# Patient Record
Sex: MALE | Race: WHITE | Employment: OTHER | URBAN - METROPOLITAN AREA
[De-identification: names, ages, dates, MRNs, and addresses within clinical notes are randomized per-mention and may not be internally consistent; named-entity substitution may affect disease eponyms.]

---

## 2018-06-18 ENCOUNTER — HOSPITAL ENCOUNTER (INPATIENT)
Age: 68
LOS: 3 days | Discharge: HOME | DRG: 637 | End: 2018-06-21
Attending: EMERGENCY MEDICINE | Admitting: INTERNAL MEDICINE
Payer: COMMERCIAL

## 2018-06-18 DIAGNOSIS — E13.10 DIABETIC KETOACIDOSIS WITHOUT COMA ASSOCIATED WITH OTHER SPECIFIED DIABETES MELLITUS (HCC): Primary | ICD-10-CM

## 2018-06-18 DIAGNOSIS — K92.2 GASTROINTESTINAL HEMORRHAGE, UNSPECIFIED GASTROINTESTINAL HEMORRHAGE TYPE: ICD-10-CM

## 2018-06-18 PROBLEM — E87.29 HIGH ANION GAP METABOLIC ACIDOSIS: Status: ACTIVE | Noted: 2018-06-18

## 2018-06-18 PROBLEM — E11.10 DKA, TYPE 2, NOT AT GOAL (HCC): Status: ACTIVE | Noted: 2018-06-18

## 2018-06-18 PROBLEM — E11.10 DKA (DIABETIC KETOACIDOSIS) (HCC): Status: ACTIVE | Noted: 2018-06-18

## 2018-06-18 PROBLEM — E11.10 DKA, TYPE 2, NOT AT GOAL (HCC): Status: RESOLVED | Noted: 2018-06-18 | Resolved: 2018-06-18

## 2018-06-18 PROBLEM — D64.9 ANEMIA: Status: ACTIVE | Noted: 2018-06-18

## 2018-06-18 LAB
ABO/RH: NORMAL
ACETAMINOPHEN LEVEL: <5 MCG/ML (ref 10–30)
ALBUMIN SERPL-MCNC: 3.6 G/DL (ref 3.5–5.2)
ALP BLD-CCNC: 52 U/L (ref 40–129)
ALT SERPL-CCNC: 62 U/L (ref 0–40)
AMPHETAMINE SCREEN, URINE: NOT DETECTED
ANION GAP SERPL CALCULATED.3IONS-SCNC: 12 MMOL/L (ref 7–16)
ANION GAP SERPL CALCULATED.3IONS-SCNC: 23 MMOL/L (ref 7–16)
ANTIBODY SCREEN: NORMAL
APTT: 21.6 SEC (ref 24.5–35.1)
AST SERPL-CCNC: 49 U/L (ref 0–39)
BARBITURATE SCREEN URINE: NOT DETECTED
BASOPHILS ABSOLUTE: 0.06 E9/L (ref 0–0.2)
BASOPHILS RELATIVE PERCENT: 0.6 % (ref 0–2)
BENZODIAZEPINE SCREEN, URINE: NOT DETECTED
BETA-HYDROXYBUTYRATE: >4.5 MMOL/L (ref 0.02–0.27)
BILIRUB SERPL-MCNC: 0.7 MG/DL (ref 0–1.2)
BILIRUBIN URINE: NEGATIVE
BLOOD, URINE: NEGATIVE
BUN BLDV-MCNC: 30 MG/DL (ref 8–23)
BUN BLDV-MCNC: 46 MG/DL (ref 8–23)
CALCIUM SERPL-MCNC: 8.1 MG/DL (ref 8.6–10.2)
CALCIUM SERPL-MCNC: 9.5 MG/DL (ref 8.6–10.2)
CANNABINOID SCREEN URINE: NOT DETECTED
CHLORIDE BLD-SCNC: 101 MMOL/L (ref 98–107)
CHLORIDE BLD-SCNC: 89 MMOL/L (ref 98–107)
CHP ED QC CHECK: NORMAL
CHP ED QC CHECK: NORMAL
CLARITY: CLEAR
CO2: 17 MMOL/L (ref 22–29)
CO2: 21 MMOL/L (ref 22–29)
COCAINE METABOLITE SCREEN URINE: NOT DETECTED
COLOR: YELLOW
CREAT SERPL-MCNC: 0.7 MG/DL (ref 0.7–1.2)
CREAT SERPL-MCNC: 0.7 MG/DL (ref 0.7–1.2)
EOSINOPHILS ABSOLUTE: 0.01 E9/L (ref 0.05–0.5)
EOSINOPHILS RELATIVE PERCENT: 0.1 % (ref 0–6)
ETHANOL: <10 MG/DL (ref 0–0.08)
FERRITIN: 36 NG/ML
FOLATE: >20 NG/ML (ref 4.8–24.2)
GFR AFRICAN AMERICAN: >60
GFR AFRICAN AMERICAN: >60
GFR NON-AFRICAN AMERICAN: >60 ML/MIN/1.73
GFR NON-AFRICAN AMERICAN: >60 ML/MIN/1.73
GLUCOSE BLD-MCNC: 153 MG/DL (ref 74–109)
GLUCOSE BLD-MCNC: 441 MG/DL
GLUCOSE BLD-MCNC: 470 MG/DL (ref 74–109)
GLUCOSE BLD-MCNC: 499 MG/DL
GLUCOSE URINE: >=1000 MG/DL
HBA1C MFR BLD: 13.3 % (ref 4.8–5.9)
HCT VFR BLD CALC: 21.6 % (ref 37–54)
HCT VFR BLD CALC: 24.8 % (ref 37–54)
HEMOGLOBIN: 7.6 G/DL (ref 12.5–16.5)
HEMOGLOBIN: 8.8 G/DL (ref 12.5–16.5)
IMMATURE GRANULOCYTES #: 0.06 E9/L
IMMATURE GRANULOCYTES %: 0.6 % (ref 0–5)
IMMATURE RETIC FRACT: 19.3 % (ref 2.3–13.4)
INR BLD: 1
IRON SATURATION: 55 % (ref 20–55)
IRON: 174 MCG/DL (ref 59–158)
KETONES, URINE: >=80 MG/DL
LACTIC ACID: 1.7 MMOL/L (ref 0.5–2.2)
LEUKOCYTE ESTERASE, URINE: NEGATIVE
LYMPHOCYTES ABSOLUTE: 1.18 E9/L (ref 1.5–4)
LYMPHOCYTES RELATIVE PERCENT: 12.1 % (ref 20–42)
MAGNESIUM: 1.8 MG/DL (ref 1.6–2.6)
MAGNESIUM: 2 MG/DL (ref 1.6–2.6)
MCH RBC QN AUTO: 34.4 PG (ref 26–35)
MCH RBC QN AUTO: 34.4 PG (ref 26–35)
MCHC RBC AUTO-ENTMCNC: 35.2 % (ref 32–34.5)
MCHC RBC AUTO-ENTMCNC: 35.5 % (ref 32–34.5)
MCV RBC AUTO: 96.9 FL (ref 80–99.9)
MCV RBC AUTO: 97.7 FL (ref 80–99.9)
METER GLUCOSE: 161 MG/DL (ref 70–110)
METER GLUCOSE: 161 MG/DL (ref 70–110)
METER GLUCOSE: 164 MG/DL (ref 70–110)
METER GLUCOSE: 174 MG/DL (ref 70–110)
METER GLUCOSE: 193 MG/DL (ref 70–110)
METER GLUCOSE: 253 MG/DL (ref 70–110)
METER GLUCOSE: 320 MG/DL (ref 70–110)
METER GLUCOSE: 366 MG/DL (ref 70–110)
METER GLUCOSE: 405 MG/DL (ref 70–110)
METER GLUCOSE: 441 MG/DL (ref 70–110)
METER GLUCOSE: 499 MG/DL (ref 70–110)
METHADONE SCREEN, URINE: NOT DETECTED
MONOCYTES ABSOLUTE: 0.66 E9/L (ref 0.1–0.95)
MONOCYTES RELATIVE PERCENT: 6.8 % (ref 2–12)
NEUTROPHILS ABSOLUTE: 7.78 E9/L (ref 1.8–7.3)
NEUTROPHILS RELATIVE PERCENT: 79.8 % (ref 43–80)
NITRITE, URINE: NEGATIVE
OPIATE SCREEN URINE: NOT DETECTED
PDW BLD-RTO: 12.8 FL (ref 11.5–15)
PDW BLD-RTO: 13.2 FL (ref 11.5–15)
PH UA: 5.5 (ref 5–9)
PHENCYCLIDINE SCREEN URINE: NOT DETECTED
PHOSPHORUS: 3.3 MG/DL (ref 2.5–4.5)
PHOSPHORUS: 3.8 MG/DL (ref 2.5–4.5)
PLATELET # BLD: 124 E9/L (ref 130–450)
PLATELET # BLD: 155 E9/L (ref 130–450)
PMV BLD AUTO: 10.3 FL (ref 7–12)
PMV BLD AUTO: 9.8 FL (ref 7–12)
POTASSIUM SERPL-SCNC: 3.9 MMOL/L (ref 3.5–5)
POTASSIUM SERPL-SCNC: 4.9 MMOL/L (ref 3.5–5)
PROPOXYPHENE SCREEN: NOT DETECTED
PROTEIN UA: NEGATIVE MG/DL
PROTHROMBIN TIME: 12 SEC (ref 9.3–12.4)
RBC # BLD: 2.21 E12/L (ref 3.8–5.8)
RBC # BLD: 2.56 E12/L (ref 3.8–5.8)
RETIC HGB EQUIVALENT: 38.7 PG (ref 28.2–36.6)
RETICULOCYTE ABSOLUTE COUNT: 0.09 E12/L
RETICULOCYTE COUNT PCT: 3.7 % (ref 0.4–1.9)
SALICYLATE, SERUM: <0.3 MG/DL (ref 0–30)
SODIUM BLD-SCNC: 129 MMOL/L (ref 132–146)
SODIUM BLD-SCNC: 134 MMOL/L (ref 132–146)
SPECIFIC GRAVITY UA: 1.01 (ref 1–1.03)
TOTAL IRON BINDING CAPACITY: 315 MCG/DL (ref 250–450)
TOTAL PROTEIN: 6 G/DL (ref 6.4–8.3)
TRICYCLIC ANTIDEPRESSANTS SCREEN SERUM: NEGATIVE NG/ML
TROPONIN: <0.01 NG/ML (ref 0–0.03)
UROBILINOGEN, URINE: 0.2 E.U./DL
VITAMIN B-12: >2000 PG/ML (ref 211–946)
WBC # BLD: 8.6 E9/L (ref 4.5–11.5)
WBC # BLD: 9.8 E9/L (ref 4.5–11.5)

## 2018-06-18 PROCEDURE — 86850 RBC ANTIBODY SCREEN: CPT

## 2018-06-18 PROCEDURE — 6360000002 HC RX W HCPCS: Performed by: PREVENTIVE MEDICINE

## 2018-06-18 PROCEDURE — 83605 ASSAY OF LACTIC ACID: CPT

## 2018-06-18 PROCEDURE — 83550 IRON BINDING TEST: CPT

## 2018-06-18 PROCEDURE — C9113 INJ PANTOPRAZOLE SODIUM, VIA: HCPCS | Performed by: PREVENTIVE MEDICINE

## 2018-06-18 PROCEDURE — 2580000003 HC RX 258: Performed by: EMERGENCY MEDICINE

## 2018-06-18 PROCEDURE — 82607 VITAMIN B-12: CPT

## 2018-06-18 PROCEDURE — 82010 KETONE BODYS QUAN: CPT

## 2018-06-18 PROCEDURE — G0480 DRUG TEST DEF 1-7 CLASSES: HCPCS

## 2018-06-18 PROCEDURE — 36415 COLL VENOUS BLD VENIPUNCTURE: CPT

## 2018-06-18 PROCEDURE — 81003 URINALYSIS AUTO W/O SCOPE: CPT

## 2018-06-18 PROCEDURE — 6370000000 HC RX 637 (ALT 250 FOR IP): Performed by: EMERGENCY MEDICINE

## 2018-06-18 PROCEDURE — 80048 BASIC METABOLIC PNL TOTAL CA: CPT

## 2018-06-18 PROCEDURE — 87040 BLOOD CULTURE FOR BACTERIA: CPT

## 2018-06-18 PROCEDURE — 99285 EMERGENCY DEPT VISIT HI MDM: CPT

## 2018-06-18 PROCEDURE — 82728 ASSAY OF FERRITIN: CPT

## 2018-06-18 PROCEDURE — 86901 BLOOD TYPING SEROLOGIC RH(D): CPT

## 2018-06-18 PROCEDURE — 80053 COMPREHEN METABOLIC PANEL: CPT

## 2018-06-18 PROCEDURE — 6360000002 HC RX W HCPCS: Performed by: EMERGENCY MEDICINE

## 2018-06-18 PROCEDURE — 85045 AUTOMATED RETICULOCYTE COUNT: CPT

## 2018-06-18 PROCEDURE — 85730 THROMBOPLASTIN TIME PARTIAL: CPT

## 2018-06-18 PROCEDURE — 83735 ASSAY OF MAGNESIUM: CPT

## 2018-06-18 PROCEDURE — 87081 CULTURE SCREEN ONLY: CPT

## 2018-06-18 PROCEDURE — 85027 COMPLETE CBC AUTOMATED: CPT

## 2018-06-18 PROCEDURE — 6360000002 HC RX W HCPCS: Performed by: INTERNAL MEDICINE

## 2018-06-18 PROCEDURE — 2000000000 HC ICU R&B

## 2018-06-18 PROCEDURE — 80307 DRUG TEST PRSMV CHEM ANLYZR: CPT

## 2018-06-18 PROCEDURE — 82962 GLUCOSE BLOOD TEST: CPT

## 2018-06-18 PROCEDURE — 87088 URINE BACTERIA CULTURE: CPT

## 2018-06-18 PROCEDURE — 2580000003 HC RX 258: Performed by: INTERNAL MEDICINE

## 2018-06-18 PROCEDURE — 2500000003 HC RX 250 WO HCPCS: Performed by: EMERGENCY MEDICINE

## 2018-06-18 PROCEDURE — 83540 ASSAY OF IRON: CPT

## 2018-06-18 PROCEDURE — 84484 ASSAY OF TROPONIN QUANT: CPT

## 2018-06-18 PROCEDURE — 82746 ASSAY OF FOLIC ACID SERUM: CPT

## 2018-06-18 PROCEDURE — 83036 HEMOGLOBIN GLYCOSYLATED A1C: CPT

## 2018-06-18 PROCEDURE — 86900 BLOOD TYPING SEROLOGIC ABO: CPT

## 2018-06-18 PROCEDURE — 85025 COMPLETE CBC W/AUTO DIFF WBC: CPT

## 2018-06-18 PROCEDURE — 85610 PROTHROMBIN TIME: CPT

## 2018-06-18 PROCEDURE — 84100 ASSAY OF PHOSPHORUS: CPT

## 2018-06-18 RX ORDER — MAGNESIUM SULFATE 1 G/100ML
1 INJECTION INTRAVENOUS PRN
Status: DISCONTINUED | OUTPATIENT
Start: 2018-06-18 | End: 2018-06-19

## 2018-06-18 RX ORDER — 0.9 % SODIUM CHLORIDE 0.9 %
1000 INTRAVENOUS SOLUTION INTRAVENOUS ONCE
Status: COMPLETED | OUTPATIENT
Start: 2018-06-18 | End: 2018-06-18

## 2018-06-18 RX ORDER — DEXTROSE MONOHYDRATE 25 G/50ML
12.5 INJECTION, SOLUTION INTRAVENOUS PRN
Status: DISCONTINUED | OUTPATIENT
Start: 2018-06-18 | End: 2018-06-21 | Stop reason: HOSPADM

## 2018-06-18 RX ORDER — SODIUM CHLORIDE 9 MG/ML
INJECTION, SOLUTION INTRAVENOUS CONTINUOUS
Status: DISCONTINUED | OUTPATIENT
Start: 2018-06-18 | End: 2018-06-18

## 2018-06-18 RX ORDER — SODIUM CHLORIDE 0.9 % (FLUSH) 0.9 %
10 SYRINGE (ML) INJECTION EVERY 12 HOURS SCHEDULED
Status: DISCONTINUED | OUTPATIENT
Start: 2018-06-18 | End: 2018-06-21 | Stop reason: HOSPADM

## 2018-06-18 RX ORDER — SODIUM CHLORIDE 9 MG/ML
INJECTION, SOLUTION INTRAVENOUS CONTINUOUS
Status: DISCONTINUED | OUTPATIENT
Start: 2018-06-18 | End: 2018-06-19

## 2018-06-18 RX ORDER — PANTOPRAZOLE SODIUM 40 MG/10ML
40 INJECTION, POWDER, LYOPHILIZED, FOR SOLUTION INTRAVENOUS DAILY
Status: DISCONTINUED | OUTPATIENT
Start: 2018-06-18 | End: 2018-06-18

## 2018-06-18 RX ORDER — DEXTROSE MONOHYDRATE 25 G/50ML
12.5 INJECTION, SOLUTION INTRAVENOUS PRN
Status: DISCONTINUED | OUTPATIENT
Start: 2018-06-18 | End: 2018-06-18 | Stop reason: SDUPTHER

## 2018-06-18 RX ORDER — ASCORBIC ACID 500 MG
1000 TABLET ORAL DAILY
COMMUNITY

## 2018-06-18 RX ORDER — 0.9 % SODIUM CHLORIDE 0.9 %
15 INTRAVENOUS SOLUTION INTRAVENOUS ONCE
Status: COMPLETED | OUTPATIENT
Start: 2018-06-18 | End: 2018-06-18

## 2018-06-18 RX ORDER — NICOTINE POLACRILEX 4 MG
15 LOZENGE BUCCAL PRN
Status: DISCONTINUED | OUTPATIENT
Start: 2018-06-18 | End: 2018-06-21 | Stop reason: HOSPADM

## 2018-06-18 RX ORDER — CHOLECALCIFEROL (VITAMIN D3) 125 MCG
500 CAPSULE ORAL DAILY
COMMUNITY

## 2018-06-18 RX ORDER — DEXTROSE MONOHYDRATE 50 MG/ML
100 INJECTION, SOLUTION INTRAVENOUS PRN
Status: DISCONTINUED | OUTPATIENT
Start: 2018-06-18 | End: 2018-06-21 | Stop reason: HOSPADM

## 2018-06-18 RX ORDER — ONDANSETRON 2 MG/ML
4 INJECTION INTRAMUSCULAR; INTRAVENOUS EVERY 6 HOURS PRN
Status: DISCONTINUED | OUTPATIENT
Start: 2018-06-18 | End: 2018-06-21 | Stop reason: HOSPADM

## 2018-06-18 RX ORDER — VITAMIN E 268 MG
400 CAPSULE ORAL DAILY
COMMUNITY

## 2018-06-18 RX ORDER — PANTOPRAZOLE SODIUM 40 MG/10ML
40 INJECTION, POWDER, LYOPHILIZED, FOR SOLUTION INTRAVENOUS 2 TIMES DAILY
Status: DISCONTINUED | OUTPATIENT
Start: 2018-06-18 | End: 2018-06-21 | Stop reason: HOSPADM

## 2018-06-18 RX ORDER — CHLORAL HYDRATE 500 MG
1000 CAPSULE ORAL DAILY
COMMUNITY

## 2018-06-18 RX ORDER — POTASSIUM CHLORIDE 7.45 MG/ML
10 INJECTION INTRAVENOUS PRN
Status: DISCONTINUED | OUTPATIENT
Start: 2018-06-18 | End: 2018-06-19

## 2018-06-18 RX ORDER — DEXTROSE, SODIUM CHLORIDE, AND POTASSIUM CHLORIDE 5; .45; .15 G/100ML; G/100ML; G/100ML
INJECTION INTRAVENOUS CONTINUOUS PRN
Status: DISCONTINUED | OUTPATIENT
Start: 2018-06-18 | End: 2018-06-19

## 2018-06-18 RX ORDER — VITAMIN B COMPLEX
1 CAPSULE ORAL DAILY
COMMUNITY

## 2018-06-18 RX ORDER — SODIUM CHLORIDE 0.9 % (FLUSH) 0.9 %
10 SYRINGE (ML) INJECTION PRN
Status: DISCONTINUED | OUTPATIENT
Start: 2018-06-18 | End: 2018-06-21 | Stop reason: HOSPADM

## 2018-06-18 RX ADMIN — POTASSIUM CHLORIDE 10 MEQ: 10 INJECTION, SOLUTION INTRAVENOUS at 21:00

## 2018-06-18 RX ADMIN — POTASSIUM CHLORIDE 10 MEQ: 10 INJECTION, SOLUTION INTRAVENOUS at 23:00

## 2018-06-18 RX ADMIN — SODIUM CHLORIDE 0.1 UNITS/KG/HR: 9 INJECTION, SOLUTION INTRAVENOUS at 14:14

## 2018-06-18 RX ADMIN — POTASSIUM CHLORIDE 10 MEQ: 10 INJECTION, SOLUTION INTRAVENOUS at 22:00

## 2018-06-18 RX ADMIN — SODIUM CHLORIDE 1000 ML: 9 INJECTION, SOLUTION INTRAVENOUS at 12:03

## 2018-06-18 RX ADMIN — SODIUM CHLORIDE: 9 INJECTION, SOLUTION INTRAVENOUS at 15:03

## 2018-06-18 RX ADMIN — PANTOPRAZOLE SODIUM 40 MG: 40 INJECTION, POWDER, FOR SOLUTION INTRAVENOUS at 15:27

## 2018-06-18 RX ADMIN — PANTOPRAZOLE SODIUM 40 MG: 40 INJECTION, POWDER, FOR SOLUTION INTRAVENOUS at 20:07

## 2018-06-18 RX ADMIN — SODIUM CHLORIDE 1020 ML: 9 INJECTION, SOLUTION INTRAVENOUS at 14:03

## 2018-06-18 RX ADMIN — Medication 10 ML: at 20:07

## 2018-06-18 RX ADMIN — POTASSIUM CHLORIDE, DEXTROSE MONOHYDRATE AND SODIUM CHLORIDE: 150; 5; 450 INJECTION, SOLUTION INTRAVENOUS at 19:20

## 2018-06-18 ASSESSMENT — PAIN SCALES - GENERAL
PAINLEVEL_OUTOF10: 0

## 2018-06-18 NOTE — CONSULTS
Critical Care Admit/Consult Note         Patient - Tania Alvarado   MRN -  [id number]   Acct # - [de-identified]   - 1950      Date of Admission -  2018 11:10 AM  Date of evaluation -  2018   Hospital Day - 0            ADMIT/CONSULT DETAILS     Reason for Admit/Consult   DKA    Consulting Karl Harris MD  Primary Care Physician - MD ROBERTA Farah   The patient is a 75 y.o. male with significant past medical history of diabetes mellitus, hepatitis C, hypertension, hyperlipidemia and right-sided inguinal hernia presenting to the emergency department with polyuria, fatigue and greater than 2 year history of 50 pound weight loss. The patient is the primary historian, he reports he has been treated with metformin for his diabetes for a number of years but that his last hemoglobin A1c was greater than 12. He was previously being managed by his primary care doctor for this but was recently referred to an endocrinologist who recommended he start on Triceeba and Novolin for better blood sugar management. The patient reports he was unable to  his medications from the pharmacy and has not had any of his insulin yet. Upon arrival to the emergency department a urinalysis demonstrated urinary ketones, his blood glucose was greater than 1000, his beta hydroxybutyrate was greater than 4.5 and anion gap was 23. He was initiated on the DKA protocol and started on IV fluid hydration. He was also found to be Hemoccult positive on rectal exam in the emergency department. He states he has not had a colonoscopy. Upon examination patient is asymptomatic other than mild fatigue and polyuria stating that he has been going to the bathroom approximately 12 times daily but denies any polyphagia or polydipsia. He denies any nausea, vomiting, diarrhea, hematochezia, melena, hematuria, chest pain, shortness of breath.          Past Medical History         Diagnosis Date 98.2 °F (36.8 °C)  Max: 98.2 °F (36.8 °C)  BP Range:  Systolic (72HIW), RQE:784 , Min:118 , LQT:068     Diastolic (66YZU), MUU:45, Min:67, Max:67    Pulse Range: Pulse  Av  Min: 100  Max: 100  Respiration Range: Resp  Av  Min: 18  Max: 18  Current Pulse Ox[de-identified]  SpO2: 100 %  24HR Pulse Ox Range:  SpO2  Av %  Min: 100 %  Max: 100 %  Oxygen Amount and Delivery:        I/O (24 Hours)    Patient Vitals for the past 8 hrs:   BP Temp Temp src Pulse Resp SpO2 Height Weight   18 1126 118/67 98.2 °F (36.8 °C) Oral 100 18 100 % 5' 10\" (1.778 m) 150 lb (68 kg)     No intake or output data in the 24 hours ending 18 1355  No intake/output data recorded. Patient Vitals for the past 96 hrs (Last 3 readings):   Weight   18 1126 150 lb (68 kg)         Drains/Tubes Outputs  None  Exam         PHYSICAL EXAM:  CONSTITUTIONAL:  awake, alert, cooperative, no apparent distress, and appears stated age  EYES: pupils equal, round and reactive to light, extra ocular muscles intact, sclera clear, conjunctiva normal  ENT:  Normocephalic, without obvious abnormality, atraumatic, sinuses nontender on palpation, external ears without lesions, oral pharynx with dry mucus membranes, tonsils without erythema or exudates, gums normal and good dentition. NECK:  Supple, symmetrical, trachea midline, no adenopathy, thyroid symmetric, not enlarged and no tenderness, skin normal  LUNGS:  No increased work of breathing, good air exchange, clear to auscultation bilaterally, no crackles or wheezing  CARDIOVASCULAR:  Normal apical impulse, regular rate and rhythm, normal S1 and S2, , and no murmur noted  ABDOMEN:  No scars, normal bowel sounds, soft, non-distended, non-tender, no masses palpated, no hepatosplenomegally  MUSCULOSKELETAL:  There is no redness, warmth, or swelling of the joints. Full range of motion noted. Motor strength is 5 out of 5 all extremities bilaterally.   Tone is normal.  NEUROLOGIC:  Awake, alert, requires no sedation. No acute issues at this time. Respiratory   Exam shows no labored breathing, bilateral lungs are clear to auscultation, SPO2 is within normal limits on room air. No acute issues at this time. Cardiovascular   Cardiac exam is unremarkable, there is no chest x-ray, or EKG. He is asymptomatic at this time. He'll be placed on telemetry. Gastrointestinal   Low-grade transaminitis, however he has a history of hepatitis C from a blood transfusion as a child. He'll be nothing by mouth at this time while on DKA protocol. His hemoglobin is 8.8 and there is no historical data to refer to a baseline. We will start patient on Protonix 40 mg twice daily and consult to Gen. surgery will be placed. Renal   Creatinine is at his baseline at 0.7. We will follow urine output. His urinalysis demonstrated urinary ketones and is otherwise unremarkable. Infectious Disease   There is no leukocytosis, the patient is afebrile. We will continue to monitor these parameters. Hematology/Oncology   Hemoglobin and hematocrit are both low, the patient denies any blood in the stool but was Hemoccult positive. We will consult to Gen. surgery and place patient on Protonix. Endocrine   Current clinical picture is consistent with diabetic ketoacidosis. He has urinary ketones, elevated beta hydroxybutyrate, anion gap of 23, and a blood glucose of 499. He will be initiated on the DKA protocol and received IV fluids according to this. The patient's sodium was also found to be low at 129 but when corrected is greater than 135 consistent with pseudohyponatremia, we'll continue to monitor this. Social/Spiritual/DNR/Other   Patient is full code    MECRED    \"Thank you for asking us to see this complex patient. \"    Total critical care time caring for this patient with life threatening, unstable organ failure, including direct patient contact, management of life support systems, review of data including imaging and 5 minutes    Critical Care Time: > 35 minutes excluding procedures    SIVA Cerda  6/18/2018  4:37 PM

## 2018-06-18 NOTE — ED PROVIDER NOTES
rhonchi. Not in respiratory distress  Cardiovascular:  Regular rate. Regular rhythm. No murmurs, no aortic murmurs, no gallops, or rubs. 2+ distal pulses. Equal extremity pulses. Chest: No chest wall tenderness  GI:  Abdomen Soft, Non tender, Non distended. +BS. No rebound, guarding, or rigidity. No pulsatile masses. Musculoskeletal: Moves all extremities x 4. Warm and well perfused, no clubbing, cyanosis, or edema. Capillary refill <3 seconds  Integument: skin warm and dry. No rashes. Neurologic: GCS 15, no focal deficits, symmetric strength 5/5 in the upper and lower extremities bilaterally  Psychiatric: Normal Affect    Rectal, guaiac positive stool        -------------------------------------------------- RESULTS -------------------------------------------------  I have personally reviewed all laboratory and imaging results for this patient. Results are listed below.      LABS:  Results for orders placed or performed during the hospital encounter of 06/18/18   CBC Auto Differential   Result Value Ref Range    WBC 9.8 4.5 - 11.5 E9/L    RBC 2.56 (L) 3.80 - 5.80 E12/L    Hemoglobin 8.8 (L) 12.5 - 16.5 g/dL    Hematocrit 24.8 (L) 37.0 - 54.0 %    MCV 96.9 80.0 - 99.9 fL    MCH 34.4 26.0 - 35.0 pg    MCHC 35.5 (H) 32.0 - 34.5 %    RDW 12.8 11.5 - 15.0 fL    Platelets 667 711 - 772 E9/L    MPV 10.3 7.0 - 12.0 fL    Neutrophils % 79.8 43.0 - 80.0 %    Immature Granulocytes % 0.6 0.0 - 5.0 %    Lymphocytes % 12.1 (L) 20.0 - 42.0 %    Monocytes % 6.8 2.0 - 12.0 %    Eosinophils % 0.1 0.0 - 6.0 %    Basophils % 0.6 0.0 - 2.0 %    Neutrophils # 7.78 (H) 1.80 - 7.30 E9/L    Immature Granulocytes # 0.06 E9/L    Lymphocytes # 1.18 (L) 1.50 - 4.00 E9/L    Monocytes # 0.66 0.10 - 0.95 E9/L    Eosinophils # 0.01 (L) 0.05 - 0.50 E9/L    Basophils # 0.06 0.00 - 0.20 E9/L   Comprehensive Metabolic Panel   Result Value Ref Range    Sodium 129 (L) 132 - 146 mmol/L    Potassium 4.9 3.5 - 5.0 mmol/L    Chloride 89 (L) 98 - 107 mg/dL   Urine Drug Screen   Result Value Ref Range    Amphetamine Screen, Urine NOT DETECTED Negative <1000 ng/mL    Barbiturate Screen, Ur NOT DETECTED Negative < 200 ng/mL    Benzodiazepine Screen, Urine NOT DETECTED Negative < 200 ng/mL    Cannabinoid Scrn, Ur NOT DETECTED Negative < 50ng/mL    COCAINE METABOLITE SCREEN URINE NOT DETECTED Negative < 300 ng/mL    Opiate Scrn, Ur NOT DETECTED Negative < 300ng/mL    PCP Scrn, Ur NOT DETECTED Negative < 25 ng/mL    Methadone Screen, Urine NOT DETECTED Negative <300 ng/mL    Propoxyphene Scrn, Ur NOT DETECTED Negative <300 ng/mL   Troponin   Result Value Ref Range    Troponin <0.01 0.00 - 0.03 ng/mL   CBC   Result Value Ref Range    WBC 8.6 4.5 - 11.5 E9/L    RBC 2.21 (L) 3.80 - 5.80 E12/L    Hemoglobin 7.6 (L) 12.5 - 16.5 g/dL    Hematocrit 21.6 (L) 37.0 - 54.0 %    MCV 97.7 80.0 - 99.9 fL    MCH 34.4 26.0 - 35.0 pg    MCHC 35.2 (H) 32.0 - 34.5 %    RDW 13.2 11.5 - 15.0 fL    Platelets 736 (L) 520 - 450 E9/L    MPV 9.8 7.0 - 12.0 fL   Protime-INR   Result Value Ref Range    Protime 12.0 9.3 - 12.4 sec    INR 1.0    APTT   Result Value Ref Range    aPTT 21.6 (L) 24.5 - 35.1 sec   Lactic acid, plasma   Result Value Ref Range    Lactic Acid 1.7 0.5 - 2.2 mmol/L   Basic metabolic panel   Result Value Ref Range    Sodium 134 132 - 146 mmol/L    Potassium 3.9 3.5 - 5.0 mmol/L    Chloride 101 98 - 107 mmol/L    CO2 21 (L) 22 - 29 mmol/L    Anion Gap 12 7 - 16 mmol/L    Glucose 153 (H) 74 - 109 mg/dL    BUN 30 (H) 8 - 23 mg/dL    CREATININE 0.7 0.7 - 1.2 mg/dL    GFR Non-African American >60 >=60 mL/min/1.73    GFR African American >60     Calcium 8.1 (L) 8.6 - 10.2 mg/dL   Magnesium   Result Value Ref Range    Magnesium 1.8 1.6 - 2.6 mg/dL   Phosphorus   Result Value Ref Range    Phosphorus 3.3 2.5 - 4.5 mg/dL   POCT Glucose   Result Value Ref Range    Glucose 499 mg/dL    QC OK? ok    POCT Glucose   Result Value Ref Range    Meter Glucose 499 (H) 70 - 110 mg/dL   POCT Intravenous New Bag 6/18/18 2300)   magnesium sulfate 1 g in dextrose 5% 100 mL IVPB (not administered)   sodium phosphate 10 mmol in dextrose 5 % 250 mL IVPB (not administered)     Or   sodium phosphate 15 mmol in dextrose 5 % 250 mL IVPB (not administered)     Or   sodium phosphate 20 mmol in dextrose 5 % 500 mL IVPB (not administered)   sodium chloride flush 0.9 % injection 10 mL (10 mLs Intravenous Given 6/18/18 2007)   sodium chloride flush 0.9 % injection 10 mL (not administered)   ondansetron (ZOFRAN) injection 4 mg (not administered)   glucose (GLUTOSE) 40 % oral gel 15 g (not administered)   dextrose 50 % solution 12.5 g (not administered)   glucagon (rDNA) injection 1 mg (not administered)   dextrose 5 % solution (not administered)   pantoprazole (PROTONIX) injection 40 mg (40 mg Intravenous Given 6/18/18 2007)   0.9 % sodium chloride bolus (0 mLs Intravenous Stopped 6/18/18 1328)              Medical Decision Making:     diabetic ketoacidosis, IV fluids, IV insulin, DKA protocol, needs ICU admission. Also with gastrointestinal bleeding, hemodynamically stable. Re-Evaluations:                  Improving      This patient's ED course included: a personal history and physicial examination, re-evaluation prior to disposition, multiple bedside re-evaluations, IV medications, cardiac monitoring, continuous pulse oximetry and complex medical decision making and emergency management    This patient has remained hemodynamically stable during their ED course. Consultations:  Dr. Jorge Danger  Dr. Chauncey Cotton:  Please note that the withdrawal or failure to initiate urgent interventions for this patient would likely result in a life threatening deterioration or permanent disability. Accordingly this patient received 30 minutes of critical care time, excluding separately billable procedures. Counseling:    The emergency provider has spoken with the patient and family and discussed todays

## 2018-06-18 NOTE — CARE COORDINATION
Social Work/Transition of Care:  Pt is a 74 y/o male who family requested to speak with SW,  SW introduced self and role, pt spouse and daughter at bedside and express concerns over the price of pt Insulin, pt currently has prescription drug coverage but family concerned that he may not be able to afford it. ADALBERTO provided number for [employer] in order for family to follow up.     Electronically signed by Peg Hendrickson on 6/61/4164 at 7:21 PM

## 2018-06-18 NOTE — CONSULTS
GENERAL SURGERY  CONSULT NOTE  6/18/2018    Physician Consulted: Dr. Connie Doyle  Reason for Consult: Anemia r/o GIB  Referring Physician: Dr. Jeanne Montez    HPI  Evelyn Hannah is a 75 y.o. male who presents for evaluation of anemia and GIB. He is admitted to MICU with DKA. Reports several days of darkened stools. Denies hematochezia. He was found to be HOC (+) in ED. He takes no AC at home. He denies abdominal pain, nausea, vomiting, history of reflux. History of HCV known to GI - he has completed treatment for his HCV. Last EGD < 1yr ago by Dr Brant Hughes to eval for varices per patient and colonoscopy < 3yrs ago without significant findings per patient. He denies smoking, Etoh, Nsaids or steroids. He denies fevers, chills, CP SOB. Past Medical History:   Diagnosis Date    Hepatitis C     Hernia, inguinal, right     Hyperlipidemia     Hypertension        Past Surgical History:   Procedure Laterality Date    NASAL SEPTUM SURGERY      TONSILLECTOMY         Medications Prior to Admission:    Prior to Admission medications    Medication Sig Start Date End Date Taking?  Authorizing Provider   metFORMIN (GLUCOPHAGE) 1000 MG tablet Take 1,000 mg by mouth 2 times daily (with meals)   Yes Historical Provider, MD   Omega-3 Fatty Acids (FISH OIL) 1000 MG CAPS Take 1,000 mg by mouth 3 times daily   Yes Historical Provider, MD   vitamin C (ASCORBIC ACID) 500 MG tablet Take 1,000 mg by mouth 3 times daily   Yes Historical Provider, MD   vitamin B-12 (CYANOCOBALAMIN) 500 MCG tablet Take 500 mcg by mouth 3 times daily   Yes Historical Provider, MD   vitamin E 400 UNIT capsule Take 400 Units by mouth 3 times daily   Yes Historical Provider, MD   b complex vitamins capsule Take 1 capsule by mouth 3 times daily   Yes Historical Provider, MD   Calcium-Magnesium-Zinc 500-250-12.5 MG TABS Take 1 tablet by mouth 3 times daily   Yes Historical Provider, MD   Insulin Degludec (TRESIBA FLEXTOUCH) 200 UNIT/ML SOPN Inject 30 Units into the skin nightly    Historical Provider, MD   insulin aspart (NOVOLOG FLEXPEN) 100 UNIT/ML injection pen Inject 6-16 Units into the skin 3 times daily (before meals) 6 units with breakfast and lunch PLUS 2 units for  and above. Increasing by 50   8 units with dinner PLUS 2 units for  and above. Increasing by 50    Historical Provider, MD       No Known Allergies    History reviewed. No pertinent family history. Social History   Substance Use Topics    Smoking status: Never Smoker    Smokeless tobacco: Never Used    Alcohol use No         Review of Systems as above      PHYSICAL EXAM:    Vitals:    06/18/18 1530   BP: 138/71   Pulse: 98   Resp: 21   Temp: 99.1 °F (37.3 °C)   SpO2: 97%       General Appearance:  awake, alert, oriented, in no acute distress  Skin:  Skin color, texture, turgor normal. No rashes or lesions. Head/face:  NCAT  Eyes:  No gross abnormalities. Lungs:  Normal expansion. No increased work of breathing. Heart:  Heart regular rate   Abdomen:  Soft, NT, ND, reducible R inguinal hernia  Extremities: Extremities warm to touch, pink, with no edema. Male Rectal:  HOC (+) melenic stool, no masses    LABS:    CBC  Recent Labs      06/18/18   1208   WBC  9.8   HGB  8.8*   HCT  24.8*   PLT  155     BMP  Recent Labs      06/18/18   1208   NA  129*   K  4.9   CL  89*   CO2  17*   BUN  46*   CREATININE  0.7   CALCIUM  9.5     Liver Function  Recent Labs      06/18/18   1208   BILITOT  0.7   AST  49*   ALT  62*   ALKPHOS  52   PROT  6.0*   LABALBU  3.6     No results for input(s): LACTATE in the last 72 hours. No results for input(s): INR, PTT in the last 72 hours. Invalid input(s): PT    RADIOLOGY    No results found. ASSESSMENT:  75 y.o. male with anemia, GIB.  Admitted with DKA    PLAN:    NPO   IVF  PPI bid  Trend HH - transfuse prn  Check coags, T&S    Will discuss EGD timing per general surgery or GI service likely when DKA resolves      Blayne Woods DO  6/18/18  5:04

## 2018-06-19 ENCOUNTER — ANESTHESIA (OUTPATIENT)
Dept: ENDOSCOPY | Age: 68
DRG: 637 | End: 2018-06-19
Payer: COMMERCIAL

## 2018-06-19 ENCOUNTER — ANESTHESIA EVENT (OUTPATIENT)
Dept: ENDOSCOPY | Age: 68
DRG: 637 | End: 2018-06-19
Payer: COMMERCIAL

## 2018-06-19 VITALS — OXYGEN SATURATION: 100 % | DIASTOLIC BLOOD PRESSURE: 55 MMHG | SYSTOLIC BLOOD PRESSURE: 107 MMHG

## 2018-06-19 LAB
ALBUMIN SERPL-MCNC: 3 G/DL (ref 3.5–5.2)
ALP BLD-CCNC: 42 U/L (ref 40–129)
ALT SERPL-CCNC: 50 U/L (ref 0–40)
ANION GAP SERPL CALCULATED.3IONS-SCNC: 10 MMOL/L (ref 7–16)
ANION GAP SERPL CALCULATED.3IONS-SCNC: 10 MMOL/L (ref 7–16)
ANION GAP SERPL CALCULATED.3IONS-SCNC: 11 MMOL/L (ref 7–16)
ANION GAP SERPL CALCULATED.3IONS-SCNC: 14 MMOL/L (ref 7–16)
AST SERPL-CCNC: 38 U/L (ref 0–39)
BASOPHILS ABSOLUTE: 0.05 E9/L (ref 0–0.2)
BASOPHILS RELATIVE PERCENT: 0.7 % (ref 0–2)
BILIRUB SERPL-MCNC: 0.5 MG/DL (ref 0–1.2)
BUN BLDV-MCNC: 14 MG/DL (ref 8–23)
BUN BLDV-MCNC: 15 MG/DL (ref 8–23)
BUN BLDV-MCNC: 22 MG/DL (ref 8–23)
BUN BLDV-MCNC: 26 MG/DL (ref 8–23)
CALCIUM SERPL-MCNC: 7.6 MG/DL (ref 8.6–10.2)
CALCIUM SERPL-MCNC: 7.8 MG/DL (ref 8.6–10.2)
CALCIUM SERPL-MCNC: 7.9 MG/DL (ref 8.6–10.2)
CALCIUM SERPL-MCNC: 8.4 MG/DL (ref 8.6–10.2)
CHLORIDE BLD-SCNC: 100 MMOL/L (ref 98–107)
CHLORIDE BLD-SCNC: 96 MMOL/L (ref 98–107)
CHLORIDE BLD-SCNC: 98 MMOL/L (ref 98–107)
CHLORIDE BLD-SCNC: 99 MMOL/L (ref 98–107)
CO2: 19 MMOL/L (ref 22–29)
CO2: 21 MMOL/L (ref 22–29)
CO2: 21 MMOL/L (ref 22–29)
CO2: 22 MMOL/L (ref 22–29)
CREAT SERPL-MCNC: 0.5 MG/DL (ref 0.7–1.2)
CREAT SERPL-MCNC: 0.6 MG/DL (ref 0.7–1.2)
EOSINOPHILS ABSOLUTE: 0.14 E9/L (ref 0.05–0.5)
EOSINOPHILS RELATIVE PERCENT: 2 % (ref 0–6)
GFR AFRICAN AMERICAN: >60
GFR NON-AFRICAN AMERICAN: >60 ML/MIN/1.73
GLUCOSE BLD-MCNC: 147 MG/DL (ref 74–109)
GLUCOSE BLD-MCNC: 170 MG/DL (ref 74–109)
GLUCOSE BLD-MCNC: 209 MG/DL (ref 74–109)
GLUCOSE BLD-MCNC: 273 MG/DL (ref 74–109)
HCT VFR BLD CALC: 20 % (ref 37–54)
HCT VFR BLD CALC: 22 % (ref 37–54)
HCT VFR BLD CALC: 25.2 % (ref 37–54)
HEMOGLOBIN: 6.7 G/DL (ref 12.5–16.5)
HEMOGLOBIN: 7.6 G/DL (ref 12.5–16.5)
HEMOGLOBIN: 8.7 G/DL (ref 12.5–16.5)
IMMATURE GRANULOCYTES #: 0.03 E9/L
IMMATURE GRANULOCYTES %: 0.4 % (ref 0–5)
LYMPHOCYTES ABSOLUTE: 1.57 E9/L (ref 1.5–4)
LYMPHOCYTES RELATIVE PERCENT: 22.3 % (ref 20–42)
MAGNESIUM: 1.8 MG/DL (ref 1.6–2.6)
MAGNESIUM: 1.8 MG/DL (ref 1.6–2.6)
MAGNESIUM: 2 MG/DL (ref 1.6–2.6)
MCH RBC QN AUTO: 33.5 PG (ref 26–35)
MCHC RBC AUTO-ENTMCNC: 34.5 % (ref 32–34.5)
MCV RBC AUTO: 96.9 FL (ref 80–99.9)
METER GLUCOSE: 124 MG/DL (ref 70–110)
METER GLUCOSE: 147 MG/DL (ref 70–110)
METER GLUCOSE: 149 MG/DL (ref 70–110)
METER GLUCOSE: 161 MG/DL (ref 70–110)
METER GLUCOSE: 163 MG/DL (ref 70–110)
METER GLUCOSE: 165 MG/DL (ref 70–110)
METER GLUCOSE: 181 MG/DL (ref 70–110)
METER GLUCOSE: 181 MG/DL (ref 70–110)
METER GLUCOSE: 193 MG/DL (ref 70–110)
METER GLUCOSE: 209 MG/DL (ref 70–110)
METER GLUCOSE: 213 MG/DL (ref 70–110)
METER GLUCOSE: 217 MG/DL (ref 70–110)
METER GLUCOSE: 217 MG/DL (ref 70–110)
METER GLUCOSE: 218 MG/DL (ref 70–110)
METER GLUCOSE: 226 MG/DL (ref 70–110)
METER GLUCOSE: 244 MG/DL (ref 70–110)
METER GLUCOSE: 466 MG/DL (ref 70–110)
METER GLUCOSE: >500 MG/DL (ref 70–110)
MONOCYTES ABSOLUTE: 0.83 E9/L (ref 0.1–0.95)
MONOCYTES RELATIVE PERCENT: 11.8 % (ref 2–12)
NEUTROPHILS ABSOLUTE: 4.43 E9/L (ref 1.8–7.3)
NEUTROPHILS RELATIVE PERCENT: 62.8 % (ref 43–80)
PATHOLOGIST REVIEW: NORMAL
PDW BLD-RTO: 13.3 FL (ref 11.5–15)
PHOSPHORUS: 2.3 MG/DL (ref 2.5–4.5)
PHOSPHORUS: 2.7 MG/DL (ref 2.5–4.5)
PHOSPHORUS: 3 MG/DL (ref 2.5–4.5)
PLATELET # BLD: 101 E9/L (ref 130–450)
PMV BLD AUTO: 9.8 FL (ref 7–12)
POTASSIUM SERPL-SCNC: 4.2 MMOL/L (ref 3.5–5)
POTASSIUM SERPL-SCNC: 4.4 MMOL/L (ref 3.5–5)
POTASSIUM SERPL-SCNC: 4.8 MMOL/L (ref 3.5–5)
POTASSIUM SERPL-SCNC: 5.3 MMOL/L (ref 3.5–5)
RBC # BLD: 2.27 E12/L (ref 3.8–5.8)
SODIUM BLD-SCNC: 129 MMOL/L (ref 132–146)
SODIUM BLD-SCNC: 130 MMOL/L (ref 132–146)
SODIUM BLD-SCNC: 130 MMOL/L (ref 132–146)
SODIUM BLD-SCNC: 132 MMOL/L (ref 132–146)
TOTAL PROTEIN: 5.3 G/DL (ref 6.4–8.3)
WBC # BLD: 7.1 E9/L (ref 4.5–11.5)

## 2018-06-19 PROCEDURE — 6360000002 HC RX W HCPCS: Performed by: NURSE ANESTHETIST, CERTIFIED REGISTERED

## 2018-06-19 PROCEDURE — 2580000003 HC RX 258: Performed by: INTERNAL MEDICINE

## 2018-06-19 PROCEDURE — 80053 COMPREHEN METABOLIC PANEL: CPT

## 2018-06-19 PROCEDURE — 2500000003 HC RX 250 WO HCPCS: Performed by: EMERGENCY MEDICINE

## 2018-06-19 PROCEDURE — C9113 INJ PANTOPRAZOLE SODIUM, VIA: HCPCS | Performed by: PREVENTIVE MEDICINE

## 2018-06-19 PROCEDURE — 1200000000 HC SEMI PRIVATE

## 2018-06-19 PROCEDURE — 06L38CZ OCCLUSION OF ESOPHAGEAL VEIN WITH EXTRALUMINAL DEVICE, VIA NATURAL OR ARTIFICIAL OPENING ENDOSCOPIC: ICD-10-PCS | Performed by: INTERNAL MEDICINE

## 2018-06-19 PROCEDURE — 3700000001 HC ADD 15 MINUTES (ANESTHESIA): Performed by: INTERNAL MEDICINE

## 2018-06-19 PROCEDURE — 6370000000 HC RX 637 (ALT 250 FOR IP): Performed by: INTERNAL MEDICINE

## 2018-06-19 PROCEDURE — 83735 ASSAY OF MAGNESIUM: CPT

## 2018-06-19 PROCEDURE — 85025 COMPLETE CBC W/AUTO DIFF WBC: CPT

## 2018-06-19 PROCEDURE — 82962 GLUCOSE BLOOD TEST: CPT

## 2018-06-19 PROCEDURE — 2580000003 HC RX 258: Performed by: NURSE ANESTHETIST, CERTIFIED REGISTERED

## 2018-06-19 PROCEDURE — 2580000003 HC RX 258: Performed by: EMERGENCY MEDICINE

## 2018-06-19 PROCEDURE — 3609012300 HC EGD BAND LIGATION ESOPHGEAL/GASTRIC VARICES: Performed by: INTERNAL MEDICINE

## 2018-06-19 PROCEDURE — 6370000000 HC RX 637 (ALT 250 FOR IP): Performed by: EMERGENCY MEDICINE

## 2018-06-19 PROCEDURE — 85018 HEMOGLOBIN: CPT

## 2018-06-19 PROCEDURE — 84100 ASSAY OF PHOSPHORUS: CPT

## 2018-06-19 PROCEDURE — 0DB68ZX EXCISION OF STOMACH, VIA NATURAL OR ARTIFICIAL OPENING ENDOSCOPIC, DIAGNOSTIC: ICD-10-PCS | Performed by: INTERNAL MEDICINE

## 2018-06-19 PROCEDURE — 3700000000 HC ANESTHESIA ATTENDED CARE: Performed by: INTERNAL MEDICINE

## 2018-06-19 PROCEDURE — 80048 BASIC METABOLIC PNL TOTAL CA: CPT

## 2018-06-19 PROCEDURE — 6360000002 HC RX W HCPCS: Performed by: PREVENTIVE MEDICINE

## 2018-06-19 PROCEDURE — 36415 COLL VENOUS BLD VENIPUNCTURE: CPT

## 2018-06-19 PROCEDURE — 6360000002 HC RX W HCPCS: Performed by: EMERGENCY MEDICINE

## 2018-06-19 PROCEDURE — 2720000010 HC SURG SUPPLY STERILE: Performed by: INTERNAL MEDICINE

## 2018-06-19 PROCEDURE — 85014 HEMATOCRIT: CPT

## 2018-06-19 RX ORDER — SODIUM CHLORIDE 9 MG/ML
INJECTION, SOLUTION INTRAVENOUS CONTINUOUS PRN
Status: DISCONTINUED | OUTPATIENT
Start: 2018-06-19 | End: 2018-06-19 | Stop reason: SDUPTHER

## 2018-06-19 RX ORDER — SODIUM CHLORIDE 9 MG/ML
INJECTION, SOLUTION INTRAVENOUS CONTINUOUS
Status: DISCONTINUED | OUTPATIENT
Start: 2018-06-19 | End: 2018-06-20

## 2018-06-19 RX ORDER — PHENYLEPHRINE HYDROCHLORIDE 10 MG/ML
INJECTION INTRAVENOUS
Status: DISPENSED
Start: 2018-06-19 | End: 2018-06-19

## 2018-06-19 RX ORDER — INSULIN GLARGINE 100 [IU]/ML
30 INJECTION, SOLUTION SUBCUTANEOUS DAILY
Status: DISCONTINUED | OUTPATIENT
Start: 2018-06-19 | End: 2018-06-21

## 2018-06-19 RX ORDER — DEXTROSE MONOHYDRATE 25 G/50ML
12.5 INJECTION, SOLUTION INTRAVENOUS PRN
Status: DISCONTINUED | OUTPATIENT
Start: 2018-06-19 | End: 2018-06-19 | Stop reason: SDUPTHER

## 2018-06-19 RX ORDER — PROPOFOL 10 MG/ML
INJECTION, EMULSION INTRAVENOUS PRN
Status: DISCONTINUED | OUTPATIENT
Start: 2018-06-19 | End: 2018-06-19 | Stop reason: SDUPTHER

## 2018-06-19 RX ORDER — NICOTINE POLACRILEX 4 MG
15 LOZENGE BUCCAL PRN
Status: DISCONTINUED | OUTPATIENT
Start: 2018-06-19 | End: 2018-06-19 | Stop reason: SDUPTHER

## 2018-06-19 RX ORDER — FENTANYL CITRATE 50 UG/ML
INJECTION, SOLUTION INTRAMUSCULAR; INTRAVENOUS PRN
Status: DISCONTINUED | OUTPATIENT
Start: 2018-06-19 | End: 2018-06-19 | Stop reason: SDUPTHER

## 2018-06-19 RX ORDER — DEXTROSE MONOHYDRATE 50 MG/ML
100 INJECTION, SOLUTION INTRAVENOUS PRN
Status: DISCONTINUED | OUTPATIENT
Start: 2018-06-19 | End: 2018-06-19 | Stop reason: SDUPTHER

## 2018-06-19 RX ADMIN — FENTANYL CITRATE 100 MCG: 50 INJECTION, SOLUTION INTRAMUSCULAR; INTRAVENOUS at 12:02

## 2018-06-19 RX ADMIN — POTASSIUM CHLORIDE 10 MEQ: 10 INJECTION, SOLUTION INTRAVENOUS at 02:00

## 2018-06-19 RX ADMIN — SODIUM CHLORIDE 6.05 UNITS/HR: 9 INJECTION, SOLUTION INTRAVENOUS at 10:05

## 2018-06-19 RX ADMIN — SODIUM CHLORIDE: 9 INJECTION, SOLUTION INTRAVENOUS at 21:28

## 2018-06-19 RX ADMIN — INSULIN LISPRO 4 UNITS: 100 INJECTION, SOLUTION INTRAVENOUS; SUBCUTANEOUS at 17:32

## 2018-06-19 RX ADMIN — POTASSIUM CHLORIDE 10 MEQ: 10 INJECTION, SOLUTION INTRAVENOUS at 03:00

## 2018-06-19 RX ADMIN — Medication 10 ML: at 21:30

## 2018-06-19 RX ADMIN — SODIUM PHOSPHATE, MONOBASIC, MONOHYDRATE 10 MMOL: 276; 142 INJECTION, SOLUTION INTRAVENOUS at 20:23

## 2018-06-19 RX ADMIN — INSULIN LISPRO 6 UNITS: 100 INJECTION, SOLUTION INTRAVENOUS; SUBCUTANEOUS at 21:37

## 2018-06-19 RX ADMIN — POTASSIUM CHLORIDE 10 MEQ: 10 INJECTION, SOLUTION INTRAVENOUS at 05:00

## 2018-06-19 RX ADMIN — PROPOFOL 200 MG: 10 INJECTION, EMULSION INTRAVENOUS at 12:02

## 2018-06-19 RX ADMIN — SODIUM CHLORIDE: 9 INJECTION, SOLUTION INTRAVENOUS at 12:00

## 2018-06-19 RX ADMIN — PANTOPRAZOLE SODIUM 40 MG: 40 INJECTION, POWDER, FOR SOLUTION INTRAVENOUS at 21:28

## 2018-06-19 RX ADMIN — POTASSIUM CHLORIDE 10 MEQ: 10 INJECTION, SOLUTION INTRAVENOUS at 06:00

## 2018-06-19 RX ADMIN — PANTOPRAZOLE SODIUM 40 MG: 40 INJECTION, POWDER, FOR SOLUTION INTRAVENOUS at 07:47

## 2018-06-19 RX ADMIN — Medication 10 ML: at 07:47

## 2018-06-19 RX ADMIN — INSULIN GLARGINE 30 UNITS: 100 INJECTION, SOLUTION SUBCUTANEOUS at 15:02

## 2018-06-19 RX ADMIN — POTASSIUM CHLORIDE, DEXTROSE MONOHYDRATE AND SODIUM CHLORIDE: 150; 5; 450 INJECTION, SOLUTION INTRAVENOUS at 00:00

## 2018-06-19 RX ADMIN — PHENYLEPHRINE HYDROCHLORIDE 100 MCG: 10 INJECTION INTRAVENOUS at 12:12

## 2018-06-19 ASSESSMENT — PAIN SCALES - GENERAL
PAINLEVEL_OUTOF10: 0

## 2018-06-19 NOTE — CARE COORDINATION
CM met with pt, wife & children in room this am to discuss CM/SW role, current plan of care, anticipated LOS & transition of care. Pt awake & talking. Wife & pt prefer pt returns home when ready for discharge. Decline home care at this time as daughter states they have a family friend who is a nurse & pt would prefer her to check on him. Explained home care role & will follow if they change their minds.

## 2018-06-19 NOTE — H&P
the skin 3 times daily (before meals) 6 units with breakfast and lunch PLUS 2 units for  and above. Increasing by 50  8 units with dinner PLUS 2 units for  and above. Increasing by 50    Allergies:    Patient has no known allergies. Social History:    reports that he has never smoked. He has never used smokeless tobacco. He reports that he does not drink alcohol or use drugs. Family History: Mother  age 28 breast cancer    REVIEW OF SYSTEMS    Constitutional: positive for weight loss, negative for chills and fevers  Eyes: negative for icterus and redness  Ears, nose, mouth, throat, and face: negative for epistaxis, hearing loss, nasal congestion, sore mouth, sore throat and tinnitus  Respiratory: negative for cough and hemoptysis  Cardiovascular: negative for chest pain and palpitations  Gastrointestinal: negative for abdominal pain and vomiting  Genitourinary:negative for dysuria and hematuria  Integument/breast: negative for pruritus and rash  Hematologic/lymphatic: negative for bleeding and easy bruising  Musculoskeletal:negative for arthralgias and back pain  Neurological: negative for coordination problems and dizziness  Behavioral/Psych: negative for anxiety and depression  Endocrine: negative for temperature intolerance  Allergic/Immunologic: negative for anaphylaxis and angioedema    PHYSICAL EXAM:    Vitals:  /68   Pulse 91   Temp 98.2 °F (36.8 °C) (Oral)   Resp 20   Ht 5' 10\" (1.778 m)   Wt 157 lb 13.6 oz (71.6 kg)   SpO2 99%   BMI 22.65 kg/m²     General appearance: alert, appears stated age and cooperative  Head: Normocephalic, without obvious abnormality, atraumatic  Eyes: conjunctivae/corneas clear. PERRL, EOM's intact. Fundi benign. Ears: normal TM's and external ear canals both ears  Nose: Nares normal. Septum midline. Mucosa normal. No drainage or sinus tenderness.   Throat: lips, mucosa, and tongue normal; teeth and gums normal  Neck: no adenopathy, no carotid Hematocrit 24.8 (L) 37.0 - 54.0 %     MCV 96.9 80.0 - 99.9 fL     MCH 34.4 26.0 - 35.0 pg     MCHC 35.5 (H) 32.0 - 34.5 %     RDW 12.8 11.5 - 15.0 fL     Platelets 483 938 - 816 E9/L     MPV 10.3 7.0 - 12.0 fL     Neutrophils % 79.8 43.0 - 80.0 %     Immature Granulocytes % 0.6 0.0 - 5.0 %     Lymphocytes % 12.1 (L) 20.0 - 42.0 %     Monocytes % 6.8 2.0 - 12.0 %     Eosinophils % 0.1 0.0 - 6.0 %     Basophils % 0.6 0.0 - 2.0 %     Neutrophils # 7.78 (H) 1.80 - 7.30 E9/L     Immature Granulocytes # 0.06 E9/L     Lymphocytes # 1.18 (L) 1.50 - 4.00 E9/L     Monocytes # 0.66 0.10 - 0.95 E9/L     Eosinophils # 0.01 (L) 0.05 - 0.50 E9/L     Basophils # 0.06 0.00 - 0.20 E9/L   Comprehensive Metabolic Panel   Result Value Ref Range     Sodium 129 (L) 132 - 146 mmol/L     Potassium 4.9 3.5 - 5.0 mmol/L     Chloride 89 (L) 98 - 107 mmol/L     CO2 17 (L) 22 - 29 mmol/L     Anion Gap 23 (H) 7 - 16 mmol/L     Glucose 470 (H) 74 - 109 mg/dL     BUN 46 (H) 8 - 23 mg/dL     CREATININE 0.7 0.7 - 1.2 mg/dL     GFR Non-African American >60 >=60 mL/min/1.73     GFR African American >60       Calcium 9.5 8.6 - 10.2 mg/dL     Total Protein 6.0 (L) 6.4 - 8.3 g/dL     Alb 3.6 3.5 - 5.2 g/dL     Total Bilirubin 0.7 0.0 - 1.2 mg/dL     Alkaline Phosphatase 52 40 - 129 U/L     ALT 62 (H) 0 - 40 U/L     AST 49 (H) 0 - 39 U/L   Beta-Hydroxybutyrate   Result Value Ref Range     Beta-Hydroxybutyrate >4.50 (H) 0.02 - 0.27 mmol/L   Urinalysis   Result Value Ref Range     Color, UA Yellow Straw/Yellow     Clarity, UA Clear Clear     Glucose, Ur >=1000 (A) Negative mg/dL     Bilirubin Urine Negative Negative     Ketones, Urine >=80 (A) Negative mg/dL     Specific Gravity, UA 1.015 1.005 - 1.030     Blood, Urine Negative Negative     pH, UA 5.5 5.0 - 9.0     Protein, UA Negative Negative mg/dL     Urobilinogen, Urine 0.2 <2.0 E.U./dL     Nitrite, Urine Negative Negative     Leukocyte Esterase, Urine Negative Negative   Hemoglobin A1c   Result Value Ref Range     Hemoglobin A1C 13.3 (H) 4.8 - 5.9 %   Iron and TIBC   Result Value Ref Range     Iron 174 (H) 59 - 158 mcg/dL     TIBC 315 250 - 450 mcg/dL     Iron Saturation 55 20 - 55 %   FERRITIN   Result Value Ref Range     Ferritin 36 ng/mL   VITAMIN B12 & FOLATE   Result Value Ref Range     Vitamin B-12 >2000 (H) 211 - 946 pg/mL     Folate >20.0 4.8 - 24.2 ng/mL   Magnesium   Result Value Ref Range     Magnesium 2.0 1.6 - 2.6 mg/dL   Phosphorus   Result Value Ref Range     Phosphorus 3.8 2.5 - 4.5 mg/dL   Reticulocytes   Result Value Ref Range     Retic Ct Pct 3.7 (H) 0.4 - 1.9 %     Retic Ct Abs 0.092 E12/L     Immature Retic Fract 19.3 (H) 2.3 - 13.4 %     Retic HGB Equivalent 38.7 (H) 28.2 - 36.6 pg   Serum Drug Screen   Result Value Ref Range     Ethanol Lvl <10 mg/dL     Acetaminophen Level <5.0 (L) 10.0 - 06.9 mcg/mL     Salicylate, Serum <9.2 0.0 - 30.0 mg/dL   Urine Drug Screen   Result Value Ref Range     Amphetamine Screen, Urine NOT DETECTED Negative <1000 ng/mL     Barbiturate Screen, Ur NOT DETECTED Negative < 200 ng/mL     Benzodiazepine Screen, Urine NOT DETECTED Negative < 200 ng/mL     Cannabinoid Scrn, Ur NOT DETECTED Negative < 50ng/mL     COCAINE METABOLITE SCREEN URINE NOT DETECTED Negative < 300 ng/mL     Opiate Scrn, Ur NOT DETECTED Negative < 300ng/mL     PCP Scrn, Ur NOT DETECTED Negative < 25 ng/mL     Methadone Screen, Urine NOT DETECTED Negative <300 ng/mL     Propoxyphene Scrn, Ur NOT DETECTED Negative <300 ng/mL   Troponin   Result Value Ref Range     Troponin <0.01 0.00 - 0.03 ng/mL   CBC   Result Value Ref Range     WBC 8.6 4.5 - 11.5 E9/L     RBC 2.21 (L) 3.80 - 5.80 E12/L     Hemoglobin 7.6 (L) 12.5 - 16.5 g/dL     Hematocrit 21.6 (L) 37.0 - 54.0 %     MCV 97.7 80.0 - 99.9 fL     MCH 34.4 26.0 - 35.0 pg     MCHC 35.2 (H) 32.0 - 34.5 %     RDW 13.2 11.5 - 15.0 fL     Platelets 191 (L) 255 - 450 E9/L     MPV 9.8 7.0 - 12.0 fL   Protime-INR   Result Value Ref Range     Protime 12.0 9.3 - 12.4 sec     INR 1.0     APTT   Result Value Ref Range     aPTT 21.6 (L) 24.5 - 35.1 sec   Lactic acid, plasma   Result Value Ref Range     Lactic Acid 1.7 0.5 - 2.2 mmol/L   Basic metabolic panel   Result Value Ref Range     Sodium 134 132 - 146 mmol/L     Potassium 3.9 3.5 - 5.0 mmol/L     Chloride 101 98 - 107 mmol/L     CO2 21 (L) 22 - 29 mmol/L     Anion Gap 12 7 - 16 mmol/L     Glucose 153 (H) 74 - 109 mg/dL     BUN 30 (H) 8 - 23 mg/dL     CREATININE 0.7 0.7 - 1.2 mg/dL     GFR Non-African American >60 >=60 mL/min/1.73     GFR African American >60       Calcium 8.1 (L) 8.6 - 10.2 mg/dL   Magnesium   Result Value Ref Range     Magnesium 1.8 1.6 - 2.6 mg/dL   Phosphorus   Result Value Ref Range     Phosphorus 3.3 2.5 - 4.5 mg/dL   POCT Glucose   Result Value Ref Range     Glucose 499 mg/dL     QC OK? ok     POCT Glucose   Result Value Ref Range     Meter Glucose 499 (H) 70 - 110 mg/dL   POCT glucose - every hour   Result Value Ref Range     Glucose 441 mg/dL     QC OK? ok     POCT Glucose   Result Value Ref Range     Meter Glucose 441 (H) 70 - 110 mg/dL   POCT Glucose   Result Value Ref Range     Meter Glucose 405 (H) 70 - 110 mg/dL   POCT Glucose   Result Value Ref Range     Meter Glucose 366 (H) 70 - 110 mg/dL   POCT Glucose   Result Value Ref Range     Meter Glucose 320 (H) 70 - 110 mg/dL   POCT Glucose   Result Value Ref Range     Meter Glucose 253 (H) 70 - 110 mg/dL   POCT Glucose   Result Value Ref Range     Meter Glucose 193 (H) 70 - 110 mg/dL   POCT Glucose   Result Value Ref Range     Meter Glucose 174 (H) 70 - 110 mg/dL   POCT Glucose   Result Value Ref Range     Meter Glucose 161 (H) 70 - 110 mg/dL   TYPE AND SCREEN   Result Value Ref Range     ABO/Rh O POS       Antibody Screen NEG        Results for Rachel Rebollar (MRN [id number]) as of 6/19/2018 13:11   Ref.  Range 6/18/2018 15:35   Hemoglobin A1C Latest Ref Range: 4.8 - 5.9 % 13.3 (H)       Problem list:    Patient Active Problem List

## 2018-06-19 NOTE — CONSULTS
Gastroenterology Consult Note   Nita MAYERS with Sunni Prescott M.D. Consult Note        Date of Service: 6/19/2018  Reason for Consult: UGIB, known to patient  Requesting Physician: Dr Hayder Mejia:  Hyperglycemia    History Obtained From:  patient, electronic medical record    HISTORY OF PRESENT ILLNESS:       Marcela Ramírez is a 75 y.o. male with significant past medical history of Hep C--S/P treatment with [facility] (post treatment HCV RNA PCR < 15 on 2/9/18) ,Esophageal varices, hepatic fibrosis, Diabetes mellitus, type II, right inguinal hernia, hyperlipidemia and HTN admitted via ED for chief c/o hyperglycemia. Pt reports that his blood sugars are high with associated polydipsia, polyuria and fatigue. Review of the medical record states patient was to start insulin and did not pick it up from the pharmacy. Patient stating, \" I have never taken insulin\". When asked if he was suppose to start insulin, he stated \"I don't know\". He admits to 50-60 pound weight loss over 2 yr period of time--reports the last 20# came off in the last 6 months. Reports he does not have much of an appetite. Denies abdominal pain, nausea or emesis. Reports he has been having dark stools, \"almost black color\" for a few weeks. Denies hematochezia or hematemesis. Denies fever, chills or sweats. Admission labs WBC 9.8, RBC 2.56, Hgb 8.8, Hct 24.8, MCHC 35.5, Lymph 12.1%, Na 129, CL 89, CO2 17, BUN 46, Crt 0.7, Anion gap 23, , Hgb A1C 13.3, Beta hydroxybutyrate > 4.50. Consultation for UGIB. Pt is  known to Dr. Annie Molina, last seen in office Nov 2017 for follow up for hepatic fibrosis. He was scheduled at that time for repeat EGD to be done in March of this year to R/O varices--He called and cancelled the procedure stating he had one in the past and did not want one at this time. EGD per Dr Annie Molina on  3/10/16 which demonstrated grade 3/4 varices banded with 3 bands and gastritis.   Surgical pathology demonstrated duodenum with no pathologic alterations. Currently, pt reports he feels fine. \"I still don't have an appetite\". Continues to deny abdominal pain, nausea or emesis. Labs today Alb 3.0, ALP 42, ALT 50, AST 38, T Protein 5.3, , RBC 2.27, Hgb 7.6, Hct 22.0, Plt 101, Na 130, CO2 21, Crt 0.6, Ca+ 7.6.     Past Medical History:        Diagnosis Date    Hepatitis C     Hernia, inguinal, right     Hyperlipidemia     Hypertension      Past Surgical History:        Procedure Laterality Date    NASAL SEPTUM SURGERY      TONSILLECTOMY       Current Medications:    Current Facility-Administered Medications: phenylephrine (VAZCULEP) 10 MG/ML injection, , ,   [COMPLETED] 0.9 % sodium chloride bolus, 15 mL/kg, Intravenous, Once **FOLLOWED BY** 0.9 % sodium chloride infusion, , Intravenous, Continuous  dextrose 5 % and 0.45 % NaCl with KCl 20 mEq infusion, , Intravenous, Continuous PRN  insulin regular (HUMULIN R;NOVOLIN R) 100 Units in sodium chloride 0.9 % 100 mL infusion, 0.1 Units/kg/hr, Intravenous, Continuous  potassium chloride 10 mEq/100 mL IVPB (Peripheral Line), 10 mEq, Intravenous, PRN  magnesium sulfate 1 g in dextrose 5% 100 mL IVPB, 1 g, Intravenous, PRN  sodium phosphate 10 mmol in dextrose 5 % 250 mL IVPB, 10 mmol, Intravenous, PRN **OR** sodium phosphate 15 mmol in dextrose 5 % 250 mL IVPB, 15 mmol, Intravenous, PRN **OR** sodium phosphate 20 mmol in dextrose 5 % 500 mL IVPB, 20 mmol, Intravenous, PRN  sodium chloride flush 0.9 % injection 10 mL, 10 mL, Intravenous, 2 times per day  sodium chloride flush 0.9 % injection 10 mL, 10 mL, Intravenous, PRN  ondansetron (ZOFRAN) injection 4 mg, 4 mg, Intravenous, Q6H PRN  glucose (GLUTOSE) 40 % oral gel 15 g, 15 g, Oral, PRN  dextrose 50 % solution 12.5 g, 12.5 g, Intravenous, PRN  glucagon (rDNA) injection 1 mg, 1 mg, Intramuscular, PRN  dextrose 5 % solution, 100 mL/hr, Intravenous, PRN  pantoprazole (PROTONIX) injection 40 mg, 40 mg, 06/19/2018    MCV 96.9 06/19/2018    MCH 33.5 06/19/2018    MCHC 34.5 06/19/2018    RDW 13.3 06/19/2018    LYMPHOPCT 22.3 06/19/2018    MONOPCT 11.8 06/19/2018    BASOPCT 0.7 06/19/2018    MONOSABS 0.83 06/19/2018    LYMPHSABS 1.57 06/19/2018    EOSABS 0.14 06/19/2018    BASOSABS 0.05 06/19/2018     CMP:    Lab Results   Component Value Date     06/19/2018    K 4.4 06/19/2018    CL 99 06/19/2018    CO2 21 06/19/2018    BUN 22 06/19/2018    CREATININE 0.6 06/19/2018    GFRAA >60 06/19/2018    LABGLOM >60 06/19/2018    GLUCOSE 170 06/19/2018    GLUCOSE 114 03/29/2012    PROT 5.3 06/19/2018    LABALBU 3.0 06/19/2018    LABALBU 4.3 03/29/2012    CALCIUM 7.6 06/19/2018    BILITOT 0.5 06/19/2018    ALKPHOS 42 06/19/2018    AST 38 06/19/2018    ALT 50 06/19/2018     Hepatic Function Panel:    Lab Results   Component Value Date    ALKPHOS 42 06/19/2018    ALT 50 06/19/2018    AST 38 06/19/2018    PROT 5.3 06/19/2018    BILITOT 0.5 06/19/2018    LABALBU 3.0 06/19/2018    LABALBU 4.3 03/29/2012     PT/INR:    Lab Results   Component Value Date    PROTIME 12.0 06/18/2018    PROTIME 11.3 03/29/2012    INR 1.0 06/18/2018     PTT:    Lab Results   Component Value Date    APTT 21.6 06/18/2018   [APTT}  Last 3 Troponin:    Lab Results   Component Value Date    TROPONINI <0.01 06/18/2018     TSH:    Lab Results   Component Value Date    TSH 1.590 02/02/2016     VITAMIN B12:   Lab Results   Component Value Date    NCLRIRYZ53 >2000 06/18/2018     FOLATE:    Lab Results   Component Value Date    FOLATE >20.0 06/18/2018     IRON:    Lab Results   Component Value Date    IRON 174 06/18/2018     Iron Saturation:    Lab Results   Component Value Date    LABIRON 55 06/18/2018     TIBC:    Lab Results   Component Value Date    TIBC 315 06/18/2018     FERRITIN:    Lab Results   Component Value Date    FERRITIN 36 06/18/2018     CORAL:    Lab Results   Component Value Date    CORAL NEGATIVE 02/02/2016        Ref.  Range 6/18/2018 12:08

## 2018-06-19 NOTE — PROGRESS NOTES
Intensive Care Daily Quality Rounding Checklist    ICU Team Members: Bedside Nurse, Charge Nurse, Respiratory Therapist, Clinical Pharmacist, Dr. Geovany Isbell and residents    ICU Day #: NUMBER: 2    Intubation Date: N/A    Ventilator Day #: N/A    Central Line Insertion Date: N/A      Day #: N/A     Arterial Line Insertion Date:  N/A      Day #: N/A    DVT Prophylaxis: SCD's    GI Prophylaxis:  Protonix    Skin Issues/ Wounds and ordered treatment discussed on rounds: N    Goals/ Plans for the Day: Wean off insulin drip and downgrade patient.

## 2018-06-19 NOTE — PROGRESS NOTES
15 mmol PRN   Or     sodium phosphate IVPB 20 mmol PRN   sodium chloride flush 10 mL PRN   ondansetron 4 mg Q6H PRN   glucose 15 g PRN   dextrose 12.5 g PRN   glucagon (rDNA) 1 mg PRN   dextrose 100 mL/hr PRN         VENT SETTINGS (Comprehensive) (if applicable): Additional Respiratory  Assessments  Pulse: 91  Resp: 20  SpO2: 99 %    ABGs:   No results for input(s): PH, PCO2, PO2, HCO3, BE, O2SAT in the last 72 hours.     Laboratory findings:    Complete Blood Count: Recent Labs      06/18/18   1208  06/18/18 2015 06/19/18   0430   WBC  9.8  8.6  7.1   HGB  8.8*  7.6*  7.6*   HCT  24.8*  21.6*  22.0*   PLT  155  124*  101*        Last 3 Blood Glucose:   Recent Labs      06/18/18 2015 06/19/18   0030  06/19/18   0430   GLUCOSE  153*  147*  170*        PT/INR:    Lab Results   Component Value Date    PROTIME 12.0 06/18/2018    PROTIME 11.3 03/29/2012    INR 1.0 06/18/2018     PTT:    Lab Results   Component Value Date    APTT 21.6 06/18/2018       Comprehensive Metabolic Profile:   Recent Labs      06/18/18 2015 06/19/18   0030  06/19/18   0430   NA  134  132  130*   K  3.9  4.2  4.4   CL  101  100  99   CO2  21*  21*  21*   BUN  30*  26*  22   CREATININE  0.7  0.6*  0.6*   GLUCOSE  153*  147*  170*   CALCIUM  8.1*  7.9*  7.6*   PROT   --    --   5.3*   LABALBU   --    --   3.0*   BILITOT   --    --   0.5   ALKPHOS   --    --   42   AST   --    --   38   ALT   --    --   50*      Magnesium:   Lab Results   Component Value Date    MG 1.8 06/19/2018     Phosphorus:   Lab Results   Component Value Date    PHOS 2.7 06/19/2018     Ionized Calcium: No results found for: CAION     Urinalysis:     Troponin:   Recent Labs      06/18/18   1700   TROPONINI  <0.01       Microbiology:    Cultures during this admission:     Blood cultures:                 [] None drawn      [x] Negative             []  Positive (Details: 6/18)  Urine Culture:                   [] None drawn      [x] Negative             []  Positive (Details: 6/18 )  Sputum Culture:               [] None drawn       [] Negative             []  Positive (Details:  )   Endotracheal aspirate:     [] None drawn       [] Negative             []  Positive (Details:  )     Other pertinent Labs:       Radiology/Imaging:     Chest Xray (6/19/2018):  none      ASSESSMENT:     Principal Problem:    DKA (diabetic ketoacidosis) ([facility] [address]  Active Problems:    High anion gap metabolic acidosis    Diabetes mellitus type 2, uncontrolled ([facility] [address]    Anemia  Resolved Problems:    DKA, type 2, not at goal [facility])      Additional assessment:    ·         PLAN:     WEAN PER PROTOCOL:  [x] No   [] Yes  [] N/A    DISCONTINUE ANY LABS:   [x] No   [] Yes    ICU PROPHYLAXIS:  Stress ulcer:  [x] PPI Agent  [] [id number] [] Sucralfate  [] Other:  VTE:   [] Enoxaparin  [] Unfract. Heparin Subcut  [x] EPC Cuffs    NUTRITION:  [] NPO [] Tube Feeding (Specify: ) [] TPN  [x] PO (Diet: DIET CLEAR LIQUID;)    HOME MEDICATIONS RECONCILED: [x] No  [] Yes    INSULIN DRIP:   [] No   [x] Yes    CONSULTATION NEEDED:  [x] No   [] Yes    FAMILY UPDATED:    [x] No   [] Yes    TRANSFER OUT OF ICU:   [x] No   [] Yes    ADDITIONAL PLAN:  1. Neuro:  A/Ox3, no pain, no sedation, no acute issues  2. Resp: CTAB, SPO2 WNL, no acute issues  3. CV: MAP>65 on no pressors. 4. GI/Endo: NPO for EGD today, D5 with Insulin drip per DKA protocol, anion gap closed x2. Transfer to floor after discontinue Insulin drip. 5. Renal: Cr 0.6.    6. Infectious: No leukocytosis, Cultures pending, on no antibiotics. 7. Heme: H/H stable and WNL  8. GI/Dvt: Protonix, SCD's  9. Code: Full        Jie DO Marissa                  6/19/2018, 1:08 PM     Attending Physician Attestation: Dr. Casper Part you very much for allowing me to see this patient in consultation and follow up.     I personally saw, examined and provided care for the patient. Radiographs, labs and medication list were reviewed by me independently.  I spoke with bedside nursing, respiratory therapists and consultants. Critical care services and times documented are independent of procedures and multidisciplinary rounds with Residents. Additionally comprehensive, multidisciplinary rounds were conducted with the MICU team. The case was discussed in detail and plans for care were established. Review of Residents documentation was conducted and revisions were made as appropriate. I agree with the the above documented information.      ASSESSMENT:  1.) DKA  2.) Anion Gap Metabolic Acidosis   3.) GI Bleed  4.) H/O HCV - s/p treatment      In addition the following applies:     Check: blood cultures x 2, urine drug screen, troponins x 3, EKG  Medication Alterations: insulin drip, DKA protocol  Procedures: per GI/surgery   Imaging: reviewed  New Consultations: GI  VENT: n/a     - once EGD completed look to transition to insulin, feed and turn off insulin drip 1 hour afterwards  - look to transfer patient out of ICU level of care today if bed needed but otherwise tomorrow    Thank you for allowing me to participate in the care of this patient.     Care reviewed with nursing staff, medical and surgical specialty care, primary care and the patient's family as available. Restraints are ordered when the patient can do harm to him/herself by pulling out devices. Critical care time spent reviewing labs/films, examining patient, collaborating with other physicians but excluding procedures for life threatening organ failure is:    Chart review/lab review/X-ray viewing/documentation: 10 minutes  Assessment: 10 minutes  Conversation patient/family re: prognosis, care options and any end of life issues: 10 minutes  Conversation with staff: 5 minutes     Critical Care Time: > 35 minutes excluding procedures    Angelica Kwong M.D.     Angelica Kwong  6/19/2018  1:35 PM

## 2018-06-20 LAB
ALBUMIN SERPL-MCNC: 3.2 G/DL (ref 3.5–5.2)
ALP BLD-CCNC: 51 U/L (ref 40–129)
ALT SERPL-CCNC: 50 U/L (ref 0–40)
ANION GAP SERPL CALCULATED.3IONS-SCNC: 13 MMOL/L (ref 7–16)
AST SERPL-CCNC: 42 U/L (ref 0–39)
BASOPHILS ABSOLUTE: 0.05 E9/L (ref 0–0.2)
BASOPHILS RELATIVE PERCENT: 0.6 % (ref 0–2)
BILIRUB SERPL-MCNC: 0.4 MG/DL (ref 0–1.2)
BILIRUBIN DIRECT: <0.2 MG/DL (ref 0–0.3)
BILIRUBIN, INDIRECT: ABNORMAL MG/DL (ref 0–1)
BUN BLDV-MCNC: 15 MG/DL (ref 8–23)
CALCIUM SERPL-MCNC: 7.8 MG/DL (ref 8.6–10.2)
CHLORIDE BLD-SCNC: 95 MMOL/L (ref 98–107)
CO2: 19 MMOL/L (ref 22–29)
CREAT SERPL-MCNC: 0.5 MG/DL (ref 0.7–1.2)
EOSINOPHILS ABSOLUTE: 0.1 E9/L (ref 0.05–0.5)
EOSINOPHILS RELATIVE PERCENT: 1.3 % (ref 0–6)
GFR AFRICAN AMERICAN: >60
GFR NON-AFRICAN AMERICAN: >60 ML/MIN/1.73
GLUCOSE BLD-MCNC: 261 MG/DL (ref 74–109)
HCT VFR BLD CALC: 22.9 % (ref 37–54)
HCT VFR BLD CALC: 23.4 % (ref 37–54)
HEMOGLOBIN: 8.2 G/DL (ref 12.5–16.5)
HEMOGLOBIN: 8.3 G/DL (ref 12.5–16.5)
IMMATURE GRANULOCYTES #: 0.03 E9/L
IMMATURE GRANULOCYTES %: 0.4 % (ref 0–5)
LYMPHOCYTES ABSOLUTE: 1.21 E9/L (ref 1.5–4)
LYMPHOCYTES RELATIVE PERCENT: 15.3 % (ref 20–42)
MAGNESIUM: 1.9 MG/DL (ref 1.6–2.6)
MCH RBC QN AUTO: 34 PG (ref 26–35)
MCHC RBC AUTO-ENTMCNC: 35.8 % (ref 32–34.5)
MCV RBC AUTO: 95 FL (ref 80–99.9)
METER GLUCOSE: 217 MG/DL (ref 70–110)
METER GLUCOSE: 266 MG/DL (ref 70–110)
METER GLUCOSE: 339 MG/DL (ref 70–110)
METER GLUCOSE: 360 MG/DL (ref 70–110)
METER GLUCOSE: 363 MG/DL (ref 70–110)
MONOCYTES ABSOLUTE: 0.69 E9/L (ref 0.1–0.95)
MONOCYTES RELATIVE PERCENT: 8.7 % (ref 2–12)
MRSA CULTURE ONLY: NORMAL
NEUTROPHILS ABSOLUTE: 5.84 E9/L (ref 1.8–7.3)
NEUTROPHILS RELATIVE PERCENT: 73.7 % (ref 43–80)
PDW BLD-RTO: 13 FL (ref 11.5–15)
PHOSPHORUS: 2.7 MG/DL (ref 2.5–4.5)
PLATELET # BLD: 139 E9/L (ref 130–450)
PMV BLD AUTO: 9.6 FL (ref 7–12)
POTASSIUM SERPL-SCNC: 4.2 MMOL/L (ref 3.5–5)
RBC # BLD: 2.41 E12/L (ref 3.8–5.8)
SODIUM BLD-SCNC: 127 MMOL/L (ref 132–146)
TOTAL PROTEIN: 5.6 G/DL (ref 6.4–8.3)
WBC # BLD: 7.9 E9/L (ref 4.5–11.5)

## 2018-06-20 PROCEDURE — 36415 COLL VENOUS BLD VENIPUNCTURE: CPT

## 2018-06-20 PROCEDURE — 6370000000 HC RX 637 (ALT 250 FOR IP): Performed by: INTERNAL MEDICINE

## 2018-06-20 PROCEDURE — 6370000000 HC RX 637 (ALT 250 FOR IP): Performed by: CLINICAL NURSE SPECIALIST

## 2018-06-20 PROCEDURE — 6360000002 HC RX W HCPCS: Performed by: PREVENTIVE MEDICINE

## 2018-06-20 PROCEDURE — 85018 HEMOGLOBIN: CPT

## 2018-06-20 PROCEDURE — 85014 HEMATOCRIT: CPT

## 2018-06-20 PROCEDURE — 80048 BASIC METABOLIC PNL TOTAL CA: CPT

## 2018-06-20 PROCEDURE — 82962 GLUCOSE BLOOD TEST: CPT

## 2018-06-20 PROCEDURE — C9113 INJ PANTOPRAZOLE SODIUM, VIA: HCPCS | Performed by: PREVENTIVE MEDICINE

## 2018-06-20 PROCEDURE — 84100 ASSAY OF PHOSPHORUS: CPT

## 2018-06-20 PROCEDURE — 2580000003 HC RX 258: Performed by: INTERNAL MEDICINE

## 2018-06-20 PROCEDURE — 85025 COMPLETE CBC W/AUTO DIFF WBC: CPT

## 2018-06-20 PROCEDURE — 80076 HEPATIC FUNCTION PANEL: CPT

## 2018-06-20 PROCEDURE — 1200000000 HC SEMI PRIVATE

## 2018-06-20 PROCEDURE — 83735 ASSAY OF MAGNESIUM: CPT

## 2018-06-20 RX ORDER — NADOLOL 20 MG/1
20 TABLET ORAL DAILY
Status: DISCONTINUED | OUTPATIENT
Start: 2018-06-20 | End: 2018-06-21 | Stop reason: HOSPADM

## 2018-06-20 RX ADMIN — INSULIN LISPRO 6 UNITS: 100 INJECTION, SOLUTION INTRAVENOUS; SUBCUTANEOUS at 09:12

## 2018-06-20 RX ADMIN — PANTOPRAZOLE SODIUM 40 MG: 40 INJECTION, POWDER, FOR SOLUTION INTRAVENOUS at 09:19

## 2018-06-20 RX ADMIN — Medication 10 ML: at 21:49

## 2018-06-20 RX ADMIN — INSULIN LISPRO 2 UNITS: 100 INJECTION, SOLUTION INTRAVENOUS; SUBCUTANEOUS at 21:50

## 2018-06-20 RX ADMIN — PANTOPRAZOLE SODIUM 40 MG: 40 INJECTION, POWDER, FOR SOLUTION INTRAVENOUS at 21:50

## 2018-06-20 RX ADMIN — Medication 10 ML: at 09:19

## 2018-06-20 RX ADMIN — INSULIN GLARGINE 30 UNITS: 100 INJECTION, SOLUTION SUBCUTANEOUS at 09:09

## 2018-06-20 RX ADMIN — INSULIN LISPRO 10 UNITS: 100 INJECTION, SOLUTION INTRAVENOUS; SUBCUTANEOUS at 18:35

## 2018-06-20 RX ADMIN — SODIUM CHLORIDE: 9 INJECTION, SOLUTION INTRAVENOUS at 10:41

## 2018-06-20 RX ADMIN — NADOLOL 20 MG: 20 TABLET ORAL at 18:35

## 2018-06-20 RX ADMIN — INSULIN LISPRO 10 UNITS: 100 INJECTION, SOLUTION INTRAVENOUS; SUBCUTANEOUS at 13:32

## 2018-06-20 ASSESSMENT — PAIN SCALES - GENERAL
PAINLEVEL_OUTOF10: 0

## 2018-06-20 NOTE — PROGRESS NOTES
Physical Therapy    Facility/Department: [facility]LE      NAME: Estee Lemon  : 1950  MRN: [id number]    Date of Service: 2018    Attempted to see pt for PT evaluation, pt declined stating that he will just have his wife walk with him when she gets here.   Marhenrry Neighbours PT [phone]

## 2018-06-20 NOTE — PROGRESS NOTES
Occupational Therapy      Occupational Therapy referral received. Eval attempted - upon entry patient sitting on couch visiting with family members. Therapist explained reason for visit - at this time patient feels no need for occupational therapy. States waling around and performing own self care. No further questions/concerns   OT order discontinued - patient agreed.

## 2018-06-20 NOTE — PROGRESS NOTES
Physical Therapy  PT maría attempted. Pt independently walking around room upon arrival and states he's been walking the halls with his wife. Pt does not feel he needs PT. Will discharge order per pt request and independence with mobility in room .

## 2018-06-20 NOTE — PROGRESS NOTES
Subjective: Tolerating clear liquid diet    Has not been out of bed    Family at bedside, concerned about GI bleeding    Objective:    /77   Pulse 107   Temp 96.6 °F (35.9 °C) (Oral)   Resp 16   Ht 5' 10\" (1.778 m)   Wt 167 lb 4.8 oz (75.9 kg)   SpO2 95%   BMI 24.01 kg/m²   Skin: Warm and dry  Neck: Supple, no JVD  Heart:  RRR, no murmurs, gallops, or rubs. Lungs:  CTA bilaterally, no wheeze, rales or rhonchi  Abd: bowel sounds present, nontender, nondistended, no masses  Extrem:  No clubbing, cyanosis, or edema, pulses intact    I/O last 3 completed shifts:   In: 2480 [P.O.:360; I.V.:2120]  Out: -     Laboratory:     CBC with Differential:    Lab Results   Component Value Date    WBC 7.9 06/20/2018    RBC 2.41 06/20/2018    HGB 8.2 06/20/2018    HCT 22.9 06/20/2018     06/20/2018    MCV 95.0 06/20/2018    MCH 34.0 06/20/2018    MCHC 35.8 06/20/2018    RDW 13.0 06/20/2018    LYMPHOPCT 15.3 06/20/2018    MONOPCT 8.7 06/20/2018    BASOPCT 0.6 06/20/2018    MONOSABS 0.69 06/20/2018    LYMPHSABS 1.21 06/20/2018    EOSABS 0.10 06/20/2018    BASOSABS 0.05 06/20/2018     Hemoglobin/Hematocrit:    Lab Results   Component Value Date    HGB 8.2 06/20/2018    HCT 22.9 06/20/2018     CMP:    Lab Results   Component Value Date     06/20/2018    K 4.2 06/20/2018    CL 95 06/20/2018    CO2 19 06/20/2018    BUN 15 06/20/2018    CREATININE 0.5 06/20/2018    GFRAA >60 06/20/2018    LABGLOM >60 06/20/2018    GLUCOSE 261 06/20/2018    GLUCOSE 114 03/29/2012    PROT 5.6 06/20/2018    LABALBU 3.2 06/20/2018    LABALBU 4.3 03/29/2012    CALCIUM 7.8 06/20/2018    BILITOT 0.4 06/20/2018    ALKPHOS 51 06/20/2018    AST 42 06/20/2018    ALT 50 06/20/2018     Hepatic Function Panel:    Lab Results   Component Value Date    ALKPHOS 51 06/20/2018    ALT 50 06/20/2018    AST 42 06/20/2018    PROT 5.6 06/20/2018    BILITOT 0.4 06/20/2018    BILIDIR <0.2 06/20/2018    IBILI see below 06/20/2018    LABALBU 3.2 06/20/2018    LABALBU 4.3 03/29/2012        Current Facility-Administered Medications   Medication Dose Route Frequency Provider Last Rate Last Dose    insulin glargine (LANTUS) injection vial 30 Units  30 Units Subcutaneous Daily Doen Velásquez MD   30 Units at 06/20/18 0909    insulin lispro (HUMALOG) injection vial 0-12 Units  0-12 Units Subcutaneous TID WC Deon Velásquez MD   6 Units at 06/20/18 0912    insulin lispro (HUMALOG) injection vial 0-6 Units  0-6 Units Subcutaneous Nightly Deon Velásquez MD   6 Units at 06/19/18 2137    sodium chloride flush 0.9 % injection 10 mL  10 mL Intravenous 2 times per day Luciana Stovall MD   10 mL at 06/20/18 0919    sodium chloride flush 0.9 % injection 10 mL  10 mL Intravenous PRN Luciana Stovall MD        ondansetron [facility]) injection 4 mg  4 mg Intravenous Q6H PRN Luciana Stovall MD        glucose (GLUTOSE) 40 % oral gel 15 g  15 g Oral PRN Luciana Stovall MD        dextrose 50 % solution 12.5 g  12.5 g Intravenous PRN Luciana Stovall MD        glucagon (rDNA) injection 1 mg  1 mg Intramuscular PRN Luciana Stovall MD        dextrose 5 % solution  100 mL/hr Intravenous PRHARSHAD Stovall MD        pantoprazole (PROTONIX) injection 40 mg  40 mg Intravenous BID Jieharshad Ann, DO   40 mg at 06/20/18 0919       Problem list:    Patient Active Problem List   Diagnosis    High anion gap metabolic acidosis    DKA (diabetic ketoacidosis) ([facility] [address]    Diabetes mellitus type 2, uncontrolled ([facility] [address]    Anemia       Assessment:    1. Diabetic ketoacidosis.      2. Diabetes mellitus type II, uncontrolled     3. Acute blood loss anemia.      4. Hepatitis C with cirrhosis     5. Acute GI bleed     6. Esophageal varices     7. Essential hypertension     8. Hyperlipidemia       Plan:    1. Stop intravenous fluids    2. Continue PPI    3. Discontinue telemetry. 4. Increase activity    5. PT/OT evaluation    6. Monitor H/H    7.  Advance diet at the discretion of

## 2018-06-20 NOTE — PROGRESS NOTES
Notified NA Merry regarding patient's daily weight to be obtained and specifics associated with that

## 2018-06-20 NOTE — PROGRESS NOTES
PROGRESS NOTE    Patient Presents with/Seen in Consultation For      *UGIB, known to patient  CHIEF COMPLAINT:  Hyperglycemia    Subjective:     Patient States he feels good today. Patient reports a mild twinge off and on esophageal region. Patient denies nausea, vomiting, melena, or hematochezia. Patient states he is tolerating clear liquid diet, asking to advance. Wife and daughter at bedside, EGD results reviewed with patient, wife, and daughter with other questions answered. Review of Systems  Aside from what was mentioned in the PMH and HPI, essentially unremarkable, all others negative. Objective:     Patient Vitals for the past 8 hrs:   BP Temp Temp src Pulse Resp SpO2 Weight   06/20/18 0645 - - - - - - 167 lb 4.8 oz (75.9 kg)   06/20/18 0400 129/77 96.6 °F (35.9 °C) Oral 107 16 95 % -       General appearance: alert, awake, Sitting up in bed, pale, and cooperative  Eyes: conjunctiva pale, sclera nonicteric bilaterally. PERRL.   Lungs: clear to auscultation bilaterally  Heart: regular rate and rhythm, no murmur, 2+ pulses; no edema  Abdomen: softly distended, dull to percussion, non-tender; bowel sounds normal; no masses,  no organomegaly  Extremities: extremities without edema  Pulses: 2+ and symmetric  Skin: Skin color pale, texture, turgor normal.   Neurologic: Grossly normal      insulin glargine (LANTUS) injection vial 30 Units Daily   insulin lispro (HUMALOG) injection vial 0-12 Units TID WC   insulin lispro (HUMALOG) injection vial 0-6 Units Nightly   0.9 % sodium chloride infusion Continuous   sodium chloride flush 0.9 % injection 10 mL 2 times per day   sodium chloride flush 0.9 % injection 10 mL PRN   ondansetron (ZOFRAN) injection 4 mg Q6H PRN   glucose (GLUTOSE) 40 % oral gel 15 g PRN   dextrose 50 % solution 12.5 g PRN   glucagon (rDNA) injection 1 mg PRN   dextrose 5 % solution PRN   pantoprazole (PROTONIX) injection 40 mg BID        Data Review  CBC: Lab Results   Component Value Date WBC 7.9 06/20/2018    RBC 2.41 06/20/2018    HGB 8.2 06/20/2018    HCT 22.9 06/20/2018    MCV 95.0 06/20/2018    MCH 34.0 06/20/2018    MCHC 35.8 06/20/2018    RDW 13.0 06/20/2018     06/20/2018    MPV 9.6 06/20/2018     CMP:  Lab Results   Component Value Date     06/20/2018    K 4.2 06/20/2018    CL 95 06/20/2018    CO2 19 06/20/2018    BUN 15 06/20/2018    CREATININE 0.5 06/20/2018    GFRAA >60 06/20/2018    LABGLOM >60 06/20/2018    GLUCOSE 261 06/20/2018    GLUCOSE 114 03/29/2012    PROT 5.6 06/20/2018    LABALBU 3.2 06/20/2018    LABALBU 4.3 03/29/2012    CALCIUM 7.8 06/20/2018    BILITOT 0.4 06/20/2018    ALKPHOS 51 06/20/2018    AST 42 06/20/2018    ALT 50 06/20/2018     Hepatic Function Panel:  Lab Results   Component Value Date    ALKPHOS 51 06/20/2018    ALT 50 06/20/2018    AST 42 06/20/2018    PROT 5.6 06/20/2018    BILITOT 0.4 06/20/2018    BILIDIR <0.2 06/20/2018    IBILI see below 06/20/2018    LABALBU 3.2 06/20/2018    LABALBU 4.3 03/29/2012       PT/INR:    Lab Results   Component Value Date    PROTIME 12.0 06/18/2018    PROTIME 11.3 03/29/2012    INR 1.0 06/18/2018     IRON:    Lab Results   Component Value Date    IRON 174 06/18/2018     Iron Saturation:  No components found for: PERCENTFE  FERRITIN:    Lab Results   Component Value Date    FERRITIN 36 06/18/2018         Assessment:     Principal Problem:    DKA (diabetic ketoacidosis) ([facility] [address]  Active Problems:  ? GI bleed  ? 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 Anemia, normocytic, normochromic  ? Hx Hep C treated with Vosevi  ? HTN  ? DM  ? Hx of esophageal varices--banded March 2016  ? S/P EGD 6/19/18 -Three large varices, grade 3/4, banded with three bands, otherwise, unremarkable. Stomach showed mild gastritis. Duodenum unremarkable. Plan:     ? Advance diet to gi soft  ? Continue Protonix as ordered  ? Monitor daily CBC, LFTs, INR  ? Defer co-morbidities to others  ?  Continue to follow    Discussed with Dr. Magdy Aly per Dr. Elisa Lopez [id number], CNP 6/20/2018 9:01 AM For Dr. Kimberli Mas. I HAVE REVIEWED AND AGREE WITH THE PLAN OF CARE. REVIEWED LABS/RADIOLOGY. HISTORY AND EXAM BY ME SHOWS: ABDOMEN IS SOFT, LAX, AND NON-TENDER. DOING GREAT. NO COMPLAINTS. EGD RESULTS D/W PT AND WIFE WITH ALL THEIR QUESTIONS ANSWERED. START NADOLOL PER OUR ORDERS. ADVANCE DIET. OK FOR DISCHARGE IN AM FROM GI POV.

## 2018-06-20 NOTE — PROGRESS NOTES
Nutrition Education    Type and Reason for Visit: Initial, Consult, Patient Education (Diet Education - Family requested education for current diet)    Patient stated need for education on a Carb Control, Low Fiber/GI Soft Diet. · Verbally reviewed following information with the patient and pt's wife. · Written educational materials provided. · Contact name and number provided. · Refer to Patient Education activity for more details.     Electronically signed by Naheed Urbano RD, LD on 6/20/18 at 4:13 PM    Contact Number: ext [phone]

## 2018-06-20 NOTE — PATIENT CARE CONFERENCE
P Quality Flow/Interdisciplinary Rounds Progress Note        Quality Flow Rounds held on June 20, 2018    Disciplines Attending:  Bedside Nurse, ,  and Nursing Unit Leadership    Gucci Cadet was admitted on 6/18/2018 11:10 AM    Anticipated Discharge Date:  Expected Discharge Date: 06/21/18    Disposition:    Nilton Score:  Nilton Scale Score: 22    Readmission Score:         Discussed patient goal for the day, patient clinical progression, and barriers to discharge.   The following Goal(s) of the Day/Commitment(s) have been identified:  advance diet, monitor BS      [facility]  June 20, 2018

## 2018-06-21 VITALS
HEART RATE: 81 BPM | DIASTOLIC BLOOD PRESSURE: 63 MMHG | OXYGEN SATURATION: 92 % | BODY MASS INDEX: 24.05 KG/M2 | WEIGHT: 168 LBS | HEIGHT: 70 IN | TEMPERATURE: 98.4 F | SYSTOLIC BLOOD PRESSURE: 113 MMHG | RESPIRATION RATE: 16 BRPM

## 2018-06-21 LAB
ALBUMIN SERPL-MCNC: 3.2 G/DL (ref 3.5–5.2)
ALP BLD-CCNC: 57 U/L (ref 40–129)
ALT SERPL-CCNC: 45 U/L (ref 0–40)
ANION GAP SERPL CALCULATED.3IONS-SCNC: 12 MMOL/L (ref 7–16)
AST SERPL-CCNC: 34 U/L (ref 0–39)
BASOPHILS ABSOLUTE: 0.05 E9/L (ref 0–0.2)
BASOPHILS RELATIVE PERCENT: 0.5 % (ref 0–2)
BILIRUB SERPL-MCNC: 0.4 MG/DL (ref 0–1.2)
BILIRUBIN DIRECT: <0.2 MG/DL (ref 0–0.3)
BILIRUBIN, INDIRECT: ABNORMAL MG/DL (ref 0–1)
BUN BLDV-MCNC: 14 MG/DL (ref 8–23)
CALCIUM SERPL-MCNC: 8.1 MG/DL (ref 8.6–10.2)
CHLORIDE BLD-SCNC: 102 MMOL/L (ref 98–107)
CO2: 22 MMOL/L (ref 22–29)
CREAT SERPL-MCNC: 0.7 MG/DL (ref 0.7–1.2)
EOSINOPHILS ABSOLUTE: 0.11 E9/L (ref 0.05–0.5)
EOSINOPHILS RELATIVE PERCENT: 1.1 % (ref 0–6)
GFR AFRICAN AMERICAN: >60
GFR NON-AFRICAN AMERICAN: >60 ML/MIN/1.73
GLUCOSE BLD-MCNC: 67 MG/DL (ref 74–109)
HCT VFR BLD CALC: 24.6 % (ref 37–54)
HEMOGLOBIN: 8.6 G/DL (ref 12.5–16.5)
IMMATURE GRANULOCYTES #: 0.05 E9/L
IMMATURE GRANULOCYTES %: 0.5 % (ref 0–5)
LYMPHOCYTES ABSOLUTE: 1.92 E9/L (ref 1.5–4)
LYMPHOCYTES RELATIVE PERCENT: 18.4 % (ref 20–42)
MCH RBC QN AUTO: 34.3 PG (ref 26–35)
MCHC RBC AUTO-ENTMCNC: 35 % (ref 32–34.5)
MCV RBC AUTO: 98 FL (ref 80–99.9)
METER GLUCOSE: 113 MG/DL (ref 70–110)
METER GLUCOSE: 348 MG/DL (ref 70–110)
MONOCYTES ABSOLUTE: 0.99 E9/L (ref 0.1–0.95)
MONOCYTES RELATIVE PERCENT: 9.5 % (ref 2–12)
NEUTROPHILS ABSOLUTE: 7.33 E9/L (ref 1.8–7.3)
NEUTROPHILS RELATIVE PERCENT: 70 % (ref 43–80)
PDW BLD-RTO: 13.3 FL (ref 11.5–15)
PHOSPHORUS: 2.8 MG/DL (ref 2.5–4.5)
PLATELET # BLD: 241 E9/L (ref 130–450)
PMV BLD AUTO: 9.9 FL (ref 7–12)
POTASSIUM SERPL-SCNC: 4 MMOL/L (ref 3.5–5)
RBC # BLD: 2.51 E12/L (ref 3.8–5.8)
SODIUM BLD-SCNC: 136 MMOL/L (ref 132–146)
TOTAL PROTEIN: 5.7 G/DL (ref 6.4–8.3)
URINE CULTURE, ROUTINE: NORMAL
WBC # BLD: 10.5 E9/L (ref 4.5–11.5)

## 2018-06-21 PROCEDURE — 2580000003 HC RX 258: Performed by: INTERNAL MEDICINE

## 2018-06-21 PROCEDURE — 80048 BASIC METABOLIC PNL TOTAL CA: CPT

## 2018-06-21 PROCEDURE — 82962 GLUCOSE BLOOD TEST: CPT

## 2018-06-21 PROCEDURE — C9113 INJ PANTOPRAZOLE SODIUM, VIA: HCPCS | Performed by: PREVENTIVE MEDICINE

## 2018-06-21 PROCEDURE — 6370000000 HC RX 637 (ALT 250 FOR IP): Performed by: CLINICAL NURSE SPECIALIST

## 2018-06-21 PROCEDURE — 80076 HEPATIC FUNCTION PANEL: CPT

## 2018-06-21 PROCEDURE — 85025 COMPLETE CBC W/AUTO DIFF WBC: CPT

## 2018-06-21 PROCEDURE — 6370000000 HC RX 637 (ALT 250 FOR IP): Performed by: INTERNAL MEDICINE

## 2018-06-21 PROCEDURE — 84100 ASSAY OF PHOSPHORUS: CPT

## 2018-06-21 PROCEDURE — 6360000002 HC RX W HCPCS: Performed by: PREVENTIVE MEDICINE

## 2018-06-21 PROCEDURE — 36415 COLL VENOUS BLD VENIPUNCTURE: CPT

## 2018-06-21 RX ORDER — INSULIN GLARGINE 100 [IU]/ML
35 INJECTION, SOLUTION SUBCUTANEOUS NIGHTLY
Status: DISCONTINUED | OUTPATIENT
Start: 2018-06-21 | End: 2018-06-21 | Stop reason: HOSPADM

## 2018-06-21 RX ORDER — DEXTROSE MONOHYDRATE 50 MG/ML
100 INJECTION, SOLUTION INTRAVENOUS PRN
Status: DISCONTINUED | OUTPATIENT
Start: 2018-06-21 | End: 2018-06-21 | Stop reason: SDUPTHER

## 2018-06-21 RX ORDER — PANTOPRAZOLE SODIUM 40 MG/1
40 TABLET, DELAYED RELEASE ORAL DAILY
Qty: 30 TABLET | Refills: 3 | Status: SHIPPED | OUTPATIENT
Start: 2018-06-21 | End: 2020-06-02

## 2018-06-21 RX ORDER — NICOTINE POLACRILEX 4 MG
15 LOZENGE BUCCAL PRN
Status: DISCONTINUED | OUTPATIENT
Start: 2018-06-21 | End: 2018-06-21 | Stop reason: SDUPTHER

## 2018-06-21 RX ORDER — NADOLOL 20 MG/1
20 TABLET ORAL DAILY
Qty: 30 TABLET | Refills: 3 | Status: SHIPPED | OUTPATIENT
Start: 2018-06-22 | End: 2020-06-02

## 2018-06-21 RX ORDER — DEXTROSE MONOHYDRATE 25 G/50ML
12.5 INJECTION, SOLUTION INTRAVENOUS PRN
Status: DISCONTINUED | OUTPATIENT
Start: 2018-06-21 | End: 2018-06-21 | Stop reason: SDUPTHER

## 2018-06-21 RX ADMIN — PANTOPRAZOLE SODIUM 40 MG: 40 INJECTION, POWDER, FOR SOLUTION INTRAVENOUS at 09:17

## 2018-06-21 RX ADMIN — INSULIN GLARGINE 30 UNITS: 100 INJECTION, SOLUTION SUBCUTANEOUS at 09:17

## 2018-06-21 RX ADMIN — Medication 10 ML: at 09:17

## 2018-06-21 RX ADMIN — NADOLOL 20 MG: 20 TABLET ORAL at 09:17

## 2018-06-21 RX ADMIN — INSULIN LISPRO 3 UNITS: 100 INJECTION, SOLUTION INTRAVENOUS; SUBCUTANEOUS at 13:03

## 2018-06-21 RX ADMIN — INSULIN LISPRO 8 UNITS: 100 INJECTION, SOLUTION INTRAVENOUS; SUBCUTANEOUS at 12:22

## 2018-06-21 ASSESSMENT — PAIN SCALES - GENERAL: PAINLEVEL_OUTOF10: 0

## 2018-06-21 NOTE — DISCHARGE SUMMARY
Physician Discharge Summary     Patient ID:  Missy Sotelo  [id number]  75 y.o.  1950    Admit date: 2018    Discharge date and time: 2018 2:34 PM      Admission Diagnoses: DKA, type 2, not at goal [facility]) [E13.10]  DKA, type 2, not at goal [facility]) [E13.10]    Discharge Diagnoses:     1. Diabetic ketoacidosis.      2. Diabetes mellitus type II, uncontrolled     3. Acute blood loss anemia.      4. Hepatitis C with cirrhosis     5. Acute GI bleed     6. Esophageal varices     7. Essential hypertension     8. Hyperlipidemia    Consults: pulmonary/intensive care and GI    Procedures:   Hammad Irby MD Physician Signed Gastroenterology  Op Note Date of Service: 2018  8:36 PM         []Manual[]Template  []Copied                [address] [address]                                  OPERATIVE REPORT     PATIENT NAME: Alfonzo Grier                          :        1950  MED REC NO:   [id number]                            ROOM:       [facility]  ACCOUNT NO:   [de-identified]                           ADMIT DATE: 2018  PROVIDER:     Corey Johnson MD     DATE OF PROCEDURE:  2018     PROCEDURE PERFORMED:  Upper endoscopy with esophageal variceal banding.     PREOPERATIVE DIAGNOSES:  Severe anemia, history of esophageal varices.     POSTOPERATIVE DIAGNOSES:  Three large varices, grade 3/4, banded with three  bands, otherwise, unremarkable. Stomach showed mild gastritis.   Duodenum  unremarkable.     ANESTHESIA:  LMAC.     NOTE:  Prior to the procedure an informed consent was obtained from the  patient after explaining the benefits as well as the risks, alternatives,  and complications of the procedure to the patient, who understood and  agreed.     PROCEDURE:  With the patient in the left lateral decubitus position, the  Olympus GIF-100 forward-viewing videoscope was introduced into the  esophagus, the evaluation of which showed Take 500 mcg by mouth 3 times daily      vitamin E 400 UNIT capsule Take 400 Units by mouth 3 times daily      b complex vitamins capsule Take 1 capsule by mouth 3 times daily      Calcium-Magnesium-Zinc 500-250-12.5 MG TABS Take 1 tablet by mouth 3 times daily      Insulin Degludec (TRESIBA FLEXTOUCH) 200 UNIT/ML SOPN Inject 30 Units into the skin nightly      insulin aspart (NOVOLOG FLEXPEN) 100 UNIT/ML injection pen Inject 6-16 Units into the skin 3 times daily (before meals) 6 units with breakfast and lunch PLUS 2 units for  and above. Increasing by 50   8 units with dinner PLUS 2 units for  and above. Increasing by 50         STOP taking these medications       metFORMIN (GLUCOPHAGE) 1000 MG tablet Comments:   Reason for Stopping:             Activity: activity as tolerated  Diet: diabetic diet    Follow-up with Dr Mingo Lindsay in 1 week. Note that over 30 minutes was spent in preparing discharge papers, discussing discharge with patient, medication review, etc.    Signed:  CARINA Villa  6/21/2018  1:19 PM

## 2018-06-21 NOTE — PATIENT CARE CONFERENCE
[facility] Quality Flow/Interdisciplinary Rounds Progress Note        Quality Flow Rounds held on June 21, 2018    Disciplines Attending:  Bedside Nurse, ,  and Nursing Unit Leadership    Missy Sotelo was admitted on 6/18/2018 11:10 AM    Anticipated Discharge Date:  Expected Discharge Date: 06/21/18    Disposition:    Nilton Score:  Nilton Scale Score: 21    Readmission Risk              Risk of Unplanned Readmission:        11             Discussed patient goal for the day, patient clinical progression, and barriers to discharge.   The following Goal(s) of the Day/Commitment(s) have been identified:  monitor Hgb possible discharge home      [facility]  June 21, 2018

## 2018-06-21 NOTE — PLAN OF CARE
Problem: Pain:  Goal: Control of acute pain  Control of acute pain   Outcome: Met This Shift      Problem: Falls - Risk of:  Goal: Will remain free from falls  Will remain free from falls   Outcome: Met This Shift

## 2018-06-21 NOTE — CARE COORDINATION
Met with pt/wife; INTRODUCED MYSELF AS AN RN CASE MANAGER AND EXPLAINED MY  ROLE. QUESTIONS ANSWERED. PT  ANXIOUS FOR DISCHARGE. DENIES ANY NEEDS AT THIS TIME. WILL FOLLOW  ALONG WITH SOCIAL WORK FOR DISCHARGE PLANNING. Paz Massey RN,CM.

## 2018-06-23 LAB
BLOOD CULTURE, ROUTINE: NORMAL
CULTURE, BLOOD 2: NORMAL

## 2020-05-30 ENCOUNTER — HOSPITAL ENCOUNTER (OUTPATIENT)
Age: 70
Discharge: HOME | End: 2020-06-01
Payer: MEDICARE

## 2020-05-30 PROCEDURE — U0003 INFECTIOUS AGENT DETECTION BY NUCLEIC ACID (DNA OR RNA); SEVERE ACUTE RESPIRATORY SYNDROME CORONAVIRUS 2 (SARS-COV-2) (CORONAVIRUS DISEASE [COVID-19]), AMPLIFIED PROBE TECHNIQUE, MAKING USE OF HIGH THROUGHPUT TECHNOLOGIES AS DESCRIBED BY CMS-2020-01-R: HCPCS

## 2020-06-01 LAB
SARS-COV-2: NOT DETECTED
SOURCE: NORMAL

## 2020-06-03 ENCOUNTER — ANESTHESIA EVENT (OUTPATIENT)
Dept: OPERATING ROOM | Age: 70
End: 2020-06-03
Payer: MEDICARE

## 2020-06-04 ENCOUNTER — HOSPITAL ENCOUNTER (OUTPATIENT)
Age: 70
Setting detail: OUTPATIENT SURGERY
Discharge: HOME | End: 2020-06-04
Attending: OTOLARYNGOLOGY | Admitting: OTOLARYNGOLOGY
Payer: MEDICARE

## 2020-06-04 ENCOUNTER — ANESTHESIA (OUTPATIENT)
Dept: OPERATING ROOM | Age: 70
End: 2020-06-04
Payer: MEDICARE

## 2020-06-04 VITALS
HEART RATE: 84 BPM | OXYGEN SATURATION: 96 % | BODY MASS INDEX: 25.05 KG/M2 | RESPIRATION RATE: 18 BRPM | TEMPERATURE: 96.5 F | HEIGHT: 70 IN | SYSTOLIC BLOOD PRESSURE: 151 MMHG | DIASTOLIC BLOOD PRESSURE: 88 MMHG | WEIGHT: 175 LBS

## 2020-06-04 VITALS — DIASTOLIC BLOOD PRESSURE: 50 MMHG | SYSTOLIC BLOOD PRESSURE: 93 MMHG | OXYGEN SATURATION: 99 % | TEMPERATURE: 94.8 F

## 2020-06-04 PROCEDURE — 2500000003 HC RX 250 WO HCPCS: Performed by: OTOLARYNGOLOGY

## 2020-06-04 PROCEDURE — 6360000002 HC RX W HCPCS: Performed by: OTOLARYNGOLOGY

## 2020-06-04 PROCEDURE — 7100000000 HC PACU RECOVERY - FIRST 15 MIN: Performed by: OTOLARYNGOLOGY

## 2020-06-04 PROCEDURE — 88305 TISSUE EXAM BY PATHOLOGIST: CPT

## 2020-06-04 PROCEDURE — 3600000002 HC SURGERY LEVEL 2 BASE: Performed by: OTOLARYNGOLOGY

## 2020-06-04 PROCEDURE — 7100000001 HC PACU RECOVERY - ADDTL 15 MIN: Performed by: OTOLARYNGOLOGY

## 2020-06-04 PROCEDURE — 6360000002 HC RX W HCPCS: Performed by: NURSE ANESTHETIST, CERTIFIED REGISTERED

## 2020-06-04 PROCEDURE — 3700000001 HC ADD 15 MINUTES (ANESTHESIA): Performed by: OTOLARYNGOLOGY

## 2020-06-04 PROCEDURE — 2709999900 HC NON-CHARGEABLE SUPPLY: Performed by: OTOLARYNGOLOGY

## 2020-06-04 PROCEDURE — 7100000010 HC PHASE II RECOVERY - FIRST 15 MIN: Performed by: OTOLARYNGOLOGY

## 2020-06-04 PROCEDURE — 2500000003 HC RX 250 WO HCPCS: Performed by: NURSE ANESTHETIST, CERTIFIED REGISTERED

## 2020-06-04 PROCEDURE — 2720000010 HC SURG SUPPLY STERILE: Performed by: OTOLARYNGOLOGY

## 2020-06-04 PROCEDURE — 7100000011 HC PHASE II RECOVERY - ADDTL 15 MIN: Performed by: OTOLARYNGOLOGY

## 2020-06-04 PROCEDURE — 3600000012 HC SURGERY LEVEL 2 ADDTL 15MIN: Performed by: OTOLARYNGOLOGY

## 2020-06-04 PROCEDURE — 2580000003 HC RX 258: Performed by: NURSE ANESTHETIST, CERTIFIED REGISTERED

## 2020-06-04 PROCEDURE — 3700000000 HC ANESTHESIA ATTENDED CARE: Performed by: OTOLARYNGOLOGY

## 2020-06-04 RX ORDER — SODIUM CHLORIDE 9 MG/ML
INJECTION, SOLUTION INTRAVENOUS CONTINUOUS PRN
Status: DISCONTINUED | OUTPATIENT
Start: 2020-06-04 | End: 2020-06-04 | Stop reason: SDUPTHER

## 2020-06-04 RX ORDER — GLYCOPYRROLATE 1 MG/5 ML
SYRINGE (ML) INTRAVENOUS PRN
Status: DISCONTINUED | OUTPATIENT
Start: 2020-06-04 | End: 2020-06-04 | Stop reason: SDUPTHER

## 2020-06-04 RX ORDER — CEFAZOLIN SODIUM 2 G/50ML
2 SOLUTION INTRAVENOUS
Status: COMPLETED | OUTPATIENT
Start: 2020-06-04 | End: 2020-06-04

## 2020-06-04 RX ORDER — FENTANYL CITRATE 50 UG/ML
INJECTION, SOLUTION INTRAMUSCULAR; INTRAVENOUS PRN
Status: DISCONTINUED | OUTPATIENT
Start: 2020-06-04 | End: 2020-06-04 | Stop reason: SDUPTHER

## 2020-06-04 RX ORDER — SODIUM CHLORIDE, SODIUM LACTATE, POTASSIUM CHLORIDE, CALCIUM CHLORIDE 600; 310; 30; 20 MG/100ML; MG/100ML; MG/100ML; MG/100ML
INJECTION, SOLUTION INTRAVENOUS CONTINUOUS
Status: DISCONTINUED | OUTPATIENT
Start: 2020-06-04 | End: 2020-06-04 | Stop reason: HOSPADM

## 2020-06-04 RX ORDER — SODIUM CHLORIDE 0.9 % (FLUSH) 0.9 %
10 SYRINGE (ML) INJECTION PRN
Status: DISCONTINUED | OUTPATIENT
Start: 2020-06-04 | End: 2020-06-04 | Stop reason: HOSPADM

## 2020-06-04 RX ORDER — ONDANSETRON 8 MG/1
8 TABLET, ORALLY DISINTEGRATING ORAL EVERY 8 HOURS PRN
Qty: 9 TABLET | Refills: 0 | Status: SHIPPED | OUTPATIENT
Start: 2020-06-04 | End: 2020-06-07

## 2020-06-04 RX ORDER — DEXAMETHASONE SODIUM PHOSPHATE 4 MG/ML
INJECTION, SOLUTION INTRA-ARTICULAR; INTRALESIONAL; INTRAMUSCULAR; INTRAVENOUS; SOFT TISSUE PRN
Status: DISCONTINUED | OUTPATIENT
Start: 2020-06-04 | End: 2020-06-04 | Stop reason: SDUPTHER

## 2020-06-04 RX ORDER — NEOSTIGMINE METHYLSULFATE 1 MG/ML
INJECTION, SOLUTION INTRAVENOUS PRN
Status: DISCONTINUED | OUTPATIENT
Start: 2020-06-04 | End: 2020-06-04 | Stop reason: SDUPTHER

## 2020-06-04 RX ORDER — PROPOFOL 10 MG/ML
INJECTION, EMULSION INTRAVENOUS PRN
Status: DISCONTINUED | OUTPATIENT
Start: 2020-06-04 | End: 2020-06-04 | Stop reason: SDUPTHER

## 2020-06-04 RX ORDER — ROCURONIUM BROMIDE 10 MG/ML
INJECTION, SOLUTION INTRAVENOUS PRN
Status: DISCONTINUED | OUTPATIENT
Start: 2020-06-04 | End: 2020-06-04 | Stop reason: SDUPTHER

## 2020-06-04 RX ORDER — ONDANSETRON 2 MG/ML
INJECTION INTRAMUSCULAR; INTRAVENOUS PRN
Status: DISCONTINUED | OUTPATIENT
Start: 2020-06-04 | End: 2020-06-04 | Stop reason: SDUPTHER

## 2020-06-04 RX ORDER — LIDOCAINE HYDROCHLORIDE AND EPINEPHRINE 10; 10 MG/ML; UG/ML
INJECTION, SOLUTION INFILTRATION; PERINEURAL PRN
Status: DISCONTINUED | OUTPATIENT
Start: 2020-06-04 | End: 2020-06-04 | Stop reason: ALTCHOICE

## 2020-06-04 RX ORDER — HYDROCODONE BITARTRATE AND ACETAMINOPHEN 5; 325 MG/1; MG/1
1 TABLET ORAL EVERY 4 HOURS PRN
Qty: 12 TABLET | Refills: 0 | Status: SHIPPED | OUTPATIENT
Start: 2020-06-04 | End: 2020-06-07

## 2020-06-04 RX ORDER — CHLORHEXIDINE GLUCONATE 0.12 MG/ML
15 RINSE ORAL 2 TIMES DAILY
Qty: 420 ML | Refills: 0 | Status: SHIPPED | OUTPATIENT
Start: 2020-06-04 | End: 2020-06-18

## 2020-06-04 RX ORDER — LIDOCAINE HYDROCHLORIDE 20 MG/ML
INJECTION, SOLUTION EPIDURAL; INFILTRATION; INTRACAUDAL; PERINEURAL PRN
Status: DISCONTINUED | OUTPATIENT
Start: 2020-06-04 | End: 2020-06-04 | Stop reason: SDUPTHER

## 2020-06-04 RX ORDER — SODIUM CHLORIDE 0.9 % (FLUSH) 0.9 %
10 SYRINGE (ML) INJECTION EVERY 12 HOURS SCHEDULED
Status: DISCONTINUED | OUTPATIENT
Start: 2020-06-04 | End: 2020-06-04 | Stop reason: HOSPADM

## 2020-06-04 RX ADMIN — FENTANYL CITRATE 50 MCG: 50 INJECTION, SOLUTION INTRAMUSCULAR; INTRAVENOUS at 08:54

## 2020-06-04 RX ADMIN — ONDANSETRON HYDROCHLORIDE 4 MG: 2 INJECTION, SOLUTION INTRAMUSCULAR; INTRAVENOUS at 08:49

## 2020-06-04 RX ADMIN — Medication 3 MG: at 09:05

## 2020-06-04 RX ADMIN — ROCURONIUM BROMIDE 30 MG: 10 INJECTION, SOLUTION INTRAVENOUS at 08:49

## 2020-06-04 RX ADMIN — CEFAZOLIN SODIUM 2 G: 2 SOLUTION INTRAVENOUS at 08:34

## 2020-06-04 RX ADMIN — SODIUM CHLORIDE: 9 INJECTION, SOLUTION INTRAVENOUS at 08:38

## 2020-06-04 RX ADMIN — CEFAZOLIN SODIUM 2 G: 2 SOLUTION INTRAVENOUS at 08:45

## 2020-06-04 RX ADMIN — Medication 0.6 MG: at 09:05

## 2020-06-04 RX ADMIN — LIDOCAINE HYDROCHLORIDE 100 MG: 20 INJECTION, SOLUTION EPIDURAL; INFILTRATION; INTRACAUDAL; PERINEURAL at 08:49

## 2020-06-04 RX ADMIN — DEXAMETHASONE SODIUM PHOSPHATE 1 MG: 4 INJECTION, SOLUTION INTRAMUSCULAR; INTRAVENOUS at 08:49

## 2020-06-04 RX ADMIN — FENTANYL CITRATE 50 MCG: 50 INJECTION, SOLUTION INTRAMUSCULAR; INTRAVENOUS at 08:49

## 2020-06-04 RX ADMIN — PROPOFOL 150 MG: 10 INJECTION, EMULSION INTRAVENOUS at 08:49

## 2020-06-04 ASSESSMENT — PAIN DESCRIPTION - PAIN TYPE
TYPE: SURGICAL PAIN
TYPE: SURGICAL PAIN

## 2020-06-04 ASSESSMENT — PULMONARY FUNCTION TESTS
PIF_VALUE: 21
PIF_VALUE: 20
PIF_VALUE: 1
PIF_VALUE: 20
PIF_VALUE: 2
PIF_VALUE: 7
PIF_VALUE: 14
PIF_VALUE: 39
PIF_VALUE: 0
PIF_VALUE: 21
PIF_VALUE: 16
PIF_VALUE: 16
PIF_VALUE: 29
PIF_VALUE: 0
PIF_VALUE: 22
PIF_VALUE: 35
PIF_VALUE: 1
PIF_VALUE: 1
PIF_VALUE: 20
PIF_VALUE: 14
PIF_VALUE: 28
PIF_VALUE: 14
PIF_VALUE: 20
PIF_VALUE: 36
PIF_VALUE: 0
PIF_VALUE: 16
PIF_VALUE: 0
PIF_VALUE: 16
PIF_VALUE: 19
PIF_VALUE: 15
PIF_VALUE: 4
PIF_VALUE: 7
PIF_VALUE: 0
PIF_VALUE: 27
PIF_VALUE: 1
PIF_VALUE: 1

## 2020-06-04 ASSESSMENT — PAIN DESCRIPTION - ORIENTATION
ORIENTATION: LEFT
ORIENTATION: LEFT

## 2020-06-04 ASSESSMENT — PAIN DESCRIPTION - LOCATION
LOCATION: MOUTH
LOCATION: MOUTH

## 2020-06-04 ASSESSMENT — PAIN - FUNCTIONAL ASSESSMENT
PAIN_FUNCTIONAL_ASSESSMENT: 0-10
PAIN_FUNCTIONAL_ASSESSMENT: 0-10

## 2020-06-04 ASSESSMENT — PAIN DESCRIPTION - PROGRESSION
CLINICAL_PROGRESSION: NOT CHANGED
CLINICAL_PROGRESSION: NOT CHANGED

## 2020-06-04 ASSESSMENT — PAIN SCALES - GENERAL
PAINLEVEL_OUTOF10: 0

## 2020-06-04 NOTE — H&P
The H&P has been reviewed, the patient has been examined, and I concur with the findings of the 5/20/2020 H&P.

## 2020-06-04 NOTE — ANESTHESIA PRE PROCEDURE
Intravenous On Call to [facility] Bernie Moscoso MD           Allergies:  No Known Allergies    Problem List:    Patient Active Problem List   Diagnosis Code    High anion gap metabolic acidosis H85.4    DKA (diabetic ketoacidosis) ([facility] [address] E11.10    Diabetes mellitus type 2, uncontrolled ([facility] [address] E11.65    Anemia D64.9       Past Medical History:        Diagnosis Date    Hepatitis C        Past Surgical History:        Procedure Laterality Date    COLONOSCOPY  2017    ENDOSCOPY, COLON, DIAGNOSTIC      HERNIA REPAIR Right 2018    inguinal     NASAL SEPTUM SURGERY      TONSILLECTOMY      UPPER GASTROINTESTINAL ENDOSCOPY N/A 6/19/2018    EGD BAND LIGATION performed by Selina Vasquez MD at [facility] ENDOSCOPY       Social History:    Social History     Tobacco Use    Smoking status: Never Smoker    Smokeless tobacco: Never Used   Substance Use Topics    Alcohol use: No                                Counseling given: Not Answered      Vital Signs (Current):   Vitals:    06/02/20 1030 06/04/20 0716   BP:  135/82   Pulse:  73   Resp:  16   Temp:  97.3 °F (36.3 °C)   TempSrc:  Temporal   SpO2:  96%   Weight: 175 lb (79.4 kg)    Height: 5' 10\" (1.778 m)                                               BP Readings from Last 3 Encounters:   06/04/20 135/82   06/21/18 113/63   06/19/18 (!) 107/55       NPO Status: Time of last liquid consumption: 1900                        Time of last solid consumption: 1900                        Date of last liquid consumption: 06/03/20                        Date of last solid food consumption: 06/03/20    BMI:   Wt Readings from Last 3 Encounters:   06/02/20 175 lb (79.4 kg)   06/21/18 168 lb (76.2 kg)   03/29/12 170 lb (77.1 kg)     Body mass index is 25.11 kg/m².     CBC:   Lab Results   Component Value Date    WBC 10.5 06/21/2018    RBC 2.51 06/21/2018    HGB 8.6 06/21/2018    HCT 24.6 06/21/2018    MCV 98.0 06/21/2018    RDW 13.3 06/21/2018     06/21/2018       CMP:   Lab Results   Component Value Date     06/21/2018    K 4.0 06/21/2018     06/21/2018    CO2 22 06/21/2018    BUN 14 06/21/2018    CREATININE 0.7 06/21/2018    GFRAA >60 06/21/2018    LABGLOM >60 06/21/2018    GLUCOSE 67 06/21/2018    GLUCOSE 114 03/29/2012    PROT 5.7 06/21/2018    CALCIUM 8.1 06/21/2018    BILITOT 0.4 06/21/2018    ALKPHOS 57 06/21/2018    AST 34 06/21/2018    ALT 45 06/21/2018       POC Tests: No results for input(s): POCGLU, POCNA, POCK, POCCL, POCBUN, POCHEMO, POCHCT in the last 72 hours. Coags:   Lab Results   Component Value Date    PROTIME 12.0 06/18/2018    PROTIME 11.3 03/29/2012    INR 1.0 06/18/2018    APTT 21.6 06/18/2018       HCG (If Applicable): No results found for: PREGTESTUR, PREGSERUM, HCG, HCGQUANT     ABGs: No results found for: PHART, PO2ART, SKG9QPR, EKL6JVS, BEART, T8DHKVWQ     Type & Screen (If Applicable):  No results found for: LABABO, LABRH    Drug/Infectious Status (If Applicable):  No results found for: HIV, HEPCAB    COVID-19 Screening (If Applicable):   Lab Results   Component Value Date    COVID19 Not Detected 05/30/2020         Anesthesia Evaluation  Patient summary reviewed and Nursing notes reviewed no history of anesthetic complications:   Airway: Mallampati: III  TM distance: >3 FB   Neck ROM: full  Mouth opening: > = 3 FB Dental:    (+) partials      Pulmonary:Negative Pulmonary ROS breath sounds clear to auscultation                             Cardiovascular:  Exercise tolerance: good (>4 METS),           Rhythm: regular  Rate: normal                    Neuro/Psych:   Negative Neuro/Psych ROS              GI/Hepatic/Renal:   (+) hepatitis: C, liver disease:,           Endo/Other:    (+) DiabetesType II DM, no interval change, , .                 Abdominal:           Vascular: negative vascular ROS. Anesthesia Plan      general     ASA 3       Induction: intravenous.     MIPS: Postoperative opioids intended and Prophylactic antiemetics administered. Anesthetic plan and risks discussed with patient and spouse.       Plan discussed with CRNA and surgical team.                  Rachele Small MD   6/4/2020

## 2020-06-20 ENCOUNTER — HOSPITAL ENCOUNTER (OUTPATIENT)
Age: 70
Discharge: HOME | End: 2020-06-20
Payer: MEDICARE

## 2020-06-20 LAB
ALBUMIN SERPL-MCNC: 4.1 G/DL (ref 3.5–5.2)
ALP BLD-CCNC: 81 U/L (ref 40–129)
ALT SERPL-CCNC: 42 U/L (ref 0–40)
AMMONIA: 48.5 UMOL/L (ref 16–60)
ANION GAP SERPL CALCULATED.3IONS-SCNC: 9 MMOL/L (ref 7–16)
APTT: 31.1 SEC (ref 24.5–35.1)
AST SERPL-CCNC: 39 U/L (ref 0–39)
BASOPHILS ABSOLUTE: 0.04 E9/L (ref 0–0.2)
BASOPHILS RELATIVE PERCENT: 0.9 % (ref 0–2)
BILIRUB SERPL-MCNC: 1.4 MG/DL (ref 0–1.2)
BUN BLDV-MCNC: 13 MG/DL (ref 8–23)
CALCIUM SERPL-MCNC: 9.2 MG/DL (ref 8.6–10.2)
CHLORIDE BLD-SCNC: 104 MMOL/L (ref 98–107)
CO2: 22 MMOL/L (ref 22–29)
CREAT SERPL-MCNC: 0.7 MG/DL (ref 0.7–1.2)
EOSINOPHILS ABSOLUTE: 0.1 E9/L (ref 0.05–0.5)
EOSINOPHILS RELATIVE PERCENT: 2.4 % (ref 0–6)
FERRITIN: 38 NG/ML
GFR AFRICAN AMERICAN: >60
GFR NON-AFRICAN AMERICAN: >60 ML/MIN/1.73
GLUCOSE BLD-MCNC: 194 MG/DL (ref 74–99)
HCT VFR BLD CALC: 37.6 % (ref 37–54)
HEMOGLOBIN: 13.3 G/DL (ref 12.5–16.5)
IMMATURE GRANULOCYTES #: 0.01 E9/L
IMMATURE GRANULOCYTES %: 0.2 % (ref 0–5)
INR BLD: 1.2
IRON SATURATION: 42 % (ref 20–55)
IRON: 136 MCG/DL (ref 59–158)
LYMPHOCYTES ABSOLUTE: 1.07 E9/L (ref 1.5–4)
LYMPHOCYTES RELATIVE PERCENT: 25.3 % (ref 20–42)
MCH RBC QN AUTO: 33.3 PG (ref 26–35)
MCHC RBC AUTO-ENTMCNC: 35.4 % (ref 32–34.5)
MCV RBC AUTO: 94 FL (ref 80–99.9)
MONOCYTES ABSOLUTE: 0.36 E9/L (ref 0.1–0.95)
MONOCYTES RELATIVE PERCENT: 8.5 % (ref 2–12)
NEUTROPHILS ABSOLUTE: 2.65 E9/L (ref 1.8–7.3)
NEUTROPHILS RELATIVE PERCENT: 62.7 % (ref 43–80)
PDW BLD-RTO: 14.2 FL (ref 11.5–15)
PLATELET # BLD: 110 E9/L (ref 130–450)
PMV BLD AUTO: 9.8 FL (ref 7–12)
POTASSIUM SERPL-SCNC: 4.1 MMOL/L (ref 3.5–5)
PROTHROMBIN TIME: 13.5 SEC (ref 9.3–12.4)
RBC # BLD: 4 E12/L (ref 3.8–5.8)
SODIUM BLD-SCNC: 135 MMOL/L (ref 132–146)
TOTAL IRON BINDING CAPACITY: 324 MCG/DL (ref 250–450)
TOTAL PROTEIN: 7.3 G/DL (ref 6.4–8.3)
WBC # BLD: 4.2 E9/L (ref 4.5–11.5)

## 2020-06-20 PROCEDURE — 36415 COLL VENOUS BLD VENIPUNCTURE: CPT

## 2020-06-20 PROCEDURE — 87522 HEPATITIS C REVRS TRNSCRPJ: CPT

## 2020-06-20 PROCEDURE — 85025 COMPLETE CBC W/AUTO DIFF WBC: CPT

## 2020-06-20 PROCEDURE — 83540 ASSAY OF IRON: CPT

## 2020-06-20 PROCEDURE — 83550 IRON BINDING TEST: CPT

## 2020-06-20 PROCEDURE — 82728 ASSAY OF FERRITIN: CPT

## 2020-06-20 PROCEDURE — 85730 THROMBOPLASTIN TIME PARTIAL: CPT

## 2020-06-20 PROCEDURE — 82140 ASSAY OF AMMONIA: CPT

## 2020-06-20 PROCEDURE — 82105 ALPHA-FETOPROTEIN SERUM: CPT

## 2020-06-20 PROCEDURE — 85610 PROTHROMBIN TIME: CPT

## 2020-06-20 PROCEDURE — 80053 COMPREHEN METABOLIC PANEL: CPT

## 2020-06-23 LAB
AFP-TUMOR MARKER: 1 NG/ML (ref 0–9)
HCV QNT BY NAAT IU/ML: NOT DETECTED IU/ML
HCV QNT BY NAAT LOG IU/ML: NOT DETECTED LOG IU/ML
INTERPRETATION: NOT DETECTED

## 2020-07-10 ENCOUNTER — HOSPITAL ENCOUNTER (OUTPATIENT)
Dept: ULTRASOUND IMAGING | Age: 70
Discharge: HOME | End: 2020-07-12
Payer: MEDICARE

## 2020-07-10 PROCEDURE — 76705 ECHO EXAM OF ABDOMEN: CPT

## 2020-08-24 PROBLEM — B18.2 HEPATIC CIRRHOSIS DUE TO CHRONIC HEPATITIS C INFECTION (HCC): Status: ACTIVE | Noted: 2020-08-24

## 2020-08-24 PROBLEM — I85.11 SECONDARY ESOPHAGEAL VARICES WITH BLEEDING (HCC): Status: ACTIVE | Noted: 2020-08-24

## 2020-08-24 PROBLEM — K74.60 HEPATIC CIRRHOSIS DUE TO CHRONIC HEPATITIS C INFECTION (HCC): Status: ACTIVE | Noted: 2020-08-24

## 2020-08-24 PROBLEM — F02.80 DEMENTIA ASSOCIATED WITH OTHER UNDERLYING DISEASE WITHOUT BEHAVIORAL DISTURBANCE (HCC): Status: ACTIVE | Noted: 2020-08-24

## 2020-08-24 PROBLEM — E53.8 B12 DEFICIENCY: Status: ACTIVE | Noted: 2020-08-24

## 2020-08-24 PROBLEM — E10.9 TYPE 1 DIABETES MELLITUS WITHOUT COMPLICATION (HCC): Status: ACTIVE | Noted: 2018-06-18

## 2020-09-18 ENCOUNTER — HOSPITAL ENCOUNTER (OUTPATIENT)
Age: 70
Discharge: HOME | End: 2020-09-20
Payer: MEDICARE

## 2020-09-18 PROCEDURE — U0003 INFECTIOUS AGENT DETECTION BY NUCLEIC ACID (DNA OR RNA); SEVERE ACUTE RESPIRATORY SYNDROME CORONAVIRUS 2 (SARS-COV-2) (CORONAVIRUS DISEASE [COVID-19]), AMPLIFIED PROBE TECHNIQUE, MAKING USE OF HIGH THROUGHPUT TECHNOLOGIES AS DESCRIBED BY CMS-2020-01-R: HCPCS

## 2020-09-19 LAB
SARS-COV-2: NOT DETECTED
SOURCE: NORMAL

## 2020-09-21 RX ORDER — METHYLSULFONYLMETHANE 500 MG
1 CAPSULE ORAL DAILY
Status: ON HOLD | COMMUNITY
End: 2021-10-12 | Stop reason: ALTCHOICE

## 2020-09-23 ENCOUNTER — ANESTHESIA EVENT (OUTPATIENT)
Dept: OPERATING ROOM | Age: 70
End: 2020-09-23
Payer: MEDICARE

## 2020-09-24 ENCOUNTER — HOSPITAL ENCOUNTER (OUTPATIENT)
Age: 70
Setting detail: OUTPATIENT SURGERY
Discharge: HOME | End: 2020-09-24
Attending: OTOLARYNGOLOGY | Admitting: OTOLARYNGOLOGY
Payer: MEDICARE

## 2020-09-24 ENCOUNTER — ANESTHESIA (OUTPATIENT)
Dept: OPERATING ROOM | Age: 70
End: 2020-09-24
Payer: MEDICARE

## 2020-09-24 VITALS — OXYGEN SATURATION: 95 % | SYSTOLIC BLOOD PRESSURE: 144 MMHG | DIASTOLIC BLOOD PRESSURE: 79 MMHG | TEMPERATURE: 95 F

## 2020-09-24 VITALS
TEMPERATURE: 97.4 F | DIASTOLIC BLOOD PRESSURE: 86 MMHG | WEIGHT: 175 LBS | HEIGHT: 70 IN | RESPIRATION RATE: 18 BRPM | OXYGEN SATURATION: 95 % | SYSTOLIC BLOOD PRESSURE: 160 MMHG | HEART RATE: 86 BPM | BODY MASS INDEX: 25.05 KG/M2

## 2020-09-24 LAB
ALBUMIN SERPL-MCNC: 3.6 G/DL (ref 3.5–5.2)
ALP BLD-CCNC: 57 U/L (ref 40–129)
ALT SERPL-CCNC: 64 U/L (ref 0–40)
ANION GAP SERPL CALCULATED.3IONS-SCNC: 10 MMOL/L (ref 7–16)
AST SERPL-CCNC: 79 U/L (ref 0–39)
BILIRUB SERPL-MCNC: 1.4 MG/DL (ref 0–1.2)
BUN BLDV-MCNC: 13 MG/DL (ref 8–23)
CALCIUM SERPL-MCNC: 8.8 MG/DL (ref 8.6–10.2)
CHLORIDE BLD-SCNC: 106 MMOL/L (ref 98–107)
CO2: 20 MMOL/L (ref 22–29)
CREAT SERPL-MCNC: 0.7 MG/DL (ref 0.7–1.2)
GFR AFRICAN AMERICAN: >60
GFR NON-AFRICAN AMERICAN: >60 ML/MIN/1.73
GLUCOSE BLD-MCNC: 107 MG/DL (ref 74–99)
HCT VFR BLD CALC: 38.7 % (ref 37–54)
HEMOGLOBIN: 13.2 G/DL (ref 12.5–16.5)
MCH RBC QN AUTO: 31.3 PG (ref 26–35)
MCHC RBC AUTO-ENTMCNC: 34.1 % (ref 32–34.5)
MCV RBC AUTO: 91.7 FL (ref 80–99.9)
PDW BLD-RTO: 15.4 FL (ref 11.5–15)
PLATELET # BLD: 84 E9/L (ref 130–450)
PLATELET CONFIRMATION: NORMAL
PMV BLD AUTO: 10.7 FL (ref 7–12)
POTASSIUM SERPL-SCNC: 4.8 MMOL/L (ref 3.5–5)
RBC # BLD: 4.22 E12/L (ref 3.8–5.8)
REASON FOR REJECTION: NORMAL
REJECTED TEST: NORMAL
SODIUM BLD-SCNC: 136 MMOL/L (ref 132–146)
TOTAL PROTEIN: 6.8 G/DL (ref 6.4–8.3)
WBC # BLD: 2.6 E9/L (ref 4.5–11.5)

## 2020-09-24 PROCEDURE — 7100000010 HC PHASE II RECOVERY - FIRST 15 MIN: Performed by: OTOLARYNGOLOGY

## 2020-09-24 PROCEDURE — 85027 COMPLETE CBC AUTOMATED: CPT

## 2020-09-24 PROCEDURE — 7100000000 HC PACU RECOVERY - FIRST 15 MIN: Performed by: OTOLARYNGOLOGY

## 2020-09-24 PROCEDURE — 2709999900 HC NON-CHARGEABLE SUPPLY: Performed by: OTOLARYNGOLOGY

## 2020-09-24 PROCEDURE — 7100000001 HC PACU RECOVERY - ADDTL 15 MIN: Performed by: OTOLARYNGOLOGY

## 2020-09-24 PROCEDURE — 3700000001 HC ADD 15 MINUTES (ANESTHESIA): Performed by: OTOLARYNGOLOGY

## 2020-09-24 PROCEDURE — 88305 TISSUE EXAM BY PATHOLOGIST: CPT

## 2020-09-24 PROCEDURE — 6360000002 HC RX W HCPCS: Performed by: OTOLARYNGOLOGY

## 2020-09-24 PROCEDURE — 2720000010 HC SURG SUPPLY STERILE: Performed by: OTOLARYNGOLOGY

## 2020-09-24 PROCEDURE — 2500000003 HC RX 250 WO HCPCS: Performed by: NURSE ANESTHETIST, CERTIFIED REGISTERED

## 2020-09-24 PROCEDURE — 6360000002 HC RX W HCPCS: Performed by: NURSE ANESTHETIST, CERTIFIED REGISTERED

## 2020-09-24 PROCEDURE — 3600000012 HC SURGERY LEVEL 2 ADDTL 15MIN: Performed by: OTOLARYNGOLOGY

## 2020-09-24 PROCEDURE — 3600000002 HC SURGERY LEVEL 2 BASE: Performed by: OTOLARYNGOLOGY

## 2020-09-24 PROCEDURE — 80053 COMPREHEN METABOLIC PANEL: CPT

## 2020-09-24 PROCEDURE — 2500000003 HC RX 250 WO HCPCS: Performed by: OTOLARYNGOLOGY

## 2020-09-24 PROCEDURE — 36415 COLL VENOUS BLD VENIPUNCTURE: CPT

## 2020-09-24 PROCEDURE — 2580000003 HC RX 258: Performed by: NURSE ANESTHETIST, CERTIFIED REGISTERED

## 2020-09-24 PROCEDURE — 7100000011 HC PHASE II RECOVERY - ADDTL 15 MIN: Performed by: OTOLARYNGOLOGY

## 2020-09-24 PROCEDURE — 3700000000 HC ANESTHESIA ATTENDED CARE: Performed by: OTOLARYNGOLOGY

## 2020-09-24 RX ORDER — ROCURONIUM BROMIDE 10 MG/ML
INJECTION, SOLUTION INTRAVENOUS PRN
Status: DISCONTINUED | OUTPATIENT
Start: 2020-09-24 | End: 2020-09-24 | Stop reason: SDUPTHER

## 2020-09-24 RX ORDER — LIDOCAINE HYDROCHLORIDE AND EPINEPHRINE 10; 10 MG/ML; UG/ML
INJECTION, SOLUTION INFILTRATION; PERINEURAL PRN
Status: DISCONTINUED | OUTPATIENT
Start: 2020-09-24 | End: 2020-09-24 | Stop reason: ALTCHOICE

## 2020-09-24 RX ORDER — PROMETHAZINE HYDROCHLORIDE 25 MG/ML
6.25 INJECTION, SOLUTION INTRAMUSCULAR; INTRAVENOUS
Status: DISCONTINUED | OUTPATIENT
Start: 2020-09-24 | End: 2020-09-24 | Stop reason: HOSPADM

## 2020-09-24 RX ORDER — SODIUM CHLORIDE 0.9 % (FLUSH) 0.9 %
10 SYRINGE (ML) INJECTION EVERY 12 HOURS SCHEDULED
Status: DISCONTINUED | OUTPATIENT
Start: 2020-09-24 | End: 2020-09-24 | Stop reason: HOSPADM

## 2020-09-24 RX ORDER — PROPOFOL 10 MG/ML
INJECTION, EMULSION INTRAVENOUS PRN
Status: DISCONTINUED | OUTPATIENT
Start: 2020-09-24 | End: 2020-09-24 | Stop reason: SDUPTHER

## 2020-09-24 RX ORDER — SODIUM CHLORIDE, SODIUM LACTATE, POTASSIUM CHLORIDE, CALCIUM CHLORIDE 600; 310; 30; 20 MG/100ML; MG/100ML; MG/100ML; MG/100ML
INJECTION, SOLUTION INTRAVENOUS CONTINUOUS
Status: DISCONTINUED | OUTPATIENT
Start: 2020-09-24 | End: 2020-09-24 | Stop reason: HOSPADM

## 2020-09-24 RX ORDER — SODIUM CHLORIDE 9 MG/ML
INJECTION, SOLUTION INTRAVENOUS CONTINUOUS PRN
Status: DISCONTINUED | OUTPATIENT
Start: 2020-09-24 | End: 2020-09-24 | Stop reason: SDUPTHER

## 2020-09-24 RX ORDER — SODIUM CHLORIDE 0.9 % (FLUSH) 0.9 %
10 SYRINGE (ML) INJECTION PRN
Status: DISCONTINUED | OUTPATIENT
Start: 2020-09-24 | End: 2020-09-24 | Stop reason: HOSPADM

## 2020-09-24 RX ORDER — FENTANYL CITRATE 50 UG/ML
INJECTION, SOLUTION INTRAMUSCULAR; INTRAVENOUS PRN
Status: DISCONTINUED | OUTPATIENT
Start: 2020-09-24 | End: 2020-09-24 | Stop reason: SDUPTHER

## 2020-09-24 RX ORDER — ONDANSETRON 2 MG/ML
INJECTION INTRAMUSCULAR; INTRAVENOUS PRN
Status: DISCONTINUED | OUTPATIENT
Start: 2020-09-24 | End: 2020-09-24 | Stop reason: SDUPTHER

## 2020-09-24 RX ORDER — HYDROCODONE BITARTRATE AND ACETAMINOPHEN 5; 325 MG/1; MG/1
1 TABLET ORAL EVERY 6 HOURS PRN
Qty: 12 TABLET | Refills: 0 | Status: SHIPPED | OUTPATIENT
Start: 2020-09-24 | End: 2020-09-27

## 2020-09-24 RX ORDER — LIDOCAINE HYDROCHLORIDE 20 MG/ML
INJECTION, SOLUTION EPIDURAL; INFILTRATION; INTRACAUDAL; PERINEURAL PRN
Status: DISCONTINUED | OUTPATIENT
Start: 2020-09-24 | End: 2020-09-24 | Stop reason: SDUPTHER

## 2020-09-24 RX ORDER — SUCCINYLCHOLINE/SOD CL,ISO/PF 200MG/10ML
SYRINGE (ML) INTRAVENOUS PRN
Status: DISCONTINUED | OUTPATIENT
Start: 2020-09-24 | End: 2020-09-24 | Stop reason: SDUPTHER

## 2020-09-24 RX ORDER — DEXAMETHASONE SODIUM PHOSPHATE 4 MG/ML
INJECTION, SOLUTION INTRA-ARTICULAR; INTRALESIONAL; INTRAMUSCULAR; INTRAVENOUS; SOFT TISSUE PRN
Status: DISCONTINUED | OUTPATIENT
Start: 2020-09-24 | End: 2020-09-24 | Stop reason: SDUPTHER

## 2020-09-24 RX ORDER — CHLORHEXIDINE GLUCONATE 0.12 MG/ML
15 RINSE ORAL 2 TIMES DAILY
Qty: 420 ML | Refills: 0 | Status: SHIPPED | OUTPATIENT
Start: 2020-09-24 | End: 2020-10-08

## 2020-09-24 RX ADMIN — ONDANSETRON 4 MG: 2 INJECTION INTRAMUSCULAR; INTRAVENOUS at 11:23

## 2020-09-24 RX ADMIN — ROCURONIUM BROMIDE 5 MG: 10 INJECTION, SOLUTION INTRAVENOUS at 11:16

## 2020-09-24 RX ADMIN — SODIUM CHLORIDE: 9 INJECTION, SOLUTION INTRAVENOUS at 11:16

## 2020-09-24 RX ADMIN — PROPOFOL 100 MG: 10 INJECTION, EMULSION INTRAVENOUS at 11:16

## 2020-09-24 RX ADMIN — Medication 120 MG: at 11:16

## 2020-09-24 RX ADMIN — DEXAMETHASONE SODIUM PHOSPHATE 10 MG: 4 INJECTION, SOLUTION INTRAMUSCULAR; INTRAVENOUS at 11:23

## 2020-09-24 RX ADMIN — FENTANYL CITRATE 100 MCG: 50 INJECTION, SOLUTION INTRAMUSCULAR; INTRAVENOUS at 11:16

## 2020-09-24 RX ADMIN — Medication 2 G: at 11:09

## 2020-09-24 RX ADMIN — SODIUM CHLORIDE: 9 INJECTION, SOLUTION INTRAVENOUS at 11:34

## 2020-09-24 RX ADMIN — LIDOCAINE HYDROCHLORIDE 100 MG: 20 INJECTION, SOLUTION EPIDURAL; INFILTRATION; INTRACAUDAL; PERINEURAL at 11:16

## 2020-09-24 ASSESSMENT — PULMONARY FUNCTION TESTS
PIF_VALUE: 20
PIF_VALUE: 21
PIF_VALUE: 2
PIF_VALUE: 20
PIF_VALUE: 22
PIF_VALUE: 24
PIF_VALUE: 23
PIF_VALUE: 0
PIF_VALUE: 24
PIF_VALUE: 4
PIF_VALUE: 20
PIF_VALUE: 1
PIF_VALUE: 1
PIF_VALUE: 20
PIF_VALUE: 1
PIF_VALUE: 1
PIF_VALUE: 28
PIF_VALUE: 23
PIF_VALUE: 1
PIF_VALUE: 1
PIF_VALUE: 2
PIF_VALUE: 23
PIF_VALUE: 22
PIF_VALUE: 20
PIF_VALUE: 20
PIF_VALUE: 22
PIF_VALUE: 26
PIF_VALUE: 0
PIF_VALUE: 0
PIF_VALUE: 23
PIF_VALUE: 2
PIF_VALUE: 1

## 2020-09-24 ASSESSMENT — PAIN SCALES - GENERAL
PAINLEVEL_OUTOF10: 0

## 2020-09-24 ASSESSMENT — PAIN - FUNCTIONAL ASSESSMENT: PAIN_FUNCTIONAL_ASSESSMENT: 0-10

## 2020-09-24 NOTE — BRIEF OP NOTE
Brief Postoperative Note      Patient: Mignon Brink  YOB: 1950  MRN: [id number]    Date of Procedure: 9/24/2020    Pre-Op Diagnosis: LESION OF ORAL MUCOSA    Post-Op Diagnosis: Same       Procedure(s):  EXCISIONAL BIOPSY OF RIGHT BUCCOL LESION    Surgeon(s):  Sayra Hernandez MD    Assistant:  Resident: Morales Griffin DO    Anesthesia: General    Estimated Blood Loss (mL): Minimal    Complications: None    Specimens:   ID Type Source Tests Collected by Time Destination   A : RIGHT BUCCAL CHEEK LESION Tissue Tissue SURGICAL PATHOLOGY Sayra Hernandez MD 9/24/2020 1125        Implants:  * No implants in log *      Drains: * No LDAs found *    Findings: 5mm papillomatous lesion on the right anterior buccal mucosa     Electronically signed by Morales Griffin DO on 9/24/2020 at 11:43 AM

## 2020-09-24 NOTE — ANESTHESIA POSTPROCEDURE EVALUATION
Department of Anesthesiology  Postprocedure Note    Patient: Fili Juarez  MRN: [id number]  YOB: 1950  Date of evaluation: 9/24/2020  Time:  2:37 PM     Procedure Summary     Date:  09/24/20 Room / Location:  [facility] OR 01 / [facility]    Anesthesia Start:  1108 Anesthesia Stop:  6407    Procedure:  EXCISIONAL BIOPSY OF RIGHT BUCCOL LESION (Right Mouth) Diagnosis:  (LESION OF ORAL MUCOSA)    Surgeon:  Pedro Joseph MD Responsible Provider:  Rosa Maria Navarro MD    Anesthesia Type:  general ASA Status:  3          Anesthesia Type: general    Nino Phase I: Nino Score: 10    Nino Phase II: Nino Score: 10    Last vitals: Reviewed and per EMR flowsheets.        Anesthesia Post Evaluation    Patient location during evaluation: PACU  Patient participation: complete - patient participated  Level of consciousness: awake and alert  Pain score: 2  Airway patency: patent  Nausea & Vomiting: no vomiting and no nausea  Complications: no  Cardiovascular status: hemodynamically stable  Respiratory status: spontaneous ventilation  Hydration status: stable

## 2020-09-24 NOTE — ANESTHESIA PRE PROCEDURE
Department of Anesthesiology  Preprocedure Note       Name:  Jose J Owen   Age:  69 y.o.  :  1950                                          MRN:  [id number]         Date:  2020      Surgeon: Melvin Wright):  Denae Gillette MD    Procedure: Procedure(s):  EXCISIONAL BIOPSY LEFT BUCCAL MUCOSAL LESION    (PATHOLOGY NOTIFIED)    Medications prior to admission:   Prior to Admission medications    Medication Sig Start Date End Date Taking? Authorizing Provider   Bilberry, Vaccinium myrtillus, (BILBERRY PO) Take 1 tablet by mouth daily Last taken     Historical Provider, MD   Nutritional Supplements (DHEA PO) Take 1 tablet by mouth daily Last taken     Historical Provider, MD   MILK THISTLE PO Take 1 tablet by mouth daily Last taken     Historical Provider, MD   vitamin k 100 MCG tablet Take 100 mcg by mouth daily Last taken     Historical Provider, MD   Cholecalciferol (VITAMIN D3) 250 MCG (05530 UT) CAPS Take 1 capsule by mouth daily Last taken     Historical Provider, MD   Chromium 200 MCG CAPS Take 1 capsule by mouth daily Last taken     Historical Provider, MD   Methylsulfonylmethane (MSM) 500 MG CAPS Take 1 capsule by mouth daily Last taken     Historical Provider, MD   NONFORMULARY Take 1 capsule by mouth daily Blueberry extract caps.   Last taken     Historical Provider, MD   NONFORMULARY Take 1 tablet by mouth daily Vegetable and fruit tab  Last taken     Historical Provider, MD   Coenzyme Q10 75 MG CAPS Take 1 capsule by mouth daily Last taken     Historical Provider, MD   Probiotic Product (PROBIOTIC MULTI-ENZYME) TABS Take 1 tablet by mouth daily Last taken     Historical Provider, MD   ONETOUCH ULTRA strip TEST BLOOD SUGAR 4 TIMES A DAY 20   Historical Provider, MD   Omega-3 Fatty Acids (FISH OIL) 1000 MG CAPS Take 1,000 mg by mouth daily Last taken 2020    Historical Provider, MD   vitamin C (ASCORBIC ACID) 500 MG tablet Take 1,000 mg behavioral disturbance (HCC) F02.80    B12 deficiency E53.8       Past Medical History:        Diagnosis Date    Buccal mass 09/2020    Diabetes mellitus (HCC)     Esophageal varices (HCC)     Hepatitis C     treated and resolved    Liver cirrhosis (HCC)     Mild dementia ([facility] [address]     no meds       Past Surgical History:        Procedure Laterality Date    BIOPSY MOUTH LESION Left 6/4/2020    EXCISIONAL BIOPSY LEFT BUCCAL MUCOSAL LESION performed by Nicole Patton MD at [address] [place] [address]  2017    ENDOSCOPY, COLON, DIAGNOSTIC      HERNIA REPAIR Right 2018    inguinal     NASAL SEPTUM SURGERY      TONSILLECTOMY      UPPER GASTROINTESTINAL ENDOSCOPY N/A 6/19/2018    EGD BAND LIGATION performed by Caresse Klinefelter, MD at [facility] ENDOSCOPY       Social History:    Social History     Tobacco Use    Smoking status: Never Smoker    Smokeless tobacco: Never Used   Substance Use Topics    Alcohol use: Yes     Comment: rare socially                                Counseling given: Not Answered      Vital Signs (Current): There were no vitals filed for this visit.                                            BP Readings from Last 3 Encounters:   09/24/20 138/78   08/24/20 130/80   06/04/20 (!) 93/50       NPO Status:                                                                                 BMI:   Wt Readings from Last 3 Encounters:   09/21/20 175 lb (79.4 kg)   08/24/20 182 lb (82.6 kg)   06/04/20 175 lb (79.4 kg)     There is no height or weight on file to calculate BMI.    CBC:   Lab Results   Component Value Date    WBC 4.2 06/20/2020    RBC 4.00 06/20/2020    HGB 13.3 06/20/2020    HCT 37.6 06/20/2020    MCV 94.0 06/20/2020    RDW 14.2 06/20/2020     06/20/2020       CMP:   Lab Results   Component Value Date     06/20/2020    K 4.1 06/20/2020     06/20/2020    CO2 22 06/20/2020    BUN 13 06/20/2020    CREATININE 0.7 06/20/2020    GFRAA >60 06/20/2020    LABGLOM >60 06/20/2020 GLUCOSE 194 06/20/2020    GLUCOSE 114 03/29/2012    PROT 7.3 06/20/2020    CALCIUM 9.2 06/20/2020    BILITOT 1.4 06/20/2020    ALKPHOS 81 06/20/2020    AST 39 06/20/2020    ALT 42 06/20/2020       POC Tests: No results for input(s): POCGLU, POCNA, POCK, POCCL, POCBUN, POCHEMO, POCHCT in the last 72 hours. Coags:   Lab Results   Component Value Date    PROTIME 13.5 06/20/2020    PROTIME 11.3 03/29/2012    INR 1.2 06/20/2020    APTT 31.1 06/20/2020       HCG (If Applicable): No results found for: PREGTESTUR, PREGSERUM, HCG, HCGQUANT     ABGs: No results found for: PHART, PO2ART, KUK6NIZ, SNT0DQG, BEART, J3ZLRXIU     Type & Screen (If Applicable):  No results found for: LABABO, LABRH    Drug/Infectious Status (If Applicable):  No results found for: HIV, HEPCAB    COVID-19 Screening (If Applicable):   Lab Results   Component Value Date    COVID19 Not Detected 09/18/2020         Anesthesia Evaluation  Patient summary reviewed and Nursing notes reviewed no history of anesthetic complications:   Airway: Mallampati: III  TM distance: >3 FB   Neck ROM: full  Mouth opening: > = 3 FB Dental:    (+) partials      Pulmonary:Negative Pulmonary ROS breath sounds clear to auscultation                             Cardiovascular:  Exercise tolerance: good (>4 METS),           Rhythm: regular  Rate: normal                    Neuro/Psych:   Negative Neuro/Psych ROS              GI/Hepatic/Renal:   (+) hepatitis: C, liver disease:,           Endo/Other:    (+) DiabetesType II DM, no interval change, , .                 Abdominal:           Vascular: negative vascular ROS. Anesthesia Plan      general     ASA 3       Induction: intravenous. MIPS: Postoperative opioids intended and Prophylactic antiemetics administered. Anesthetic plan and risks discussed with patient and spouse.       Plan discussed with CRNA and surgical team.                  Alissa Mayberry MD   9/24/2020

## 2020-09-24 NOTE — PROGRESS NOTES
CLINICAL PHARMACY NOTE: MEDS TO [address] [place] [address] Select Patient?: No  Total # of Prescriptions Filled: 2   The following medications were delivered to the patient:  · Chlorhexidine 0.12% solution  · Norco 5/325 mg     Total # of Interventions Completed: 2  Time Spent (min): 30    Additional Documentation:

## 2020-09-25 NOTE — OP NOTE
[address] [address]                                OPERATIVE REPORT    PATIENT NAME: Glynn Barba                          :        1950  MED REC NO:   [id number]                            ROOM:  ACCOUNT NO:   [de-identified]                           ADMIT DATE: 2020  PROVIDER:     Ramsey Alcala DO    DATE OF PROCEDURE:  2020    PREOPERATIVE DIAGNOSIS:  Right buccal lesion. POSTOPERATIVE DIAGNOSIS:  Right buccal lesion. PROCEDURE PERFORMED:  Excision of right buccal mucosal lesion. SURGEON:  Dino Mccarthy MD.    ASSISTANT:  Ramsey Alcala DO, PGY-2. ANESTHESIA:  General endotracheal intubation. FLUIDS:  IV crystalloids. ESTIMATED BLOOD LOSS:  Minimal.    SPECIMENS:  Right buccal mucosal lesion sent for pathologic review. INDICATIONS FOR PROCEDURE:  This is a 26-year-old male with history of  previous buccal lesion on the left side that was a granuloma, who  presents with progressively enlarging right-sided buccal mucosal lesion. Risks and benefits of the procedure were discussed including, but not  limited to bleeding, infection, damage to adjacent structures. The  patient understands and agreed to proceed with the procedure. DESCRIPTION OF PROCEDURE:  The patient was taken back to the operating  room and placed supine on the table. SCDs were placed and functioning. The patient was then given to Anesthesia for proper endotracheal  intubation. The patient was then prepped and draped in a sterile  fashion. A 1% lidocaine with epinephrine was then injected over the  right buccal mucosal lesion. The right buccal mucosal lesion was noted  to be about 5 mm, papillomatous in nature, and mucosal colored. Once  the lidocaine was allowed to work, a 15-blade scalpel was used and the  lesion was excised in an elliptical fashion.   A monopolar cautery was  then used for hemostasis to

## 2021-01-22 DIAGNOSIS — E10.9 TYPE 1 DIABETES MELLITUS WITHOUT COMPLICATION (HCC): ICD-10-CM

## 2021-01-22 LAB — HBA1C MFR BLD: 7.1 % (ref 4–5.6)

## 2021-02-26 DIAGNOSIS — E53.8 B12 DEFICIENCY: ICD-10-CM

## 2021-02-26 DIAGNOSIS — R41.3 MEMORY LOSS: ICD-10-CM

## 2021-02-26 DIAGNOSIS — F68.8 PERSONALITY CHANGE: ICD-10-CM

## 2021-02-26 LAB — VITAMIN B-12: 1497 PG/ML (ref 211–946)

## 2021-04-12 ENCOUNTER — INITIAL CONSULT (OUTPATIENT)
Dept: GERIATRIC MEDICINE | Age: 71
End: 2021-04-12
Payer: MEDICARE

## 2021-04-12 VITALS
HEART RATE: 76 BPM | RESPIRATION RATE: 20 BRPM | WEIGHT: 185 LBS | SYSTOLIC BLOOD PRESSURE: 144 MMHG | DIASTOLIC BLOOD PRESSURE: 86 MMHG | HEIGHT: 70 IN | TEMPERATURE: 97.1 F | BODY MASS INDEX: 26.48 KG/M2

## 2021-04-12 DIAGNOSIS — G30.1 LATE ONSET ALZHEIMER'S DISEASE WITH BEHAVIORAL DISTURBANCE (HCC): Primary | ICD-10-CM

## 2021-04-12 DIAGNOSIS — F02.818 LATE ONSET ALZHEIMER'S DISEASE WITH BEHAVIORAL DISTURBANCE (HCC): Primary | ICD-10-CM

## 2021-04-12 PROCEDURE — 99212 OFFICE O/P EST SF 10 MIN: CPT | Performed by: INTERNAL MEDICINE

## 2021-04-12 RX ORDER — MEMANTINE HYDROCHLORIDE 10 MG/1
10 TABLET ORAL DAILY
Qty: 30 TABLET | Refills: 3 | Status: SHIPPED
Start: 2021-04-12 | End: 2021-07-30

## 2021-04-12 NOTE — PROGRESS NOTES
Chief Complaint   Patient presents with    Consultation     Referral from PCP, Dr Ashlyn Patricia re:  memory loss & personality changes. Here with wife.  Blood Pressure Check     144/88. Repeat 144/86.

## 2021-04-12 NOTE — PROGRESS NOTES
CC Here with 2 years of memory decline    Had Sx on Esophageal Varices 3 years ago  And Hx of Hep C  And retired [employer]  He is originally from [place] and wife is from [employer] remember how many years he has been   No head injuries   Sleeps well , always eating   A1C 7.1 and B12  1497  But always recognizes wife  IADL's , Most all per wife   And Never drives alone   Wife assists in ADL''s and needs coaxed to shower   No hallucinations  Knows home   [address]? \"Good\"   [facility] 3:16 able to recite   Forgetting scriptures   Cr normal  Impression: Alzhieimer's  Disease  Plan: Namenda 10 mg titration

## 2021-04-30 ENCOUNTER — APPOINTMENT (OUTPATIENT)
Dept: CT IMAGING | Age: 71
End: 2021-04-30
Payer: MEDICARE

## 2021-04-30 ENCOUNTER — HOSPITAL ENCOUNTER (EMERGENCY)
Age: 71
Discharge: HOME | End: 2021-04-30
Attending: EMERGENCY MEDICINE
Payer: MEDICARE

## 2021-04-30 VITALS
TEMPERATURE: 97.5 F | SYSTOLIC BLOOD PRESSURE: 167 MMHG | HEART RATE: 100 BPM | RESPIRATION RATE: 16 BRPM | DIASTOLIC BLOOD PRESSURE: 89 MMHG | OXYGEN SATURATION: 95 %

## 2021-04-30 DIAGNOSIS — R10.9 FLANK PAIN: ICD-10-CM

## 2021-04-30 DIAGNOSIS — N20.1 LEFT URETERAL STONE: Primary | ICD-10-CM

## 2021-04-30 LAB
ALBUMIN SERPL-MCNC: 3.9 G/DL (ref 3.5–5.2)
ALP BLD-CCNC: 85 U/L (ref 40–129)
ALT SERPL-CCNC: 51 U/L (ref 0–40)
AMORPHOUS: ABNORMAL
ANION GAP SERPL CALCULATED.3IONS-SCNC: 12 MMOL/L (ref 7–16)
ANISOCYTOSIS: ABNORMAL
AST SERPL-CCNC: 40 U/L (ref 0–39)
BACTERIA: ABNORMAL /HPF
BASOPHILS ABSOLUTE: 0 E9/L (ref 0–0.2)
BASOPHILS RELATIVE PERCENT: 0.4 % (ref 0–2)
BILIRUB SERPL-MCNC: 2.1 MG/DL (ref 0–1.2)
BILIRUBIN URINE: NEGATIVE
BLOOD, URINE: NEGATIVE
BUN BLDV-MCNC: 16 MG/DL (ref 6–23)
CALCIUM SERPL-MCNC: 9.2 MG/DL (ref 8.6–10.2)
CHLORIDE BLD-SCNC: 98 MMOL/L (ref 98–107)
CLARITY: CLEAR
CO2: 23 MMOL/L (ref 22–29)
COLOR: YELLOW
CREAT SERPL-MCNC: 1.2 MG/DL (ref 0.7–1.2)
EOSINOPHILS ABSOLUTE: 0 E9/L (ref 0.05–0.5)
EOSINOPHILS RELATIVE PERCENT: 1 % (ref 0–6)
EPITHELIAL CELLS, UA: ABNORMAL /HPF
GFR AFRICAN AMERICAN: >60
GFR NON-AFRICAN AMERICAN: 60 ML/MIN/1.73
GLUCOSE BLD-MCNC: 205 MG/DL (ref 74–99)
GLUCOSE URINE: NEGATIVE MG/DL
HCT VFR BLD CALC: 41.7 % (ref 37–54)
HEMOGLOBIN: 14.8 G/DL (ref 12.5–16.5)
KETONES, URINE: NEGATIVE MG/DL
LACTIC ACID: 2.1 MMOL/L (ref 0.5–2.2)
LEUKOCYTE ESTERASE, URINE: ABNORMAL
LIPASE: 27 U/L (ref 13–60)
LYMPHOCYTES ABSOLUTE: 0.24 E9/L (ref 1.5–4)
LYMPHOCYTES RELATIVE PERCENT: 5.2 % (ref 20–42)
MCH RBC QN AUTO: 33.7 PG (ref 26–35)
MCHC RBC AUTO-ENTMCNC: 35.5 % (ref 32–34.5)
MCV RBC AUTO: 95 FL (ref 80–99.9)
MONOCYTES ABSOLUTE: 0.19 E9/L (ref 0.1–0.95)
MONOCYTES RELATIVE PERCENT: 3.5 % (ref 2–12)
MYELOCYTE PERCENT: 0.9 % (ref 0–0)
NEUTROPHILS ABSOLUTE: 4.37 E9/L (ref 1.8–7.3)
NEUTROPHILS RELATIVE PERCENT: 90.4 % (ref 43–80)
NITRITE, URINE: NEGATIVE
PDW BLD-RTO: 13.3 FL (ref 11.5–15)
PH UA: 6 (ref 5–9)
PLATELET # BLD: 76 E9/L (ref 130–450)
PLATELET CONFIRMATION: NORMAL
PMV BLD AUTO: 10.6 FL (ref 7–12)
POIKILOCYTES: ABNORMAL
POTASSIUM SERPL-SCNC: 4.1 MMOL/L (ref 3.5–5)
PROTEIN UA: ABNORMAL MG/DL
RBC # BLD: 4.39 E12/L (ref 3.8–5.8)
RBC UA: ABNORMAL /HPF (ref 0–2)
SODIUM BLD-SCNC: 133 MMOL/L (ref 132–146)
SPECIFIC GRAVITY UA: >=1.03 (ref 1–1.03)
TOTAL PROTEIN: 7 G/DL (ref 6.4–8.3)
UROBILINOGEN, URINE: 0.2 E.U./DL
WBC # BLD: 4.8 E9/L (ref 4.5–11.5)
WBC UA: ABNORMAL /HPF (ref 0–5)

## 2021-04-30 PROCEDURE — 80053 COMPREHEN METABOLIC PANEL: CPT

## 2021-04-30 PROCEDURE — 99283 EMERGENCY DEPT VISIT LOW MDM: CPT

## 2021-04-30 PROCEDURE — 81001 URINALYSIS AUTO W/SCOPE: CPT

## 2021-04-30 PROCEDURE — 2580000003 HC RX 258: Performed by: PHYSICIAN ASSISTANT

## 2021-04-30 PROCEDURE — 36415 COLL VENOUS BLD VENIPUNCTURE: CPT

## 2021-04-30 PROCEDURE — 74176 CT ABD & PELVIS W/O CONTRAST: CPT

## 2021-04-30 PROCEDURE — 83690 ASSAY OF LIPASE: CPT

## 2021-04-30 PROCEDURE — 83605 ASSAY OF LACTIC ACID: CPT

## 2021-04-30 PROCEDURE — 85025 COMPLETE CBC W/AUTO DIFF WBC: CPT

## 2021-04-30 RX ORDER — HYDROCODONE BITARTRATE AND ACETAMINOPHEN 5; 325 MG/1; MG/1
1 TABLET ORAL EVERY 8 HOURS PRN
Qty: 9 TABLET | Refills: 0 | Status: SHIPPED | OUTPATIENT
Start: 2021-04-30 | End: 2021-05-03

## 2021-04-30 RX ORDER — TAMSULOSIN HYDROCHLORIDE 0.4 MG/1
0.4 CAPSULE ORAL DAILY
Qty: 14 CAPSULE | Refills: 0 | Status: SHIPPED | OUTPATIENT
Start: 2021-04-30 | End: 2021-07-30

## 2021-04-30 RX ORDER — KETOROLAC TROMETHAMINE 10 MG/1
10 TABLET, FILM COATED ORAL EVERY 8 HOURS PRN
Qty: 20 TABLET | Refills: 0 | Status: SHIPPED | OUTPATIENT
Start: 2021-04-30 | End: 2021-07-30

## 2021-04-30 RX ORDER — KETOROLAC TROMETHAMINE 30 MG/ML
15 INJECTION, SOLUTION INTRAMUSCULAR; INTRAVENOUS ONCE
Status: DISCONTINUED | OUTPATIENT
Start: 2021-04-30 | End: 2021-04-30 | Stop reason: HOSPADM

## 2021-04-30 RX ORDER — 0.9 % SODIUM CHLORIDE 0.9 %
1000 INTRAVENOUS SOLUTION INTRAVENOUS ONCE
Status: COMPLETED | OUTPATIENT
Start: 2021-04-30 | End: 2021-04-30

## 2021-04-30 RX ORDER — ONDANSETRON 4 MG/1
4 TABLET, ORALLY DISINTEGRATING ORAL EVERY 8 HOURS PRN
Qty: 10 TABLET | Refills: 0 | Status: SHIPPED | OUTPATIENT
Start: 2021-04-30 | End: 2021-07-30

## 2021-04-30 RX ADMIN — SODIUM CHLORIDE 1000 ML: 9 INJECTION, SOLUTION INTRAVENOUS at 19:45

## 2021-04-30 NOTE — ED NOTES
FIRST PROVIDER CONTACT ASSESSMENT NOTE      Department of Emergency Medicine   Admit Date: No admission date for patient encounter. Chief Complaint: No chief complaint on file. History of Present Illness:    Gustavo Rizo is a 69 y.o. male who presents to the ED for c/o left flank pian began week ago, without dysuria.  No fever, no history of kidney stones        -----------------END OF FIRST PROVIDER CONTACT ASSESSMENT NOTE--------------  Electronically signed by DAVID Pang CNP   DD: 4/30/21               DAVID Garay CNP  04/30/21 1343

## 2021-05-01 NOTE — ED PROVIDER NOTES
ED Attending  CC: Sharla Costello 476  Department of Emergency Medicine   ED  Encounter Note  Admit Date/RoomTime: 2021  7:27 PM  ED Room: [address] [place] [address]    NAME: Jong Gaines  : 1950  MRN: [id number]     Chief Complaint:  Flank Pain (pt complaints of left flank pain for 1 week. )    History of Present Illness        Jong Gaines is a 69 y.o. old male who presents to the emergency department by private vehicle, for intermittent left flank pain x1 week worsening last night. Patient states symptoms are mild in severity and describes as an aching pain. Patient denies any making it better or worse. Patient denies associated symptoms. Patient denies history of kidney stones. Denies fever/chills, HA, vision change, dizziness, cough, hemoptysis, cp, sob, nvd, urinary symptoms, hematuria, numbness/weakness. ROS   Pertinent positives and negatives are stated within HPI, all other systems reviewed and are negative. Past Medical History:  has a past medical history of Buccal mass, Diabetes mellitus ([address] Esophageal varices ([address] Hepatitis C, Liver cirrhosis ([address] Mild dementia ([address] and Positive colorectal cancer screening using Cologuard test.    Surgical History:  has a past surgical history that includes Nasal septum surgery; Tonsillectomy; Upper gastrointestinal endoscopy (N/A, 2018); hernia repair (Right, 2018); Upper gastrointestinal endoscopy (2020); Colonoscopy (2017); Biopsy mouth lesion (Left, 2020); and Mouth surgery (Right, 2020). Social History:  reports that he has never smoked. He has never used smokeless tobacco. He reports current alcohol use. He reports that he does not use drugs. Family History: family history is not on file. Allergies: Patient has no known allergies.     Physical Exam   Oxygen Saturation Interpretation: Normal.        ED Triage Vitals   BP Temp Temp Source Pulse Resp SpO2 Height Weight   21 1343 21 1336 04/30/21 1336 04/30/21 1336 04/30/21 1343 04/30/21 1336 -- --   (!) 167/89 97.5 °F (36.4 °C) Tympanic 100 16 95 %           General Appearance/Constitutional:  Alert, development consistent with age. HEENT:  NC/NT. PERRLA. Airway patent. Neck:  Supple. No lymphadenopathy. Respiratory: Lungs Clear to auscultation and breath sounds equal.  CV:  Regular rate and rhythm. GI:  normal appearing, non-distended with no visible hernias. Bowel sounds: normal bowel sounds. Tenderness: There is mild tenderness present - located in the left flank. .           Liver: non-tender. Spleen:  non-tender. Back: CVA Tenderness: No.  Integument:  Normal turgor. Warm, dry, without visible rash, unless noted elsewhere. Neurological:  Orientation age-appropriate. Motor functions intact.     Lab / Imaging Results   (All laboratory and radiology results have been personally reviewed by myself)  Labs:  Results for orders placed or performed during the hospital encounter of 04/30/21   CBC Auto Differential   Result Value Ref Range    WBC 4.8 4.5 - 11.5 E9/L    RBC 4.39 3.80 - 5.80 E12/L    Hemoglobin 14.8 12.5 - 16.5 g/dL    Hematocrit 41.7 37.0 - 54.0 %    MCV 95.0 80.0 - 99.9 fL    MCH 33.7 26.0 - 35.0 pg    MCHC 35.5 (H) 32.0 - 34.5 %    RDW 13.3 11.5 - 15.0 fL    Platelets 76 (L) 420 - 450 E9/L    MPV 10.6 7.0 - 12.0 fL    Neutrophils % 90.4 (H) 43.0 - 80.0 %    Lymphocytes % 5.2 (L) 20.0 - 42.0 %    Monocytes % 3.5 2.0 - 12.0 %    Eosinophils % 1.0 0.0 - 6.0 %    Basophils % 0.4 0.0 - 2.0 %    Neutrophils Absolute 4.37 1.80 - 7.30 E9/L    Lymphocytes Absolute 0.24 (L) 1.50 - 4.00 E9/L    Monocytes Absolute 0.19 0.10 - 0.95 E9/L    Eosinophils Absolute 0.00 (L) 0.05 - 0.50 E9/L    Basophils Absolute 0.00 0.00 - 0.20 E9/L    Myelocyte Percent 0.9 0 - 0 %    Anisocytosis 1+     Poikilocytes 1+    Comprehensive Metabolic Panel   Result Value Ref Range    Sodium 133 132 - 146 mmol/L Potassium 4.1 3.5 - 5.0 mmol/L    Chloride 98 98 - 107 mmol/L    CO2 23 22 - 29 mmol/L    Anion Gap 12 7 - 16 mmol/L    Glucose 205 (H) 74 - 99 mg/dL    BUN 16 6 - 23 mg/dL    CREATININE 1.2 0.7 - 1.2 mg/dL    GFR Non-African American 60 >=60 mL/min/1.73    GFR African American >60     Calcium 9.2 8.6 - 10.2 mg/dL    Total Protein 7.0 6.4 - 8.3 g/dL    Albumin 3.9 3.5 - 5.2 g/dL    Total Bilirubin 2.1 (H) 0.0 - 1.2 mg/dL    Alkaline Phosphatase 85 40 - 129 U/L    ALT 51 (H) 0 - 40 U/L    AST 40 (H) 0 - 39 U/L   Urinalysis   Result Value Ref Range    Color, UA Yellow Straw/Yellow    Clarity, UA Clear Clear    Glucose, Ur Negative Negative mg/dL    Bilirubin Urine Negative Negative    Ketones, Urine Negative Negative mg/dL    Specific Gravity, UA >=1.030 1.005 - 1.030    Blood, Urine Negative Negative    pH, UA 6.0 5.0 - 9.0    Protein, UA TRACE Negative mg/dL    Urobilinogen, Urine 0.2 <2.0 E.U./dL    Nitrite, Urine Negative Negative    Leukocyte Esterase, Urine TRACE (A) Negative   Platelet Confirmation   Result Value Ref Range    Platelet Confirmation CONFIRMED    Microscopic Urinalysis   Result Value Ref Range    WBC, UA 1-3 0 - 5 /HPF    RBC, UA 1-3 0 - 2 /HPF    Epithelial Cells, UA FEW /HPF    Bacteria, UA FEW (A) None Seen /HPF    Amorphous, UA MODERATE    Lipase   Result Value Ref Range    Lipase 27 13 - 60 U/L   Lactic Acid, Plasma   Result Value Ref Range    Lactic Acid 2.1 0.5 - 2.2 mmol/L     Imaging: All Radiology results interpreted by Radiologist unless otherwise noted. CT ABDOMEN PELVIS WO CONTRAST Additional Contrast? None   Final Result   Left hydronephrosis due to a 5 mm obstructing stone at the ureterovesicular   junction. Cirrhosis with portal hypertension, varices, and small volume ascites. Cholelithiasis with pericholecystic fluid. This may relate to underlying   hepatic/portal disease but requires clinical correlation to exclude   cholecystitis.       Evaluation for acute inflammatory findings in the abdomen is limited by   diffuse graying of the intra-fat. There does appear to be more focal   inflammatory changes about the head and uncinate process of the pancreas in   the proximal duodenum. Correlate for acute pancreatitis or duodenitis. Gastric and small bowel wall thickening with diffuse long segment colonic   wall thickening. Findings are likely secondary to portal gastropathy,   enteropathy, and colopathy, though this requires clinical correlation to   exclude underlying gastroenteritis/colitis. ED Course / Medical Decision Making     Medications   ketorolac (TORADOL) injection 15 mg (15 mg Intravenous Not Given 4/30/21 1946)   0.9 % sodium chloride bolus (0 mLs Intravenous Stopped 4/30/21 2033)        Consultations:             None    Procedures:   none    MDM: Patient presenting with left flank pain. Patient is in no acute distress, afebrile, nontoxic appearance. Patient's CT showing a 5 mm left UVJ stone. Patient CT also showing cirrhosis with portal hypertension and cholelithiasis patient has a bili of 2.1 but has no right upper quadrant abdominal tenderness. Patient's pancreas is also showing inflammatory changes however the lipase is normal.  Patient's other labs are stable. Patient pain completely resolved. Patient will be sent home with Flomax, Toradol, and Norco.  Recommend patient follow-up with urology and PCP. Recommended patient return to the ED with new or worsening of symptoms. Plan of Care/Counseling:  I reviewed today's visit with the patient in addition to providing specific details for the plan of care and counseling regarding the diagnosis and prognosis. Questions are answered at this time and are agreeable with the plan. Assessment      1. Left ureteral stone    2.  Flank pain      This patient's ED course included: a personal history and physicial examination  This patient has remained hemodynamically stable during their ED course. Plan   Discharge home  Patient condition is stable. New Medications     Discharge Medication List as of 4/30/2021  8:37 PM      START taking these medications    Details   tamsulosin (FLOMAX) 0.4 MG capsule Take 1 capsule by mouth daily for 14 days, Disp-14 capsule, R-0Print      ketorolac (TORADOL) 10 MG tablet Take 1 tablet by mouth every 8 hours as needed for Pain Pt had IV in ED, Disp-20 tablet, R-0Print      HYDROcodone-acetaminophen (NORCO) 5-325 MG per tablet Take 1 tablet by mouth every 8 hours as needed for Pain for up to 3 days. Intended supply: 3 days. Take lowest dose possible to manage pain, Disp-9 tablet, R-0Print      ondansetron (ZOFRAN ODT) 4 MG disintegrating tablet Take 1 tablet by mouth every 8 hours as needed for Nausea or Vomiting, Disp-10 tablet, R-0Print           Electronically signed by Sam Hung MD   DD: 4/30/21  **This report was transcribed using voice recognition software. Every effort was made to ensure accuracy; however, inadvertent computerized transcription errors may be present. END OF PROVIDER NOTE     Celestine Riley PA-C  04/30/21 2028      ATTENDING PROVIDER ATTESTATION:     I have personally performed and/or participated in the history, exam, medical decision making, and procedures and agree with all pertinent clinical information. I have also reviewed and agree with the past medical, family and social history unless otherwise noted. My findings/Plan: Patient presenting here because of flank pain for the past week. Patient reporting no nausea vomiting reports no fever chills he reports no abdominal pain. Patient reporting no black or tarry stools he reports no chest pain or shortness of breath to me he reports no leg pain or swelling to me. Patient reporting no headache. Patient awake alert [place] x3 heart lung exam normal abdomen is soft he has very minimal tenderness in his left flank. Patient has no edema.   He is neurologically intact labs and CT noted reviewed. Patient was medicated here in the emergency department with improvement. Patient made aware of findings and plan and need for follow-up he is to return at any time for any further problems.        Senait Castaneda MD  04/30/21 2051       Senait Castaneda MD  04/30/21 2051

## 2021-06-23 ENCOUNTER — TELEPHONE (OUTPATIENT)
Dept: GERIATRIC MEDICINE | Age: 71
End: 2021-06-23

## 2021-06-23 NOTE — TELEPHONE ENCOUNTER
Spoke with wife and he is failing lately  And mental confusion more. He is cold and may have a temp without urinary issues  Plan:Tylenol 500 mg tid and tryto hve him take fluids again an hour later. and if still unable to eat send to ER

## 2021-06-23 NOTE — TELEPHONE ENCOUNTER
Pt wife called states that pt is depressed and not eating. Has an appointment in august with us for a follow up. She called pcp office and they had already left for the day.  Please call pt wife

## 2021-07-12 ENCOUNTER — TELEPHONE (OUTPATIENT)
Dept: GERIATRIC MEDICINE | Age: 71
End: 2021-07-12

## 2021-07-12 NOTE — TELEPHONE ENCOUNTER
Nurse from dr Domi Santos office called states that pt wife called them stating that since pt started on the namenda he has no appetite and is depressed. Pt wife called dr Megan Bryan and wanted to move pt appt up.  Will you call pt  Dr Megan Bryan will see pt sooner if necessary

## 2021-07-13 NOTE — TELEPHONE ENCOUNTER
Called [facility] spoke to pt wife told her that pt should stop namenda and to follow up with pcp.  She understands and will call pcp to schedule appt

## 2021-07-30 DIAGNOSIS — Z79.4 TYPE 2 DIABETES MELLITUS WITH OTHER SPECIFIED COMPLICATION, WITH LONG-TERM CURRENT USE OF INSULIN (HCC): ICD-10-CM

## 2021-07-30 DIAGNOSIS — E11.69 TYPE 2 DIABETES MELLITUS WITH OTHER SPECIFIED COMPLICATION, WITH LONG-TERM CURRENT USE OF INSULIN (HCC): ICD-10-CM

## 2021-07-30 DIAGNOSIS — E78.01 FAMILIAL HYPERCHOLESTEROLEMIA: ICD-10-CM

## 2021-07-30 LAB
ALBUMIN SERPL-MCNC: 3.5 G/DL (ref 3.5–5.2)
ALP BLD-CCNC: 91 U/L (ref 40–129)
ALT SERPL-CCNC: 53 U/L (ref 0–40)
ANION GAP SERPL CALCULATED.3IONS-SCNC: 9 MMOL/L (ref 7–16)
AST SERPL-CCNC: 55 U/L (ref 0–39)
BILIRUB SERPL-MCNC: 1.8 MG/DL (ref 0–1.2)
BUN BLDV-MCNC: 10 MG/DL (ref 6–23)
CALCIUM SERPL-MCNC: 9.1 MG/DL (ref 8.6–10.2)
CHLORIDE BLD-SCNC: 105 MMOL/L (ref 98–107)
CHOLESTEROL, TOTAL: 147 MG/DL (ref 0–199)
CO2: 24 MMOL/L (ref 22–29)
CREAT SERPL-MCNC: 0.8 MG/DL (ref 0.7–1.2)
CREATININE URINE: 253 MG/DL (ref 40–278)
GFR AFRICAN AMERICAN: >60
GFR NON-AFRICAN AMERICAN: >60 ML/MIN/1.73
GLUCOSE BLD-MCNC: 115 MG/DL (ref 74–99)
HBA1C MFR BLD: 6.7 % (ref 4–5.6)
HDLC SERPL-MCNC: 57 MG/DL
LDL CHOLESTEROL CALCULATED: 81 MG/DL (ref 0–99)
MICROALBUMIN UR-MCNC: <12 MG/L
MICROALBUMIN/CREAT UR-RTO: ABNORMAL (ref 0–30)
POTASSIUM SERPL-SCNC: 5 MMOL/L (ref 3.5–5)
SODIUM BLD-SCNC: 138 MMOL/L (ref 132–146)
TOTAL PROTEIN: 6.6 G/DL (ref 6.4–8.3)
TRIGL SERPL-MCNC: 43 MG/DL (ref 0–149)
VLDLC SERPL CALC-MCNC: 9 MG/DL

## 2021-08-06 ENCOUNTER — OFFICE VISIT (OUTPATIENT)
Dept: GERIATRIC MEDICINE | Age: 71
End: 2021-08-06
Payer: MEDICARE

## 2021-08-06 VITALS
DIASTOLIC BLOOD PRESSURE: 80 MMHG | WEIGHT: 183.3 LBS | RESPIRATION RATE: 16 BRPM | HEIGHT: 69 IN | BODY MASS INDEX: 27.15 KG/M2 | SYSTOLIC BLOOD PRESSURE: 120 MMHG

## 2021-08-06 DIAGNOSIS — G30.9 ALZHEIMER DISEASE (HCC): Primary | ICD-10-CM

## 2021-08-06 DIAGNOSIS — F02.80 ALZHEIMER DISEASE (HCC): Primary | ICD-10-CM

## 2021-08-06 PROCEDURE — 99212 OFFICE O/P EST SF 10 MIN: CPT | Performed by: INTERNAL MEDICINE

## 2021-08-06 RX ORDER — DONEPEZIL HYDROCHLORIDE 5 MG/1
5 TABLET, FILM COATED ORAL
Qty: 30 TABLET | Refills: 5 | Status: SHIPPED
Start: 2021-08-06 | End: 2021-11-15 | Stop reason: SDUPTHER

## 2021-08-06 NOTE — PROGRESS NOTES
CC Evaluate Dementia    Here with wife   And knew BD 1950  Retired [employer]  Recognized wife   IADL's , still driving   Wife oversees checks pills  Recognizes  [place] not know address   Knew part of [facility] .16  HYM? \"Good\"  Still coached in shower  Stopped praying  Forgot how  ADL coaxed to shower  Sleeps well and always eating  Esphageal varices Sx for Hep C  3 years ago and decline and more surgery since  HYM   Fine  Lexapro 5 mg a day  Impression  Progressing SDAT                       Sleeps well                      ADL independent coaxed to shower                       Nameda , adverse effect sedatiion  Plan: Aricept 2.5 mg a day for 2 weeks then advance

## 2021-10-06 NOTE — H&P
H&P reviewed, no changes. No issues. Questions and concerns were answered to the patient's satisfaction.  Will proceed with procedure    Will discuss with attending     Electronically signed by Harry Rubio DO on 9/24/20 at 10:12 AM EDT appropriate

## 2021-10-10 ENCOUNTER — HOSPITAL ENCOUNTER (EMERGENCY)
Age: 71
Discharge: LEFT BEFORE DISCHARGE | End: 2021-10-11
Payer: MEDICARE

## 2021-10-10 ENCOUNTER — APPOINTMENT (OUTPATIENT)
Dept: GENERAL RADIOLOGY | Age: 71
End: 2021-10-10
Payer: MEDICARE

## 2021-10-10 VITALS
RESPIRATION RATE: 16 BRPM | WEIGHT: 175 LBS | DIASTOLIC BLOOD PRESSURE: 107 MMHG | TEMPERATURE: 100.6 F | HEART RATE: 112 BPM | OXYGEN SATURATION: 93 % | SYSTOLIC BLOOD PRESSURE: 173 MMHG | BODY MASS INDEX: 24.5 KG/M2 | HEIGHT: 71 IN

## 2021-10-10 LAB
ALBUMIN SERPL-MCNC: 3.6 G/DL (ref 3.5–5.2)
ALP BLD-CCNC: 101 U/L (ref 40–129)
ALT SERPL-CCNC: 36 U/L (ref 0–40)
AMYLASE: 39 U/L (ref 20–100)
ANION GAP SERPL CALCULATED.3IONS-SCNC: 11 MMOL/L (ref 7–16)
APTT: 32.8 SEC (ref 24.5–35.1)
AST SERPL-CCNC: 39 U/L (ref 0–39)
BASOPHILS ABSOLUTE: 0.01 E9/L (ref 0–0.2)
BASOPHILS RELATIVE PERCENT: 0.1 % (ref 0–2)
BILIRUB SERPL-MCNC: 2.8 MG/DL (ref 0–1.2)
BILIRUBIN URINE: NEGATIVE
BLOOD, URINE: NEGATIVE
BUN BLDV-MCNC: 9 MG/DL (ref 6–23)
CALCIUM SERPL-MCNC: 9.4 MG/DL (ref 8.6–10.2)
CHLORIDE BLD-SCNC: 98 MMOL/L (ref 98–107)
CLARITY: CLEAR
CO2: 22 MMOL/L (ref 22–29)
COLOR: YELLOW
CREAT SERPL-MCNC: 0.7 MG/DL (ref 0.7–1.2)
EOSINOPHILS ABSOLUTE: 0 E9/L (ref 0.05–0.5)
EOSINOPHILS RELATIVE PERCENT: 0 % (ref 0–6)
GFR AFRICAN AMERICAN: >60
GFR NON-AFRICAN AMERICAN: >60 ML/MIN/1.73
GLUCOSE BLD-MCNC: 208 MG/DL (ref 74–99)
GLUCOSE URINE: NEGATIVE MG/DL
HCT VFR BLD CALC: 40.6 % (ref 37–54)
HEMOGLOBIN: 14.3 G/DL (ref 12.5–16.5)
IMMATURE GRANULOCYTES #: 0.09 E9/L
IMMATURE GRANULOCYTES %: 0.8 % (ref 0–5)
INR BLD: 1.9
KETONES, URINE: NEGATIVE MG/DL
LACTIC ACID, SEPSIS: 3.1 MMOL/L (ref 0.5–1.9)
LEUKOCYTE ESTERASE, URINE: NEGATIVE
LIPASE: 18 U/L (ref 13–60)
LYMPHOCYTES ABSOLUTE: 0.61 E9/L (ref 1.5–4)
LYMPHOCYTES RELATIVE PERCENT: 5.4 % (ref 20–42)
MCH RBC QN AUTO: 33.5 PG (ref 26–35)
MCHC RBC AUTO-ENTMCNC: 35.2 % (ref 32–34.5)
MCV RBC AUTO: 95.1 FL (ref 80–99.9)
MONOCYTES ABSOLUTE: 0.57 E9/L (ref 0.1–0.95)
MONOCYTES RELATIVE PERCENT: 5.1 % (ref 2–12)
NEUTROPHILS ABSOLUTE: 9.96 E9/L (ref 1.8–7.3)
NEUTROPHILS RELATIVE PERCENT: 88.6 % (ref 43–80)
NITRITE, URINE: NEGATIVE
PDW BLD-RTO: 14.1 FL (ref 11.5–15)
PH UA: 5.5 (ref 5–9)
PLATELET # BLD: 93 E9/L (ref 130–450)
PLATELET CONFIRMATION: NORMAL
PMV BLD AUTO: 10.6 FL (ref 7–12)
POTASSIUM REFLEX MAGNESIUM: 4.2 MMOL/L (ref 3.5–5)
PROTEIN UA: NEGATIVE MG/DL
PROTHROMBIN TIME: 21.4 SEC (ref 9.3–12.4)
RBC # BLD: 4.27 E12/L (ref 3.8–5.8)
SARS-COV-2, NAAT: NOT DETECTED
SODIUM BLD-SCNC: 131 MMOL/L (ref 132–146)
SPECIFIC GRAVITY UA: 1.02 (ref 1–1.03)
TOTAL PROTEIN: 7.5 G/DL (ref 6.4–8.3)
UROBILINOGEN, URINE: 0.2 E.U./DL
WBC # BLD: 11.2 E9/L (ref 4.5–11.5)

## 2021-10-10 PROCEDURE — 82150 ASSAY OF AMYLASE: CPT

## 2021-10-10 PROCEDURE — 93005 ELECTROCARDIOGRAM TRACING: CPT | Performed by: PHYSICIAN ASSISTANT

## 2021-10-10 PROCEDURE — 85025 COMPLETE CBC W/AUTO DIFF WBC: CPT

## 2021-10-10 PROCEDURE — 83605 ASSAY OF LACTIC ACID: CPT

## 2021-10-10 PROCEDURE — 85610 PROTHROMBIN TIME: CPT

## 2021-10-10 PROCEDURE — 87186 SC STD MICRODIL/AGAR DIL: CPT

## 2021-10-10 PROCEDURE — 85730 THROMBOPLASTIN TIME PARTIAL: CPT

## 2021-10-10 PROCEDURE — 80053 COMPREHEN METABOLIC PANEL: CPT

## 2021-10-10 PROCEDURE — 81003 URINALYSIS AUTO W/O SCOPE: CPT

## 2021-10-10 PROCEDURE — 87635 SARS-COV-2 COVID-19 AMP PRB: CPT

## 2021-10-10 PROCEDURE — 99282 EMERGENCY DEPT VISIT SF MDM: CPT

## 2021-10-10 PROCEDURE — 4500000002 HC ER NO CHARGE

## 2021-10-10 PROCEDURE — 87088 URINE BACTERIA CULTURE: CPT

## 2021-10-10 PROCEDURE — 83690 ASSAY OF LIPASE: CPT

## 2021-10-10 PROCEDURE — 71045 X-RAY EXAM CHEST 1 VIEW: CPT

## 2021-10-10 PROCEDURE — 87045 FECES CULTURE AEROBIC BACT: CPT

## 2021-10-10 PROCEDURE — 87077 CULTURE AEROBIC IDENTIFY: CPT

## 2021-10-10 PROCEDURE — 87329 GIARDIA AG IA: CPT

## 2021-10-10 PROCEDURE — 87040 BLOOD CULTURE FOR BACTERIA: CPT

## 2021-10-10 PROCEDURE — 87150 DNA/RNA AMPLIFIED PROBE: CPT

## 2021-10-10 RX ORDER — ACETAMINOPHEN 500 MG
1000 TABLET ORAL ONCE
Status: DISCONTINUED | OUTPATIENT
Start: 2021-10-10 | End: 2021-10-11 | Stop reason: HOSPADM

## 2021-10-10 NOTE — ED PROVIDER NOTES
FIRST PROVIDER CONTACT ASSESSMENT NOTE                                                                                                Department of Emergency Medicine                                                      First Provider Note  10/10/21  4:38 PM EDT  NAME: Alena Pappas  : 1950  MRN: [id number]    Chief Complaint: Diarrhea (Has been having diarrhea for a week that got worse this weekend. Was on a recent antibiotic. )      History of Present Illness:   Alena Pappas is a 66 y.o. male who presents to the ED for n/v/d with decreased appetiete x 1 wk. Just finished Flagyl given by Dr. Ramesh Montero. The patient has been fully vaccinated against COVID-19. Focused Physical Exam:  VS:    ED Triage Vitals   BP Temp Temp Source Pulse Resp SpO2 Height Weight   10/10/21 1637 10/10/21 1637 10/10/21 1637 10/10/21 1631 10/10/21 1637 10/10/21 1631 10/10/21 1637 10/10/21 1637   (!) 173/107 100.6 °F (38.1 °C) Oral 112 16 93 % 5' 11\" (1.803 m) 175 lb (79.4 kg)        General: Alert and in no apparent distress. Medical History:  has a past medical history of Buccal mass, Diabetes mellitus ([address] Esophageal varices ([address] Hepatitis C, Liver cirrhosis ([address] Mild dementia ([address] and Positive colorectal cancer screening using Cologuard test.    Surgical History:  has a past surgical history that includes Nasal septum surgery; Tonsillectomy; Upper gastrointestinal endoscopy (N/A, 2018); hernia repair (Right, 2018); Upper gastrointestinal endoscopy (2020); Colonoscopy (2017); Biopsy mouth lesion (Left, 2020); and Mouth surgery (Right, 2020). Social History:  reports that he has never smoked. He has never used smokeless tobacco. He reports current alcohol use. He reports that he does not use drugs. Family History: family history is not on file.     Allergies: Ketorolac tromethamine, Memantine hcl, and Tamsulosin hcl     Initial Plan of Care:  Initiate Treatment-Testing, Proceed toTreatment Area When Bed Available for ED Attending/MLP to Continue Care    -------------------------------------------------[address] ASSESSMENT NOTE--------------------------------------------------------  Electronically signed by Hadassah Paget, PA   DD: 10/10/21       TALIA Manley  10/10/21 8608

## 2021-10-11 LAB
BOTTLE TYPE: ABNORMAL
CANDIDA ALBICANS BY PCR: NOT DETECTED
CANDIDA GLABRATA BY PCR: NOT DETECTED
CANDIDA KRUSEI BY PCR: NOT DETECTED
CANDIDA PARAPSILOSIS BY PCR: NOT DETECTED
CANDIDA TROPICALIS BY PCR: NOT DETECTED
EKG ATRIAL RATE: 109 BPM
EKG P AXIS: 21 DEGREES
EKG P-R INTERVAL: 116 MS
EKG Q-T INTERVAL: 324 MS
EKG QRS DURATION: 88 MS
EKG QTC CALCULATION (BAZETT): 436 MS
EKG R AXIS: -4 DEGREES
EKG T AXIS: 12 DEGREES
EKG VENTRICULAR RATE: 109 BPM
ENTEROBACTER CLOACAE COMPLEX BY PCR: NOT DETECTED
ENTEROBACTERALES BY PCR: NOT DETECTED
ENTEROCOCCUS BY PCR: NOT DETECTED
ESCHERICHIA COLI BY PCR: NOT DETECTED
GIARDIA ANTIGEN STOOL: NORMAL
HAEMOPHILUS INFLUENZAE BY PCR: NOT DETECTED
KLEBSIELLA OXYTOCA BY PCR: NOT DETECTED
KLEBSIELLA PNEUMONIAE GROUP BY PCR: NOT DETECTED
LISTERIA MONOCYTOGENES BY PCR: NOT DETECTED
NEISSERIA MENINGITIDIS BY PCR: NOT DETECTED
ORDER NUMBER: ABNORMAL
PROTEUS SPECIES BY PCR: NOT DETECTED
PSEUDOMONAS AERUGINOSA BY PCR: NOT DETECTED
SERRATIA MARCESCENS BY PCR: NOT DETECTED
SOURCE OF BLOOD CULTURE: ABNORMAL
STAPHYLOCOCCUS AUREUS BY PCR: NOT DETECTED
STAPHYLOCOCCUS SPECIES BY PCR: NOT DETECTED
STREPTOCOCCUS AGALACTIAE BY PCR: NOT DETECTED
STREPTOCOCCUS PNEUMONIAE BY PCR: NOT DETECTED
STREPTOCOCCUS PYOGENES  BY PCR: NOT DETECTED
STREPTOCOCCUS SPECIES BY PCR: DETECTED

## 2021-10-11 PROCEDURE — 96374 THER/PROPH/DIAG INJ IV PUSH: CPT

## 2021-10-11 PROCEDURE — 99284 EMERGENCY DEPT VISIT MOD MDM: CPT

## 2021-10-11 PROCEDURE — 93010 ELECTROCARDIOGRAM REPORT: CPT | Performed by: INTERNAL MEDICINE

## 2021-10-11 NOTE — ED TRIAGE NOTES
FIRST PROVIDER CONTACT ASSESSMENT NOTE      Department of Emergency Medicine   10/11/21  5:16 PM EDT    Chief Complaint: No chief complaint on file. History of Present Illness:    Ulises Schrader is a 66 y.o. male who presents to the ED by private vehicle for + blood cultures. Pt c/o nvd x 1 week. Focused Screening Exam:  Constitutional:  Alert, appears stated age and is in no distress.       *ALLERGIES*     Ketorolac tromethamine, Memantine hcl, and Tamsulosin hcl     ED Triage Vitals   BP Temp Temp src Pulse Resp SpO2 Height Weight   -- -- -- -- -- -- -- --        Initial Plan of Care:  Initiate Treatment-Testing, Proceed toTreatment Area When Bed Available for ED Attending/MLP to Continue Care    -----------------END OF FIRST PROVIDER CONTACT ASSESSMENT NOTE--------------  Electronically signed by Mamie Silver PA-C   DD: 10/11/21

## 2021-10-11 NOTE — ED NOTES
Update provided to pt and wife in waiting room. This RN called daughter to also provide an update on results.       Reilly Clifford, RN  10/10/21 [id number]

## 2021-10-11 NOTE — ED NOTES
Once again updated patient and wife on wait status unfortunately we are still waiting for bed in the main emergency department, I did provide apology once again and encouraged him to continue to wait for main bed in the emergency department so that he can receive IV fluids for hydration secondary him having diarrhea.      Estee Broussard, DAVDI - CNP  10/11/21 1111 [facility], DAVID  CNP  10/11/21 7806

## 2021-10-12 ENCOUNTER — APPOINTMENT (OUTPATIENT)
Dept: GENERAL RADIOLOGY | Age: 71
DRG: 872 | End: 2021-10-12
Payer: MEDICARE

## 2021-10-12 ENCOUNTER — APPOINTMENT (OUTPATIENT)
Dept: CT IMAGING | Age: 71
DRG: 872 | End: 2021-10-12
Payer: MEDICARE

## 2021-10-12 ENCOUNTER — HOSPITAL ENCOUNTER (INPATIENT)
Age: 71
LOS: 2 days | Discharge: HOME | DRG: 872 | End: 2021-10-14
Attending: STUDENT IN AN ORGANIZED HEALTH CARE EDUCATION/TRAINING PROGRAM | Admitting: INTERNAL MEDICINE
Payer: MEDICARE

## 2021-10-12 DIAGNOSIS — R78.81 BACTEREMIA: Primary | ICD-10-CM

## 2021-10-12 PROBLEM — B95.5 STREPTOCOCCAL BACTEREMIA: Status: ACTIVE | Noted: 2021-10-12

## 2021-10-12 LAB
ALBUMIN SERPL-MCNC: 3.4 G/DL (ref 3.5–5.2)
ALP BLD-CCNC: 89 U/L (ref 40–129)
ALT SERPL-CCNC: 39 U/L (ref 0–40)
AMMONIA: 22.3 UMOL/L (ref 16–60)
ANION GAP SERPL CALCULATED.3IONS-SCNC: 10 MMOL/L (ref 7–16)
AST SERPL-CCNC: 44 U/L (ref 0–39)
BASOPHILS ABSOLUTE: 0.01 E9/L (ref 0–0.2)
BASOPHILS RELATIVE PERCENT: 0.1 % (ref 0–2)
BILIRUB SERPL-MCNC: 2.6 MG/DL (ref 0–1.2)
BUN BLDV-MCNC: 10 MG/DL (ref 6–23)
BURR CELLS: ABNORMAL
CALCIUM SERPL-MCNC: 8.9 MG/DL (ref 8.6–10.2)
CHLORIDE BLD-SCNC: 97 MMOL/L (ref 98–107)
CO2: 22 MMOL/L (ref 22–29)
CREAT SERPL-MCNC: 0.6 MG/DL (ref 0.7–1.2)
EKG ATRIAL RATE: 100 BPM
EKG P AXIS: 46 DEGREES
EKG P-R INTERVAL: 134 MS
EKG Q-T INTERVAL: 360 MS
EKG QRS DURATION: 98 MS
EKG QTC CALCULATION (BAZETT): 464 MS
EKG R AXIS: 1 DEGREES
EKG T AXIS: 29 DEGREES
EKG VENTRICULAR RATE: 100 BPM
EOSINOPHILS ABSOLUTE: 0.03 E9/L (ref 0.05–0.5)
EOSINOPHILS RELATIVE PERCENT: 0.3 % (ref 0–6)
GFR AFRICAN AMERICAN: >60
GFR NON-AFRICAN AMERICAN: >60 ML/MIN/1.73
GLUCOSE BLD-MCNC: 167 MG/DL (ref 74–99)
HCT VFR BLD CALC: 39.5 % (ref 37–54)
HEMOGLOBIN: 13.9 G/DL (ref 12.5–16.5)
IMMATURE GRANULOCYTES #: 0.02 E9/L
IMMATURE GRANULOCYTES %: 0.2 % (ref 0–5)
INR BLD: 1.5
LACTIC ACID: 1.9 MMOL/L (ref 0.5–2.2)
LIPASE: 22 U/L (ref 13–60)
LV EF: 60 %
LVEF MODALITY: NORMAL
LYMPHOCYTES ABSOLUTE: 0.58 E9/L (ref 1.5–4)
LYMPHOCYTES RELATIVE PERCENT: 6.1 % (ref 20–42)
MCH RBC QN AUTO: 33.7 PG (ref 26–35)
MCHC RBC AUTO-ENTMCNC: 35.2 % (ref 32–34.5)
MCV RBC AUTO: 95.6 FL (ref 80–99.9)
METER GLUCOSE: 155 MG/DL (ref 74–99)
METER GLUCOSE: 161 MG/DL (ref 74–99)
METER GLUCOSE: 268 MG/DL (ref 74–99)
MONOCYTES ABSOLUTE: 0.58 E9/L (ref 0.1–0.95)
MONOCYTES RELATIVE PERCENT: 6.1 % (ref 2–12)
NEUTROPHILS ABSOLUTE: 8.26 E9/L (ref 1.8–7.3)
NEUTROPHILS RELATIVE PERCENT: 87.2 % (ref 43–80)
OVALOCYTES: ABNORMAL
PDW BLD-RTO: 14 FL (ref 11.5–15)
PLATELET # BLD: 89 E9/L (ref 130–450)
PLATELET CONFIRMATION: NORMAL
PMV BLD AUTO: 9.9 FL (ref 7–12)
POIKILOCYTES: ABNORMAL
POLYCHROMASIA: ABNORMAL
POTASSIUM SERPL-SCNC: 4.3 MMOL/L (ref 3.5–5)
PROTHROMBIN TIME: 16.7 SEC (ref 9.3–12.4)
RBC # BLD: 4.13 E12/L (ref 3.8–5.8)
SEDIMENTATION RATE, ERYTHROCYTE: 9 MM/HR (ref 0–15)
SODIUM BLD-SCNC: 129 MMOL/L (ref 132–146)
TOTAL PROTEIN: 6.8 G/DL (ref 6.4–8.3)
URINE CULTURE, ROUTINE: NORMAL
WBC # BLD: 9.5 E9/L (ref 4.5–11.5)

## 2021-10-12 PROCEDURE — 93306 TTE W/DOPPLER COMPLETE: CPT

## 2021-10-12 PROCEDURE — 85025 COMPLETE CBC W/AUTO DIFF WBC: CPT

## 2021-10-12 PROCEDURE — 83690 ASSAY OF LIPASE: CPT

## 2021-10-12 PROCEDURE — 82140 ASSAY OF AMMONIA: CPT

## 2021-10-12 PROCEDURE — 6370000000 HC RX 637 (ALT 250 FOR IP): Performed by: HOSPITALIST

## 2021-10-12 PROCEDURE — 80053 COMPREHEN METABOLIC PANEL: CPT

## 2021-10-12 PROCEDURE — 6360000004 HC RX CONTRAST MEDICATION: Performed by: RADIOLOGY

## 2021-10-12 PROCEDURE — 82962 GLUCOSE BLOOD TEST: CPT

## 2021-10-12 PROCEDURE — 2580000003 HC RX 258: Performed by: STUDENT IN AN ORGANIZED HEALTH CARE EDUCATION/TRAINING PROGRAM

## 2021-10-12 PROCEDURE — 86060 ANTISTREPTOLYSIN O TITER: CPT

## 2021-10-12 PROCEDURE — 71046 X-RAY EXAM CHEST 2 VIEWS: CPT

## 2021-10-12 PROCEDURE — 2580000003 HC RX 258: Performed by: INTERNAL MEDICINE

## 2021-10-12 PROCEDURE — 1200000000 HC SEMI PRIVATE

## 2021-10-12 PROCEDURE — 6360000002 HC RX W HCPCS: Performed by: INTERNAL MEDICINE

## 2021-10-12 PROCEDURE — 36415 COLL VENOUS BLD VENIPUNCTURE: CPT

## 2021-10-12 PROCEDURE — 6360000002 HC RX W HCPCS: Performed by: STUDENT IN AN ORGANIZED HEALTH CARE EDUCATION/TRAINING PROGRAM

## 2021-10-12 PROCEDURE — 83605 ASSAY OF LACTIC ACID: CPT

## 2021-10-12 PROCEDURE — 85651 RBC SED RATE NONAUTOMATED: CPT

## 2021-10-12 PROCEDURE — 6370000000 HC RX 637 (ALT 250 FOR IP): Performed by: INTERNAL MEDICINE

## 2021-10-12 PROCEDURE — 93005 ELECTROCARDIOGRAM TRACING: CPT | Performed by: PHYSICIAN ASSISTANT

## 2021-10-12 PROCEDURE — 87040 BLOOD CULTURE FOR BACTERIA: CPT

## 2021-10-12 PROCEDURE — 2580000003 HC RX 258: Performed by: SPECIALIST

## 2021-10-12 PROCEDURE — 74177 CT ABD & PELVIS W/CONTRAST: CPT

## 2021-10-12 PROCEDURE — 85610 PROTHROMBIN TIME: CPT

## 2021-10-12 PROCEDURE — 6360000002 HC RX W HCPCS: Performed by: SPECIALIST

## 2021-10-12 PROCEDURE — 86140 C-REACTIVE PROTEIN: CPT

## 2021-10-12 RX ORDER — LORAZEPAM 0.5 MG/1
0.5 TABLET ORAL NIGHTLY PRN
Status: DISCONTINUED | OUTPATIENT
Start: 2021-10-12 | End: 2021-10-14 | Stop reason: HOSPADM

## 2021-10-12 RX ORDER — ESCITALOPRAM OXALATE 10 MG/1
5 TABLET ORAL DAILY
Status: DISCONTINUED | OUTPATIENT
Start: 2021-10-12 | End: 2021-10-14 | Stop reason: HOSPADM

## 2021-10-12 RX ORDER — PANTOPRAZOLE SODIUM 40 MG/1
40 TABLET, DELAYED RELEASE ORAL
Status: DISCONTINUED | OUTPATIENT
Start: 2021-10-12 | End: 2021-10-14 | Stop reason: HOSPADM

## 2021-10-12 RX ORDER — DEXTROSE MONOHYDRATE 25 G/50ML
12.5 INJECTION, SOLUTION INTRAVENOUS PRN
Status: DISCONTINUED | OUTPATIENT
Start: 2021-10-12 | End: 2021-10-14 | Stop reason: HOSPADM

## 2021-10-12 RX ORDER — LACTOBACILLUS RHAMNOSUS GG 10B CELL
1 CAPSULE ORAL DAILY
Status: DISCONTINUED | OUTPATIENT
Start: 2021-10-12 | End: 2021-10-14 | Stop reason: HOSPADM

## 2021-10-12 RX ORDER — ONDANSETRON 4 MG/1
4 TABLET, ORALLY DISINTEGRATING ORAL EVERY 8 HOURS PRN
Status: DISCONTINUED | OUTPATIENT
Start: 2021-10-12 | End: 2021-10-14 | Stop reason: HOSPADM

## 2021-10-12 RX ORDER — DEXTROSE MONOHYDRATE 50 MG/ML
100 INJECTION, SOLUTION INTRAVENOUS PRN
Status: DISCONTINUED | OUTPATIENT
Start: 2021-10-12 | End: 2021-10-14 | Stop reason: HOSPADM

## 2021-10-12 RX ORDER — ESCITALOPRAM OXALATE 5 MG/1
5 TABLET ORAL NIGHTLY
COMMUNITY
End: 2021-10-18 | Stop reason: SDUPTHER

## 2021-10-12 RX ORDER — ACETAMINOPHEN 325 MG/1
650 TABLET ORAL EVERY 6 HOURS PRN
Status: DISCONTINUED | OUTPATIENT
Start: 2021-10-12 | End: 2021-10-14 | Stop reason: HOSPADM

## 2021-10-12 RX ORDER — SODIUM CHLORIDE 0.9 % (FLUSH) 0.9 %
10 SYRINGE (ML) INJECTION EVERY 12 HOURS SCHEDULED
Status: DISCONTINUED | OUTPATIENT
Start: 2021-10-12 | End: 2021-10-14 | Stop reason: HOSPADM

## 2021-10-12 RX ORDER — POTASSIUM CHLORIDE 20 MEQ/1
40 TABLET, EXTENDED RELEASE ORAL PRN
Status: DISCONTINUED | OUTPATIENT
Start: 2021-10-12 | End: 2021-10-14 | Stop reason: HOSPADM

## 2021-10-12 RX ORDER — SENNA PLUS 8.6 MG/1
1 TABLET ORAL DAILY PRN
Status: DISCONTINUED | OUTPATIENT
Start: 2021-10-12 | End: 2021-10-14 | Stop reason: HOSPADM

## 2021-10-12 RX ORDER — INSULIN GLARGINE 100 [IU]/ML
20 INJECTION, SOLUTION SUBCUTANEOUS NIGHTLY
Status: DISCONTINUED | OUTPATIENT
Start: 2021-10-12 | End: 2021-10-14 | Stop reason: HOSPADM

## 2021-10-12 RX ORDER — SODIUM CHLORIDE 9 MG/ML
25 INJECTION, SOLUTION INTRAVENOUS PRN
Status: DISCONTINUED | OUTPATIENT
Start: 2021-10-12 | End: 2021-10-14 | Stop reason: HOSPADM

## 2021-10-12 RX ORDER — LORAZEPAM 0.5 MG/1
0.5 TABLET ORAL NIGHTLY PRN
Status: DISCONTINUED | OUTPATIENT
Start: 2021-10-12 | End: 2021-10-12

## 2021-10-12 RX ORDER — POTASSIUM CHLORIDE 7.45 MG/ML
10 INJECTION INTRAVENOUS PRN
Status: DISCONTINUED | OUTPATIENT
Start: 2021-10-12 | End: 2021-10-14 | Stop reason: HOSPADM

## 2021-10-12 RX ORDER — DONEPEZIL HYDROCHLORIDE 5 MG/1
5 TABLET, FILM COATED ORAL
Status: DISCONTINUED | OUTPATIENT
Start: 2021-10-12 | End: 2021-10-14 | Stop reason: HOSPADM

## 2021-10-12 RX ORDER — NICOTINE POLACRILEX 4 MG
15 LOZENGE BUCCAL PRN
Status: DISCONTINUED | OUTPATIENT
Start: 2021-10-12 | End: 2021-10-14 | Stop reason: HOSPADM

## 2021-10-12 RX ORDER — LANOLIN ALCOHOL/MO/W.PET/CERES
6 CREAM (GRAM) TOPICAL NIGHTLY PRN
Status: DISCONTINUED | OUTPATIENT
Start: 2021-10-12 | End: 2021-10-12

## 2021-10-12 RX ORDER — ACETAMINOPHEN 650 MG/1
650 SUPPOSITORY RECTAL EVERY 6 HOURS PRN
Status: DISCONTINUED | OUTPATIENT
Start: 2021-10-12 | End: 2021-10-14 | Stop reason: HOSPADM

## 2021-10-12 RX ORDER — LANOLIN ALCOHOL/MO/W.PET/CERES
6 CREAM (GRAM) TOPICAL NIGHTLY PRN
Status: DISCONTINUED | OUTPATIENT
Start: 2021-10-12 | End: 2021-10-14 | Stop reason: HOSPADM

## 2021-10-12 RX ORDER — SODIUM CHLORIDE 0.9 % (FLUSH) 0.9 %
10 SYRINGE (ML) INJECTION PRN
Status: DISCONTINUED | OUTPATIENT
Start: 2021-10-12 | End: 2021-10-14 | Stop reason: HOSPADM

## 2021-10-12 RX ORDER — ONDANSETRON 2 MG/ML
4 INJECTION INTRAMUSCULAR; INTRAVENOUS EVERY 6 HOURS PRN
Status: DISCONTINUED | OUTPATIENT
Start: 2021-10-12 | End: 2021-10-14 | Stop reason: HOSPADM

## 2021-10-12 RX ADMIN — INSULIN LISPRO 6 UNITS: 100 INJECTION, SOLUTION INTRAVENOUS; SUBCUTANEOUS at 11:56

## 2021-10-12 RX ADMIN — WATER 2000 MG: 1 INJECTION INTRAMUSCULAR; INTRAVENOUS; SUBCUTANEOUS at 02:55

## 2021-10-12 RX ADMIN — Medication 1 CAPSULE: at 11:52

## 2021-10-12 RX ADMIN — WATER 2000 MG: 1 INJECTION INTRAMUSCULAR; INTRAVENOUS; SUBCUTANEOUS at 17:46

## 2021-10-12 RX ADMIN — Medication 10 ML: at 22:26

## 2021-10-12 RX ADMIN — IOPAMIDOL 75 ML: 755 INJECTION, SOLUTION INTRAVENOUS at 03:30

## 2021-10-12 RX ADMIN — LORAZEPAM 0.5 MG: 0.5 TABLET ORAL at 22:27

## 2021-10-12 RX ADMIN — INSULIN LISPRO 14 UNITS: 100 INJECTION, SOLUTION INTRAVENOUS; SUBCUTANEOUS at 11:54

## 2021-10-12 RX ADMIN — RIFAXIMIN 550 MG: 550 TABLET ORAL at 22:26

## 2021-10-12 RX ADMIN — Medication 10 ML: at 12:10

## 2021-10-12 RX ADMIN — ESCITALOPRAM OXALATE 5 MG: 10 TABLET ORAL at 12:09

## 2021-10-12 RX ADMIN — INSULIN LISPRO 1 UNITS: 100 INJECTION, SOLUTION INTRAVENOUS; SUBCUTANEOUS at 22:52

## 2021-10-12 RX ADMIN — ENOXAPARIN SODIUM 40 MG: 40 INJECTION SUBCUTANEOUS at 12:09

## 2021-10-12 RX ADMIN — INSULIN GLARGINE 20 UNITS: 100 INJECTION, SOLUTION SUBCUTANEOUS at 22:21

## 2021-10-12 NOTE — ED PROVIDER NOTES
Department of Emergency Medicine   ED  Provider Note  Admit Date/RoomTime: 10/12/2021  1:50 AM  ED Room: 0540/0540-A          History of Present Illness:  10/12/21, Time: 2:47 AM EDT  Chief Complaint   Patient presents with    Other     Was in ER yesterday. Pt left because of wait time. [facility] staff called and said he had a blood infection and to come in        Allyn Hernandez is a 66 y.o. male presenting to the ED for positive blood cultures, diarrhea, and fatigue, beginning several days ago. The complaint has been persistent, moderate in severity, and worsened by nothing. The patient is a 74-year-old male with a history of type 1 diabetes insulin-dependent, hepatic cirrhosis with chronic hep C, esophageal varices, and dementia who presents to the emergency department for positive blood cultures. The patient also is complaining of fatigue and diarrhea. The patient symptoms were sudden in onset several days ago, and has been persistent, moderate in severity, nothing makes it better or worse. The patient was seen at [facility] yesterday and had testing done but was not seen by a provider due to the prolonged wait time he states that he left. However, he states that they did draw blood cultures which were found to be positive today. Per chart review he had 3 of 4 bottles that were positive for gram-positive cocci in chains consistent with strep species. The patient was recently treated \"for an infection\" with Flagyl by his GI doctor, Dr. Lowell Chambers, but he does not remember why he had that antibiotic. He states that he completed that prescription approximately 3 days ago. The patient denies any fever, chills, chest pain, shortness of breath, cough, lightheadedness, dizziness, syncope, abdominal pain, hematemesis, hematochezia, melena, sick contacts, recent travel, recent hospitalization, recent illness, or other acute symptoms or concerns.     Review of Systems:   A complete review of systems was performed and pertinent positives and negatives are stated within HPI, all other systems reviewed and are negative.        --------------------------------------------- PAST HISTORY ---------------------------------------------  Past Medical History:  has a past medical history of Buccal mass, Diabetes mellitus ([facility] [address] Esophageal varices ([facility] [address] Hepatitis C, Liver cirrhosis ([facility] Mild dementia ([facility] and Positive colorectal cancer screening using Cologuard test.    Past Surgical History:  has a past surgical history that includes Nasal septum surgery; Tonsillectomy; Upper gastrointestinal endoscopy (N/A, 06/19/2018); hernia repair (Right, 2018); Upper gastrointestinal endoscopy (09/01/2020); Biopsy mouth lesion (Left, 06/04/2020); Mouth surgery (Right, 09/24/2020); Cholecystectomy; Colonoscopy (2017); and Endoscopy, colon, diagnostic (2019 and 2020). Social History:  reports that he has never smoked. He has never used smokeless tobacco. He reports that he does not drink alcohol and does not use drugs. Family History: family history includes Heart Failure in his brother; Hypertension in his brother; Seizures in his brother; Stroke in his brother. . Unless otherwise noted, family history is non contributory    The patients home medications have been reviewed.     Allergies: Ketorolac tromethamine, Memantine hcl, and Tamsulosin hcl    I have reviewed the past medical history, past surgical history, social history, and family history    ---------------------------------------------------PHYSICAL EXAM--------------------------------------    Constitutional/General: Alert and oriented x3, frail and chronically ill-appearing but in no acute distress   Head: Normocephalic and atraumatic  Eyes:  EOMI, sclera non icteric  ENT: Oropharynx clear, handling secretions, no trismus, no asymmetry of the posterior oropharynx or uvular edema  Neck: Supple, full ROM, no stridor, no meningeal signs  Respiratory: Lungs clear to auscultation bilaterally, no wheezes, rales, or rhonchi. Not in respiratory distress  Cardiovascular:  Regular rate. Regular rhythm. No murmurs, no gallops, no rubs. 2+ distal pulses. Equal extremity pulses. Gastrointestinal: Abdomen is soft with mild diffuse tenderness palpation. There is no rigidity or rebound tenderness or guarding. Musculoskeletal: Moves all extremities x 4. Warm and well perfused, no clubbing, no cyanosis, no edema. Capillary refill <3 seconds  Skin: skin warm and dry. No rashes. Neurologic: GCS 15, no focal deficits, symmetric strength 5/5 in the upper and lower extremities bilaterally  Psychiatric: Normal Affect          -------------------------------------------------- RESULTS -------------------------------------------------  I have personally reviewed all laboratory and imaging results for this patient. Results are listed below.      LABS: (Lab results interpreted by me)  Results for orders placed or performed during the hospital encounter of 10/12/21   Culture, Blood 1    Specimen: Blood   Result Value Ref Range    Blood Culture, Routine 24 Hours no growth    Culture, Blood 2    Specimen: Blood   Result Value Ref Range    Culture, Blood 2 24 Hours no growth    CBC Auto Differential   Result Value Ref Range    WBC 9.5 4.5 - 11.5 E9/L    RBC 4.13 3.80 - 5.80 E12/L    Hemoglobin 13.9 12.5 - 16.5 g/dL    Hematocrit 39.5 37.0 - 54.0 %    MCV 95.6 80.0 - 99.9 fL    MCH 33.7 26.0 - 35.0 pg    MCHC 35.2 (H) 32.0 - 34.5 %    RDW 14.0 11.5 - 15.0 fL    Platelets 89 (L) 233 - 450 E9/L    MPV 9.9 7.0 - 12.0 fL    Neutrophils % 87.2 (H) 43.0 - 80.0 %    Immature Granulocytes % 0.2 0.0 - 5.0 %    Lymphocytes % 6.1 (L) 20.0 - 42.0 %    Monocytes % 6.1 2.0 - 12.0 %    Eosinophils % 0.3 0.0 - 6.0 %    Basophils % 0.1 0.0 - 2.0 %    Neutrophils Absolute 8.26 (H) 1.80 - 7.30 E9/L    Immature Granulocytes # 0.02 E9/L    Lymphocytes Absolute 0.58 (L) 1.50 - 4.00 E9/L    Monocytes Absolute 0.58 0.10 - 0.95 E9/L    Eosinophils Absolute 0.03 (L) 0.05 - 0.50 E9/L    Basophils Absolute 0.01 0.00 - 0.20 E9/L    Polychromasia 1+     Poikilocytes 1+     Hoda Cells 1+     Ovalocytes 1+    Comprehensive Metabolic Panel   Result Value Ref Range    Sodium 129 (L) 132 - 146 mmol/L    Potassium 4.3 3.5 - 5.0 mmol/L    Chloride 97 (L) 98 - 107 mmol/L    CO2 22 22 - 29 mmol/L    Anion Gap 10 7 - 16 mmol/L    Glucose 167 (H) 74 - 99 mg/dL    BUN 10 6 - 23 mg/dL    CREATININE 0.6 (L) 0.7 - 1.2 mg/dL    GFR Non-African American >60 >=60 mL/min/1.73    GFR African American >60     Calcium 8.9 8.6 - 10.2 mg/dL    Total Protein 6.8 6.4 - 8.3 g/dL    Albumin 3.4 (L) 3.5 - 5.2 g/dL    Total Bilirubin 2.6 (H) 0.0 - 1.2 mg/dL    Alkaline Phosphatase 89 40 - 129 U/L    ALT 39 0 - 40 U/L    AST 44 (H) 0 - 39 U/L   Lactic Acid, Plasma   Result Value Ref Range    Lactic Acid 1.9 0.5 - 2.2 mmol/L   Lipase   Result Value Ref Range    Lipase 22 13 - 60 U/L   Platelet Confirmation   Result Value Ref Range    Platelet Confirmation SEE BELOW    C-Reactive Protein   Result Value Ref Range    CRP 6.5 (H) 0.0 - 0.4 mg/dL   Sedimentation Rate   Result Value Ref Range    Sed Rate 9 0 - 15 mm/Hr   Antistreptolysin O Titer   Result Value Ref Range     (H) 0 - 200 IU/mL   Ammonia   Result Value Ref Range    Ammonia 22.3 16.0 - 60.0 umol/L   Protime-INR   Result Value Ref Range    Protime 16.7 (H) 9.3 - 12.4 sec    INR 1.5    Hemoglobin A1c   Result Value Ref Range    Hemoglobin A1C 6.1 (H) 4.0 - 5.6 %   Comprehensive Metabolic Panel w/ Reflex to MG   Result Value Ref Range    Sodium 136 132 - 146 mmol/L    Potassium reflex Magnesium 3.6 3.5 - 5.0 mmol/L    Chloride 105 98 - 107 mmol/L    CO2 21 (L) 22 - 29 mmol/L    Anion Gap 10 7 - 16 mmol/L    Glucose 149 (H) 74 - 99 mg/dL    BUN 11 6 - 23 mg/dL    CREATININE 0.6 (L) 0.7 - 1.2 mg/dL    GFR Non-African American >60 >=60 mL/min/1.73    GFR African American >60     Calcium 8.3 (L) 8.6 - 10.2 mg/dL    Total Protein 5.7 (L) 6.4 - 8.3 g/dL    Albumin 2.9 (L) 3.5 - 5.2 g/dL    Total Bilirubin 1.3 (H) 0.0 - 1.2 mg/dL    Alkaline Phosphatase 74 40 - 129 U/L    ALT 34 0 - 40 U/L    AST 32 0 - 39 U/L   CBC auto differential   Result Value Ref Range    WBC 3.8 (L) 4.5 - 11.5 E9/L    RBC 3.71 (L) 3.80 - 5.80 E12/L    Hemoglobin 12.6 12.5 - 16.5 g/dL    Hematocrit 36.4 (L) 37.0 - 54.0 %    MCV 98.1 80.0 - 99.9 fL    MCH 34.0 26.0 - 35.0 pg    MCHC 34.6 (H) 32.0 - 34.5 %    RDW 14.3 11.5 - 15.0 fL    Platelets 85 (L) 227 - 450 E9/L    MPV 9.9 7.0 - 12.0 fL    Neutrophils % 70.2 43.0 - 80.0 %    Immature Granulocytes % 0.3 0.0 - 5.0 %    Lymphocytes % 19.1 (L) 20.0 - 42.0 %    Monocytes % 5.7 2.0 - 12.0 %    Eosinophils % 4.2 0.0 - 6.0 %    Basophils % 0.5 0.0 - 2.0 %    Neutrophils Absolute 2.69 1.80 - 7.30 E9/L    Immature Granulocytes # 0.01 E9/L    Lymphocytes Absolute 0.73 (L) 1.50 - 4.00 E9/L    Monocytes Absolute 0.22 0.10 - 0.95 E9/L    Eosinophils Absolute 0.16 0.05 - 0.50 E9/L    Basophils Absolute 0.02 0.00 - 0.20 E9/L    Poikilocytes 2+     Hoda Cells 2+     Ovalocytes 1+    Protime-INR   Result Value Ref Range    Protime 16.4 (H) 9.3 - 12.4 sec    INR 1.5    Hepatitis panel, acute   Result Value Ref Range    Hep A IgM Non-Reactive Non-Reactive    Hep B Core Ab, IgM Non-Reactive Non-Reactive    Hep B S Ag Interp Non-Reactive Non-Reactive    Hep C Ab Interp REACTIVE (A) Non-Reactive   Platelet Confirmation   Result Value Ref Range    Platelet Confirmation SEE BELOW    Comprehensive Metabolic Panel w/ Reflex to MG   Result Value Ref Range    Sodium 138 132 - 146 mmol/L    Potassium reflex Magnesium 3.5 3.5 - 5.0 mmol/L    Chloride 106 98 - 107 mmol/L    CO2 23 22 - 29 mmol/L    Anion Gap 9 7 - 16 mmol/L    Glucose 113 (H) 74 - 99 mg/dL    BUN 11 6 - 23 mg/dL    CREATININE 0.7 0.7 - 1.2 mg/dL    GFR Non-African American >60 >=60 mL/min/1.73    GFR African American >60     Calcium 8.2 (L) 8.6 - 10.2 mg/dL Total Protein 5.8 (L) 6.4 - 8.3 g/dL    Albumin 2.9 (L) 3.5 - 5.2 g/dL    Total Bilirubin 1.2 0.0 - 1.2 mg/dL    Alkaline Phosphatase 71 40 - 129 U/L    ALT 33 0 - 40 U/L    AST 33 0 - 39 U/L   CBC auto differential   Result Value Ref Range    WBC 3.0 (L) 4.5 - 11.5 E9/L    RBC 3.77 (L) 3.80 - 5.80 E12/L    Hemoglobin 12.6 12.5 - 16.5 g/dL    Hematocrit 36.3 (L) 37.0 - 54.0 %    MCV 96.3 80.0 - 99.9 fL    MCH 33.4 26.0 - 35.0 pg    MCHC 34.7 (H) 32.0 - 34.5 %    RDW 14.3 11.5 - 15.0 fL    Platelets 89 (L) 015 - 450 E9/L    MPV 9.5 7.0 - 12.0 fL    Neutrophils % 57.7 43.0 - 80.0 %    Immature Granulocytes % 0.3 0.0 - 5.0 %    Lymphocytes % 24.8 20.0 - 42.0 %    Monocytes % 11.2 2.0 - 12.0 %    Eosinophils % 5.0 0.0 - 6.0 %    Basophils % 1.0 0.0 - 2.0 %    Neutrophils Absolute 1.75 (L) 1.80 - 7.30 E9/L    Immature Granulocytes # 0.01 E9/L    Lymphocytes Absolute 0.75 (L) 1.50 - 4.00 E9/L    Monocytes Absolute 0.34 0.10 - 0.95 E9/L    Eosinophils Absolute 0.15 0.05 - 0.50 E9/L    Basophils Absolute 0.03 0.00 - 0.20 E9/L    Poikilocytes 1+     Hoda Cells 1+     Ovalocytes 1+    Protime-INR   Result Value Ref Range    Protime 16.2 (H) 9.3 - 12.4 sec    INR 1.5    Platelet Confirmation   Result Value Ref Range    Platelet Confirmation SEE BELOW    Magnesium   Result Value Ref Range    Magnesium 1.8 1.6 - 2.6 mg/dL   POCT Glucose   Result Value Ref Range    Meter Glucose 268 (H) 74 - 99 mg/dL   POCT Glucose   Result Value Ref Range    Meter Glucose 161 (H) 74 - 99 mg/dL   POCT Glucose   Result Value Ref Range    Meter Glucose 155 (H) 74 - 99 mg/dL   POCT Glucose   Result Value Ref Range    Meter Glucose 116 (H) 74 - 99 mg/dL   POCT Glucose   Result Value Ref Range    Meter Glucose 90 74 - 99 mg/dL   POCT Glucose   Result Value Ref Range    Meter Glucose 162 (H) 74 - 99 mg/dL   POCT Glucose   Result Value Ref Range    Meter Glucose 286 (H) 74 - 99 mg/dL   POCT Glucose   Result Value Ref Range    Meter Glucose 89 74 - 99 mg/dL   POCT Glucose   Result Value Ref Range    Meter Glucose 192 (H) 74 - 99 mg/dL   EKG 12 Lead   Result Value Ref Range    Ventricular Rate 100 BPM    Atrial Rate 100 BPM    P-R Interval 134 ms    QRS Duration 98 ms    Q-T Interval 360 ms    QTc Calculation (Bazett) 464 ms    P Axis 46 degrees    R Axis 1 degrees    T Axis 29 degrees   ,       RADIOLOGY:  Interpreted by Radiologist unless otherwise specified  XR CHEST (2 VW)   Final Result   Low lung volumes. Stable small bilateral pleural effusions. Bibasilar atelectasis. XR CHEST (2 VW)   Final Result   Small right pleural effusion and bibasilar areas of atelectasis. Clinical   correlation and follow-up to resolution recommended. There is elevation of the diaphragm with shallow inspiration. CT ABDOMEN PELVIS W IV CONTRAST Additional Contrast? None   Final Result   1. Limited study due motion artifacts in the mid upper abdomen. Consider   repeat examination. 2.  Findings that can indicate liver cirrhosis with mild splenomegaly and   upper abdomen portal collateral circulation. 3.  Due to motion artifacts cannot see well the main portal vein in the shabnam   hepatis region. 4.  There is a confluent nodular cluster of densities in around the distal   esophagus could represent the prominent esophageal varices. These findings   can be better evaluated with multiphase IV contrast including arterial phase,   portal venous phase and delayed the face. 5.  Moderate right-sided pleural effusion with compression atelectasis of the   right lower lobe. 6.  Chronic eventration of the left diaphragma with chronic atelectasis in   the diaphragma surface of the left lower lobe. 7.  There are is stranding of the fat planes in the left upper quadrant   following the elevation of the diaphragma, this is a chronic finding could be   relate with portal hypertension also.                EKG Interpretation  Interpreted by emergency department physician, Dr. Merline Chamorro    EKG: This EKG is signed and interpreted by me. Rate: 100  Rhythm: Sinus  Interpretation: Normal sinus rhythm, normal axis, nonspecific ST changes in the anterior septal leads, no acute elevations, intervals within normal limits, QTC is 464  Comparison: no previous EKG available       ------------------------- NURSING NOTES AND VITALS REVIEWED ---------------------------   The nursing notes within the ED encounter and vital signs as below have been reviewed by myself  BP (!) 149/81   Pulse 96   Temp 98.3 °F (36.8 °C) (Oral)   Resp 18   Ht 5' 10\" (1.778 m)   Wt 171 lb (77.6 kg)   SpO2 95%   BMI 24.54 kg/m²     Oxygen Saturation Interpretation: Normal    The patients available past medical records and past encounters were reviewed.         ------------------------------ ED COURSE/MEDICAL DECISION MAKING----------------------  Medications   donepezil (ARICEPT) tablet 5 mg (5 mg Oral Given 10/14/21 0756)   escitalopram (LEXAPRO) tablet 5 mg (5 mg Oral Given 10/14/21 0756)   insulin lispro (HUMALOG) injection vial 14 Units (14 Units SubCUTAneous Given 10/14/21 1132)   insulin lispro (HUMALOG) injection vial 0-12 Units (2 Units SubCUTAneous Given 10/14/21 1131)   insulin lispro (HUMALOG) injection vial 0-6 Units (3 Units SubCUTAneous Given 10/13/21 2050)   glucose (GLUTOSE) 40 % oral gel 15 g (has no administration in time range)   dextrose 50 % IV solution (has no administration in time range)   glucagon (rDNA) injection 1 mg (has no administration in time range)   dextrose 5 % solution (has no administration in time range)   sodium chloride flush 0.9 % injection 10 mL (10 mLs IntraVENous Given 10/14/21 1134)   sodium chloride flush 0.9 % injection 10 mL (has no administration in time range)   0.9 % sodium chloride infusion (has no administration in time range)   potassium chloride (KLOR-CON M) extended release tablet 40 mEq (has no administration in time range) Or   potassium bicarb-citric acid (EFFER-K) effervescent tablet 40 mEq (has no administration in time range)     Or   potassium chloride 10 mEq/100 mL IVPB (Peripheral Line) (has no administration in time range)   enoxaparin (LOVENOX) injection 40 mg (40 mg SubCUTAneous Given 10/14/21 0756)   ondansetron (ZOFRAN-ODT) disintegrating tablet 4 mg (has no administration in time range)     Or   ondansetron (ZOFRAN) injection 4 mg (has no administration in time range)   senna (SENOKOT) tablet 8.6 mg (has no administration in time range)   acetaminophen (TYLENOL) tablet 650 mg (has no administration in time range)     Or   acetaminophen (TYLENOL) suppository 650 mg (has no administration in time range)   perflutren lipid microspheres (DEFINITY) injection 1.65 mg (has no administration in time range)   lactobacillus (CULTURELLE) capsule 1 capsule (1 capsule Oral Given 10/14/21 0756)   insulin glargine (LANTUS) injection vial 20 Units (20 Units SubCUTAneous Given 10/13/21 2050)   cefTRIAXone (ROCEPHIN) 2,000 mg in sterile water 20 mL IV syringe (2,000 mg IntraVENous Given 10/13/21 1709)   pantoprazole (PROTONIX) tablet 40 mg (40 mg Oral Given 10/14/21 1540)   LORazepam (ATIVAN) tablet 0.5 mg (0.5 mg Oral Given 10/12/21 2227)   melatonin tablet 6 mg (has no administration in time range)   lidocaine PF 1 % injection 5 mL (has no administration in time range)   sodium chloride flush 0.9 % injection 5-40 mL (5 mLs IntraVENous Not Given 10/14/21 1134)   sodium chloride flush 0.9 % injection 5-40 mL (has no administration in time range)   0.9 % sodium chloride infusion (has no administration in time range)   heparin flush 100 UNIT/ML injection 300 Units (300 Units IntraVENous Not Given 10/13/21 2048)   heparin flush 100 UNIT/ML injection 300 Units (has no administration in time range)   cefTRIAXone (ROCEPHIN) 2,000 mg in sterile water 20 mL IV syringe (2,000 mg IntraVENous Given 10/12/21 0255)   iopamidol (ISOVUE-370) 76 % injection 75 mL (75 mLs IntraVENous Given 10/12/21 0330)           The cardiac monitor revealed NSR with a heart rate in the 100s as interpreted by me. The cardiac monitor was ordered secondary to the patient's pain and to monitor the patient for dysrhythmia. CPT [id number]       I, Dr. Lauri Ott, am the primary provider of record    Medical Decision Making:   The patient is a 69-year-old male who presents to the emergency department for positive blood cultures, fatigue, and diarrhea. Patient is slightly tachycardic on arrival but otherwise hemodynamically stable, nontoxic in appearance, and in no acute distress. He is frail and chronically ill-appearing. Repeat blood cultures were drawn and did review results from yesterday which did show 3 of 4 bottles positive for streptococcal species. Patient was given a dose of Rocephin in the emergency department. Patient remained hemodynamically stable while in the emergency department. I did consult with the hospitalist who accepted for admission. Discussed results and plan with patient and family at bedside and they verbalized understanding agreement to treatment plan and admission. Oxygen Saturation Interpretation: 96 % on room air. Re-Evaluations:  ED Course as of Oct 14 1220   Tue Oct 12, 2021   3062 Consulted with Dr. Korin Nam, hospitalist, who accepted for admission. [KG]      ED Course User Index  [KG] Batsheva DO Mansi       Patient was resting comfortably and in no acute distress on reassessment. This patient's ED course included: a personal history and physicial examination, re-evaluation prior to disposition, multiple bedside re-evaluations, IV medications, cardiac monitoring, continuous pulse oximetry and complex medical decision making and emergency management    This patient has remained hemodynamically stable during their ED course. Consultations:    Spoke with Dr. Korin Nam (Medicine). Discussed case.   They will admit this patient. Counseling: The emergency provider has spoken with the patient and discussed todays results, in addition to providing specific details for the plan of care and counseling regarding the diagnosis and prognosis. Questions are answered at this time and they are agreeable with the plan.       --------------------------------- IMPRESSION AND DISPOSITION ---------------------------------    IMPRESSION  1. Bacteremia        DISPOSITION  Disposition: Admit to telemetry  Patient condition is stable        NOTE: This report was transcribed using voice recognition software.  Every effort was made to ensure accuracy; however, inadvertent computerized transcription errors may be present       Dorothy Sharma DO  Resident  10/14/21 [id number]

## 2021-10-12 NOTE — CONSULTS
Ortega Gray M.D. [facility]  Dr. Barry Vargas M.D.,  Dr. Veronica Guillen M.D.,  Dr. Wilmer Trotter D.O.,  Dr. Anna Mchugh D.O. ,  Dr. Rosita Nieves M.D.,  CARLI KasperO. Luzmaria Can  66 y.o.  male      Re: Cirrhosis  Requesting physician: Dr. Hermilo Gar  Date:4:55 PM 10/12/2021          HPI: 28-year-old male patient seen in the hospital for above-described issue. Patient is poor historian and does not know why he is in the hospital.  He denies knowledge of history of cirrhosis. He denies abdominal pain. He denies nausea vomiting. He denies any other complaints. Patient appears confused and repetitively asks for his wife. According to medical records patient presented because of positive blood cultures as well as diarrhea and fatigue. Patient apparently has history of cirrhosis with history of hepatitis C but also history of diabetes and dementia. It appears that patient has received Flagyl from our office. Upon presentation liver profile shows elevated bilirubin of 2.6 which is slightly worse than patient baseline however close. AST is 44 and ALT is 39 with alkaline phosphatase of 89. Toxicology panel has not been reported ammonia level has not been checked/reported chemistry panel reveals hyponatremia otherwise fairly unremarkable electrolytes including preserved renal function with BUN of 10 and creatinine of 0.6. CBC reveals no leukocytosis, no anemia but shows what appears to be chronic thrombocytopenia. INR on 10 October was found to be elevated at 1.9.   Abdominal imaging consist of CT scan of the abdomen and pelvis obtained with IV contrast.  Reported this liver cirrhosis with splenomegaly and upper abdominal portal collateral circulation    Information sources:   -Patient  -medical record  -health care team    PMHx:  Past Medical History:   Diagnosis Date    Buccal mass 09/2020    Diabetes mellitus ([facility] [place] [address]     Esophageal varices (HCC)     Hepatitis C treated and resolved    [facility] cirrhosis [facility] [address]     Mild dementia ([facility] [address]     no meds    Positive colorectal cancer screening using Cologuard test 04/18/2021    Referred to Dr Timmy Barriga Colonoscopy       PSHx:  Past Surgical History:   Procedure Laterality Date    BIOPSY MOUTH LESION Left 06/04/2020    EXCISIONAL BIOPSY LEFT BUCCAL MUCOSAL LESION performed by Jamilah Singh MD at [address] [place] [address]  2017    ENDOSCOPY, COLON, DIAGNOSTIC  2019 and 2020    HERNIA REPAIR Right 2018    inguinal     MOUTH SURGERY Right 09/24/2020    EXCISIONAL BIOPSY OF RIGHT BUCCOL LESION performed by Jamilah Singh MD at [address] [place] [address] ENDOSCOPY N/A 06/19/2018    EGD BAND LIGATION [facility]    Dr Adrián Gomes medium sized esophageal varices--placed 6 rubber bands on varices--normal duodenum       Meds:  Current Facility-Administered Medications   Medication Dose Route Frequency Provider Last Rate Last Admin    donepezil (ARICEPT) tablet 5 mg  5 mg Oral Daily with breakfast Marco Zendejas, DO        escitalopram (LEXAPRO) tablet 5 mg  5 mg Oral Daily Marco Zendejas, DO   5 mg at 10/12/21 1209    insulin lispro (HUMALOG) injection vial 14 Units  14 Units SubCUTAneous TID  Marco Zendejas, DO   14 Units at 10/12/21 1154    insulin lispro (HUMALOG) injection vial 0-12 Units  0-12 Units SubCUTAneous TID  Marco Leivaino, DO   6 Units at 10/12/21 1156    insulin lispro (HUMALOG) injection vial 0-6 Units  0-6 Units SubCUTAneous Nightly Marco Zendejas, DO        glucose (GLUTOSE) 40 % oral gel 15 g  15 g Oral PRN Marco Leivaino, DO        dextrose 50 % IV solution  12.5 g IntraVENous PRN Marco Zendejas, DO        glucagon (rDNA) injection 1 mg  1 mg IntraMUSCular PRN Marco Zendejas, DO        dextrose 5 % solution  100 mL/hr IntraVENous PRN Marco E Volino, DO        sodium chloride flush 0.9 % injection 10 mL  10 mL IntraVENous 2 times per day Marco E Volino, DO   10 mL at 10/12/21 1210    sodium chloride flush 0.9 % injection 10 mL  10 mL IntraVENous PRN Marco E Volino, DO        0.9 % sodium chloride infusion  25 mL IntraVENous PRN Marco E Volino, DO        potassium chloride (KLOR-CON M) extended release tablet 40 mEq  40 mEq Oral PRN Marco E Volino, DO        Or    potassium bicarb-citric acid (EFFER-K) effervescent tablet 40 mEq  40 mEq Oral PRN Marco E Volino, DO        Or    potassium chloride 10 mEq/100 mL IVPB (Peripheral Line)  10 mEq IntraVENous PRN Marco E Volino, DO        enoxaparin (LOVENOX) injection 40 mg  40 mg SubCUTAneous Daily Marco E Abbieino, DO   40 mg at 10/12/21 1209    ondansetron (ZOFRAN-ODT) disintegrating tablet 4 mg  4 mg Oral Q8H PRN Marco Leivaino, DO        Or    ondansetron (ZOFRAN) injection 4 mg  4 mg IntraVENous Q6H PRN Marco Leivaino, DO        senna (SENOKOT) tablet 8.6 mg  1 tablet Oral Daily PRN Marco Leivaino, DO        acetaminophen (TYLENOL) tablet 650 mg  650 mg Oral Q6H PRN Marco Leivaino, DO        Or    acetaminophen (TYLENOL) suppository 650 mg  650 mg Rectal Q6H PRN Marco Leivaino, DO        perflutren lipid microspheres (DEFINITY) injection 1.65 mg  1.5 mL IntraVENous ONCE PRN Marco Leivaino, DO        lactobacillus (CULTURELLE) capsule 1 capsule  1 capsule Oral Daily Marco Zendejas, DO   1 capsule at 10/12/21 1152    insulin glargine (LANTUS) injection vial 20 Units  20 Units SubCUTAneous Nightly Marco Leivaino, DO        cefTRIAXone (ROCEPHIN) 2,000 mg in sterile water 20 mL IV syringe  2,000 mg IntraVENous Q24H Laura Huntley MD           SocHx:  Social History     Socioeconomic History    Marital status:      Spouse name: Not on file    Number of children: 3    Years of education: Not on file    Highest education level: Not on file   Occupational History    Occupation: retired     Comment: customer service   Tobacco Use    Smoking status: Never Smoker    Smokeless tobacco: Never Used   Vaping Use    Vaping Use: Never used   Substance and Sexual Activity    Alcohol use: Never     Comment: rare socially    Drug use: Never    Sexual activity: Not Currently   Other Topics Concern    Not on file   Social History Narrative    Not on file     Social Determinants of Health     Financial Resource Strain: Low Risk     Difficulty of Paying Living Expenses: Not hard at all   Food Insecurity: No Food Insecurity    Worried About Running Out of Food in the Last Year: Never true    [address] in the Last Year: Never true   Transportation Needs: No Transportation Needs    Lack of Transportation (Medical): No    Lack of Transportation (Non-Medical): No   Physical Activity:     Days of Exercise per Week:     Minutes of Exercise per Session:    Stress:     Feeling of Stress :    Social Connections:     Frequency of Communication with Friends and Family:     Frequency of Social Gatherings with Friends and Family:     Attends Yazidism Services:     Active Member of Clubs or Organizations:     Attends Club or Organization Meetings:     Marital Status:    Intimate Partner Violence:     Fear of Current or Ex-Partner:     Emotionally Abused:     Physically Abused:     Sexually Abused:        FamHx:  Family History   Problem Relation Age of Onset    Stroke Brother     Seizures Brother     Hypertension Brother     Heart Failure Brother        Allergy:  Allergies   Allergen Reactions    Ketorolac Tromethamine Shortness Of Breath and Other (See Comments)     Depression, \"walking like a zombie\", no desire to do anything.     Memantine Hcl Other (See Comments)     AMS, \"walking like a zombie\"    Tamsulosin Hcl Shortness Of Breath         ROS: As described in the HPI and in addition is negative upon detailed review of systems or unobtainable unless otherwise stated in this dictation. PE:  /73   Pulse 98   Temp 98.2 °F (36.8 °C) (Oral)   Resp 16   Ht 5' 10\" (1.778 m)   Wt 180 lb (81.6 kg)   SpO2 93%   BMI 25.83 kg/m²     Gen.: NAD/ male. Confused  Head: Atraumatic/normocephalic  Eyes: EOMI/no significant scleral icterus/no conjunctival erythema  ENT: Moist oral mucosa/no discharge no seizures  Neck: Supple with trachea midline  Chest: Symmetrical/nonlabored respirations  Cor: Regular  Abd.: Soft and appears nondistended and nontender  Extr.:  No peripheral edema  Muscles: Decreased tone and bulk, consistent with age and condition  Skin: Warm and dry/anicteric      DATA:     Lab Results   Component Value Date    WBC 9.5 10/12/2021    RBC 4.13 10/12/2021    HGB 13.9 10/12/2021    HCT 39.5 10/12/2021    MCV 95.6 10/12/2021    MCH 33.7 10/12/2021    MCHC 35.2 10/12/2021    RDW 14.0 10/12/2021    PLT 89 10/12/2021    MPV 9.9 10/12/2021     Lab Results   Component Value Date     10/12/2021    K 4.3 10/12/2021    K 4.2 10/10/2021    CL 97 10/12/2021    CO2 22 10/12/2021    BUN 10 10/12/2021    CREATININE 0.6 10/12/2021    CALCIUM 8.9 10/12/2021    PROT 6.8 10/12/2021    LABALBU 3.4 10/12/2021    LABALBU 4.3 03/29/2012    BILITOT 2.6 10/12/2021    ALKPHOS 89 10/12/2021    AST 44 10/12/2021    ALT 39 10/12/2021     Lab Results   Component Value Date    LIPASE 22 10/12/2021     Lab Results   Component Value Date    AMYLASE 39 10/10/2021         ASSESSMENT/PLAN:    1. Cirrhosis  -Decompensated with a history hepatitis C  -Await INR to calculate meld on this admission  -Obtain ammonia level and start empiric rifaximin  -Monitor labs  -obtain AFP  -Outpatient variceal screening endoscopy unless overt bleed/precipitous drop  -Please, see orders    2. Reported diarrhea  -Await stool studies as ordered  -Defer antibiotic to admit    3.   Bacteremia/pleural effusion  -Antibiotics per admitting    Thank you for the opportunity to see this patient in consultation      Migdalia Hightower MD  10/12/2021  4:55 PM    NOTE:  This report was transcribed using voice recognition software. Every effort was made to ensure accuracy; however, inadvertent computerized transcription errors may be present.

## 2021-10-12 NOTE — CONSULTS
[address] [place] [address] [facility]  Consultation Note     Admit Date: 10/12/2021  1:50 AM    Reason for Consult:   Bacteremia    Attending Physician:  Parrish Schneider DO    HISTORY OF PRESENT ILLNESS:             The history is obtained from extensive review of available past medical records. The patient is a 66 y.o. male who is not previously known to the ID service. Patient presents to the ED on 10/10/2021 complaining of diarrhea for about a week and poor appetite. He had just been treated with Metronidazole by his gastroenterologist.  The next day blood cultures turn positive in 3 out of 4 bottles with Streptococcus. He was called back to the emergency room and was admitted. He was treated with Ceftriaxone.     Past Medical History:        Diagnosis Date    Buccal mass 09/2020    Diabetes mellitus (HCC)     Esophageal varices (HCC)     Hepatitis C     treated and resolved    Liver cirrhosis (HCC)     Mild dementia (HCC)     no meds    Positive colorectal cancer screening using Cologuard test 04/18/2021    Referred to Dr Cong Hemphill Colonoscopy     Past Surgical History:        Procedure Laterality Date    BIOPSY MOUTH LESION Left 06/04/2020    EXCISIONAL BIOPSY LEFT BUCCAL MUCOSAL LESION performed by Kristie Ohara MD at [address] [place] [address]  2017    ENDOSCOPY, COLON, DIAGNOSTIC  2019 and 2020    HERNIA REPAIR Right 2018    inguinal     MOUTH SURGERY Right 09/24/2020    EXCISIONAL BIOPSY OF RIGHT BUCCOL LESION performed by Kristie Ohara MD at [address] [place] [address] ENDOSCOPY N/A 06/19/2018    EGD BAND LIGATION performed by Phineas Mcburney, MD at [address] [place] [address]  09/01/2020    Dr Allyn Medina medium sized esophageal varices--placed 6 rubber bands on varices--normal duodenum     Current Medications:   Scheduled Meds:   donepezil  5 mg Oral Daily with breakfast    escitalopram  5 mg Oral Daily    insulin lispro  14 Units SubCUTAneous TID WC    insulin lispro  0-12 Units SubCUTAneous TID WC    insulin lispro  0-6 Units SubCUTAneous Nightly    sodium chloride flush  10 mL IntraVENous 2 times per day    enoxaparin  40 mg SubCUTAneous Daily    lactobacillus  1 capsule Oral Daily    insulin glargine  20 Units SubCUTAneous Nightly     Continuous Infusions:   dextrose      sodium chloride       PRN Meds:glucose, dextrose, glucagon (rDNA), dextrose, sodium chloride flush, sodium chloride, potassium chloride **OR** potassium alternative oral replacement **OR** potassium chloride, ondansetron **OR** ondansetron, senna, acetaminophen **OR** acetaminophen, perflutren lipid microspheres    Allergies:  Ketorolac tromethamine, Memantine hcl, and Tamsulosin hcl    Social History:   Social History     Socioeconomic History    Marital status:      Spouse name: None    Number of children: 3    Years of education: None    Highest education level: None   Occupational History    Occupation: retired     Comment: customer service   Tobacco Use    Smoking status: Never Smoker    Smokeless tobacco: Never Used   Vaping Use    Vaping Use: Never used   Substance and Sexual Activity    Alcohol use: Never     Comment: rare socially    Drug use: Never    Sexual activity: Not Currently   Other Topics Concern    None   Social History Narrative    None     Social Determinants of Health     Financial Resource Strain: Low Risk     Difficulty of Paying Living Expenses: Not hard at all   Food Insecurity: No Food Insecurity    Worried About Running Out of Food in the Last Year: Never true    Elisa of Food in the Last Year: Never true   Transportation Needs: No Transportation Needs    Lack of Transportation (Medical): No    Lack of Transportation (Non-Medical):  No   Physical Activity:     Days of Exercise per Week:     Minutes of Exercise per Session: Stress:     Feeling of Stress :    Social Connections:     Frequency of Communication with Friends and Family:     Frequency of Social Gatherings with Friends and Family:     Attends Mormonism Services:     Active Member of Clubs or Organizations:     Attends Club or Organization Meetings:     Marital Status:    Intimate Partner Violence:     Fear of Current or Ex-Partner:     Emotionally Abused:     Physically Abused:     Sexually Abused:       Pets: None  Travel: No  The patient lives at home with his wife    Family History:       Problem Relation Age of Onset    Stroke Brother     Seizures Brother     Hypertension Brother     Heart Failure Brother    . Otherwise non-pertinent to the chief complaint. REVIEW OF SYSTEMS:    Constitutional: Negative for fevers, chills, diaphoresis  Neurologic: Negative   Psychiatric: Negative  Rheumatologic: Negative   Endocrine: Negative  Hematologic: Negative  Immunologic: Negative. SARS-CoV-2 vaccination up-to-date  ENT: Negative  Respiratory: Negative   Cardiovascular: Negative  GI: Negative  : Negative  Musculoskeletal: Negative  Skin: No rashes. PHYSICAL EXAM:    Vitals:   /73   Pulse 98   Temp 98.2 °F (36.8 °C) (Oral)   Resp 16   Ht 5' 10\" (1.778 m)   Wt 180 lb (81.6 kg)   SpO2 93%   BMI 25.83 kg/m²   Constitutional: The patient is awake, alert, and partially oriented. Skin: Warm and dry. No rashes were noted. HEENT: Eyes show round, and reactive pupils. No jaundice. Moist mucous membranes, no ulcerations, no thrush. Neck: Supple to movements. No lymphadenopathy. Chest: No use of accessory muscles to breathe. Symmetrical expansion. Auscultation reveals no wheezing, crackles, or rhonchi. Cardiovascular: S1 and S2 are rhythmic and regular. Questionable presystolic murmur right sternal border. Abdomen: Positive bowel sounds to auscultation. Benign to palpation. No masses felt. No hepatosplenomegaly.   Extremities: No clubbing, no cyanosis, no edema. No Janeway lesions. No splinter hemorrhages. Lines: peripheral      CBC+dif:  Recent Labs     10/10/21  1648 10/10/21  1648 10/12/21  0214   WBC 11.2  --  9.5   HGB 14.3   < > 13.9   HCT 40.6   < > 39.5   MCV 95.1   < > 95.6   PLT 93*   < > 89*   NEUTROABS 9.96*   < > 8.26*    < > = values in this interval not displayed.      No results found for: CRP   No results found for: CRPHS  No results found for: SEDRATE  Lab Results   Component Value Date    ALT 39 10/12/2021    AST 44 (H) 10/12/2021    ALKPHOS 89 10/12/2021    BILITOT 2.6 (H) 10/12/2021     Lab Results   Component Value Date     10/12/2021    K 4.3 10/12/2021    K 4.2 10/10/2021    CL 97 10/12/2021    CO2 22 10/12/2021    BUN 10 10/12/2021    CREATININE 0.6 10/12/2021    GFRAA >60 10/12/2021    LABGLOM >60 10/12/2021    GLUCOSE 167 10/12/2021    GLUCOSE 114 03/29/2012    PROT 6.8 10/12/2021    LABALBU 3.4 10/12/2021    LABALBU 4.3 03/29/2012    CALCIUM 8.9 10/12/2021    BILITOT 2.6 10/12/2021    ALKPHOS 89 10/12/2021    AST 44 10/12/2021    ALT 39 10/12/2021       Lab Results   Component Value Date    PROTIME 21.4 10/10/2021    PROTIME 11.3 03/29/2012    INR 1.9 10/10/2021       Lab Results   Component Value Date    TSH 2.27 10/21/2020       Lab Results   Component Value Date    COLORU Yellow 10/10/2021    PHUR 5.5 10/10/2021    WBCUA 1-3 04/30/2021    RBCUA 1-3 04/30/2021    BACTERIA FEW 04/30/2021    CLARITYU Clear 10/10/2021    SPECGRAV 1.025 10/10/2021    LEUKOCYTESUR Negative 10/10/2021    UROBILINOGEN 0.2 10/10/2021    BILIRUBINUR Negative 10/10/2021    BLOODU Negative 10/10/2021    GLUCOSEU Negative 10/10/2021    AMORPHOUS MODERATE 04/30/2021       No results found for: HCO3, BE, O2SAT, PH, THGB, PCO2, PO2, TCO2  Radiology:  Noted    Microbiology:  Pending  Recent Labs     10/10/21  1648   BC Gram stain performed from blood culture bottle media  Gram positive cocci in chains  *  Identification and sensitivity to follow Recent Labs     10/10/21  1648   ORG Streptococcus species*  Streptococcus species*     Recent Labs     10/10/21  1648   BLOODCULT2 Gram stain performed from blood culture bottle media  Gram positive cocci in chains  *     No results for input(s): STREPNEUMAGU in the last 72 hours. No results for input(s): LP1UAG in the last 72 hours. No results for input(s): ASO in the last 72 hours. No results for input(s): CULTRESP in the last 72 hours. No results for input(s): PROCAL in the last 72 hours. Assessment:  · Probable infective endocarditis  · Streptococcus bacteremia associated to the above    Plan:    · PARESH  · Continue Ceftriaxone  · Check final and repeat blood cultures, baseline ESR, CRP  · Monitor labs  · Will follow with you    Thank you for having us see this patient in consultation. I will be discussing this case with the treating physicians.     Bessy Roper MD  2:52 PM  10/12/2021

## 2021-10-12 NOTE — CONSULTS
Pulmonary Consultation    Admit Date: 10/12/2021  Requesting Physician: Betsy Galvan MD    CC:  Pleural effusion    HPI:  Nash Castro is a pleasant 66 y.o. 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 Wife answers most questions due to dementia. He developed diarrhea that was black tarry a couple days ago and was brought to the ED for evaluation. Has mild lower extremity edema. Pt denies dyspnea but wife feels he does, has a slight cough. He had abd pain 1 day. T max at home 99.5, no chills, no sick contacts. Currently on room air. S/P EGD 6/19/18 -Three large varices, grade 3/4, banded with three bands, otherwise, unremarkable.  Stomach showed mild gastritis. Duodenum unremarkable. S/P EGD 04/27/21 with 5 medium varices banded with 6 bands. Duodenum unremarkable.        PMH:    Past Medical History:   Diagnosis Date    Buccal mass 09/2020    Diabetes mellitus (HCC)     Esophageal varices (HCC)     Hepatitis C     treated and resolved    Liver cirrhosis (HCC)     Mild dementia (HCC)     no meds    Positive colorectal cancer screening using Cologuard test 04/18/2021    Referred to Dr Sreedhar Shane Colonoscopy     PSH:    Past Surgical History:   Procedure Laterality Date    BIOPSY MOUTH LESION Left 06/04/2020    EXCISIONAL BIOPSY LEFT BUCCAL MUCOSAL LESION performed by Edgard Lovell MD at [place] [address]  2017    ENDOSCOPY, COLON, DIAGNOSTIC  2019 and 2020    HERNIA REPAIR Right 2018    inguinal     MOUTH SURGERY Right 09/24/2020    EXCISIONAL BIOPSY OF RIGHT BUCCOL LESION performed by Edgard Lovell MD at [address] [place] [address] ENDOSCOPY N/A 06/19/2018    EGD BAND LIGATION performed by Matt Lee MD at [address]  09/01/2020    Dr Evie Funez Cassi--Five medium sized esophageal varices--placed 6 rubber bands on varices--normal duodenum          Respiratory ROS: hematochezia, dyspnea, cough,  Otherwise, a complete review of systems is undertaken and is negative. Social History:  Social History     Socioeconomic History    Marital status:      Spouse name: Not on file    Number of children: 3    Years of education: Not on file    Highest education level: Not on file   Occupational History    Occupation: retired     Comment: customer service   Tobacco Use    Smoking status: Never Smoker    Smokeless tobacco: Never Used   Vaping Use    Vaping Use: Never used   Substance and Sexual Activity    Alcohol use: Never     Comment: rare socially    Drug use: Never    Sexual activity: Not Currently   Other Topics Concern    Not on file   Social History Narrative    Not on file     Social Determinants of Health     Financial Resource Strain: Low Risk     Difficulty of Paying Living Expenses: Not hard at all   Food Insecurity: No Food Insecurity    Worried About Running Out of Food in the Last Year: Never true    [address] in the Last Year: Never true   Transportation Needs: No Transportation Needs    Lack of Transportation (Medical): No    Lack of Transportation (Non-Medical):  No   Physical Activity:     Days of Exercise per Week:     Minutes of Exercise per Session:    Stress:     Feeling of Stress :    Social Connections:     Frequency of Communication with Friends and Family:     Frequency of Social Gatherings with Friends and Family:     Attends Oriental orthodox Services:     Active Member of Clubs or Organizations:     Attends Club or Organization Meetings:     Marital Status:    Intimate Partner Violence:     Fear of Current or Ex-Partner:     Emotionally Abused:     Physically Abused:     Sexually Abused:        Family History:  Family History   Problem Relation Age of Onset    Stroke Brother     Seizures Brother     Hypertension Brother     Heart Failure Brother        Medications:     dextrose      sodium chloride        donepezil  5 mg Oral Daily with breakfast    escitalopram  5 mg Oral Daily    insulin lispro  14 Units SubCUTAneous TID WC    insulin lispro  0-12 Units SubCUTAneous TID WC    insulin lispro  0-6 Units SubCUTAneous Nightly    sodium chloride flush  10 mL IntraVENous 2 times per day    enoxaparin  40 mg SubCUTAneous Daily    lactobacillus  1 capsule Oral Daily    insulin glargine  20 Units SubCUTAneous Nightly         Vitals:    VITALS:  /73   Pulse 98   Temp 98.2 °F (36.8 °C) (Oral)   Resp 16   Ht 5' 10\" (1.778 m)   Wt 180 lb (81.6 kg)   SpO2 93%   BMI 25.83 kg/m²   24HR INTAKE/OUTPUT:  No intake or output data in the 24 hours ending 10/12/21 1145  CURRENT PULSE OXIMETRY:  SpO2: 93 %  24HR PULSE OXIMETRY RANGE:  SpO2  Av.5 %  Min: 93 %  Max: 97 %      EXAM:  General: No distress. Eyes: PERRL. No sclera icterus. No conjunctival injection. ENT: No discharge. Pharynx clear. Neck: Trachea midline. Normal thyroid. Resp: No accessory muscle use. Dimimished throughout. No crackles. No wheezing. No rhonchi. CV: Regular rate. Regular rhythm. No mumur or rub. ABD: Non-tender. Non-distended. No masses. No organmegaly. Normal bowel sounds. Skin: Warm and dry. No nodule on exposed extremities. No rash on exposed extremities. Lymph: No cervical LAD. No supraclavicular LAD. Ext: No joint deformity. No clubbing. No cyanosis. BLE with 1-2+ edema  Neuro: Awake.  Follows commands      Lab Results:  CBC:   Recent Labs     10/10/21  1648 10/12/21  0214   WBC 11.2 9.5   HGB 14.3 13.9   HCT 40.6 39.5   MCV 95.1 95.6   PLT 93* 89*     BMP:   Recent Labs     10/10/21  1648 10/12/21  0214   * 129*   K 4.2 4.3   CL 98 97*   CO2 22 22   BUN 9 10   CREATININE 0.7 0.6*     LFT:   Recent Labs     10/10/21  1648 10/12/21  0214   ALKPHOS 101 89   ALT 36 39   AST 39 44*   PROT 7.5 6.8   BILITOT 2. 8* 2.6*   LABALBU 3.6 3.4*     PT/INR:   Recent Labs     10/10/21  1648   PROTIME 21.4*   INR 1.9       Cultures:  Results for Edilson Gonzalez (MRN [id number]) as of 10/12/2021 12:15   Ref.  Range 10/10/2021 16:48   Bottle Type Unknown Aerobic   Source of Blood Culture Unknown No site given   Haemophilus Influenzae by PCR Latest Ref Range: Not Detected  Not Detected   Listeria monocytogenes by PCR Latest Ref Range: Not Detected  Not Detected   Neisseria meningitidis by PCR Latest Ref Range: Not Detected  Not Detected   Enterococcus by PCR Latest Ref Range: Not Detected  Not Detected   Staphylococcus species by PCR Latest Ref Range: Not Detected  Not Detected   Staphylococcus aureus by PCR Latest Ref Range: Not Detected  Not Detected   Streptococcus species by PCR Latest Ref Range: Not Detected  DETECTED (AA)   Streptococcus agalactiae by PCR Latest Ref Range: Not Detected  Not Detected   Streptococcus pneumoniae by PCR Latest Ref Range: Not Detected  Not Detected   Streptococcus pyogenes  by PCR Latest Ref Range: Not Detected  Not Detected   Enterobacteriaceae by PCR Latest Ref Range: Not Detected  Not Detected   Enterobacter cloacae complex by PCR Latest Ref Range: Not Detected  Not Detected   Escherichia coli by PCR Latest Ref Range: Not Detected  Not Detected   Klebsiella oxytoca by PCR Latest Ref Range: Not Detected  Not Detected   Klebsiella pneumoniae group by PCR Latest Ref Range: Not Detected  Not Detected   Serratia marcescens by PCR Latest Ref Range: Not Detected  Not Detected   Pseudomonas aeruginosa by PCR Latest Ref Range: Not Detected  Not Detected   Candida albicans by PCR Latest Ref Range: Not Detected  Not Detected   Candida glabrata by PCR Latest Ref Range: Not Detected  Not Detected   Candida krusei by PCR Latest Ref Range: Not Detected  Not Detected   Candida parapsilosis by PCR Latest Ref Range: Not Detected  Not Detected   Candida tropicalis by PCR Latest Ref Range: Not Detected  Not Detected 10/10/21 + Blood cultures - Gram stain performed from blood culture bottle media   Gram positive cocci in chains     ABG:   No results for input(s): PH, PO2, PCO2, HCO3, BE, O2SAT in the last 72 hours. Films:  XR CHEST 10/12/2021: Cardiac silhouette is somewhat obscured but mildly prominent. There is elevation of the diaphragm bilaterally left more so than right. There is small right pleural effusion. There is bibasilar areas of atelectasis. Several monitoring leads seen over the chest. Small right pleural effusion and bibasilar areas of atelectasis. Clinical correlation and follow-up to resolution recommended. There is elevation of the diaphragm with shallow inspiration. CT ABDOMEN PELVIS W IV CONTRAST 10/12/2021 1. Limited study due motion artifacts in the mid upper abdomen. Consider repeat examination. 2.  Findings that can indicate liver cirrhosis with mild splenomegaly and upper abdomen portal collateral circulation. 3.  Due to motion artifacts cannot see well the main portal vein in the shabnam hepatis region. 4.  There is a confluent nodular cluster of densities in around the distal esophagus could represent the prominent esophageal varices. These findings can be better evaluated with multiphase IV contrast including arterial phase, portal venous phase and delayed the face. 5.  Moderate right-sided pleural effusion with compression atelectasis of the right lower lobe. 6.  Chronic eventration of the left diaphragma with chronic atelectasis in the diaphragma surface of the left lower lobe. 7.  There are is stranding of the fat planes in the left upper quadrant following the elevation of the diaphragma, this is a chronic finding could be relate with portal hypertension also.          Assessment:  · Pleural effusion  · Streptococcus Bacteremia - +blood cultures yesterday  · Hematochezia   · VEGA  · Liver cirrhosis  · Esophageal varices  · DM  · Hx Hep C (treated)      Plan:  · CXR reviewed - small-mod effusion. Will monitor for now and follow with CXR's. Pt is on room air and asymptomatic from effusion. · In discussion with wife and pt - wife prefers to watch effusion for now with no intervention  · GI consulted with stool cultures and labs ordered  · Streptococcus Bacteremia on Rocephin - ID consulted. Afebrile and nl wbc. · Continue Aricept      Thank you for allowing us to see this patient in consultation. Please contact us with any questions. Natalie Ha, APRN - [place]      Electronically signed by Natalie Ha, APRN - CNP on 10/12/2021 at 11:45 AM    Attending Attestation Note:    Patient seen and examined with NP. I agree with above. CT Abd/Pelvis 10/12/2021:  1.  Limited study due motion artifacts in the mid upper abdomen.  Consider   repeat examination.       2.  Findings that can indicate liver cirrhosis with mild splenomegaly and   upper abdomen portal collateral circulation.       3.  Due to motion artifacts cannot see well the main portal vein in the shabnam   hepatis region.       4.  There is a confluent nodular cluster of densities in around the distal   esophagus could represent the prominent esophageal varices.  These findings   can be better evaluated with multiphase IV contrast including arterial phase,   portal venous phase and delayed the face.       5.  Moderate right-sided pleural effusion with compression atelectasis of the   right lower lobe.       6.  Chronic eventration of the left diaphragma with chronic atelectasis in   the diaphragma surface of the left lower lobe.       7.  There are is stranding of the fat planes in the left upper quadrant   following the elevation of the diaphragma, this is a chronic finding could be   relate with portal hypertension also.          In addition, the following apply:    - abx per ID  - right pleural effusion noted on CT abd/pelvis   - supportive care to continue   - defer on thoracentesis at this time as per discussion with wife  - O2 as needed  - incentive spirometry   - patient had increased confusion today with wandering around unit looking for wife, could be encephalopathy from infectious process vs acute on chronic process     Gosia Joseph MD  10/12/2021  2:05 PM

## 2021-10-12 NOTE — H&P
Internal Medicine History & Physical     Name: Allyn Hrenandez  : 1950  Chief Complaint: Other (Was in ER yesterday. Pt left because of wait time. [facility]'s staff called and said he had a blood infection and to come in)  Primary Care Physician: Dewey Josue MD  Admission date: 10/12/2021  Date of service: 10/12/2021     History of Present Illness  Marlen Delgadillo is a 66y.o. year old male past medical history of type 1 diabetes, cirrhosis due to hep C, esophageal varices, and dementia, who presents with complaint of a positive blood culture. Patient was seen yesterday at [facility] where he had blood work drawn. Due to long wait times he ended up leaving prior to being evaluated. He was called today with positive blood culture results and told to go to the emergency department. Patient has had diarrhea, fatigue, and feeling unwell for the past week. The symptoms were sudden in onset and has been persistent, moderate in severity, nothing makes it better or worse. He denies any associated fever, chills, nausea, vomiting, abdominal pain, or shortness of breath. Was recently treated for an infection with Flagyl by his GI doctor finished his antibiotic 3 days ago. The patient's wife is at bedside. The history is provided by the patient and his wife. His wife is felt to be a reliable historian. The patient is not a reliable historian due to his dementia. The patient's wife states that he picks at his mouth with toothpicks frequently and uses the same 1 for multiple days in a row. She also states that he has baseline confusion, but he would normally know his current location. ED course:   Initial blood work and imaging studies performed. Admission recommended by ED physician. Case discussed with ED provider.  Meds in ED consisted of the following:  Medications   cefTRIAXone (ROCEPHIN) 2,000 mg in sterile water 20 mL IV syringe (2,000 mg IntraVENous Given 10/12/21 0255)   iopamidol (ISOVUE-370) 76 % injection 75 mL (75 mLs IntraVENous Given 10/12/21 0330)       Past Medical History:   Diagnosis Date    Buccal mass 09/2020    Diabetes mellitus (HCC)     Esophageal varices (HCC)     Hepatitis C     treated and resolved    Liver cirrhosis (HCC)     Mild dementia (HCC)     no meds    Positive colorectal cancer screening using Cologuard test 04/18/2021    Referred to Dr Halina Hammer Colonoscopy       Past Surgical History:   Procedure Laterality Date    BIOPSY MOUTH LESION Left 06/04/2020    EXCISIONAL BIOPSY LEFT BUCCAL MUCOSAL LESION performed by Stevenson Car MD at [address] [place] [address]    HERNIA REPAIR Right 2018    inguinal     MOUTH SURGERY Right 09/24/2020    EXCISIONAL BIOPSY OF RIGHT BUCCOL LESION performed by Stevenson Car MD at [id number] [place] [address] ENDOSCOPY N/A 06/19/2018    EGD BAND LIGATION performed by Dennie Salen, MD at [email or web address]  09/01/2020    Dr Umberto Oneill medium sized esophageal varices--placed 6 rubber bands on varices--normal duodenum       History reviewed. No pertinent family history. No pertinent family medical history with regard to current issues. Social History  Patient lives at home with his wife. Employment: Retired  Illicit drug use- denies  TOBACCO:   reports that he has never smoked. He has never used smokeless tobacco.  ETOH:   reports current alcohol use. Home Medications  Prior to Admission medications    Medication Sig Start Date End Date Taking?  Authorizing Provider   Apoaequorin (PREVAGEN PO) Take by mouth daily    Historical Provider, MD   donepezil (ARICEPT) 5 MG tablet Take 1 tablet by mouth daily (with breakfast) 8/6/21   Jasmyn Tolliver MD   escitalopram (LEXAPRO) 5 MG tablet Take 1 tablet by mouth daily 7/30/21   Freddy Escobar MD   insulin aspart (NOVOLOG FLEXPEN) 100 UNIT/ML injection pen Inject 14 Units into the skin 3 times daily (before meals) If BS less than 100, no coverage  If greater than 150, 14 units 7/13/21 10/11/21  Robert Ngo MD   blood glucose test strips (ASCENSIA AUTODISC VI;ONE TOUCH ULTRA TEST VI) strip 1 each by In Vitro route 4 times daily One touch test strips 4/29/21   Robert Ngo MD   Cholecalciferol (VITAMIN D-3 PO) Take 1 capsule by mouth daily    Historical Provider, MD   Insulin Pen Needle (B-D UF III MINI PEN NEEDLES) 31G X 5 MM MISC 1 each by Does not apply route daily 3/12/21   Robert Ngo MD   Insulin Degludec (TRESIBA FLEXTOUCH) 200 UNIT/ML SOPN Inject 26 Units into the skin nightly 3/9/21   Robert Ngo MD   Lancets ([address]) [address] [facility]  12/6/20   Historical Provider, MD   Bilberry, Vaccinium myrtillus, (BILBERRY PO) Take 1 tablet by mouth daily Last taken 09/21    Historical Provider, MD   Nutritional Supplements (DHEA PO) Take 1 tablet by mouth daily Last taken 09/21    Historical Provider, MD   MILK THISTLE PO Take 1 tablet by mouth daily Last taken 09/21    Historical Provider, MD   Chromium 200 MCG CAPS Take 1 capsule by mouth daily Last taken 09/21    Historical Provider, MD   Methylsulfonylmethane (MSM) 500 MG CAPS Take 1 capsule by mouth daily Last taken 09/21    Historical Provider, MD   NONFORMULARY Take 1 capsule by mouth daily Blueberry extract caps.   Last taken 09/21    Historical Provider, MD   NONFORMULARY Take 1 tablet by mouth daily Vegetable and fruit tab  Last taken 09/21    Historical Provider, MD   Coenzyme Q10 75 MG CAPS Take 1 capsule by mouth daily Last taken 09/21    Historical Provider, MD   Probiotic Product (PROBIOTIC MULTI-ENZYME) TABS Take 1 tablet by mouth daily Last taken 09/21    Historical Provider, MD   Omega-3 Fatty Acids (FISH OIL) 1000 MG CAPS Take 1,000 mg by mouth daily Last taken 09/17/2020    Historical Provider, MD   vitamin C (ASCORBIC ACID) 500 MG tablet Take 1,000 mg by mouth daily Last taken 09/21/2020    Historical Provider, MD   vitamin B-12 (CYANOCOBALAMIN) 500 MCG tablet Take 500 mcg by mouth daily Last taken 09/21/2020    Historical Provider, MD   vitamin E 400 UNIT capsule Take 400 Units by mouth daily Last taken 09/17/2020    Historical Provider, MD   b complex vitamins capsule Take 1 capsule by mouth daily Last taken 09/21    Historical Provider, MD   Calcium-Magnesium-Zinc 500-250-12.5 MG TABS Take 1 tablet by mouth daily Last taken 09/21/2020    Historical Provider, MD       Allergies  Allergies   Allergen Reactions    Ketorolac Tromethamine Shortness Of Breath and Other (See Comments)     Depression, \"walking like a zombie\", no desire to do anything    Memantine Hcl Other (See Comments)     \"walking like a zombie\",    Tamsulosin Hcl Shortness Of Breath       Review of Systems  Please see HPI above. All bolded are positive. All un-bolded are negative.   Constitutional Symptoms: fever, chills, fatigue, generalized weakness, diaphoresis, increase in thirst, loss of appetite  Eyes: vision change   Ears, Nose, Mouth, Throat: hearing loss, nasal congestion, sores in the mouth  Cardiovascular: chest pain, chest heaviness, palpitations  Respiratory: shortness of breath, wheezing, coughing  Gastrointestinal: abdominal pain, nausea, vomiting, diarrhea, constipation, melena, hematochezia, hematemesis  Genitourinary: dysuria, hematuria, increased frequency  Musculoskeletal: lower extremity edema, myalgias, arthralgias, back pain  Integumentary: rashes, itching   Neurological: headache, lightheadedness, dizziness, confusion, syncope, numbness, tingling, focal weakness  Psychiatric: depression, suicidal ideation, anxiety  Endocrine: unintentional weight change  Hematologic/Lymphatic: lymphadenopathy, easy bruising, easy bleeding   Allergic/Immunologic: recurrent infections      Objective  VITALS:  BP (!) 158/91   Pulse 104   Temp 97.8 °F (36.6 °C) (Temporal)   Resp 16   Ht 5' 10\" (1.778 m)   Wt 180 lb (81.6 kg)   SpO2 97% BMI 25.83 kg/m²     Physical Exam:  General: awake, alert, oriented to person only, appears stated age, cooperative, no acute distress, pleasant, confused  Eyes: conjunctivae/corneas clear, sclera non icteric, EOMI  Ears: no obvious scars, no lesions, no masses, hearing intact  Mouth: mucous membranes moist, no obvious oral sores  Head: normocephalic, atraumatic  Neck: no JVD, no adenopathy, no thyromegaly, neck is supple, trachea is midline  Back: ROM normal, no CVA tenderness. Chest: no pain on palpation  Lungs: Diminished breath sounds on the right but clear to auscultation bilaterally, without rhonchi, crackle, wheezing, or rale, no retractions or use of accessory muscles  Heart: regular rate and regular rhythm, no murmur, normal S1, S2  Abdomen: soft, non-tender; bowel sounds normal; no masses, no organomegaly, slight distension  : Deferred   Extremities: no lower extremity edema, extremities atraumatic, no cyanosis, no clubbing, 2+ pedal pulses palpated  Skin: normal color, normal texture, normal turgor, no rashes, no lesions  Neurologic:5/5 muscle strength throughout, normal muscle tone throughout, face symmetric, hearing intact, tongue midline, speech appropriate without slurring, sensation to fine touch intact in upper and lower extremities    Labs-   Lab Results   Component Value Date    WBC 9.5 10/12/2021    HGB 13.9 10/12/2021    HCT 39.5 10/12/2021    PLT 89 (L) 10/12/2021     (L) 10/12/2021    K 4.3 10/12/2021    CL 97 (L) 10/12/2021    CREATININE 0.6 (L) 10/12/2021    BUN 10 10/12/2021    CO2 22 10/12/2021    GLUCOSE 167 (H) 10/12/2021    ALT 39 10/12/2021    AST 44 (H) 10/12/2021    INR 1.9 10/10/2021     Lab Results   Component Value Date    TROPONINI <0.01 06/18/2018       Recent Radiological Studies:  XR CHEST (2 VW)   Final Result   Small right pleural effusion and bibasilar areas of atelectasis. Clinical   correlation and follow-up to resolution recommended.       There is elevation ([place] [address] [B18.2, K65.59]     Dementia associated with other underlying disease without behavioral disturbance ([place] [address] [F02.80]     B12 deficiency [E53.8]     Type 1 diabetes mellitus without complication ([facility] [C39.3]     Anemia [D64.9]        Patient Active Problem List    Diagnosis Date Noted    Streptococcal bacteremia 10/12/2021    Hepatic cirrhosis due to chronic hepatitis C infection ([facility] 08/24/2020    Secondary esophageal varices with bleeding ([facility] 08/24/2020     Had banding      Dementia associated with other underlying disease without behavioral disturbance ([facility] 08/24/2020    B12 deficiency 08/24/2020    Type 1 diabetes mellitus without complication ([facility] 33/15/5677    Anemia 06/18/2018       Plan  · Strep bacteremia: ID consultation. PARESH needed-- check TTE 1st.  CT abdomen/pelvis noted. · DM, uncontrolled: Continue home DM meds-- adjust as needed. Add SSI. HbA1c.  · Liver cirrhosis/esophageal varices-history of hepatitis C: GI consult. Check ammonia level. · Right-sided pleural effusion: Pulmonology consultation for thoracentesis  · Continue home medications other than vitamins. · PT/OT  · Follow labs  · DVT prophylaxis. · Please see orders for further management and care. ·  for discharge planning  · Discharge plan: TBD pending clinical improvement     Patient was seen and evaluated by myself and my attending Ashley Cancino DO. Assessment and Plan discussed with attending provider, please see attestation for final plan of care. Mindi Manley DO   10/12/2021  9:16 AM    Addendum: I have personally participated in a face-to-face history and physical exam on the date of service with the patient. I have discussed the case with the resident. I also participated in medical decision making with the resident on the date of service and I agree with all of the pertinent clinical information unless indicated in my editing of the note.  I have reviewed and edited the note above based on my findings during my history, exam, and decision making on the same day of service. Electronically signed by Bethany Laughlin DO on 10/12/2021 at 11:26 AM    I can be reached through [facility].

## 2021-10-12 NOTE — PROGRESS NOTES
Thank you for consult, went to see patient. Patient states he was last seen by Dr. Jim Gong and would like to follow with that group. Charge nurse notified to change consult. Thank you.     DAVID Perkins - CNP 10/12/2021 12:45 PM

## 2021-10-13 ENCOUNTER — ANESTHESIA EVENT (OUTPATIENT)
Dept: NON INVASIVE DIAGNOSTICS | Age: 71
DRG: 872 | End: 2021-10-13
Payer: MEDICARE

## 2021-10-13 ENCOUNTER — ANESTHESIA (OUTPATIENT)
Dept: NON INVASIVE DIAGNOSTICS | Age: 71
DRG: 872 | End: 2021-10-13
Payer: MEDICARE

## 2021-10-13 ENCOUNTER — APPOINTMENT (OUTPATIENT)
Dept: GENERAL RADIOLOGY | Age: 71
DRG: 872 | End: 2021-10-13
Payer: MEDICARE

## 2021-10-13 ENCOUNTER — APPOINTMENT (OUTPATIENT)
Dept: NON INVASIVE DIAGNOSTICS | Age: 71
DRG: 872 | End: 2021-10-13
Payer: MEDICARE

## 2021-10-13 VITALS
RESPIRATION RATE: 32 BRPM | SYSTOLIC BLOOD PRESSURE: 81 MMHG | DIASTOLIC BLOOD PRESSURE: 55 MMHG | OXYGEN SATURATION: 98 %

## 2021-10-13 LAB
ALBUMIN SERPL-MCNC: 2.9 G/DL (ref 3.5–5.2)
ALP BLD-CCNC: 74 U/L (ref 40–129)
ALT SERPL-CCNC: 34 U/L (ref 0–40)
ANION GAP SERPL CALCULATED.3IONS-SCNC: 10 MMOL/L (ref 7–16)
ANTISTREPTOLYSIN-O: 283 IU/ML (ref 0–200)
AST SERPL-CCNC: 32 U/L (ref 0–39)
BASOPHILS ABSOLUTE: 0.02 E9/L (ref 0–0.2)
BASOPHILS RELATIVE PERCENT: 0.5 % (ref 0–2)
BILIRUB SERPL-MCNC: 1.3 MG/DL (ref 0–1.2)
BUN BLDV-MCNC: 11 MG/DL (ref 6–23)
BURR CELLS: ABNORMAL
C-REACTIVE PROTEIN: 6.5 MG/DL (ref 0–0.4)
CALCIUM SERPL-MCNC: 8.3 MG/DL (ref 8.6–10.2)
CHLORIDE BLD-SCNC: 105 MMOL/L (ref 98–107)
CO2: 21 MMOL/L (ref 22–29)
CREAT SERPL-MCNC: 0.6 MG/DL (ref 0.7–1.2)
EOSINOPHILS ABSOLUTE: 0.16 E9/L (ref 0.05–0.5)
EOSINOPHILS RELATIVE PERCENT: 4.2 % (ref 0–6)
GFR AFRICAN AMERICAN: >60
GFR NON-AFRICAN AMERICAN: >60 ML/MIN/1.73
GLUCOSE BLD-MCNC: 149 MG/DL (ref 74–99)
HBA1C MFR BLD: 6.1 % (ref 4–5.6)
HCT VFR BLD CALC: 36.4 % (ref 37–54)
HEMOGLOBIN: 12.6 G/DL (ref 12.5–16.5)
IMMATURE GRANULOCYTES #: 0.01 E9/L
IMMATURE GRANULOCYTES %: 0.3 % (ref 0–5)
INR BLD: 1.5
LV EF: 60 %
LVEF MODALITY: NORMAL
LYMPHOCYTES ABSOLUTE: 0.73 E9/L (ref 1.5–4)
LYMPHOCYTES RELATIVE PERCENT: 19.1 % (ref 20–42)
MCH RBC QN AUTO: 34 PG (ref 26–35)
MCHC RBC AUTO-ENTMCNC: 34.6 % (ref 32–34.5)
MCV RBC AUTO: 98.1 FL (ref 80–99.9)
METER GLUCOSE: 116 MG/DL (ref 74–99)
METER GLUCOSE: 162 MG/DL (ref 74–99)
METER GLUCOSE: 286 MG/DL (ref 74–99)
METER GLUCOSE: 90 MG/DL (ref 74–99)
MONOCYTES ABSOLUTE: 0.22 E9/L (ref 0.1–0.95)
MONOCYTES RELATIVE PERCENT: 5.7 % (ref 2–12)
NEUTROPHILS ABSOLUTE: 2.69 E9/L (ref 1.8–7.3)
NEUTROPHILS RELATIVE PERCENT: 70.2 % (ref 43–80)
ORGANISM: ABNORMAL
OVALOCYTES: ABNORMAL
PDW BLD-RTO: 14.3 FL (ref 11.5–15)
PLATELET # BLD: 85 E9/L (ref 130–450)
PLATELET CONFIRMATION: NORMAL
PMV BLD AUTO: 9.9 FL (ref 7–12)
POIKILOCYTES: ABNORMAL
POTASSIUM REFLEX MAGNESIUM: 3.6 MMOL/L (ref 3.5–5)
PROTHROMBIN TIME: 16.4 SEC (ref 9.3–12.4)
RBC # BLD: 3.71 E12/L (ref 3.8–5.8)
SODIUM BLD-SCNC: 136 MMOL/L (ref 132–146)
TOTAL PROTEIN: 5.7 G/DL (ref 6.4–8.3)
WBC # BLD: 3.8 E9/L (ref 4.5–11.5)

## 2021-10-13 PROCEDURE — 36415 COLL VENOUS BLD VENIPUNCTURE: CPT

## 2021-10-13 PROCEDURE — 93312 ECHO TRANSESOPHAGEAL: CPT | Performed by: INTERNAL MEDICINE

## 2021-10-13 PROCEDURE — 6370000000 HC RX 637 (ALT 250 FOR IP): Performed by: HOSPITALIST

## 2021-10-13 PROCEDURE — 93320 DOPPLER ECHO COMPLETE: CPT

## 2021-10-13 PROCEDURE — B24BZZ4 ULTRASONOGRAPHY OF HEART WITH AORTA, TRANSESOPHAGEAL: ICD-10-PCS | Performed by: INTERNAL MEDICINE

## 2021-10-13 PROCEDURE — 85610 PROTHROMBIN TIME: CPT

## 2021-10-13 PROCEDURE — 83036 HEMOGLOBIN GLYCOSYLATED A1C: CPT

## 2021-10-13 PROCEDURE — 93320 DOPPLER ECHO COMPLETE: CPT | Performed by: INTERNAL MEDICINE

## 2021-10-13 PROCEDURE — 3700000000 HC ANESTHESIA ATTENDED CARE

## 2021-10-13 PROCEDURE — 82105 ALPHA-FETOPROTEIN SERUM: CPT

## 2021-10-13 PROCEDURE — 3700000001 HC ADD 15 MINUTES (ANESTHESIA)

## 2021-10-13 PROCEDURE — 85025 COMPLETE CBC W/AUTO DIFF WBC: CPT

## 2021-10-13 PROCEDURE — 6370000000 HC RX 637 (ALT 250 FOR IP): Performed by: INTERNAL MEDICINE

## 2021-10-13 PROCEDURE — 80053 COMPREHEN METABOLIC PANEL: CPT

## 2021-10-13 PROCEDURE — 2580000003 HC RX 258: Performed by: NURSE ANESTHETIST, CERTIFIED REGISTERED

## 2021-10-13 PROCEDURE — 82962 GLUCOSE BLOOD TEST: CPT

## 2021-10-13 PROCEDURE — 80074 ACUTE HEPATITIS PANEL: CPT

## 2021-10-13 PROCEDURE — 2580000003 HC RX 258: Performed by: INTERNAL MEDICINE

## 2021-10-13 PROCEDURE — 6360000002 HC RX W HCPCS

## 2021-10-13 PROCEDURE — 7100000011 HC PHASE II RECOVERY - ADDTL 15 MIN

## 2021-10-13 PROCEDURE — 71046 X-RAY EXAM CHEST 2 VIEWS: CPT

## 2021-10-13 PROCEDURE — 7100000010 HC PHASE II RECOVERY - FIRST 15 MIN

## 2021-10-13 PROCEDURE — 2580000003 HC RX 258: Performed by: SPECIALIST

## 2021-10-13 PROCEDURE — 93312 ECHO TRANSESOPHAGEAL: CPT

## 2021-10-13 PROCEDURE — 93325 DOPPLER ECHO COLOR FLOW MAPG: CPT

## 2021-10-13 PROCEDURE — 6360000002 HC RX W HCPCS: Performed by: NURSE ANESTHETIST, CERTIFIED REGISTERED

## 2021-10-13 PROCEDURE — 2580000003 HC RX 258: Performed by: REGISTERED NURSE

## 2021-10-13 PROCEDURE — 93325 DOPPLER ECHO COLOR FLOW MAPG: CPT | Performed by: INTERNAL MEDICINE

## 2021-10-13 PROCEDURE — 6360000002 HC RX W HCPCS: Performed by: SPECIALIST

## 2021-10-13 PROCEDURE — 6360000002 HC RX W HCPCS: Performed by: INTERNAL MEDICINE

## 2021-10-13 PROCEDURE — 1200000000 HC SEMI PRIVATE

## 2021-10-13 RX ORDER — HEPARIN SODIUM (PORCINE) LOCK FLUSH IV SOLN 100 UNIT/ML 100 UNIT/ML
3 SOLUTION INTRAVENOUS EVERY 12 HOURS SCHEDULED
Status: DISCONTINUED | OUTPATIENT
Start: 2021-10-13 | End: 2021-10-14 | Stop reason: HOSPADM

## 2021-10-13 RX ORDER — PROPOFOL 10 MG/ML
INJECTION, EMULSION INTRAVENOUS PRN
Status: DISCONTINUED | OUTPATIENT
Start: 2021-10-13 | End: 2021-10-13 | Stop reason: SDUPTHER

## 2021-10-13 RX ORDER — SODIUM CHLORIDE 0.9 % (FLUSH) 0.9 %
5-40 SYRINGE (ML) INJECTION EVERY 12 HOURS SCHEDULED
Status: DISCONTINUED | OUTPATIENT
Start: 2021-10-13 | End: 2021-10-14 | Stop reason: HOSPADM

## 2021-10-13 RX ORDER — LIDOCAINE HYDROCHLORIDE 10 MG/ML
5 INJECTION, SOLUTION EPIDURAL; INFILTRATION; INTRACAUDAL; PERINEURAL ONCE
Status: COMPLETED | OUTPATIENT
Start: 2021-10-13 | End: 2021-10-14

## 2021-10-13 RX ORDER — SODIUM CHLORIDE 9 MG/ML
INJECTION, SOLUTION INTRAVENOUS CONTINUOUS PRN
Status: DISCONTINUED | OUTPATIENT
Start: 2021-10-13 | End: 2021-10-13 | Stop reason: SDUPTHER

## 2021-10-13 RX ORDER — HEPARIN SODIUM (PORCINE) LOCK FLUSH IV SOLN 100 UNIT/ML 100 UNIT/ML
3 SOLUTION INTRAVENOUS PRN
Status: DISCONTINUED | OUTPATIENT
Start: 2021-10-13 | End: 2021-10-14 | Stop reason: HOSPADM

## 2021-10-13 RX ORDER — SODIUM CHLORIDE 0.9 % (FLUSH) 0.9 %
5-40 SYRINGE (ML) INJECTION PRN
Status: DISCONTINUED | OUTPATIENT
Start: 2021-10-13 | End: 2021-10-14 | Stop reason: HOSPADM

## 2021-10-13 RX ORDER — SODIUM CHLORIDE 9 MG/ML
25 INJECTION, SOLUTION INTRAVENOUS PRN
Status: DISCONTINUED | OUTPATIENT
Start: 2021-10-13 | End: 2021-10-14 | Stop reason: HOSPADM

## 2021-10-13 RX ADMIN — INSULIN LISPRO 14 UNITS: 100 INJECTION, SOLUTION INTRAVENOUS; SUBCUTANEOUS at 06:29

## 2021-10-13 RX ADMIN — RIFAXIMIN 550 MG: 550 TABLET ORAL at 12:29

## 2021-10-13 RX ADMIN — WATER 2000 MG: 1 INJECTION INTRAMUSCULAR; INTRAVENOUS; SUBCUTANEOUS at 17:09

## 2021-10-13 RX ADMIN — PROPOFOL 50 MG: 10 INJECTION, EMULSION INTRAVENOUS at 10:22

## 2021-10-13 RX ADMIN — ENOXAPARIN SODIUM 40 MG: 40 INJECTION SUBCUTANEOUS at 12:26

## 2021-10-13 RX ADMIN — SODIUM CHLORIDE, PRESERVATIVE FREE 10 ML: 5 INJECTION INTRAVENOUS at 20:49

## 2021-10-13 RX ADMIN — Medication 10 ML: at 12:31

## 2021-10-13 RX ADMIN — PROPOFOL 50 MG: 10 INJECTION, EMULSION INTRAVENOUS at 10:20

## 2021-10-13 RX ADMIN — INSULIN LISPRO 3 UNITS: 100 INJECTION, SOLUTION INTRAVENOUS; SUBCUTANEOUS at 20:50

## 2021-10-13 RX ADMIN — ESCITALOPRAM OXALATE 5 MG: 10 TABLET ORAL at 12:28

## 2021-10-13 RX ADMIN — DONEPEZIL HYDROCHLORIDE 5 MG: 5 TABLET, FILM COATED ORAL at 12:27

## 2021-10-13 RX ADMIN — PROPOFOL 50 MG: 10 INJECTION, EMULSION INTRAVENOUS at 10:27

## 2021-10-13 RX ADMIN — PANTOPRAZOLE SODIUM 40 MG: 40 TABLET, DELAYED RELEASE ORAL at 06:23

## 2021-10-13 RX ADMIN — Medication 1 CAPSULE: at 12:27

## 2021-10-13 RX ADMIN — Medication 10 ML: at 20:49

## 2021-10-13 RX ADMIN — SODIUM CHLORIDE: 9 INJECTION, SOLUTION INTRAVENOUS at 10:14

## 2021-10-13 RX ADMIN — INSULIN GLARGINE 20 UNITS: 100 INJECTION, SOLUTION SUBCUTANEOUS at 20:50

## 2021-10-13 ASSESSMENT — PAIN SCALES - GENERAL
PAINLEVEL_OUTOF10: 0

## 2021-10-13 NOTE — PROGRESS NOTES
Pulmonary Progress Note    Admit Date: 10/12/2021  Requesting Physician: Maricruz Anderson MD    Subjective: Was found wandering [place], recognizes where he is at. Denies any dyspnea or cough. Remains on room air. Medications:     dextrose      sodium chloride        donepezil  5 mg Oral Daily with breakfast    escitalopram  5 mg Oral Daily    insulin lispro  14 Units SubCUTAneous TID WC    insulin lispro  0-12 Units SubCUTAneous TID WC    insulin lispro  0-6 Units SubCUTAneous Nightly    sodium chloride flush  10 mL IntraVENous 2 times per day    enoxaparin  40 mg SubCUTAneous Daily    lactobacillus  1 capsule Oral Daily    insulin glargine  20 Units SubCUTAneous Nightly    cefTRIAXone (ROCEPHIN) IV  2,000 mg IntraVENous Q24H    rifaximin  550 mg Oral BID    pantoprazole  40 mg Oral QAM AC         Vitals:    VITALS:  BP (!) 150/72   Pulse 103   Temp 98.1 °F (36.7 °C) (Oral)   Resp 19   Ht 5' 10\" (1.778 m)   Wt 180 lb (81.6 kg)   SpO2 94%   BMI 25.83 kg/m²   24HR INTAKE/OUTPUT:  No intake or output data in the 24 hours ending 10/13/21 0853  CURRENT PULSE OXIMETRY:  SpO2: 94 %  24HR PULSE OXIMETRY RANGE:  SpO2  Av.3 %  Min: 93 %  Max: 98 %      EXAM:  General: No distress. Pleasant and interactive  ENT:  Pharynx clear. Neck: Trachea midline. Resp: No accessory muscle use. Diminished at bases. No crackles. No wheezing. No rhonchi. CV: Tachycardia on exam. No mumur or rub. ABD: Non-tender. Non-distended. Normal bowel sounds. Skin: Warm and dry. No nodule on exposed extremities. No rash on exposed extremities. Ext: No joint deformity. No clubbing. No cyanosis. BLE with 1-2+ edema  Neuro: Awake. Follows commands.  Oriented to self and place     Lab Results:  CBC:   Recent Labs     10/10/21  1648 10/12/21  0214 10/13/21  0703   WBC 11.2 9.5 3.8*   HGB 14.3 13.9 12.6   HCT 40.6 39.5 36.4*   MCV 95.1 95.6 98.1   PLT 93* 89* 85*     BMP:   Recent Labs     10/10/21  1648 10/12/21  0214 10/13/21  0703   * 129* 136   K 4.2 4.3 3.6   CL 98 97* 105   CO2 22 22 21*   BUN 9 10 11   CREATININE 0.7 0.6* 0.6*     LFT:   Recent Labs     10/10/21  1648 10/12/21  0214 10/13/21  0703   ALKPHOS 101 89 74   ALT 36 39 34   AST 39 44* 32   PROT 7.5 6.8 5.7*   BILITOT 2.8* 2.6* 1.3*   LABALBU 3.6 3.4* 2.9*     PT/INR:   Recent Labs     10/10/21  1648 10/12/21  2135 10/13/21  0703   PROTIME 21.4* 16.7* 16.4*   INR 1.9 1.5 1.5       Cultures:  Results for Joy Campbell (MRN [id number]) as of 10/12/2021 12:15   Ref.  Range 10/10/2021 16:48   Bottle Type Unknown Aerobic   Source of Blood Culture Unknown No site given   Haemophilus Influenzae by PCR Latest Ref Range: Not Detected  Not Detected   Listeria monocytogenes by PCR Latest Ref Range: Not Detected  Not Detected   Neisseria meningitidis by PCR Latest Ref Range: Not Detected  Not Detected   Enterococcus by PCR Latest Ref Range: Not Detected  Not Detected   Staphylococcus species by PCR Latest Ref Range: Not Detected  Not Detected   Staphylococcus aureus by PCR Latest Ref Range: Not Detected  Not Detected   Streptococcus species by PCR Latest Ref Range: Not Detected  DETECTED (AA)   Streptococcus agalactiae by PCR Latest Ref Range: Not Detected  Not Detected   Streptococcus pneumoniae by PCR Latest Ref Range: Not Detected  Not Detected   Streptococcus pyogenes  by PCR Latest Ref Range: Not Detected  Not Detected   Enterobacteriaceae by PCR Latest Ref Range: Not Detected  Not Detected   Enterobacter cloacae complex by PCR Latest Ref Range: Not Detected  Not Detected   Escherichia coli by PCR Latest Ref Range: Not Detected  Not Detected   Klebsiella oxytoca by PCR Latest Ref Range: Not Detected  Not Detected   Klebsiella pneumoniae group by PCR Latest Ref Range: Not Detected  Not Detected   Serratia marcescens by PCR Latest Ref Range: Not Detected  Not Detected   Pseudomonas aeruginosa by PCR Latest Ref Range: Not Detected  Not Detected   Candida and assessment and plan as detailed above. Necessary editing and changes made to the note by myself. to get PICC line  Replace potassium  We will check chest x-ray if no improvement will tap effusion tomorrow especially with bacteremia.   Discussed with wife at bedside

## 2021-10-13 NOTE — PROGRESS NOTES
PROGRESS NOTE  By Mary Huggins M.D. [facility]  Dr. Angela Flynn M.D.,  Dr. Trent Brady M.D.,   Dr. Wen Morel D.O.,  Dr. Maritza Mccollum M.D.,  Dr. Lamont Wolfe D.O.,  Franci Santamaria D.O. Kerry Manzo  66 y.o.  male    SUBJECTIVE:  Denies abdominal pain. Denies nausea vomiting. No family at bedside    OBJECTIVE:    BP (!) 156/83   Pulse 100   Temp 98 °F (36.7 °C) (Oral)   Resp 17   Ht 5' 10\" (1.778 m)   Wt 171 lb (77.6 kg)   SpO2 96%   BMI 24.54 kg/m²     General: NAD/ male. Remains confused  HEENT: Anicteric sclera/moist oral mucosa  Neck: Supple/trachea is midline  Chest: Symmetric excursion/nonlabored respirations  Cor: Regular  Abd.: Soft/nontender and nondistended  Extr.:  No peripheral edema. Decreased muscle tone and bulk throughout, consistent with age and condition  Skin: Warm and dry    DATA:    Monitor data reviewed -leads off noted. Lab Results   Component Value Date    WBC 3.8 10/13/2021    RBC 3.71 10/13/2021    HGB 12.6 10/13/2021    HCT 36.4 10/13/2021    MCV 98.1 10/13/2021    MCH 34.0 10/13/2021    MCHC 34.6 10/13/2021    RDW 14.3 10/13/2021    PLT 85 10/13/2021    MPV 9.9 10/13/2021     Lab Results   Component Value Date     10/13/2021    K 3.6 10/13/2021     10/13/2021    CO2 21 10/13/2021    BUN 11 10/13/2021    CREATININE 0.6 10/13/2021    CALCIUM 8.3 10/13/2021    PROT 5.7 10/13/2021    LABALBU 2.9 10/13/2021    LABALBU 4.3 03/29/2012    BILITOT 1.3 10/13/2021    ALKPHOS 74 10/13/2021    AST 32 10/13/2021    ALT 34 10/13/2021     Lab Results   Component Value Date    LIPASE 22 10/12/2021     Lab Results   Component Value Date    AMYLASE 39 10/10/2021         ASSESSMENT/PLAN:    1. Cirrhosis  -Decompensated with a history hepatitis C  -MELD 13  -Elevated ammonia; discontinue rifaximin  -Monitor labs  -Pending AFP  -Outpatient variceal screening endoscopy unless overt bleed/precipitous drop  -Please, see orders     2. Reported diarrhea  -Await stool studies as ordered  -Defer antibiotic to admit     3. Bacteremia/pleural effusion  -Antibiotics per admitting    Suri Cazares MD  10/13/2021  4:54 PM    NOTE:  This report was transcribed using voice recognition software. Every effort was made to ensure accuracy; however, inadvertent computerized transcription errors may be present.

## 2021-10-13 NOTE — PLAN OF CARE
Problem: Skin Integrity:  Goal: Will show no infection signs and symptoms  Description: Will show no infection signs and symptoms  Outcome: Ongoing     Problem: Confusion - Acute:  Goal: Mental status will be restored to baseline  Description: Mental status will be restored to baseline  Outcome: Ongoing

## 2021-10-13 NOTE — PROGRESS NOTES
Symmetrical expansion. Clear   Cardiovascular: S1 and S2 are rhythmic and regular. No murmur  Abdomen: Positive bowel sounds to auscultation. Benign to palpation. No masses felt. Extremities: No  edema. Lines: peripheral    Laboratory and Tests Review:  Lab Results   Component Value Date    WBC 3.8 (L) 10/13/2021    WBC 9.5 10/12/2021    WBC 11.2 10/10/2021    HGB 12.6 10/13/2021    HCT 36.4 (L) 10/13/2021    MCV 98.1 10/13/2021    PLT 85 (L) 10/13/2021     Lab Results   Component Value Date    NEUTROABS 2.69 10/13/2021    NEUTROABS 8.26 (H) 10/12/2021    NEUTROABS 9.96 (H) 10/10/2021     No results found for: CRPHS  Lab Results   Component Value Date    ALT 34 10/13/2021    AST 32 10/13/2021    ALKPHOS 74 10/13/2021    BILITOT 1.3 (H) 10/13/2021     Lab Results   Component Value Date     10/13/2021    K 3.6 10/13/2021     10/13/2021    CO2 21 10/13/2021    BUN 11 10/13/2021    CREATININE 0.6 10/13/2021    CREATININE 0.6 10/12/2021    CREATININE 0.7 10/10/2021    GFRAA >60 10/13/2021    LABGLOM >60 10/13/2021    GLUCOSE 149 10/13/2021    GLUCOSE 114 03/29/2012    PROT 5.7 10/13/2021    LABALBU 2.9 10/13/2021    LABALBU 4.3 03/29/2012    CALCIUM 8.3 10/13/2021    BILITOT 1.3 10/13/2021    ALKPHOS 74 10/13/2021    AST 32 10/13/2021    ALT 34 10/13/2021     Lab Results   Component Value Date    CRP 6.5 (H) 10/12/2021     Lab Results   Component Value Date    SEDRATE 9 10/12/2021     Radiology:  Noted     Microbiology:   Blood cultures 10/10/2021: Streptococcus salivarius   Blood cultures 10/12/2021: negative so far  Urine culture: negative so far    ASSESSMENT:  · Probable infective endocarditis  · Streptococcus bacteremia associated to the above  · Leukopenia     PLAN:  · Continue Ceftriaxone x 4 weeks   · PICC line ordered  · PARESH noted - no vegetations seen  · Check final cultures  · Monitor labs- CRP 6.5, ESR 9,     DAVID Jarvis - CNP  1:37 PM  10/13/2021     Patient seen and examined.  I had a face to face encounter with the patient. Agree with exam.  Assessment and plan as outlined above and directed by me. Addition and corrections were done as deemed appropriate. My exam and plan include: No new problems. He is tolerating antibiotics. PARESH was negative, however, this does not rule out endocarditis since PARESH can miss small vegetations and 90% or more of the times. We will ask for a PICC and given 4 weeks of IV antibiotics at home. Spoke with wife and daughter. Informed Consent for PICC:  I explained the risks, benefits, alternative options, and potential complications associated with insertion of Peripherally Inserted Central Catheter (PICC) with the patient prior to the procedure.     Electronically signed by Bruce Maurer MD on 10/13/2021 at 2:19 PM     Bruce Maurer MD  10/13/2021  2:18 PM

## 2021-10-13 NOTE — ANESTHESIA PRE PROCEDURE
Department of Anesthesiology  Preprocedure Note       Name:  Yolande Saavedra   Age:  66 y.o.  :  1950                                          MRN:  [id number]         Date:  10/13/2021      Surgeon: * No surgeons listed *    Procedure: * No procedures listed *    Medications prior to admission:   Prior to Admission medications    Medication Sig Start Date End Date Taking?  Authorizing Provider   escitalopram (LEXAPRO) 5 MG tablet Take 5 mg by mouth nightly   Yes Historical Provider, MD   MISC NATURAL PRODUCTS PO Take 1 tablet by mouth daily -OSKAR BLUE-   Yes Historical Provider, MD   Apoaequorin (PREVAGEN PO) Take 1 tablet by mouth daily    Yes Historical Provider, MD   donepezil (ARICEPT) 5 MG tablet Take 1 tablet by mouth daily (with breakfast) 21  Yes Alisia Dominique MD   insulin aspart (NOVOLOG FLEXPEN) 100 UNIT/ML injection pen Inject 14 Units into the skin 3 times daily (before meals) If BS less than 100, no coverage  If greater than 150, 14 units 7/13/21 10/12/21 Yes Diony Howard MD   vitamin D-3 (CHOLECALCIFEROL) 125 MCG (5000 UT) TABS Take 5,000 Units by mouth daily    Yes Historical Provider, MD   Insulin Degludec (TRESIBA FLEXTOUCH) 200 UNIT/ML SOPN Inject 26 Units into the skin nightly 3/9/21  Yes Diony Howard MD   Probiotic Product (PROBIOTIC MULTI-ENZYME) TABS Take 1 tablet by mouth daily    Yes Historical Provider, MD   Omega-3 Fatty Acids (FISH OIL) 1000 MG CAPS Take 1,000 mg by mouth daily    Yes Historical Provider, MD   vitamin C (ASCORBIC ACID) 500 MG tablet Take 1,000 mg by mouth daily    Yes Historical Provider, MD   vitamin B-12 (CYANOCOBALAMIN) 500 MCG tablet Take 500 mcg by mouth daily    Yes Historical Provider, MD   vitamin E 400 UNIT capsule Take 400 Units by mouth daily    Yes Historical Provider, MD   b complex vitamins capsule Take 1 capsule by mouth daily    Yes Historical Provider, MD   Calcium-Magnesium-Zinc 500-250-12.5 MG TABS Take 1 tablet by mouth daily    Yes Historical Provider, MD   CRANBERRY PO Take 1 tablet by mouth as needed (URINARY)    Historical Provider, MD       Current medications:    Current Facility-Administered Medications   Medication Dose Route Frequency Provider Last Rate Last Admin    donepezil (ARICEPT) tablet 5 mg  5 mg Oral Daily with breakfast Marco Zendejas, DO        escitalopram (LEXAPRO) tablet 5 mg  5 mg Oral Daily Marco Zendejas, DO   5 mg at 10/12/21 1209    insulin lispro (HUMALOG) injection vial 14 Units  14 Units SubCUTAneous TID  Marco Leivaino, DO   14 Units at 10/13/21 0629    insulin lispro (HUMALOG) injection vial 0-12 Units  0-12 Units SubCUTAneous TID  Marco Zendejas, DO   6 Units at 10/12/21 1156    insulin lispro (HUMALOG) injection vial 0-6 Units  0-6 Units SubCUTAneous Nightly Marco Leiavino, DO   1 Units at 10/12/21 2252    glucose (GLUTOSE) 40 % oral gel 15 g  15 g Oral PRN Marco Leivaino, DO        dextrose 50 % IV solution  12.5 g IntraVENous PRN Marco Zendejas, DO        glucagon (rDNA) injection 1 mg  1 mg IntraMUSCular PRN Marco Zendejas, DO        dextrose 5 % solution  100 mL/hr IntraVENous PRN Marco Leivaino, DO        sodium chloride flush 0.9 % injection 10 mL  10 mL IntraVENous 2 times per day Marco Zendejas, DO   10 mL at 10/12/21 2226    sodium chloride flush 0.9 % injection 10 mL  10 mL IntraVENous PRN Marco Zendejas, DO        0.9 % sodium chloride infusion  25 mL IntraVENous PRN Marco Zendejas, DO        potassium chloride (KLOR-CON M) extended release tablet 40 mEq  40 mEq Oral PRN Marco Leivaino, DO        Or    potassium bicarb-citric acid (EFFER-K) effervescent tablet 40 mEq  40 mEq Oral PRN Marco Leivaino, DO        Or    potassium chloride 10 mEq/100 mL IVPB (Peripheral Line)  10 mEq IntraVENous PRN Marco Leivaino, DO        enoxaparin (LOVENOX) injection 40 mg  40 mg SubCUTAneous Daily Marco Zendejas, DO   40 mg at 10/12/21 1209    ondansetron (ZOFRAN-ODT) disintegrating tablet 4 mg 4 mg Oral Q8H PRN Marco E Volino, DO        Or    ondansetron (ZOFRAN) injection 4 mg  4 mg IntraVENous Q6H PRN Marco E Volino, DO        senna (SENOKOT) tablet 8.6 mg  1 tablet Oral Daily PRN Marco E Volino, DO        acetaminophen (TYLENOL) tablet 650 mg  650 mg Oral Q6H PRN Marco E Abbieino, DO        Or    acetaminophen (TYLENOL) suppository 650 mg  650 mg Rectal Q6H PRN Marco E Volino, DO        perflutren lipid microspheres (DEFINITY) injection 1.65 mg  1.5 mL IntraVENous ONCE PRN Marco E Volino, DO        lactobacillus (CULTURELLE) capsule 1 capsule  1 capsule Oral Daily Marco E Volino, DO   1 capsule at 10/12/21 1152    insulin glargine (LANTUS) injection vial 20 Units  20 Units SubCUTAneous Nightly Marco E Abbieino, DO   20 Units at 10/12/21 2221    cefTRIAXone (ROCEPHIN) 2,000 mg in sterile water 20 mL IV syringe  2,000 mg IntraVENous Q24H Una Paul MD   2,000 mg at 10/12/21 1746    rifaximin (XIFAXAN) tablet 550 mg  550 mg Oral BID Emelina Henry MD   550 mg at 10/12/21 2226    pantoprazole (PROTONIX) tablet 40 mg  40 mg Oral QAM AC Emelina Henry MD   40 mg at 10/13/21 5230    LORazepam (ATIVAN) tablet 0.5 mg  0.5 mg Oral Nightly PRN Marco Leivaino, DO   0.5 mg at 10/12/21 2227    melatonin tablet 6 mg  6 mg Oral Nightly PRN Marco Zendejas, DO           Allergies: Allergies   Allergen Reactions    Ketorolac Tromethamine Shortness Of Breath and Other (See Comments)     Depression, \"walking like a zombie\", no desire to do anything.     Memantine Hcl Other (See Comments)     AMS, \"walking like a zombie\"    Tamsulosin Hcl Shortness Of Breath       Problem List:    Patient Active Problem List   Diagnosis Code    Type 1 diabetes mellitus without complication [facility] K39.8    Anemia D64.9    Hepatic cirrhosis due to chronic hepatitis C infection ([facility] B18.2, K74.60    Secondary esophageal varices with bleeding ([facility] I85.11    Dementia associated with other underlying disease without behavioral disturbance (HCC) F02.80    B12 deficiency E53.8    Streptococcal bacteremia R78.81, B95.5       Past Medical History:        Diagnosis Date    Buccal mass 09/2020    Diabetes mellitus (HCC)     Esophageal varices (HCC)     Hepatitis C     treated and resolved    Liver cirrhosis (HCC)     Mild dementia (HCC)     no meds    Positive colorectal cancer screening using Cologuard test 04/18/2021    Referred to Dr Felton Mack Colonoscopy       Past Surgical History:        Procedure Laterality Date    BIOPSY MOUTH LESION Left 06/04/2020    EXCISIONAL BIOPSY LEFT BUCCAL MUCOSAL LESION performed by Robin Garibay MD at [facility] [facility]  2017    ENDOSCOPY, COLON, DIAGNOSTIC  2019 and 2020    HERNIA REPAIR Right 2018    inguinal     MOUTH SURGERY Right 09/24/2020    EXCISIONAL BIOPSY OF RIGHT BUCCOL LESION performed by Robin Garibay MD at [address]      UPPER GASTROINTESTINAL ENDOSCOPY N/A 06/19/2018    EGD BAND LIGATION performed by Melvina Marcus MD at [address] [facility]  09/01/2020    Dr Celine Chirinos medium sized esophageal varices--placed 6 rubber bands on varices--normal duodenum       Social History:    Social History     Tobacco Use    Smoking status: Never Smoker    Smokeless tobacco: Never Used   Substance Use Topics    Alcohol use: Never     Comment: rare socially                                Counseling given: Not Answered      Vital Signs (Current):   Vitals:    10/12/21 0915 10/12/21 1500 10/12/21 2159 10/13/21 0845   BP: 132/73 122/69 139/71 (!) 150/72   Pulse: 98 78 98 103   Resp: 16 17 16 19   Temp: 98.2 °F (36.8 °C) 97.8 °F (36.6 °C) 98.2 °F (36.8 °C) 98.1 °F (36.7 °C)   TempSrc: Oral Oral Oral Oral   SpO2: 93% 96% 98% 94%   Weight:       Height:                                                  BP Readings from Last 3 Encounters:   10/13/21 (!) 150/72   08/06/21 120/80   07/30/21 132/82       NPO Status:                                                                                 BMI:   Wt Readings from Last 3 Encounters:   10/11/21 180 lb (81.6 kg)   08/06/21 183 lb 4.8 oz (83.1 kg)   07/30/21 179 lb 3.2 oz (81.3 kg)     Body mass index is 25.83 kg/m². CBC:   Lab Results   Component Value Date    WBC 3.8 10/13/2021    RBC 3.71 10/13/2021    HGB 12.6 10/13/2021    HCT 36.4 10/13/2021    MCV 98.1 10/13/2021    RDW 14.3 10/13/2021    PLT 85 10/13/2021       CMP:   Lab Results   Component Value Date     10/13/2021    K 3.6 10/13/2021     10/13/2021    CO2 21 10/13/2021    BUN 11 10/13/2021    CREATININE 0.6 10/13/2021    GFRAA >60 10/13/2021    LABGLOM >60 10/13/2021    GLUCOSE 149 10/13/2021    GLUCOSE 114 03/29/2012    PROT 5.7 10/13/2021    CALCIUM 8.3 10/13/2021    BILITOT 1.3 10/13/2021    ALKPHOS 74 10/13/2021    AST 32 10/13/2021    ALT 34 10/13/2021       POC Tests: No results for input(s): POCGLU, POCNA, POCK, POCCL, POCBUN, POCHEMO, POCHCT in the last 72 hours.     Coags:   Lab Results   Component Value Date    PROTIME 16.4 10/13/2021    PROTIME 11.3 03/29/2012    INR 1.5 10/13/2021    APTT 32.8 10/10/2021       HCG (If Applicable): No results found for: PREGTESTUR, PREGSERUM, HCG, HCGQUANT     ABGs: No results found for: PHART, PO2ART, AQU6ONU, VLM4KMQ, BEART, I1ADXDSC     Type & Screen (If Applicable):  No results found for: LABABO, LABRH    Drug/Infectious Status (If Applicable):  No results found for: HIV, HEPCAB    COVID-19 Screening (If Applicable):   Lab Results   Component Value Date    COVID19 Not Detected 10/10/2021    COVID19 Not Detected 09/18/2020           Anesthesia Evaluation  Patient summary reviewed and Nursing notes reviewed no history of anesthetic complications:   Airway: Mallampati: II  TM distance: >3 FB   Neck ROM: full  Mouth opening: > = 3 FB Dental: normal exam         Pulmonary:Negative Pulmonary ROS and normal exam                               Cardiovascular:  Exercise tolerance: good (>4 METS),                     Neuro/Psych:   (+) psychiatric history:             ROS comment: dementia GI/Hepatic/Renal:   (+) hepatitis: C, liver disease (cirrhosis): esophageal varices,           Endo/Other:    (+) DiabetesType I DM, , blood dyscrasia: thrombocytopenia:., .                 Abdominal:             Vascular: negative vascular ROS. Other Findings:             Anesthesia Plan      MAC     ASA 3       Induction: intravenous. Anesthetic plan and risks discussed with patient and spouse.       Plan discussed with CRNA and surgical team.            Anesthesia consent signed by patient's wife since patient is confused      Cecil Vergara MD   10/13/2021

## 2021-10-13 NOTE — PROGRESS NOTES
Internal Medicine Progress Note    Patient's name: Bentley Bonds  : 1950  Chief complaints (on day of admission): Other (Was in ER yesterday. Pt left because of wait time. [facility] staff called and said he had a blood infection and to come in)  Admission date: 10/12/2021  Date of service: 10/13/2021   Room: [address] MED SURG/TELE  Primary care physician: Freddy Escobar MD  Reason for visit: Follow-up for bacteremia     Subjective  Ines Medina was seen and examined. Patient remains significantly confused this morning. Patient was found wandering halls was escorted back to room. On questioning patient denies any specific complaints. He states that his appetite has been poor. Patient is currently alert and oriented to person but not place time or condition.   No family at the bedside update      Review of System  Patient with no complaints this morning however unreliable due to altered mental status    Hospital Medications  Current Facility-Administered Medications   Medication Dose Route Frequency Provider Last Rate Last Admin    donepezil (ARICEPT) tablet 5 mg  5 mg Oral Daily with breakfast Marco Zendejas, DO        escitalopram (LEXAPRO) tablet 5 mg  5 mg Oral Daily Marco SALCIDO Volino, DO   5 mg at 10/12/21 1209    insulin lispro (HUMALOG) injection vial 14 Units  14 Units SubCUTAneous TID  Marco Zendejas, DO   14 Units at 10/13/21 0629    insulin lispro (HUMALOG) injection vial 0-12 Units  0-12 Units SubCUTAneous TID  Marco Leivaino, DO   6 Units at 10/12/21 1156    insulin lispro (HUMALOG) injection vial 0-6 Units  0-6 Units SubCUTAneous Nightly Marco SALCIDO Volino, DO   1 Units at 10/12/21 2252    glucose (GLUTOSE) 40 % oral gel 15 g  15 g Oral PRN Marco E Volino, DO        dextrose 50 % IV solution  12.5 g IntraVENous PRN Marco SALCIDO Volino, DO        glucagon (rDNA) injection 1 mg  1 mg IntraMUSCular PRN Marco E Volino, DO        dextrose 5 % solution  100 mL/hr IntraVENous PRN Marco E Volino, DO        sodium chloride flush 0.9 % injection 10 mL  10 mL IntraVENous 2 times per day Marco OMARI Volino, DO   10 mL at 10/12/21 2226    sodium chloride flush 0.9 % injection 10 mL  10 mL IntraVENous PRN Marco SALCIDO Volino, DO        0.9 % sodium chloride infusion  25 mL IntraVENous PRN Marco SALCIDO Volino, DO        potassium chloride (KLOR-CON M) extended release tablet 40 mEq  40 mEq Oral PRN Marco E Volino, DO        Or    potassium bicarb-citric acid (EFFER-K) effervescent tablet 40 mEq  40 mEq Oral PRN Marco E Volino, DO        Or    potassium chloride 10 mEq/100 mL IVPB (Peripheral Line)  10 mEq IntraVENous PRN Marco E Volino, DO        enoxaparin (LOVENOX) injection 40 mg  40 mg SubCUTAneous Daily Marco Leivaino, DO   40 mg at 10/12/21 1209    ondansetron (ZOFRAN-ODT) disintegrating tablet 4 mg  4 mg Oral Q8H PRN Marco Leivaino, DO        Or    ondansetron (ZOFRAN) injection 4 mg  4 mg IntraVENous Q6H PRN Marco Leivaino, DO        senna (SENOKOT) tablet 8.6 mg  1 tablet Oral Daily PRN Marco Leivaino, DO        acetaminophen (TYLENOL) tablet 650 mg  650 mg Oral Q6H PRN Marco Zendejas, DO        Or    acetaminophen (TYLENOL) suppository 650 mg  650 mg Rectal Q6H PRN Marco Leivaino, DO        perflutren lipid microspheres (DEFINITY) injection 1.65 mg  1.5 mL IntraVENous ONCE PRN Marco Leivaino, DO        lactobacillus (CULTURELLE) capsule 1 capsule  1 capsule Oral Daily Marco Leivaino, DO   1 capsule at 10/12/21 1152    insulin glargine (LANTUS) injection vial 20 Units  20 Units SubCUTAneous Nightly Marco Zendejas, DO   20 Units at 10/12/21 2221    cefTRIAXone (ROCEPHIN) 2,000 mg in sterile water 20 mL IV syringe  2,000 mg IntraVENous Q24H Raymond Tirado MD   2,000 mg at 10/12/21 1746    rifaximin (XIFAXAN) tablet 550 mg  550 mg Oral BID Anum Hutson MD   550 mg at 10/12/21 2226    pantoprazole (PROTONIX) tablet 40 mg  40 mg Oral QAM AC Anum Hutson MD   40 mg at 10/13/21 1718    LORazepam (ATIVAN) tablet 0.5 mg  0.5 mg Oral Nightly PRN Marco E Volino, DO   0.5 mg at 10/12/21 2227    melatonin tablet 6 mg  6 mg Oral Nightly PRN Marco E Volino, DO           PRN Medications  glucose, dextrose, glucagon (rDNA), dextrose, sodium chloride flush, sodium chloride, potassium chloride **OR** potassium alternative oral replacement **OR** potassium chloride, ondansetron **OR** ondansetron, senna, acetaminophen **OR** acetaminophen, perflutren lipid microspheres, LORazepam, melatonin    Objective  Most Recent Recorded Vitals  /71   Pulse 98   Temp 98.2 °F (36.8 °C) (Oral)   Resp 16   Ht 5' 10\" (1.778 m)   Wt 180 lb (81.6 kg)   SpO2 98%   BMI 25.83 kg/m²   No intake/output data recorded. No intake/output data recorded. Physical Exam:  General: AAO to person but not place time or condition. , NAD, no labored breathing  Eyes: conjunctivae/corneas clear, sclera non icteric  Skin: color/texture/turgor normal, no rashes or lesions  Lungs: CTAB, no retractions/use of accessory muscles, no vocal fremitus, no rhonchi, no crackle, no rales  Heart: Tachycardic, regular rhythm, no murmur able be auscultated  Abdomen: soft, NT, bowel sounds normal  Extremities: atraumatic, no edema  Neurologic: cranial nerves 2-12 grossly intact, no slurred speech    Most Recent Labs  Lab Results   Component Value Date    WBC 3.8 (L) 10/13/2021    HGB 12.6 10/13/2021    HCT 36.4 (L) 10/13/2021    PLT 85 (L) 10/13/2021     10/13/2021    K 3.6 10/13/2021     10/13/2021    CREATININE 0.6 (L) 10/13/2021    BUN 11 10/13/2021    CO2 21 (L) 10/13/2021    GLUCOSE 149 (H) 10/13/2021    ALT 34 10/13/2021    AST 32 10/13/2021    INR 1.5 10/13/2021    TSH 2.27 10/21/2020    LABA1C 6.7 (H) 07/30/2021    LABMICR <12.0 07/30/2021       XR CHEST (2 VW)   Final Result   Small right pleural effusion and bibasilar areas of atelectasis. Clinical   correlation and follow-up to resolution recommended.       There is elevation of the diaphragm with shallow inspiration. CT ABDOMEN PELVIS W IV CONTRAST Additional Contrast? None   Final Result   1. Limited study due motion artifacts in the mid upper abdomen. Consider   repeat examination. 2.  Findings that can indicate liver cirrhosis with mild splenomegaly and   upper abdomen portal collateral circulation. 3.  Due to motion artifacts cannot see well the main portal vein in the shabnam   hepatis region. 4.  There is a confluent nodular cluster of densities in around the distal   esophagus could represent the prominent esophageal varices. These findings   can be better evaluated with multiphase IV contrast including arterial phase,   portal venous phase and delayed the face. 5.  Moderate right-sided pleural effusion with compression atelectasis of the   right lower lobe. 6.  Chronic eventration of the left diaphragma with chronic atelectasis in   the diaphragma surface of the left lower lobe. 7.  There are is stranding of the fat planes in the left upper quadrant   following the elevation of the diaphragma, this is a chronic finding could be   relate with portal hypertension also.              Echocardiogram 10/12/21  Left Ventricle    Normal left ventricular size, wall thickness, and systolic function.    Ejection fraction is visually estimated at 60%.    There is doppler evidence of stage I diastolic dysfunction.        Right Ventricle    Normal right ventricular size and function.        Left Atrium    Normal sized left atrium.    Interatrial septum appears intact.        Right Atrium    Normal right atrium size.        Mitral Valve    Mild thickening of the mitral valve leaflets.    No evidence of mitral valve stenosis.    Physiologic and/or trace mitral regurgitation is present.    No vegetation or masses are seen on the mitral valve.        Tricuspid Valve    The tricuspid valve appears structurally normal.    Mild to moderate tricuspid regurgitation.  RVSP is 55 mmHg.    Pulmonary hypertension is moderate.    No tricuspid valve vegetation or masses visualized.        Aortic Valve    The aortic valve is trileaflet.    The aortic valve appears moderately sclerotic.    Mild aortic stenosis.    The peak gradient across the aortic valve is calculated as 22 mmHg by    doppler.    The mean gradient across the aortic valve is calculated as 12 mmHg by    doppler.    No evidence of aortic valve regurgitation.    No evidence of mass or vegetation noted on the aortic valve.        Pulmonic Valve    The pulmonic valve was not well visualized.    No evidence of pulmonic valve stenosis.    No evidence of any pulmonic regurgitation.        Pericardial Effusion    No evidence of pericardial effusion.        Pleural Effusion    No evidence of pleural effusion.        Aorta    Aortic root dimension within normal limits. PARESH 10/13/21   No valvular vegetation.    Normal left ventricular systolic function.    Ejection fraction is visually estimated at > 60%.    Normal right ventricular size and function.    No evidence of a left atrial appendage thrombus.    No evidence of interatrial shunting on bubble study.    Mild tricuspid regurgitation.  RV-RA gradient is estimated at 31 mmHg. Assessment   Active Hospital Problems    Diagnosis     Streptococcal bacteremia [R78.81, B95.5]     Secondary esophageal varices with bleeding (HCC) [I85.11]     Hepatic cirrhosis due to chronic hepatitis C infection ([facility] [address] [B18.2, K74.60]     Dementia associated with other underlying disease without behavioral disturbance (HCC) [F02.80]     B12 deficiency [E53.8]     Type 1 diabetes mellitus without complication (HCC) [Y47.2]     Anemia [D64.9]          Plan  · Strep bacteremia-- unclear source: ID managing ABX. PARESH neg. CT abdomen/pelvis noted. · DM, uncontrolled: Continue home DM meds-- adjust as needed. SSI. HbA1c still pending.   · Liver cirrhosis/esophageal varices-history of hepatitis C: GI following no acute interventions indicated currently. WNL ammonia level. · Right-sided pleural effusion: Pulmonology consultation appreciated-- no plans for thoracentesis. · Underlying dementia: Continue Aricept. Consider outpatient neurology evaluation  · PT/OT  · Follow labs  · DVT prophylaxis. · Please see orders for further management and care. ·  for discharge planning  · Discharge plan: TBD pending clinical improvement     Patient was seen and evaluated by myself and my attending Emani Barnes DO. Assessment and Plan discussed with attending provider, please see attestation for final plan of care. Vivian Hassan DO   10/13/2021  8:52 AM    Addendum: I have personally participated in a face-to-face history and physical exam on the date of service with the patient. I have discussed the case with the resident. I also participated in medical decision making with the resident on the date of service and I agree with all of the pertinent clinical information unless indicated in my editing of the note. I have reviewed and edited the note above based on my findings during my history, exam, and decision making on the same day of service. Case was discussed with the patient's daughter and wife at bedside    Electronically signed by Emani Barnes DO on 10/13/2021 at 11:55 AM    I can be reached through [address] [place] [address].

## 2021-10-13 NOTE — PROCEDURES
PRELIMINARY TRANSESOPHAGEAL ECHOCARDIOGRAPHY REPORT    Cardiologist: Dr. Elodia Wagoner    Date of Procedure: 10/13/2021    Indications for study:  Strep bacteremia, TR, AS    Study performed using (Sedation): Per anesthesia    Complications: None    Preliminary findings:  Normal LV function  Normal RV function   Mild TR  No evidence of vegetation  No left atrial appendage thrombus  No interatrial shunting on bubble study    Mayelin Reddy MD  [facility]) Cardiology

## 2021-10-13 NOTE — CARE COORDINATION
Social Work / Discharge Planning : Patient scheduled for PARESH today and it was completed. ADALBERTO judy with patient, spouse and family and explaiend role kayce discharge planner/ transiiton of care. Plan at discharge is HOME. Patient independent. Patient does NOT have HHC/ SNF hx./ Spouse asking about sitters. private duty list provided. Patient does NOT qualify for community based services nor a . Patient PCP is DR Justice Hussein. AWait ID plan. AWait plan. ADALBERTO to follow. Electronically signed by VETO Montesinos on 10/13/21 at 2:02 PM EDT    Addendum : PICC ordered and IV Q 24 for 4 weeks. SW followed up with spouse and family. They would like [address]. SW called and left  for referral as well as faxed script. SW to follow.  Electronically signed by VETO Montesinos on 10/13/21 at 3:13 PM EDT

## 2021-10-13 NOTE — PROGRESS NOTES
Nursing Transfer Note    Data:  Summary of patients progress: Dr Fidelina YODER  Reason for transfer: next level of care    Action:  Explained reason for transfer to Patient and Family. Report given to: Shakir Fournier, using RN Handoff Navigator.   Mode of transportation: bed    Response:  RN Recommendations: pain mgt

## 2021-10-13 NOTE — PROGRESS NOTES
Comprehensive Nutrition Assessment    Type and Reason for Visit:  Initial, Positive Nutrition Screen    Nutrition Recommendations/Plan: Continue current diet and added ONS to help optimize nutrient needs. Nutrition Assessment:  Was in ER yesterday. Pt left because of wait time. [facility]'s staff called and said he had a blood infection and to come in. Pt adm with bacteremia PMHx of of type 1 DM insulin-dependent, hepatic cirrhosis with chronic hep C, esophageal varices, and dementia who presents to the emergency department for positive blood cultures. Pt and family stated poor appetite and PO prior to adm ~1-2wks. Malnutrition Assessment:  Malnutrition Status:  No malnutrition    Context:  Chronic Illness     Findings of the 6 clinical characteristics of malnutrition:  Energy Intake:  Mild decrease in energy intake (Comment) (pt stated prior to adm poor appetite and PO ~1-2wks)  Weight Loss:  No significant weight loss     Body Fat Loss:  No significant body fat loss     Muscle Mass Loss:  No significant muscle mass loss    Fluid Accumulation:  No significant fluid accumulation     Strength:  Not Performed    Estimated Daily Nutrient Needs:  Energy (kcal):  18-1900kcal (MSJx1. 2SF); Weight Used for Energy Requirements:  Current     Protein (g):  100-120g (1.3-1.5g/kg CBW); Weight Used for Protein Requirements:  Current        Fluid (ml/day):  ; Method Used for Fluid Requirements:  1 ml/kcal      Nutrition Related Findings:  AMS, Abd WDL,+I/Os, Edema none      Wounds:  Surgical Incision       Current Nutrition Therapies:    ADULT DIET;  Regular; 4 carb choices (60 gm/meal)  Adult Oral Nutrition Supplement; Diabetic Oral Supplement    Anthropometric Measures:  · Height: 5' 10\" (177.8 cm)  · Current Body Weight: 171 lb (77.6 kg) (10/13)       · Usual Body Weight: 181 lb 9.6 oz (82.4 kg) (1/22/21 actual)     · Ideal Body Weight: 166 lbs; % Ideal Body Weight 103 %   · BMI: 24.5  · Adjusted Body Weight: ; No Adjustment       · BMI Categories: Normal Weight (BMI 18.5-24. 9)       Nutrition Diagnosis:   · Inadequate oral intake related to cognitive or neurological impairment (dementia) as evidenced by intake 26-50%, poor intake prior to admission    Nutrition Interventions:   Food and/or Nutrient Delivery:  Continue Current Diet, Start Oral Nutrition Supplement (Glucerna BID)  Nutrition Education/Counseling:  Education not appropriate   Coordination of Nutrition Care:  No recommendation at this time    Goals:  >75% of meals and ONS consumed       Nutrition Monitoring and Evaluation:   Behavioral-Environmental Outcomes:  None Identified   Food/Nutrient Intake Outcomes:  Food and Nutrient Intake, Supplement Intake  Physical Signs/Symptoms Outcomes:  Biochemical Data, Fluid Status or Edema, Nutrition Focused Physical Findings, Skin, Weight     Discharge Planning:     Too soon to determine     Electronically signed by Gaby Sol RD, LD on 10/13/21 at 2:09 PM EDT    Contact: [phone]

## 2021-10-14 VITALS
BODY MASS INDEX: 24.48 KG/M2 | WEIGHT: 171 LBS | HEART RATE: 96 BPM | DIASTOLIC BLOOD PRESSURE: 81 MMHG | OXYGEN SATURATION: 95 % | RESPIRATION RATE: 18 BRPM | TEMPERATURE: 98.3 F | SYSTOLIC BLOOD PRESSURE: 149 MMHG | HEIGHT: 70 IN

## 2021-10-14 LAB
ALBUMIN SERPL-MCNC: 2.9 G/DL (ref 3.5–5.2)
ALP BLD-CCNC: 71 U/L (ref 40–129)
ALT SERPL-CCNC: 33 U/L (ref 0–40)
ANION GAP SERPL CALCULATED.3IONS-SCNC: 9 MMOL/L (ref 7–16)
AST SERPL-CCNC: 33 U/L (ref 0–39)
BASOPHILS ABSOLUTE: 0.03 E9/L (ref 0–0.2)
BASOPHILS RELATIVE PERCENT: 1 % (ref 0–2)
BILIRUB SERPL-MCNC: 1.2 MG/DL (ref 0–1.2)
BUN BLDV-MCNC: 11 MG/DL (ref 6–23)
BURR CELLS: ABNORMAL
CALCIUM SERPL-MCNC: 8.2 MG/DL (ref 8.6–10.2)
CHLORIDE BLD-SCNC: 106 MMOL/L (ref 98–107)
CO2: 23 MMOL/L (ref 22–29)
CREAT SERPL-MCNC: 0.7 MG/DL (ref 0.7–1.2)
CULTURE, STOOL: NORMAL
EOSINOPHILS ABSOLUTE: 0.15 E9/L (ref 0.05–0.5)
EOSINOPHILS RELATIVE PERCENT: 5 % (ref 0–6)
GFR AFRICAN AMERICAN: >60
GFR NON-AFRICAN AMERICAN: >60 ML/MIN/1.73
GLUCOSE BLD-MCNC: 113 MG/DL (ref 74–99)
HAV IGM SER IA-ACNC: ABNORMAL
HCT VFR BLD CALC: 36.3 % (ref 37–54)
HEMOGLOBIN: 12.6 G/DL (ref 12.5–16.5)
HEPATITIS B CORE IGM ANTIBODY: ABNORMAL
HEPATITIS B SURFACE ANTIGEN INTERPRETATION: ABNORMAL
HEPATITIS C ANTIBODY INTERPRETATION: REACTIVE
IMMATURE GRANULOCYTES #: 0.01 E9/L
IMMATURE GRANULOCYTES %: 0.3 % (ref 0–5)
INR BLD: 1.5
LYMPHOCYTES ABSOLUTE: 0.75 E9/L (ref 1.5–4)
LYMPHOCYTES RELATIVE PERCENT: 24.8 % (ref 20–42)
MAGNESIUM: 1.8 MG/DL (ref 1.6–2.6)
MCH RBC QN AUTO: 33.4 PG (ref 26–35)
MCHC RBC AUTO-ENTMCNC: 34.7 % (ref 32–34.5)
MCV RBC AUTO: 96.3 FL (ref 80–99.9)
METER GLUCOSE: 110 MG/DL (ref 74–99)
METER GLUCOSE: 192 MG/DL (ref 74–99)
METER GLUCOSE: 89 MG/DL (ref 74–99)
MONOCYTES ABSOLUTE: 0.34 E9/L (ref 0.1–0.95)
MONOCYTES RELATIVE PERCENT: 11.2 % (ref 2–12)
NEUTROPHILS ABSOLUTE: 1.75 E9/L (ref 1.8–7.3)
NEUTROPHILS RELATIVE PERCENT: 57.7 % (ref 43–80)
OVALOCYTES: ABNORMAL
PDW BLD-RTO: 14.3 FL (ref 11.5–15)
PLATELET # BLD: 89 E9/L (ref 130–450)
PLATELET CONFIRMATION: NORMAL
PMV BLD AUTO: 9.5 FL (ref 7–12)
POIKILOCYTES: ABNORMAL
POTASSIUM REFLEX MAGNESIUM: 3.5 MMOL/L (ref 3.5–5)
PROTHROMBIN TIME: 16.2 SEC (ref 9.3–12.4)
RBC # BLD: 3.77 E12/L (ref 3.8–5.8)
SODIUM BLD-SCNC: 138 MMOL/L (ref 132–146)
TOTAL PROTEIN: 5.8 G/DL (ref 6.4–8.3)
WBC # BLD: 3 E9/L (ref 4.5–11.5)

## 2021-10-14 PROCEDURE — 36569 INSJ PICC 5 YR+ W/O IMAGING: CPT

## 2021-10-14 PROCEDURE — 97530 THERAPEUTIC ACTIVITIES: CPT

## 2021-10-14 PROCEDURE — 6370000000 HC RX 637 (ALT 250 FOR IP): Performed by: HOSPITALIST

## 2021-10-14 PROCEDURE — 97165 OT EVAL LOW COMPLEX 30 MIN: CPT

## 2021-10-14 PROCEDURE — 85610 PROTHROMBIN TIME: CPT

## 2021-10-14 PROCEDURE — 80053 COMPREHEN METABOLIC PANEL: CPT

## 2021-10-14 PROCEDURE — 76937 US GUIDE VASCULAR ACCESS: CPT

## 2021-10-14 PROCEDURE — 6360000002 HC RX W HCPCS: Performed by: INTERNAL MEDICINE

## 2021-10-14 PROCEDURE — 2500000003 HC RX 250 WO HCPCS: Performed by: REGISTERED NURSE

## 2021-10-14 PROCEDURE — 82962 GLUCOSE BLOOD TEST: CPT

## 2021-10-14 PROCEDURE — 36415 COLL VENOUS BLD VENIPUNCTURE: CPT

## 2021-10-14 PROCEDURE — 2580000003 HC RX 258: Performed by: INTERNAL MEDICINE

## 2021-10-14 PROCEDURE — 2580000003 HC RX 258: Performed by: SPECIALIST

## 2021-10-14 PROCEDURE — C1751 CATH, INF, PER/CENT/MIDLINE: HCPCS

## 2021-10-14 PROCEDURE — 83735 ASSAY OF MAGNESIUM: CPT

## 2021-10-14 PROCEDURE — 85025 COMPLETE CBC W/AUTO DIFF WBC: CPT

## 2021-10-14 PROCEDURE — 6370000000 HC RX 637 (ALT 250 FOR IP): Performed by: INTERNAL MEDICINE

## 2021-10-14 PROCEDURE — 6360000002 HC RX W HCPCS: Performed by: SPECIALIST

## 2021-10-14 PROCEDURE — 02HV33Z INSERTION OF INFUSION DEVICE INTO SUPERIOR VENA CAVA, PERCUTANEOUS APPROACH: ICD-10-PCS | Performed by: INTERNAL MEDICINE

## 2021-10-14 RX ADMIN — Medication 10 ML: at 11:34

## 2021-10-14 RX ADMIN — PANTOPRAZOLE SODIUM 40 MG: 40 TABLET, DELAYED RELEASE ORAL at 06:33

## 2021-10-14 RX ADMIN — INSULIN LISPRO 14 UNITS: 100 INJECTION, SOLUTION INTRAVENOUS; SUBCUTANEOUS at 11:32

## 2021-10-14 RX ADMIN — INSULIN LISPRO 2 UNITS: 100 INJECTION, SOLUTION INTRAVENOUS; SUBCUTANEOUS at 11:31

## 2021-10-14 RX ADMIN — DONEPEZIL HYDROCHLORIDE 5 MG: 5 TABLET, FILM COATED ORAL at 07:56

## 2021-10-14 RX ADMIN — WATER 2000 MG: 1 INJECTION INTRAMUSCULAR; INTRAVENOUS; SUBCUTANEOUS at 16:09

## 2021-10-14 RX ADMIN — LIDOCAINE HYDROCHLORIDE 2 ML: 10 INJECTION, SOLUTION EPIDURAL; INFILTRATION; INTRACAUDAL; PERINEURAL at 15:05

## 2021-10-14 RX ADMIN — ESCITALOPRAM OXALATE 5 MG: 10 TABLET ORAL at 07:56

## 2021-10-14 RX ADMIN — ENOXAPARIN SODIUM 40 MG: 40 INJECTION SUBCUTANEOUS at 07:56

## 2021-10-14 RX ADMIN — Medication 1 CAPSULE: at 07:56

## 2021-10-14 NOTE — PROGRESS NOTES
Internal Medicine Progress Note    Patient's name: Brynda Heimlich  : 1950  Chief complaints (on day of admission): Other (Was in ER yesterday. Pt left because of wait time. [facility]'s staff called and said he had a blood infection and to come in)  Admission date: 10/12/2021  Date of service: 10/14/2021   Room: [address] MED SURG/TELE  Primary care physician: Valerio Ross MD  Reason for visit: Follow-up for bacteremia     Subjective  Deepti Escobedo was seen and examined this morning while sitting on the side of the bed. His wife is present at the bedside. He and his wife both feel as though he is improving and doing well. He is on RA and in NAD, ambulating in room and hallways. Denies any SOB. Alert and oriented to baseline. Awaiting PICC line today. Plan for ABx x4 weeks per ID, patient to follow at infusion center post hospital discharge.       Review of System  Patient with no complaints this morning however unreliable due to altered mental status    Hospital Medications  Current Facility-Administered Medications   Medication Dose Route Frequency Provider Last Rate Last Admin    lidocaine PF 1 % injection 5 mL  5 mL IntraDERmal Once Mervin Porras, APRN - CNP        sodium chloride flush 0.9 % injection 5-40 mL  5-40 mL IntraVENous 2 times per day DAVID Looney - CNP   10 mL at 10/13/21 2049    sodium chloride flush 0.9 % injection 5-40 mL  5-40 mL IntraVENous PRN Mervin Porras, APRN - CNP        0.9 % sodium chloride infusion  25 mL IntraVENous PRN Mervin Porras, APRN - CNP        heparin flush 100 UNIT/ML injection 300 Units  3 mL IntraVENous 2 times per day Mervin Porras, APRN - CNP        heparin flush 100 UNIT/ML injection 300 Units  3 mL IntraCATHeter PRN Mervin Porras, APRN - CNP        donepezil (ARICEPT) tablet 5 mg  5 mg Oral Daily with breakfast Marco E Volino, DO   5 mg at 10/14/21 0756    escitalopram (LEXAPRO) tablet 5 mg  5 mg Oral Daily Marco E Volino, DO   5 mg at 10/14/21 0756    insulin lispro (HUMALOG) injection vial 14 Units  14 Units SubCUTAneous TID  Marco Leivaino, DO   14 Units at 10/13/21 0629    insulin lispro (HUMALOG) injection vial 0-12 Units  0-12 Units SubCUTAneous TID  Marco SALCIDO Volino, DO   6 Units at 10/12/21 1156    insulin lispro (HUMALOG) injection vial 0-6 Units  0-6 Units SubCUTAneous Nightly Marco SALCIDO Volino, DO   3 Units at 10/13/21 2050    glucose (GLUTOSE) 40 % oral gel 15 g  15 g Oral PRN Marco E Volino, DO        dextrose 50 % IV solution  12.5 g IntraVENous PRN Marco E Volino, DO        glucagon (rDNA) injection 1 mg  1 mg IntraMUSCular PRN Marco E Volino, DO        dextrose 5 % solution  100 mL/hr IntraVENous PRN Marco E Volino, DO        sodium chloride flush 0.9 % injection 10 mL  10 mL IntraVENous 2 times per day Marco E Volino, DO   10 mL at 10/13/21 2049    sodium chloride flush 0.9 % injection 10 mL  10 mL IntraVENous PRN Marco E Volino, DO        0.9 % sodium chloride infusion  25 mL IntraVENous PRN Marco E Volino, DO        potassium chloride (KLOR-CON M) extended release tablet 40 mEq  40 mEq Oral PRN Marco E Volino, DO        Or    potassium bicarb-citric acid (EFFER-K) effervescent tablet 40 mEq  40 mEq Oral PRN Marco E Volino, DO        Or    potassium chloride 10 mEq/100 mL IVPB (Peripheral Line)  10 mEq IntraVENous PRN Marco E Volino, DO        enoxaparin (LOVENOX) injection 40 mg  40 mg SubCUTAneous Daily Marco E Volino, DO   40 mg at 10/14/21 0756    ondansetron (ZOFRAN-ODT) disintegrating tablet 4 mg  4 mg Oral Q8H PRN Marco E Volino, DO        Or    ondansetron (ZOFRAN) injection 4 mg  4 mg IntraVENous Q6H PRN Marco E Volino, DO        senna (SENOKOT) tablet 8.6 mg  1 tablet Oral Daily PRN Marco E Volino, DO        acetaminophen (TYLENOL) tablet 650 mg  650 mg Oral Q6H PRN Marco E Volino, DO        Or    acetaminophen (TYLENOL) suppository 650 mg  650 mg Rectal Q6H PRN Marco E Volino, DO        perflutren lipid microspheres (DEFINITY) injection 1.65 mg  1.5 mL IntraVENous ONCE PRN Marco Zendejas DO        lactobacillus (CULTURELLE) capsule 1 capsule  1 capsule Oral Daily Marco Zendejas DO   1 capsule at 10/14/21 0756    insulin glargine (LANTUS) injection vial 20 Units  20 Units SubCUTAneous Nightly Marco Zendejas, DO   20 Units at 10/13/21 2050    cefTRIAXone (ROCEPHIN) 2,000 mg in sterile water 20 mL IV syringe  2,000 mg IntraVENous Q24H Nessa Fernando MD   2,000 mg at 10/13/21 1709    pantoprazole (PROTONIX) tablet 40 mg  40 mg Oral QAM AC Kasey Mas MD   40 mg at 10/14/21 2905    LORazepam (ATIVAN) tablet 0.5 mg  0.5 mg Oral Nightly PRN Marco Zendejas,    0.5 mg at 10/12/21 2227    melatonin tablet 6 mg  6 mg Oral Nightly PRN Marco Zendejas, DO           PRN Medications  sodium chloride flush, sodium chloride, heparin flush, glucose, dextrose, glucagon (rDNA), dextrose, sodium chloride flush, sodium chloride, potassium chloride **OR** potassium alternative oral replacement **OR** potassium chloride, ondansetron **OR** ondansetron, senna, acetaminophen **OR** acetaminophen, perflutren lipid microspheres, LORazepam, melatonin    Objective  Most Recent Recorded Vitals  BP (!) 149/81   Pulse 96   Temp 98.3 °F (36.8 °C) (Oral)   Resp 18   Ht 5' 10\" (1.778 m)   Wt 171 lb (77.6 kg)   SpO2 95%   BMI 24.54 kg/m²   I/O last 3 completed shifts: In: 300 [I.V.:300]  Out: -   No intake/output data recorded.     Physical Exam:  General: AAO to person and place, not oriented to time/situation, NAD, no labored breathing  Eyes: conjunctivae/corneas clear, sclera non icteric  Skin: color/texture/turgor normal, no rashes or lesions  Lungs: CTAB, on RA, no retractions/use of accessory muscles, no vocal fremitus, no rhonchi, no crackle, no rales  Heart: regular rate, regular rhythm, no murmur able be auscultated  Abdomen: soft, NT, bowel sounds normal  Extremities: atraumatic, no edema  Neurologic: cranial nerves 2-12 grossly intact, no slurred speech    Most Recent Labs  Lab Results   Component Value Date    WBC 3.0 (L) 10/14/2021    HGB 12.6 10/14/2021    HCT 36.3 (L) 10/14/2021    PLT 89 (L) 10/14/2021     10/14/2021    K 3.5 10/14/2021     10/14/2021    CREATININE 0.7 10/14/2021    BUN 11 10/14/2021    CO2 23 10/14/2021    GLUCOSE 113 (H) 10/14/2021    ALT 33 10/14/2021    AST 33 10/14/2021    INR 1.5 10/14/2021    TSH 2.27 10/21/2020    LABA1C 6.1 (H) 10/13/2021    LABMICR <12.0 07/30/2021       XR CHEST (2 VW)   Final Result   Low lung volumes. Stable small bilateral pleural effusions. Bibasilar atelectasis. XR CHEST (2 VW)   Final Result   Small right pleural effusion and bibasilar areas of atelectasis. Clinical   correlation and follow-up to resolution recommended. There is elevation of the diaphragm with shallow inspiration. CT ABDOMEN PELVIS W IV CONTRAST Additional Contrast? None   Final Result   1. Limited study due motion artifacts in the mid upper abdomen. Consider   repeat examination. 2.  Findings that can indicate liver cirrhosis with mild splenomegaly and   upper abdomen portal collateral circulation. 3.  Due to motion artifacts cannot see well the main portal vein in the shabnam   hepatis region. 4.  There is a confluent nodular cluster of densities in around the distal   esophagus could represent the prominent esophageal varices. These findings   can be better evaluated with multiphase IV contrast including arterial phase,   portal venous phase and delayed the face. 5.  Moderate right-sided pleural effusion with compression atelectasis of the   right lower lobe. 6.  Chronic eventration of the left diaphragma with chronic atelectasis in   the diaphragma surface of the left lower lobe.       7.  There are is stranding of the fat planes in the left upper quadrant   following the elevation of the diaphragma, this is a chronic finding could be   relate with portal hypertension also. Echocardiogram 10/12/21  Left Ventricle    Normal left ventricular size, wall thickness, and systolic function.    Ejection fraction is visually estimated at 60%.    There is doppler evidence of stage I diastolic dysfunction.        Right Ventricle    Normal right ventricular size and function.        Left Atrium    Normal sized left atrium.    Interatrial septum appears intact.        Right Atrium    Normal right atrium size.        Mitral Valve    Mild thickening of the mitral valve leaflets.    No evidence of mitral valve stenosis.    Physiologic and/or trace mitral regurgitation is present.    No vegetation or masses are seen on the mitral valve.        Tricuspid Valve    The tricuspid valve appears structurally normal.    Mild to moderate tricuspid regurgitation.  RVSP is 55 mmHg.    Pulmonary hypertension is moderate.    No tricuspid valve vegetation or masses visualized.        Aortic Valve    The aortic valve is trileaflet.    The aortic valve appears moderately sclerotic.    Mild aortic stenosis.    The peak gradient across the aortic valve is calculated as 22 mmHg by    doppler.    The mean gradient across the aortic valve is calculated as 12 mmHg by    doppler.    No evidence of aortic valve regurgitation.    No evidence of mass or vegetation noted on the aortic valve.        Pulmonic Valve    The pulmonic valve was not well visualized.    No evidence of pulmonic valve stenosis.    No evidence of any pulmonic regurgitation.        Pericardial Effusion    No evidence of pericardial effusion.        Pleural Effusion    No evidence of pleural effusion.        Aorta    Aortic root dimension within normal limits.      PARESH 10/13/21   No valvular vegetation.    Normal left ventricular systolic function.    Ejection fraction is visually estimated at > 60%.    Normal right ventricular size and function.    No evidence of a left atrial appendage thrombus.    No evidence of interatrial shunting on bubble study.    Mild tricuspid regurgitation.  RV-RA gradient is estimated at 31 mmHg. Assessment   Active Hospital Problems    Diagnosis     Streptococcal bacteremia [R78.81, B95.5]     Secondary esophageal varices with bleeding (HCC) [I85.11]     Hepatic cirrhosis due to chronic hepatitis C infection ([facility] [address] [B18.2, K74.60]     Dementia associated with other underlying disease without behavioral disturbance (HCC) [F02.80]     B12 deficiency [E53.8]     Type 1 diabetes mellitus without complication (HCC) [D72.0]     Anemia [D64.9]          Plan  · Strep bacteremia-- unclear source:   · ID managing ABX-- for PICC line today. He will continue with ABX post discharge at the infusion center in [facility]. · PARESH neg for vegetation. · CT abdomen/pelvis noted. · DM, uncontrolled: Continue home DM meds-- adjust as needed. SSI. HbA1c 6.1%. · Liver cirrhosis/esophageal varices-history of hepatitis C: GI following no acute interventions indicated currently. WNL ammonia level. DC'ed rifaximin per GI. · Right-sided pleural effusion: Pulmonology following. no plans for thoracentesis. On RA. · Underlying dementia: Continue Aricept. Consider outpatient neurology evaluation. · PT/OT  · Follow labs  · DVT prophylaxis. · Please see orders for further management and care. ·  home soon when ok with ID    Electronically signed by DAVID Box CNP on 10/14/2021 at 9:22 AM    I can be reached through [employer]. Addendum: I have personally participated in a face-to-face history and physical exam on the date of service with the patient. I have discussed the case with the nurse practitioner. I also participated in medical decision making on the date of service and I agree with all of the pertinent clinical information unless indicated in my editing of the note.  I have reviewed and edited the note above based on my findings during my history, exam, and decision making on the same day of service. My additional thoughts:    Home today after PICC and once ABX are set up    Electronically signed by Jaspal Beckman DO on 10/14/2021 at 11:37 AM    I can be reached through [email or web address].

## 2021-10-14 NOTE — PROCEDURES
PICC   Catheter insertion date: 10/14/2021     Product Number:  [id number]   Lot No: [id number]   Gauge: 4.5 fr   Lumen: single   L Basilic    Vein Diameter : 0.41 cm   Mid upper arm circumference: 30 cm   Catheter Length : 45 cm(10 cm cut from a 55 cm line)   Internal Length: 45 cm   External Catheter Length: 0   Ultrasound Used:yes  VPS [facility] [place] confirms PICC tip is placed in the lower 1/3 of the SVC or at the Cavoatrial junction. Floor nurse notified PICC is okay to use.    : Eliseo Beltran RN

## 2021-10-14 NOTE — PROGRESS NOTES
[address] [place] [address] [facility]  [facility]  Progress Note    Face to face encounter   SUBJECTIVE:  Chief Complaint   Patient presents with    Other     Was in ER yesterday. Pt left because of wait time. [facility]'s staff called and said he had a blood infection and to come in     Patient is tolerating medications. No nausea, vomiting, diarrhea. Answering questions. Sitting up at bedside. No fevers    Wife at bedside. For PICC today     Review of systems:  As stated above in the chief complaint, otherwise negative.     Medications:  Scheduled Meds:   lidocaine PF  5 mL IntraDERmal Once    sodium chloride flush  5-40 mL IntraVENous 2 times per day    heparin flush  3 mL IntraVENous 2 times per day    donepezil  5 mg Oral Daily with breakfast    escitalopram  5 mg Oral Daily    insulin lispro  14 Units SubCUTAneous TID WC    insulin lispro  0-12 Units SubCUTAneous TID WC    insulin lispro  0-6 Units SubCUTAneous Nightly    sodium chloride flush  10 mL IntraVENous 2 times per day    enoxaparin  40 mg SubCUTAneous Daily    lactobacillus  1 capsule Oral Daily    insulin glargine  20 Units SubCUTAneous Nightly    cefTRIAXone (ROCEPHIN) IV  2,000 mg IntraVENous Q24H    pantoprazole  40 mg Oral QAM AC     Continuous Infusions:   sodium chloride      dextrose      sodium chloride       PRN Meds:sodium chloride flush, sodium chloride, heparin flush, glucose, dextrose, glucagon (rDNA), dextrose, sodium chloride flush, sodium chloride, potassium chloride **OR** potassium alternative oral replacement **OR** potassium chloride, ondansetron **OR** ondansetron, senna, acetaminophen **OR** acetaminophen, perflutren lipid microspheres, LORazepam, melatonin    OBJECTIVE:  BP (!) 149/81   Pulse 96   Temp 98.3 °F (36.8 °C) (Oral)   Resp 18   Ht 5' 10\" (1.778 m)   Wt 171 lb (77.6 kg)   SpO2 95%   BMI 24.54 kg/m²   Temp  Av.1 °F (36.7 °C)  Min: 97.9 °F (36.6 °C)  Max: 98.3 °F (36.8 °C)  Constitutional: The patient is awake, alert, and confused. In no distress. Pleasant- sitting up at bedside. Skin: Warm and dry. No rashes were noted. HEENT: Round and reactive pupils. Moist mucous membranes. No ulcerations or thrush. Neck: Supple to movements. Chest: No use of accessory muscles to breathe. Symmetrical expansion. Clear   Cardiovascular: S1 and S2 are rhythmic and regular. No murmur  Abdomen: Positive bowel sounds to auscultation. Benign to palpation. No masses felt. Extremities: No  edema.   Lines: peripheral    Laboratory and Tests Review:  Lab Results   Component Value Date    WBC 3.0 (L) 10/14/2021    WBC 3.8 (L) 10/13/2021    WBC 9.5 10/12/2021    HGB 12.6 10/14/2021    HCT 36.3 (L) 10/14/2021    MCV 96.3 10/14/2021    PLT 89 (L) 10/14/2021     Lab Results   Component Value Date    NEUTROABS 1.75 (L) 10/14/2021    NEUTROABS 2.69 10/13/2021    NEUTROABS 8.26 (H) 10/12/2021     No results found for: CRPHS  Lab Results   Component Value Date    ALT 33 10/14/2021    AST 33 10/14/2021    ALKPHOS 71 10/14/2021    BILITOT 1.2 10/14/2021     Lab Results   Component Value Date     10/14/2021    K 3.5 10/14/2021     10/14/2021    CO2 23 10/14/2021    BUN 11 10/14/2021    CREATININE 0.7 10/14/2021    CREATININE 0.6 10/13/2021    CREATININE 0.6 10/12/2021    GFRAA >60 10/14/2021    LABGLOM >60 10/14/2021    GLUCOSE 113 10/14/2021    GLUCOSE 114 03/29/2012    PROT 5.8 10/14/2021    LABALBU 2.9 10/14/2021    LABALBU 4.3 03/29/2012    CALCIUM 8.2 10/14/2021    BILITOT 1.2 10/14/2021    ALKPHOS 71 10/14/2021    AST 33 10/14/2021    ALT 33 10/14/2021     Lab Results   Component Value Date    CRP 6.5 (H) 10/12/2021     Lab Results   Component Value Date    SEDRATE 9 10/12/2021     Radiology:  Noted     Microbiology:   Blood cultures 10/10/2021: Streptococcus salivarius   Blood cultures 10/12/2021: negative so far  Urine culture: negative so far    ASSESSMENT:  · Probable infective endocarditis-- PARESH was negative, however, this does not rule out endocarditis since PARESH can miss small vegetations and 90% or more of the times  · Streptococcus bacteremia associated to the above  · Leukopenia     PLAN:  · Continue Ceftriaxone x 4 weeks   · PICC line ordered- to be placed today   · PARESH noted - no vegetations seen  · Monitor labs- CRP 6.5, ESR 9,   · GI following   · Discharge plan is home- and patient is going to go to infusion center daily   · Med rec is done from [address] [facility], [facility]  10:49 AM  10/14/2021     Patient seen and examined. I had a face to face encounter with the patient. Agree with exam.  Assessment and plan as outlined above and directed by me. Addition and corrections were done as deemed appropriate. My exam and plan include: The patient continues to tolerating medications well. A PICC is being inserted. He can be discharged on 4 weeks of Ceftriaxone. He will be going to the infusion center. He is to follow-up with us in the office in a couple of weeks. He will need repeat blood cultures once antibiotics have been completed.     Oscar Godfrey MD  10/14/2021  2:47 PM

## 2021-10-14 NOTE — PROGRESS NOTES
Physical Therapy    Facility/Department: [address] MED SURG/TELE      NAME: eSrgio Dye  : 1950  MRN: [id number]    Date of Service: 10/14/2021    PT consult was received and eval was attempted. Pt was found sitting on EOB Independently with wife present. Pt reports he has no skilled PT needs and wife confirms. He reports he gets up in the room on his own and nurses charting confirms he is Independent in the room and I actually saw pt up walking in the halls the other day with steady gait. Pt has no skilled PT needs at this time and will be discharged from our service. Masha Mixon., P.T.   License Number: [id number]

## 2021-10-14 NOTE — PROGRESS NOTES
Pulmonary Progress Note    Admit Date: 10/12/2021  Requesting Physician: Mo Lake MD    Subjective: Remains on room air, up walking halls and up in room. He denies any dyspnea or cough. His spouse is at bedside and reports a marked improvement in condition and does not wish to pursue thoracentesis if indicated. Medications:     sodium chloride      dextrose      sodium chloride        lidocaine PF  5 mL IntraDERmal Once    sodium chloride flush  5-40 mL IntraVENous 2 times per day    heparin flush  3 mL IntraVENous 2 times per day    donepezil  5 mg Oral Daily with breakfast    escitalopram  5 mg Oral Daily    insulin lispro  14 Units SubCUTAneous TID WC    insulin lispro  0-12 Units SubCUTAneous TID WC    insulin lispro  0-6 Units SubCUTAneous Nightly    sodium chloride flush  10 mL IntraVENous 2 times per day    enoxaparin  40 mg SubCUTAneous Daily    lactobacillus  1 capsule Oral Daily    insulin glargine  20 Units SubCUTAneous Nightly    cefTRIAXone (ROCEPHIN) IV  2,000 mg IntraVENous Q24H    pantoprazole  40 mg Oral QAM AC         Vitals:    VITALS:  BP (!) 149/81   Pulse 96   Temp 98.3 °F (36.8 °C) (Oral)   Resp 18   Ht 5' 10\" (1.778 m)   Wt 171 lb (77.6 kg)   SpO2 95%   BMI 24.54 kg/m²   24HR INTAKE/OUTPUT:      Intake/Output Summary (Last 24 hours) at 10/14/2021 0818  Last data filed at 10/13/2021 1030  Gross per 24 hour   Intake 300 ml   Output --   Net 300 ml     CURRENT PULSE OXIMETRY:  SpO2: 95 %  24HR PULSE OXIMETRY RANGE:  SpO2  Av.6 %  Min: 88 %  Max: 100 %      EXAM:  General: No distress. Pleasant and interactive  ENT:  Pharynx clear. Neck: Trachea midline. Resp: No accessory muscle use. Diminished at bases. No crackles. No wheezing. No rhonchi. CV: Tachycardia on exam. No mumur or rub. ABD: Non-tender. Non-distended. Normal bowel sounds. Skin: Warm and dry. No nodule on exposed extremities. No rash on exposed extremities.   Ext: No joint deformity. No clubbing. No cyanosis. BLE with 1+ edema  Neuro: Awake. Follows commands. Oriented to self and place     Lab Results:  CBC:   Recent Labs     10/12/21  0214 10/13/21  0703 10/14/21  0352   WBC 9.5 3.8* 3.0*   HGB 13.9 12.6 12.6   HCT 39.5 36.4* 36.3*   MCV 95.6 98.1 96.3   PLT 89* 85* 89*     BMP:   Recent Labs     10/12/21  0214 10/13/21  0703 10/14/21  0352   * 136 138   K 4.3 3.6 3.5   CL 97* 105 106   CO2 22 21* 23   BUN 10 11 11   CREATININE 0.6* 0.6* 0.7     LFT:   Recent Labs     10/12/21  0214 10/13/21  0703 10/14/21  0352   ALKPHOS 89 74 71   ALT 39 34 33   AST 44* 32 33   PROT 6.8 5.7* 5.8*   BILITOT 2.6* 1.3* 1.2   LABALBU 3.4* 2.9* 2.9*     PT/INR:   Recent Labs     10/12/21  2135 10/13/21  0703 10/14/21  0352   PROTIME 16.7* 16.4* 16.2*   INR 1.5 1.5 1.5       Cultures:  Results for Rangel Emery (MRN [id number]) as of 10/12/2021 12:15   Ref.  Range 10/10/2021 16:48   Bottle Type Unknown Aerobic   Source of Blood Culture Unknown No site given   Haemophilus Influenzae by PCR Latest Ref Range: Not Detected  Not Detected   Listeria monocytogenes by PCR Latest Ref Range: Not Detected  Not Detected   Neisseria meningitidis by PCR Latest Ref Range: Not Detected  Not Detected   Enterococcus by PCR Latest Ref Range: Not Detected  Not Detected   Staphylococcus species by PCR Latest Ref Range: Not Detected  Not Detected   Staphylococcus aureus by PCR Latest Ref Range: Not Detected  Not Detected   Streptococcus species by PCR Latest Ref Range: Not Detected  DETECTED (AA)   Streptococcus agalactiae by PCR Latest Ref Range: Not Detected  Not Detected   Streptococcus pneumoniae by PCR Latest Ref Range: Not Detected  Not Detected   Streptococcus pyogenes  by PCR Latest Ref Range: Not Detected  Not Detected   Enterobacteriaceae by PCR Latest Ref Range: Not Detected  Not Detected   Enterobacter cloacae complex by PCR Latest Ref Range: Not Detected  Not Detected   Escherichia coli by PCR Latest Ref Range: Not Detected  Not Detected   Klebsiella oxytoca by PCR Latest Ref Range: Not Detected  Not Detected   Klebsiella pneumoniae group by PCR Latest Ref Range: Not Detected  Not Detected   Serratia marcescens by PCR Latest Ref Range: Not Detected  Not Detected   Pseudomonas aeruginosa by PCR Latest Ref Range: Not Detected  Not Detected   Candida albicans by PCR Latest Ref Range: Not Detected  Not Detected   Candida glabrata by PCR Latest Ref Range: Not Detected  Not Detected   Candida krusei by PCR Latest Ref Range: Not Detected  Not Detected   Candida parapsilosis by PCR Latest Ref Range: Not Detected  Not Detected   Candida tropicalis by PCR Latest Ref Range: Not Detected  Not Detected     10/10/21 + Blood cultures - Gram stain performed from blood culture bottle media   Gram positive cocci in chains     ABG:   No results for input(s): PH, PO2, PCO2, HCO3, BE, O2SAT in the last 72 hours. Films:  XR CHEST 10/12/2021: Cardiac silhouette is somewhat obscured but mildly prominent. There is elevation of the diaphragm bilaterally left more so than right. There is small right pleural effusion. There is bibasilar areas of atelectasis. Several monitoring leads seen over the chest. Small right pleural effusion and bibasilar areas of atelectasis. Clinical correlation and follow-up to resolution recommended. There is elevation of the diaphragm with shallow inspiration. CT ABDOMEN PELVIS W IV CONTRAST 10/12/2021 1. Limited study due motion artifacts in the mid upper abdomen. Consider repeat examination. 2.  Findings that can indicate liver cirrhosis with mild splenomegaly and upper abdomen portal collateral circulation. 3.  Due to motion artifacts cannot see well the main portal vein in the shabnam hepatis region. 4.  There is a confluent nodular cluster of densities in around the distal esophagus could represent the prominent esophageal varices.   These findings can be better evaluated with multiphase IV contrast including arterial phase, portal venous phase and delayed the face. 5.  Moderate right-sided pleural effusion with compression atelectasis of the right lower lobe. 6.  Chronic eventration of the left diaphragma with chronic atelectasis in the diaphragma surface of the left lower lobe. 7.  There are is stranding of the fat planes in the left upper quadrant following the elevation of the diaphragma, this is a chronic finding could be relate with portal hypertension also. 10/13/21      Assessment:   66 y.o. 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 With dementia. He developed diarrhea that was black tarry a couple days ago and was brought to the ED on 10/10 for evaluation. Patient just finished Flagyl. Patient was in ER was discharged  10/11 called back to hospital due to positive blood cultures  CT chest showing cirrhosis, esophageal varices, right effusion with compression atelectasis right lower lobe, chronic elevation of left diaphragm atelectasis left lower lobe  10/12 echo EF 78% stage I diastolic dysfunction RSVP 55  Chest x-ray small right effusion  10/13 PARESH negative; On 3 L saturating 96%, to room air  10/14 room air, pox 95%      1. Right pleural effusion, improved  2. Streptococcus Bacteremia PARESH negative for vegetations on ceftriaxone repeat cultures negative  3. Hematochezia  4. VEGA  5. Hx Hep C (treated) with liver cirrhosis  6. History of esophageal varices previous EGD 6/19/18 -Three large varices, grade 3/4, banded with three bands, otherwise, unremarkable.  Stomach showed mild gastritis. Duodenum unremarkable. 04/27/21 with 5 medium varices banded with 6 bands. Duodenum unremarkable. 7. DM  8.  History of dementia       Plan:  · Keep pox > 92%  · Stable effusion, so will hold thoracentesis for now and follow for symptoms  · GI consulted with stool cultures and labs ordered  · Streptococcus Bacteremia on Rocephin - ID consulted. Afebrile and nl wbc. · On Aricept - confusion with dementia at baseline, ammonia 22  · Echo 10/12 without evidence of vegetation, EF 60%, RVSP 55, stage 1 DD. For PICC line as he will continue ceftriaxone for 4 weeks per ID  CXR viewed and as above, showing improvement, and clinically doing well, so will hold thoracentesis. Counseled spouse on symptoms, if worsening dyspnea he can follow up with pulmonary in the office or proceed to ER. Electronically signed by DAVID Melendrez CNP on 10/14/2021 at 8:18 AM    I had a face-to-face encounter with the patient at the bedside. I agree with the nurse practitioner's note and assessment and plan as detailed above. Necessary editing and changes made to the note by myself.   No plans to tap patients on room air okay for discharge from pulmonary standpoint

## 2021-10-14 NOTE — PROGRESS NOTES
[address]  [address] [place] [address]:                                                  Patient Name: Selena Fair    MRN: [id number]    : 1950    Room: [address] [place] [address]      Evaluating OT: CALLIE Vázquez, [id number]    Referring Provider: Yas Hoskins DO  Specific Provider Orders/Date: OT eval and treat 10/14/21    Diagnosis: Bacteremia [R78.81]  Streptococcal bacteremia [R78.81, B95.5]   Surgery: PARESH 10/13/21     Pertinent Medical History:      Past Medical History:   Diagnosis Date    Buccal mass 2020    [facility]     Es[facility]     Hepatitis C     treated and resolved    Liver cirrhosis ([facility]     Mild dementia ([facility]     no meds    Positive colorectal cancer screening using Cologuard test 2021    Referred to Dr Ramona Balderas Colonoscopy         Past Surgical History:   Procedure Laterality Date    BIOPSY MOUTH LESION Left 2020    EXCISIONAL BIOPSY LEFT BUCCAL MUCOSAL LESION performed by Eula Dotson MD at [address] [place] [address]  2017    ENDOSCOPY, COLON, DIAGNOSTIC   and     HERNIA REPAIR Right 2018    inguinal     MOUTH SURGERY Right 2020    EXCISIONAL BIOPSY OF RIGHT BUCCOL LESION performed by Eula Dotson MD at [address] [place] [address] ENDOSCOPY N/A 2018    EGD BAND LIGATION performed by Jada Medina MD at [address] [place] [address]  2020    Dr Juan Maurer medium sized esophageal varices--placed 6 rubber bands on varices--normal duodenum     Precautions:  Fall Risk, contact isolation    Assessment of current deficits    [x] Functional mobility  [x]ADLs  [] Strength               [x]Cognition    [x] Functional transfers   [x] IADLs         [x] Safety Awareness   []Endurance    [] Fine Coordination              [x] Balance      [] Vision/perception   []Sensation     []Gross Motor Coordination  [] ROM  [] Delirium                   [] Motor Control     OT PLAN OF CARE   OT POC based on physician orders, patient diagnosis and results of clinical assessment    Frequency/Duration 1-3 days/wk for 2 weeks PRN   Specific OT Treatment Interventions to include:   * Instruction/training on adapted ADL techniques and AE recommendations to increase functional independence within precautions       * Training on energy conservation strategies, correct breathing pattern and techniques to improve independence/tolerance for self-care routine  * Functional transfer/mobility training/DME recommendations for increased independence, safety, and fall prevention  * Patient/Family education to increase follow through with safety techniques and functional independence  * Recommendation of environmental modifications for increased safety with functional transfers/mobility and ADLs  * Cognitive retraining/development of therapeutic activities to improve problem solving, judgement, memory, and attention for increased safety/participation in ADL/IADL tasks  * Therapeutic exercise to improve motor endurance, ROM, and functional strength for ADLs/functional transfers  * Therapeutic activities to facilitate/challenge dynamic balance, stand tolerance for increased safety and independence with ADLs  * Therapeutic activities to facilitate gross/fine motor skills for increased independence with ADLs  * Delirium prevention/treatment    Recommended Adaptive Equipment: TBD    Comments: Based on patient's functional performance as stated below and level of assistance needed prior to admission, this therapist believes that the patient would benefit from further skilled OT to increase problem solving, safety, and quality of life.     Home Living: Pt lives with wife in a 2 story house with 4 step(s) to enter and 1 rail(s); bed/bath on second. Full bath available on first floor. Bathroom setup: tub shower on first and second floors, elevated toilets   Equipment owned: ww, wheelchair, shower chair, BSC    Prior Level of Function: independent with ADLs and assistance with IADLs; using no device for ambulation. Driving: yes  Occupation: retired     Pain Level: denies pain  Cognition: A&O:  To self and year only; Follows 1-2 step directions   Memory: P   Sequencing: F   Problem solving: F with moderate prompting   Judgement/safety: F     Functional Assessment: AM-PAC Daily Activity Raw Score: 20/24   Initial Eval Status  Date: 10/14/21 Treatment Status  Date: STGs = LTGs  Time frame: 10-14 days   Feeding Independent         Grooming Independent  To wash hands in standing at sink         UB Dressing Set up A  simulated    supervision   LB Dressing Set up A  To leeann shoes at bedside chair    superviison   Bathing SBA  simulated    supervision   Toileting Supervision  For toilet tfr    supervision   Bed Mobility  Rolling: NT  Supine to sit: NT  Sit to supine: NT     Functional Transfers Sit to stand: Supervision  Stand to sit: supervision  Independent    Functional Mobility Supervision  No AE, for in-room distance  Independent    Balance Sitting:     Static:  Independent     Dynamic: supervision  Standing: supervision     Endurance/Activity Tolerance Good-     Visual/  Perceptual Glasses: yes              Hand Dominance R   AROM (PROM) Strength Additional Info:    RUE  WFL 5/5 good  and wfl FMC/dexterity noted during ADL tasks       LUE WFL 5/5 good  and wfl FMC/dexterity noted during ADL tasks       Hearing: WFL  Sensation: No c/o numbness or tingling  Tone: WNL  Edema: unremarkable                            Comments:  Upon arrival patient seated at bedside chair with wife in room. Cognition limited as noted above, moderate to maximim cues for problem solving and orientation.  Patient demo's good ability to sequence common and habitual grooming tasks. After session, patient sitting up in chair with all devices within reach, all lines and tubes intact. Pt required cues and education as noted above for safe facilitation and completion of tasks. Therapist provided skilled monitoring of patient's response during treatment session. Prior to and at the end of session, environmental modifications/line management completed for patients safety and efficiency of treatment session. Overall, patient demonstrates mild difficulties with completion of BADLs and IADLs. Factors contributing to these difficulties include impaired cognition and generalized weakness. As noted above, patient likely to benefit from further OT intervention to increase independence, safety, and overall quality of life. Eval Complexity:   · Low Complexity    Treatment:   · ADL completion: Self-care retraining for the above-mentioned ADLs; training on proper hand placement, safety technique, sequencing, and energy conservation techniques. · Cognitive/Perceptual training: retraining exercises to improve attention, mentation, and spatial awareness for ADLs & transfers. · Delirium Prevention/Management: Implementation of non-pharmacologic interventions for delirium prevention incorporated throughout session to patient and family. Including environmental and sensory modifications to decrease patient's internal distraction caused by delirium, and improve overall mentation. Rehab Potential:  Good for established goals     Patient / Family Goal: None stated      Patient and/or family were instructed on functional diagnosis, prognosis/goals and OT plan of care. Demonstrated good/fair understanding.      Eval Complexity: Low      Time In: 1253  Time Out: 1316  Total Time: 23 minutes    Min Units   OT Eval Low 97165  X 1    OT Eval Medium 20485      OT Eval High 41035       OT Re-Eval [id number]       Therapeutic Ex 52353       Therapeutic Activities 27240  8 1    ADL/Self Care 29341 Orthotic Management 68517       Neuro Re-Ed 66083       Non-Billable Time          Evaluation Time additionally includes thorough review of current medical information, gathering information on past medical history/social history and prior level of function, completion of standardized testing/informal observation of tasks, assessment of data and education on plan of care and goals.     Johnna Garces, OTR/L  [id number]

## 2021-10-14 NOTE — DISCHARGE SUMMARY
Internal Medicine Discharge Summary    NAME: Rod Gonsalves :  1950  MRN:  [id number] Erum Gregg MD    ADMITTED: 10/12/2021   DISCHARGED: 10/14/2021  4:47 PM    ADMITTING PHYSICIAN: Jaspal Beckman DO    PCP: Shukri Harris MD    CONSULTANT(S):   IP CONSULT TO HOSPITALIST  IP CONSULT TO SOCIAL WORK  IP CONSULT TO INFECTIOUS DISEASES  IP CONSULT TO PULMONOLOGY  IP CONSULT TO GI  IP CONSULT TO IV TEAM  IP CONSULT TO IV TEAM     ADMITTING DIAGNOSIS:   Bacteremia [R78.81]  Streptococcal bacteremia [R78.81, B95.5]     Please see H&P for further details    DISCHARGE DIAGNOSES:   Active Hospital Problems    Diagnosis     Streptococcal bacteremia [R78.81, B95.5]      Priority: High    Secondary esophageal varices with bleeding (HCC) [I85.11]     Hepatic cirrhosis due to chronic hepatitis C infection ([facility] [B18.2, K74.60]     Dementia associated with other underlying disease without behavioral disturbance ([facility] [F02.80]     B12 deficiency [E53.8]     Type 1 diabetes mellitus without complication ([address] [I09.3]     Anemia [D64.9]        BRIEF HISTORY OF PRESENT ILLNESS: Rod Gonsalves is a 66 y.o. male patient of Shukri Harris MD who  has a past medical history of Buccal mass, Diabetes mellitus ([address] Esophageal varices ([address] Hepatitis C, Liver cirrhosis ([address] Mild dementia ([address] and Positive colorectal cancer screening using Cologuard test. who originally had concerns including Other (Was in ER yesterday. Pt left because of wait time. [facility] staff called and said he had a blood infection and to come in). at presentation on 10/12/2021, and was found to have Bacteremia [R78.81]  Streptococcal bacteremia [R78.81, B95.5] after workup. Please see H&P for further details. HOSPITAL COURSE:   The patient presented to the hospital with the chief complaint of Other (Was in ER yesterday. Pt left because of wait time. [facility] staff called and said he had a blood infection and to come in).  The patient was admitted to the hospital.     · Strep bacteremia-- unclear source:   ? ID managing ABX-- for PICC line today. He will continue with ABX post discharge at the infusion center in [facility]. ? PARESH neg for vegetation. ? CT abdomen/pelvis noted. · DM, uncontrolled: Continue home DM meds-- adjust as needed. SSI. HbA1c 6.1%. · Liver cirrhosis/esophageal varices-history of hepatitis C: GI following no acute interventions indicated currently.  WNL ammonia level. DC'ed rifaximin per GI. · Right-sided pleural effusion: Pulmonology following. no plans for thoracentesis. On RA. · Underlying dementia: Continue Aricept. Consider outpatient neurology evaluation. · DC'ed home    BRIEF PHYSICAL EXAMINATION AND LABORATORIES ON DAY OF DISCHARGE:  VITALS:  BP (!) 149/81   Pulse 96   Temp 98.3 °F (36.8 °C) (Oral)   Resp 18   Ht 5' 10\" (1.778 m)   Wt 171 lb (77.6 kg)   SpO2 95%   BMI 24.54 kg/m²     Please see note from the same day.      LABS[de-identified]  Recent Labs     10/12/21  0214 10/13/21  0703 10/14/21  0352   * 136 138   K 4.3 3.6 3.5   CL 97* 105 106   CO2 22 21* 23   BUN 10 11 11   CREATININE 0.6* 0.6* 0.7   GLUCOSE 167* 149* 113*   CALCIUM 8.9 8.3* 8.2*     Recent Labs     10/12/21  0214 10/13/21  0703 10/14/21  0352   ALKPHOS 89 74 71   ALT 39 34 33   AST 44* 32 33   PROT 6.8 5.7* 5.8*   BILITOT 2.6* 1.3* 1.2   LABALBU 3.4* 2.9* 2.9*     Recent Labs     10/12/21  0214 10/13/21  0703 10/14/21  0352   WBC 9.5 3.8* 3.0*   RBC 4.13 3.71* 3.77*   HGB 13.9 12.6 12.6   HCT 39.5 36.4* 36.3*   MCV 95.6 98.1 96.3   MCH 33.7 34.0 33.4   MCHC 35.2* 34.6* 34.7*   RDW 14.0 14.3 14.3   PLT 89* 85* 89*   MPV 9.9 9.9 9.5     Lab Results   Component Value Date    LABA1C 6.1 (H) 10/13/2021    LABA1C 6.7 (H) 07/30/2021    LABA1C 7.1 (H) 01/22/2021     Lab Results   Component Value Date    INR 1.5 10/14/2021    INR 1.5 10/13/2021    INR 1.5 10/12/2021    PROTIME 16.2 (H) 10/14/2021    PROTIME 16.4 (H) 10/13/2021    PROTIME 16.7 (H) 10/12/2021      Lab Results   Component Value Date    TSH 2.27 10/21/2020    TSH 1.590 02/02/2016     Lab Results   Component Value Date    TRIG 43 07/30/2021    HDL 57 07/30/2021    LDLCALC 81 07/30/2021     Recent Labs     10/14/21  0352   MG 1.8       No results for input(s): CKTOTAL, CKMB, TROPONINI in the last 72 hours. No results for input(s): LACTA in the last 72 hours. IMAGING:  ECHO Transesophageal    Result Date: 10/13/2021  Transesophageal Echocardiography Report (PARESH)   Demographics   Patient Name        Emani Head   Gender               Male   Medical Record      [id number]    Room Number          0450  Number   Account #           [de-identified]   Procedure Date       10/13/2021   Corporate ID                    Ordering Physician   Ney Tai   Accession Number    [id number]  Referring Physician  Mary Fischer MD   Date of Birth       1950  Sonographer          Cecy Milo   Age                 66 year(s)  Interpreting         Jorge Markham MD                                  Physician                                   Any Other  Procedure Type of Study   PARESH procedure:Transesophageal Echo (PARESH). Procedure Date Date: 10/13/2021 Start: 09:27 AM Study Location: Portable Technical Quality: Adequate visualization Indications:Endocarditis. Patient Status: Routine Contrast Medium: Bubble Study. Height: 70 inches Weight: 180 pounds BSA: 2 m^2 BMI: 25.83 kg/m^2 HR: 107 bpm BP: 95/61 mmHg PARESH Performed By: the attending and the sonographer  Type of Anesthesia: General anesthesia   Findings   Left Ventricle  Left ventricle size is normal.  Normal left ventricular systolic function. Ejection fraction is visually estimated at > 60%. Right Ventricle  Normal right ventricular size and function. Left Atrium  Normal sized left atrium. No evidence of a left atrial appendage thrombus. No evidence of interatrial shunting on bubble study. Right Atrium  Normal sized right atrium.    Mitral Valve 1950  Sonographer            Nighat FITZPATRICK   Age                   66 year(s)  Interpreting Physician [id number] Jose Lloyd MD                                     Any Other  Procedure Type of Study   TTE procedure:Echo Complete W/Doppler & Color Flow. Procedure Date Date: 10/12/2021 Start: 11:00 AM Study Location: Portable Technical Quality: Adequate visualization Indications:R/O Vegetation. Patient Status: Routine Height: 70 inches Weight: 180 pounds BSA: 2 m^2 BMI: 25.83 kg/m^2 HR: 98 bpm BP: 158/91 mmHg  Findings   Left Ventricle  Normal left ventricular size, wall thickness, and systolic function. Ejection fraction is visually estimated at 60%. There is doppler evidence of stage I diastolic dysfunction. Right Ventricle  Normal right ventricular size and function. Left Atrium  Normal sized left atrium. Interatrial septum appears intact. Right Atrium  Normal right atrium size. Mitral Valve  Mild thickening of the mitral valve leaflets. No evidence of mitral valve stenosis. Physiologic and/or trace mitral regurgitation is present. No vegetation or masses are seen on the mitral valve. Tricuspid Valve  The tricuspid valve appears structurally normal.  Mild to moderate tricuspid regurgitation. RVSP is 55 mmHg. Pulmonary hypertension is moderate. No tricuspid valve vegetation or masses visualized. Aortic Valve  The aortic valve is trileaflet. The aortic valve appears moderately sclerotic. Mild aortic stenosis. The peak gradient across the aortic valve is calculated as 22 mmHg by  doppler. The mean gradient across the aortic valve is calculated as 12 mmHg by  doppler. No evidence of aortic valve regurgitation. No evidence of mass or vegetation noted on the aortic valve. Pulmonic Valve  The pulmonic valve was not well visualized. No evidence of pulmonic valve stenosis.   No evidence of any pulmonic regurgitation. Pericardial Effusion  No evidence of pericardial effusion. Pleural Effusion  No evidence of pleural effusion. Aorta  Aortic root dimension within normal limits.    Conclusions   Signature   ----------------------------------------------------------------  Electronically signed by Timothy Matamoros MD(Interpreting  physician) on 10/12/2021 02:20 PM  ----------------------------------------------------------------  M-Mode/2D Measurements & Calculations   LV Diastolic    LV Systolic Dimension: 2.3   AV Cusp Separation: 1.1 cmLA  Dimension: 3.8  cm                           Dimension: 4 cmAO Root  cm              LV Volume Diastolic: 60.4 ml Dimension: 2.9 cm  LV FS:39.5 %    LV Volume Systolic: 82.5 ml  LV PW           LV EDV/LV EDV Index: 40.4  Diastolic: 1.4  ZU/39 QS/Q^1FX ESV/LV ESV  cm              Index: 18.6 ml/9ml/ m^2      RV Diastolic Dimension: 2.6  LV PW Systolic: EF Calculated: 50.0 %        cm  1.6 cm          LV Mass Index: 97 l/min*m^2  Septum                                       LA/Aorta: 8.30  Diastolic: 1.4                               Ascending Aorta: 3.1 cm  cm              LVOT: 2 cm                   LA volume/Index: 90 ml  Septum                                       /77UN/S^5  Systolic: 1.7                                RA Area: 15.9 cm^2  cm  CO: 9.17 l/min                               IVC Expiration: 1.8 cm  CI: 4.58  l/m*m^2  LV Mass: 194.14  g  Doppler Measurements & Calculations   MV Peak E-Wave:   AV Peak Velocity: 2.18 m/s    LVOT Peak Velocity: 1.63  1.13 m/s          AV Peak Gradient: 19.03 mmHg  m/s  MV Peak A-Wave:   AV Mean Velocity: 1.65 m/s    LVOT Mean Velocity: 1.22  1.29 m/s          AV Mean Gradient: 12 mmHg     m/s  MV E/A Ratio:     AV VTI: 37.9 cm               LVOT Peak Gradient: 10.6  0.87              AV Area (Continuity):2.47     mmHgLVOT Mean Gradient:  MV Peak Gradient: cm^2                          6.3 mmHg  8.3 mmHg Estimated RVSP: 43.2 mmHg  MV Mean Gradient: LVOT VTI: 29.8 cm             Estimated RAP:3 mmHg  4.2 mmHg          IVRT: 115.3 msec  MV Mean Velocity: Estimated PASP: 43.17 mmHg  1 m/s             Pulm. Vein A Reversal         TR Velocity:3.17 m/s  MV Deceleration   Duration:76.1 msec            TR Gradient:40.17 mmHg  Time: 177.5 msec  Pulm. Vein D Velocity:0.67    PV Peak Velocity: 1.47 m/s  MV P1/2t: 70.8    m/sPulm. Vein A Reversal      PV Peak Gradient: 8.69  msec              Velocity:0.31 m/s             mmHg  MVA by PHT:3.11   Pulm. Vein S Velocity: 0.48   PV Mean Velocity: 1.07 m/s  cm^2              m/s                           PV Mean Gradient: 5 mmHg  MV Area  (continuity): 3.8  cm^2  MV E' Septal  Velocity: 0.07  m/s  MV E' Lateral  Velocity: 12 m/s  [email or web address] [id number]    XR CHEST (2 VW)    Result Date: 10/13/2021  EXAMINATION: TWO XRAY VIEWS OF THE CHEST 10/13/2021 2:38 pm COMPARISON: Chest x-ray 10/12/2020 HISTORY: ORDERING SYSTEM PROVIDED HISTORY: Follow-up effusion TECHNOLOGIST PROVIDED HISTORY: Reason for exam:->Follow-up effusion FINDINGS: Low lung volumes. Stable small bilateral pleural effusions and associated bibasilar atelectasis. No evidence of pneumothorax or pulmonary edema. The heart is obscured. Bones are stable. Low lung volumes. Stable small bilateral pleural effusions. Bibasilar atelectasis. XR CHEST (2 VW)    Result Date: 10/12/2021  EXAMINATION: TWO XRAY VIEWS OF THE CHEST 10/12/2021 7:56 am COMPARISON: October 10 21 HISTORY: ORDERING SYSTEM PROVIDED HISTORY: fatigue TECHNOLOGIST PROVIDED HISTORY: Reason for exam:->fatigue FINDINGS: Cardiac silhouette is somewhat obscured but mildly prominent. There is elevation of the diaphragm bilaterally left more so than right. There is small right pleural effusion.   There is bibasilar areas of PROVIDED HISTORY: Reason for exam:->cough What reading provider will be dictating this exam?->CRC FINDINGS: The lungs are without acute focal process. Left lower lobe atelectasis low lung volumes. There is no effusion or pneumothorax. The cardiomediastinal silhouette is without acute process. The osseous structures are without acute process. No acute process. MICROBIOLOGY:  BLOOD CX #1  No results for input(s): BC in the last 72 hours. BLOOD CX #2  No results for input(s): Chalmer Stacks in the last 72 hours. TIP CULTURE  No results for input(s): CXCATHTIP in the last 72 hours. CULTURE, RESPIRATORY   No results for input(s): CULTRESP in the last 72 hours. RESPIRATORY SMEAR  No results for input(s): RESPSMEAR in the last 72 hours. Echocardiogram 10/12/21  Left Ventricle    Normal left ventricular size, wall thickness, and systolic function.    Ejection fraction is visually estimated at 60%.    There is doppler evidence of stage I diastolic dysfunction.        Right Ventricle    Normal right ventricular size and function.        Left Atrium    Normal sized left atrium.    Interatrial septum appears intact.        Right Atrium    Normal right atrium size.        Mitral Valve    Mild thickening of the mitral valve leaflets.    No evidence of mitral valve stenosis.    Physiologic and/or trace mitral regurgitation is present.    No vegetation or masses are seen on the mitral valve.        Tricuspid Valve    The tricuspid valve appears structurally normal.    Mild to moderate tricuspid regurgitation.  RVSP is 55 mmHg.    Pulmonary hypertension is moderate.    No tricuspid valve vegetation or masses visualized.        Aortic Valve    The aortic valve is trileaflet.    The aortic valve appears moderately sclerotic.    Mild aortic stenosis.    The peak gradient across the aortic valve is calculated as 22 mmHg by    doppler.    The mean gradient across the aortic valve is calculated as 12 mmHg by    doppler. skin nightly             MISC NATURAL PRODUCTS PO  Take 1 tablet by mouth daily -OSKAR BLUE-             Omega-3 Fatty Acids (FISH OIL) 1000 MG CAPS  Take 1,000 mg by mouth daily              Probiotic Product (PROBIOTIC MULTI-ENZYME) TABS  Take 1 tablet by mouth daily              vitamin B-12 (CYANOCOBALAMIN) 500 MCG tablet  Take 500 mcg by mouth daily              vitamin C (ASCORBIC ACID) 500 MG tablet  Take 1,000 mg by mouth daily              vitamin D-3 (CHOLECALCIFEROL) 125 MCG (5000 UT) TABS  Take 5,000 Units by mouth daily              vitamin E 400 UNIT capsule  Take 400 Units by mouth daily                  Discharge Medication List as of 10/14/2021  4:21 PM        Discharge Medication List as of 10/14/2021  4:21 PM        Discharge Medication List as of 10/14/2021  4:21 PM      START taking these medications    Details   cefTRIAXone (ROCEPHIN) infusion Infuse 2,000 mg intravenously every 24 hours for 28 days Compound per protocol, Disp-56 g, R-0Print             INTERNAL MEDICINE FOLLOW UP/INSTRUCTIONS:  · Follow-up with primary care physician as directed in discharge paperwork. · Please review results of imaging studies with PCP. · Follow-up with consultants as directed by them. · If recurrence or worsening of symptoms go to the ED or call primary care physician. · Diet: ADULT DIET; Regular; 4 carb choices (60 gm/meal)  · Adult Oral Nutrition Supplement; Diabetic Oral Supplement    Preparing for this patient's discharge, including paperwork, orders, instructions, and meeting with patient did required 34 minutes.     Electronically signed by Yas Hoskins DO on 10/16/2021 at 10:42 AM

## 2021-10-15 ENCOUNTER — TELEPHONE (OUTPATIENT)
Dept: INTERNAL MEDICINE | Age: 71
End: 2021-10-15

## 2021-10-15 ENCOUNTER — HOSPITAL ENCOUNTER (OUTPATIENT)
Dept: INFUSION THERAPY | Age: 71
Setting detail: INFUSION SERIES
Discharge: HOME | End: 2021-10-15
Payer: MEDICARE

## 2021-10-15 VITALS
OXYGEN SATURATION: 99 % | SYSTOLIC BLOOD PRESSURE: 140 MMHG | DIASTOLIC BLOOD PRESSURE: 83 MMHG | RESPIRATION RATE: 16 BRPM | BODY MASS INDEX: 25.77 KG/M2 | HEART RATE: 90 BPM | TEMPERATURE: 98.6 F | WEIGHT: 180 LBS | HEIGHT: 70 IN

## 2021-10-15 DIAGNOSIS — R78.81 STREPTOCOCCAL BACTEREMIA: Primary | ICD-10-CM

## 2021-10-15 DIAGNOSIS — B95.5 STREPTOCOCCAL BACTEREMIA: Primary | ICD-10-CM

## 2021-10-15 LAB
AFP-TUMOR MARKER: 1 NG/ML (ref 0–9)
ALBUMIN SERPL-MCNC: 3.1 G/DL (ref 3.5–5.2)
ALP BLD-CCNC: 84 U/L (ref 40–129)
ALT SERPL-CCNC: 42 U/L (ref 0–40)
ANION GAP SERPL CALCULATED.3IONS-SCNC: 9 MMOL/L (ref 7–16)
AST SERPL-CCNC: 43 U/L (ref 0–39)
BASOPHILS ABSOLUTE: 0.03 E9/L (ref 0–0.2)
BASOPHILS RELATIVE PERCENT: 0.7 % (ref 0–2)
BILIRUB SERPL-MCNC: 1.2 MG/DL (ref 0–1.2)
BUN BLDV-MCNC: 9 MG/DL (ref 6–23)
C-REACTIVE PROTEIN: 2.4 MG/DL (ref 0–0.4)
CALCIUM SERPL-MCNC: 8.7 MG/DL (ref 8.6–10.2)
CHLORIDE BLD-SCNC: 104 MMOL/L (ref 98–107)
CO2: 23 MMOL/L (ref 22–29)
CREAT SERPL-MCNC: 0.6 MG/DL (ref 0.7–1.2)
EOSINOPHILS ABSOLUTE: 0.09 E9/L (ref 0.05–0.5)
EOSINOPHILS RELATIVE PERCENT: 2.2 % (ref 0–6)
GFR AFRICAN AMERICAN: >60
GFR NON-AFRICAN AMERICAN: >60 ML/MIN/1.73
GLUCOSE BLD-MCNC: 167 MG/DL (ref 74–99)
HCT VFR BLD CALC: 37.4 % (ref 37–54)
HEMOGLOBIN: 13 G/DL (ref 12.5–16.5)
IMMATURE GRANULOCYTES #: 0.02 E9/L
IMMATURE GRANULOCYTES %: 0.5 % (ref 0–5)
LYMPHOCYTES ABSOLUTE: 0.61 E9/L (ref 1.5–4)
LYMPHOCYTES RELATIVE PERCENT: 15.1 % (ref 20–42)
MCH RBC QN AUTO: 33.3 PG (ref 26–35)
MCHC RBC AUTO-ENTMCNC: 34.8 % (ref 32–34.5)
MCV RBC AUTO: 95.9 FL (ref 80–99.9)
MONOCYTES ABSOLUTE: 0.4 E9/L (ref 0.1–0.95)
MONOCYTES RELATIVE PERCENT: 9.9 % (ref 2–12)
NEUTROPHILS ABSOLUTE: 2.9 E9/L (ref 1.8–7.3)
NEUTROPHILS RELATIVE PERCENT: 71.6 % (ref 43–80)
PDW BLD-RTO: 14.5 FL (ref 11.5–15)
PLATELET # BLD: 97 E9/L (ref 130–450)
PLATELET CONFIRMATION: NORMAL
PMV BLD AUTO: 9.4 FL (ref 7–12)
POTASSIUM SERPL-SCNC: 3.9 MMOL/L (ref 3.5–5)
RBC # BLD: 3.9 E12/L (ref 3.8–5.8)
SEDIMENTATION RATE, ERYTHROCYTE: 12 MM/HR (ref 0–15)
SODIUM BLD-SCNC: 136 MMOL/L (ref 132–146)
TOTAL PROTEIN: 6.4 G/DL (ref 6.4–8.3)
WBC # BLD: 4.1 E9/L (ref 4.5–11.5)

## 2021-10-15 PROCEDURE — 86140 C-REACTIVE PROTEIN: CPT

## 2021-10-15 PROCEDURE — 96374 THER/PROPH/DIAG INJ IV PUSH: CPT

## 2021-10-15 PROCEDURE — 6360000002 HC RX W HCPCS: Performed by: REGISTERED NURSE

## 2021-10-15 PROCEDURE — 2580000003 HC RX 258: Performed by: REGISTERED NURSE

## 2021-10-15 PROCEDURE — 36415 COLL VENOUS BLD VENIPUNCTURE: CPT

## 2021-10-15 PROCEDURE — 85025 COMPLETE CBC W/AUTO DIFF WBC: CPT

## 2021-10-15 PROCEDURE — 80053 COMPREHEN METABOLIC PANEL: CPT

## 2021-10-15 PROCEDURE — 85651 RBC SED RATE NONAUTOMATED: CPT

## 2021-10-15 RX ORDER — DIPHENHYDRAMINE HCL 25 MG
50 TABLET ORAL ONCE
Status: CANCELLED
Start: 2021-10-15 | End: 2021-10-15

## 2021-10-15 RX ORDER — DIPHENHYDRAMINE HYDROCHLORIDE 50 MG/ML
50 INJECTION INTRAMUSCULAR; INTRAVENOUS ONCE
Status: CANCELLED | OUTPATIENT
Start: 2021-10-16 | End: 2021-10-16

## 2021-10-15 RX ORDER — EPINEPHRINE 1 MG/ML
0.3 INJECTION, SOLUTION, CONCENTRATE INTRAVENOUS PRN
Status: CANCELLED | OUTPATIENT
Start: 2021-10-16

## 2021-10-15 RX ORDER — HEPARIN SODIUM (PORCINE) LOCK FLUSH IV SOLN 100 UNIT/ML 100 UNIT/ML
300 SOLUTION INTRAVENOUS PRN
Status: CANCELLED | OUTPATIENT
Start: 2021-10-15

## 2021-10-15 RX ORDER — SODIUM CHLORIDE 0.9 % (FLUSH) 0.9 %
5-10 SYRINGE (ML) INJECTION PRN
Status: CANCELLED | OUTPATIENT
Start: 2021-10-15

## 2021-10-15 RX ORDER — SODIUM CHLORIDE 0.9 % (FLUSH) 0.9 %
5-10 SYRINGE (ML) INJECTION PRN
Status: DISCONTINUED | OUTPATIENT
Start: 2021-10-15 | End: 2021-10-16 | Stop reason: HOSPADM

## 2021-10-15 RX ORDER — HEPARIN SODIUM (PORCINE) LOCK FLUSH IV SOLN 100 UNIT/ML 100 UNIT/ML
300 SOLUTION INTRAVENOUS PRN
Status: CANCELLED | OUTPATIENT
Start: 2021-10-16

## 2021-10-15 RX ORDER — EPINEPHRINE 1 MG/ML
0.3 INJECTION, SOLUTION, CONCENTRATE INTRAVENOUS PRN
Status: CANCELLED | OUTPATIENT
Start: 2021-10-15

## 2021-10-15 RX ORDER — DIPHENHYDRAMINE HYDROCHLORIDE 50 MG/ML
50 INJECTION INTRAMUSCULAR; INTRAVENOUS ONCE
Status: CANCELLED | OUTPATIENT
Start: 2021-10-15 | End: 2021-10-15

## 2021-10-15 RX ORDER — DIPHENHYDRAMINE HCL 25 MG
50 TABLET ORAL ONCE
Status: CANCELLED
Start: 2021-10-16 | End: 2021-10-16

## 2021-10-15 RX ORDER — SODIUM CHLORIDE 0.9 % (FLUSH) 0.9 %
5-10 SYRINGE (ML) INJECTION PRN
Status: CANCELLED | OUTPATIENT
Start: 2021-10-16

## 2021-10-15 RX ORDER — HEPARIN SODIUM (PORCINE) LOCK FLUSH IV SOLN 100 UNIT/ML 100 UNIT/ML
300 SOLUTION INTRAVENOUS PRN
Status: DISCONTINUED | OUTPATIENT
Start: 2021-10-15 | End: 2021-10-16 | Stop reason: HOSPADM

## 2021-10-15 RX ADMIN — SODIUM CHLORIDE, PRESERVATIVE FREE 10 ML: 5 INJECTION INTRAVENOUS at 13:59

## 2021-10-15 RX ADMIN — WATER 2000 MG: 1 INJECTION INTRAMUSCULAR; INTRAVENOUS; SUBCUTANEOUS at 14:05

## 2021-10-15 RX ADMIN — Medication 300 UNITS: at 14:12

## 2021-10-15 RX ADMIN — SODIUM CHLORIDE, PRESERVATIVE FREE 10 ML: 5 INJECTION INTRAVENOUS at 14:12

## 2021-10-15 RX ADMIN — SODIUM CHLORIDE, PRESERVATIVE FREE 10 ML: 5 INJECTION INTRAVENOUS at 14:00

## 2021-10-15 NOTE — TELEPHONE ENCOUNTER
[facility] Transitions Initial Follow Up Call    Outreach made within 2 business days of discharge: Yes    Patient: Elvis Martinez Patient : 1950   MRN: [id number]  Reason for Admission: There are no discharge diagnoses documented for the most recent discharge. Discharge Date: 10/14/21       Spoke with: Leticia    Discharge department/facility: [facility]     TCM Interactive Patient Contact:  Was patient able to fill all prescriptions: No: Patient needs new prescription of Lexapro- since changed how it is being taking on after visit discharge summary. Was patient instructed to bring all medications to the follow-up visit: Yes  Is patient taking all medications as directed in the discharge summary? No  Does patient understand their discharge instructions: Yes  Does patient have questions or concerns that need addressed prior to 7-14 day follow up office visit: yes - Patient's wife states she was advised that patient needs to see Neurologist during hospital visit and would like a referral. Also states patient's Kathie Ege needs refilled since the change of medication on after visit summary. [facility] Thank you.     Appointment with PCP within 7-14 days    Follow Up  Future Appointments   Date Time Provider Marian Carballo   10/15/2021  1:30 PM [facility]   10/19/2021  9:30 AM MD BETTY Pratt   12/3/2021  2:45 PM Juan David Deng MD [facility], [facility]. [address]

## 2021-10-15 NOTE — PROGRESS NOTES
Tolerated rocephin well. Therapy plan reviewed with patient. Verbalizes understanding. Reviewed AVS with patient, reviewed medication information, verbalizes good knowledge of current plan, and has no signs or symptoms to report at this time. Declines copy of AVS. Patient instructed on s/s infection/complication with PICC line to report and instructed on keeping PICC dressing clean, dry and intact patient verbalizes understanding. Next appointment scheduled.

## 2021-10-16 ENCOUNTER — HOSPITAL ENCOUNTER (OUTPATIENT)
Dept: INFUSION THERAPY | Age: 71
Setting detail: INFUSION SERIES
Discharge: HOME | End: 2021-10-16
Payer: MEDICARE

## 2021-10-16 VITALS
SYSTOLIC BLOOD PRESSURE: 135 MMHG | OXYGEN SATURATION: 96 % | DIASTOLIC BLOOD PRESSURE: 80 MMHG | HEART RATE: 87 BPM | TEMPERATURE: 98.2 F | RESPIRATION RATE: 16 BRPM

## 2021-10-16 DIAGNOSIS — B95.5 STREPTOCOCCAL BACTEREMIA: Primary | ICD-10-CM

## 2021-10-16 DIAGNOSIS — R78.81 STREPTOCOCCAL BACTEREMIA: Primary | ICD-10-CM

## 2021-10-16 LAB
CULTURE, BLOOD 2: ABNORMAL
ORGANISM: ABNORMAL

## 2021-10-16 PROCEDURE — 2580000003 HC RX 258: Performed by: REGISTERED NURSE

## 2021-10-16 PROCEDURE — 96374 THER/PROPH/DIAG INJ IV PUSH: CPT

## 2021-10-16 PROCEDURE — 6360000002 HC RX W HCPCS: Performed by: REGISTERED NURSE

## 2021-10-16 RX ORDER — HEPARIN SODIUM (PORCINE) LOCK FLUSH IV SOLN 100 UNIT/ML 100 UNIT/ML
300 SOLUTION INTRAVENOUS PRN
Status: CANCELLED | OUTPATIENT
Start: 2021-10-17

## 2021-10-16 RX ORDER — DIPHENHYDRAMINE HYDROCHLORIDE 50 MG/ML
50 INJECTION INTRAMUSCULAR; INTRAVENOUS ONCE
Status: CANCELLED | OUTPATIENT
Start: 2021-10-17 | End: 2021-10-17

## 2021-10-16 RX ORDER — HEPARIN SODIUM (PORCINE) LOCK FLUSH IV SOLN 100 UNIT/ML 100 UNIT/ML
300 SOLUTION INTRAVENOUS PRN
Status: DISCONTINUED | OUTPATIENT
Start: 2021-10-16 | End: 2021-10-17 | Stop reason: HOSPADM

## 2021-10-16 RX ORDER — SODIUM CHLORIDE 0.9 % (FLUSH) 0.9 %
5-10 SYRINGE (ML) INJECTION PRN
Status: DISCONTINUED | OUTPATIENT
Start: 2021-10-16 | End: 2021-10-17 | Stop reason: HOSPADM

## 2021-10-16 RX ORDER — EPINEPHRINE 1 MG/ML
0.3 INJECTION, SOLUTION, CONCENTRATE INTRAVENOUS PRN
Status: CANCELLED | OUTPATIENT
Start: 2021-10-17

## 2021-10-16 RX ORDER — SODIUM CHLORIDE 0.9 % (FLUSH) 0.9 %
5-10 SYRINGE (ML) INJECTION PRN
Status: CANCELLED | OUTPATIENT
Start: 2021-10-17

## 2021-10-16 RX ORDER — DIPHENHYDRAMINE HCL 25 MG
50 TABLET ORAL ONCE
Status: CANCELLED
Start: 2021-10-17 | End: 2021-10-17

## 2021-10-16 RX ADMIN — HEPARIN SODIUM (PORCINE) LOCK FLUSH IV SOLN 100 UNIT/ML 300 UNITS: 100 SOLUTION at 10:05

## 2021-10-16 RX ADMIN — WATER 2000 MG: 1 INJECTION INTRAMUSCULAR; INTRAVENOUS; SUBCUTANEOUS at 09:57

## 2021-10-16 RX ADMIN — Medication 10 ML: at 09:56

## 2021-10-16 RX ADMIN — Medication 10 ML: at 10:05

## 2021-10-16 ASSESSMENT — PAIN SCALES - GENERAL: PAINLEVEL_OUTOF10: 0

## 2021-10-16 NOTE — PROGRESS NOTES
Physician Progress Note      PATIENT:               Campbell Bang  CSN #:                  [id number]  :                       1950  ADMIT DATE:       10/12/2021 1:50 AM  DISCH DATE:        10/14/2021 4:47 PM  RESPONDING  PROVIDER #:        Elayne DING DO          QUERY TEXT:    Pt admitted with bacteremia. Pt noted to have low WBC and BP with elevated   CRP. If possible, please document in the progress notes and discharge summary   if you are evaluating and /or treating any of the following: The medical record reflects the following:  Risk Factors: strep bacteremia  Clinical Indicators: WBC 3.8, , BP 91/66, CRP 6.5, Lactic 1.9, blood   cultures + strep, and per IM note \". Matt Presley Strep bacteremia-- likely infective   endocarditis: ID managing ABX. Matt Presley \"  Treatment: IV Rocephin    Thank you,  Lloyd MICHAEL, RN, CDIS  Clinical Documentation Improvement  [email or web address]  [phone]  Options provided:  -- Sepsis, present on admission  -- Sepsis, not present on admission  -- Sepsis was ruled out  -- Other - I will add my own diagnosis  -- Disagree - Not applicable / Not valid  -- Disagree - Clinically unable to determine / Unknown  -- Refer to Clinical Documentation Reviewer    PROVIDER RESPONSE TEXT:    This patient has sepsis which was present on admission.     Query created by: Candy Erickson on 10/13/2021 11:04 AM      Electronically signed by:  Agnieszka Joseph DO 10/16/2021 10:42 AM

## 2021-10-17 ENCOUNTER — HOSPITAL ENCOUNTER (OUTPATIENT)
Dept: INFUSION THERAPY | Age: 71
Setting detail: INFUSION SERIES
Discharge: HOME | End: 2021-10-17
Payer: MEDICARE

## 2021-10-17 VITALS
OXYGEN SATURATION: 98 % | HEART RATE: 95 BPM | DIASTOLIC BLOOD PRESSURE: 82 MMHG | TEMPERATURE: 98 F | RESPIRATION RATE: 16 BRPM | SYSTOLIC BLOOD PRESSURE: 141 MMHG

## 2021-10-17 DIAGNOSIS — R78.81 STREPTOCOCCAL BACTEREMIA: Primary | ICD-10-CM

## 2021-10-17 DIAGNOSIS — B95.5 STREPTOCOCCAL BACTEREMIA: Primary | ICD-10-CM

## 2021-10-17 LAB
BLOOD CULTURE, ROUTINE: NORMAL
CULTURE, BLOOD 2: NORMAL

## 2021-10-17 PROCEDURE — 2580000003 HC RX 258: Performed by: REGISTERED NURSE

## 2021-10-17 PROCEDURE — 96374 THER/PROPH/DIAG INJ IV PUSH: CPT

## 2021-10-17 PROCEDURE — 6360000002 HC RX W HCPCS: Performed by: REGISTERED NURSE

## 2021-10-17 RX ORDER — HEPARIN SODIUM (PORCINE) LOCK FLUSH IV SOLN 100 UNIT/ML 100 UNIT/ML
300 SOLUTION INTRAVENOUS PRN
Status: CANCELLED | OUTPATIENT
Start: 2021-10-18

## 2021-10-17 RX ORDER — DIPHENHYDRAMINE HYDROCHLORIDE 50 MG/ML
50 INJECTION INTRAMUSCULAR; INTRAVENOUS ONCE
Status: CANCELLED | OUTPATIENT
Start: 2021-10-18 | End: 2021-10-18

## 2021-10-17 RX ORDER — SODIUM CHLORIDE 0.9 % (FLUSH) 0.9 %
5-10 SYRINGE (ML) INJECTION PRN
Status: DISCONTINUED | OUTPATIENT
Start: 2021-10-17 | End: 2021-10-18 | Stop reason: HOSPADM

## 2021-10-17 RX ORDER — DIPHENHYDRAMINE HCL 25 MG
50 TABLET ORAL ONCE
Status: CANCELLED
Start: 2021-10-18 | End: 2021-10-18

## 2021-10-17 RX ORDER — HEPARIN SODIUM (PORCINE) LOCK FLUSH IV SOLN 100 UNIT/ML 100 UNIT/ML
300 SOLUTION INTRAVENOUS PRN
Status: DISCONTINUED | OUTPATIENT
Start: 2021-10-17 | End: 2021-10-18 | Stop reason: HOSPADM

## 2021-10-17 RX ORDER — EPINEPHRINE 1 MG/ML
0.3 INJECTION, SOLUTION, CONCENTRATE INTRAVENOUS PRN
Status: CANCELLED | OUTPATIENT
Start: 2021-10-18

## 2021-10-17 RX ORDER — SODIUM CHLORIDE 0.9 % (FLUSH) 0.9 %
5-10 SYRINGE (ML) INJECTION PRN
Status: CANCELLED | OUTPATIENT
Start: 2021-10-18

## 2021-10-17 RX ADMIN — SODIUM CHLORIDE, PRESERVATIVE FREE 10 ML: 5 INJECTION INTRAVENOUS at 09:23

## 2021-10-17 RX ADMIN — Medication 300 UNITS: at 09:33

## 2021-10-17 RX ADMIN — SODIUM CHLORIDE, PRESERVATIVE FREE 10 ML: 5 INJECTION INTRAVENOUS at 09:33

## 2021-10-17 RX ADMIN — WATER 2000 MG: 1 INJECTION INTRAMUSCULAR; INTRAVENOUS; SUBCUTANEOUS at 09:28

## 2021-10-17 ASSESSMENT — PAIN SCALES - GENERAL: PAINLEVEL_OUTOF10: 0

## 2021-10-18 ENCOUNTER — HOSPITAL ENCOUNTER (OUTPATIENT)
Dept: INFUSION THERAPY | Age: 71
Setting detail: INFUSION SERIES
Discharge: HOME | End: 2021-10-18
Payer: MEDICARE

## 2021-10-18 VITALS
SYSTOLIC BLOOD PRESSURE: 146 MMHG | TEMPERATURE: 98.1 F | RESPIRATION RATE: 16 BRPM | OXYGEN SATURATION: 96 % | HEART RATE: 90 BPM | DIASTOLIC BLOOD PRESSURE: 81 MMHG

## 2021-10-18 DIAGNOSIS — B95.5 STREPTOCOCCAL BACTEREMIA: Primary | ICD-10-CM

## 2021-10-18 DIAGNOSIS — R78.81 STREPTOCOCCAL BACTEREMIA: Primary | ICD-10-CM

## 2021-10-18 LAB
ALBUMIN SERPL-MCNC: 3.2 G/DL (ref 3.5–5.2)
ALP BLD-CCNC: 86 U/L (ref 40–129)
ALT SERPL-CCNC: 43 U/L (ref 0–40)
ANION GAP SERPL CALCULATED.3IONS-SCNC: 8 MMOL/L (ref 7–16)
AST SERPL-CCNC: 43 U/L (ref 0–39)
BASOPHILS ABSOLUTE: 0.04 E9/L (ref 0–0.2)
BASOPHILS RELATIVE PERCENT: 1 % (ref 0–2)
BILIRUB SERPL-MCNC: 1.3 MG/DL (ref 0–1.2)
BUN BLDV-MCNC: 9 MG/DL (ref 6–23)
C-REACTIVE PROTEIN: 1.1 MG/DL (ref 0–0.4)
CALCIUM SERPL-MCNC: 8.8 MG/DL (ref 8.6–10.2)
CHLORIDE BLD-SCNC: 105 MMOL/L (ref 98–107)
CO2: 26 MMOL/L (ref 22–29)
CREAT SERPL-MCNC: 0.5 MG/DL (ref 0.7–1.2)
EOSINOPHILS ABSOLUTE: 0.11 E9/L (ref 0.05–0.5)
EOSINOPHILS RELATIVE PERCENT: 2.7 % (ref 0–6)
GFR AFRICAN AMERICAN: >60
GFR NON-AFRICAN AMERICAN: >60 ML/MIN/1.73
GLUCOSE BLD-MCNC: 162 MG/DL (ref 74–99)
HCT VFR BLD CALC: 37.7 % (ref 37–54)
HEMOGLOBIN: 13 G/DL (ref 12.5–16.5)
IMMATURE GRANULOCYTES #: 0.01 E9/L
IMMATURE GRANULOCYTES %: 0.2 % (ref 0–5)
LYMPHOCYTES ABSOLUTE: 0.7 E9/L (ref 1.5–4)
LYMPHOCYTES RELATIVE PERCENT: 17.2 % (ref 20–42)
MCH RBC QN AUTO: 33.9 PG (ref 26–35)
MCHC RBC AUTO-ENTMCNC: 34.5 % (ref 32–34.5)
MCV RBC AUTO: 98.2 FL (ref 80–99.9)
MONOCYTES ABSOLUTE: 0.54 E9/L (ref 0.1–0.95)
MONOCYTES RELATIVE PERCENT: 13.2 % (ref 2–12)
NEUTROPHILS ABSOLUTE: 2.68 E9/L (ref 1.8–7.3)
NEUTROPHILS RELATIVE PERCENT: 65.7 % (ref 43–80)
PDW BLD-RTO: 14.2 FL (ref 11.5–15)
PLATELET # BLD: 87 E9/L (ref 130–450)
PLATELET CONFIRMATION: NORMAL
PMV BLD AUTO: 9.5 FL (ref 7–12)
POTASSIUM SERPL-SCNC: 3.9 MMOL/L (ref 3.5–5)
RBC # BLD: 3.84 E12/L (ref 3.8–5.8)
SEDIMENTATION RATE, ERYTHROCYTE: 8 MM/HR (ref 0–15)
SODIUM BLD-SCNC: 139 MMOL/L (ref 132–146)
TOTAL PROTEIN: 6.5 G/DL (ref 6.4–8.3)
WBC # BLD: 4.1 E9/L (ref 4.5–11.5)

## 2021-10-18 PROCEDURE — 85651 RBC SED RATE NONAUTOMATED: CPT

## 2021-10-18 PROCEDURE — 2580000003 HC RX 258: Performed by: REGISTERED NURSE

## 2021-10-18 PROCEDURE — 96374 THER/PROPH/DIAG INJ IV PUSH: CPT

## 2021-10-18 PROCEDURE — 86140 C-REACTIVE PROTEIN: CPT

## 2021-10-18 PROCEDURE — 85025 COMPLETE CBC W/AUTO DIFF WBC: CPT

## 2021-10-18 PROCEDURE — 36415 COLL VENOUS BLD VENIPUNCTURE: CPT

## 2021-10-18 PROCEDURE — 80053 COMPREHEN METABOLIC PANEL: CPT

## 2021-10-18 PROCEDURE — 6360000002 HC RX W HCPCS: Performed by: REGISTERED NURSE

## 2021-10-18 RX ORDER — SODIUM CHLORIDE 0.9 % (FLUSH) 0.9 %
5-10 SYRINGE (ML) INJECTION PRN
Status: DISCONTINUED | OUTPATIENT
Start: 2021-10-18 | End: 2021-10-19 | Stop reason: HOSPADM

## 2021-10-18 RX ORDER — HEPARIN SODIUM (PORCINE) LOCK FLUSH IV SOLN 100 UNIT/ML 100 UNIT/ML
300 SOLUTION INTRAVENOUS PRN
Status: DISCONTINUED | OUTPATIENT
Start: 2021-10-18 | End: 2021-10-19 | Stop reason: HOSPADM

## 2021-10-18 RX ORDER — DIPHENHYDRAMINE HCL 25 MG
50 TABLET ORAL ONCE
Status: CANCELLED
Start: 2021-10-19 | End: 2021-10-19

## 2021-10-18 RX ORDER — DIPHENHYDRAMINE HYDROCHLORIDE 50 MG/ML
50 INJECTION INTRAMUSCULAR; INTRAVENOUS ONCE
Status: CANCELLED | OUTPATIENT
Start: 2021-10-19 | End: 2021-10-19

## 2021-10-18 RX ORDER — HEPARIN SODIUM (PORCINE) LOCK FLUSH IV SOLN 100 UNIT/ML 100 UNIT/ML
300 SOLUTION INTRAVENOUS PRN
Status: CANCELLED | OUTPATIENT
Start: 2021-10-19

## 2021-10-18 RX ORDER — ESCITALOPRAM OXALATE 5 MG/1
5 TABLET ORAL NIGHTLY
Qty: 30 TABLET | Refills: 0 | Status: SHIPPED
Start: 2021-10-18 | End: 2021-11-07

## 2021-10-18 RX ORDER — EPINEPHRINE 1 MG/ML
0.3 INJECTION, SOLUTION, CONCENTRATE INTRAVENOUS PRN
Status: CANCELLED | OUTPATIENT
Start: 2021-10-19

## 2021-10-18 RX ORDER — SODIUM CHLORIDE 0.9 % (FLUSH) 0.9 %
5-10 SYRINGE (ML) INJECTION PRN
Status: CANCELLED | OUTPATIENT
Start: 2021-10-19

## 2021-10-18 RX ADMIN — Medication 300 UNITS: at 09:57

## 2021-10-18 RX ADMIN — Medication 10 ML: at 09:46

## 2021-10-18 RX ADMIN — Medication 10 ML: at 09:44

## 2021-10-18 RX ADMIN — Medication 10 ML: at 09:56

## 2021-10-18 RX ADMIN — WATER 2000 MG: 1 INJECTION INTRAMUSCULAR; INTRAVENOUS; SUBCUTANEOUS at 09:50

## 2021-10-18 NOTE — PROGRESS NOTES
Tolerated   Push  Med  well. Therapy plan reviewed with patient. Verbalizes understanding. Reviewed AVS with patient, reviewed medication information, verbalizes good knowledge of current plan, and has no signs or symptoms to report at this time. Declines copy of AVS. Patient instructed on s/s infection/complication with midline to report and instructed on keeping midline dressing clean, dry and intact patient verbalizes understanding. Next appointment scheduled.

## 2021-10-18 NOTE — TELEPHONE ENCOUNTER
Patient's daughter and wife left two messages this weekend stating they went to the pharmacy [facility] in [facility] to  prescription and there was nothing there, please address concerns of patient. Thank you.

## 2021-10-19 ENCOUNTER — HOSPITAL ENCOUNTER (OUTPATIENT)
Dept: INFUSION THERAPY | Age: 71
Setting detail: INFUSION SERIES
Discharge: HOME | End: 2021-10-19
Payer: MEDICARE

## 2021-10-19 VITALS
OXYGEN SATURATION: 98 % | WEIGHT: 183 LBS | DIASTOLIC BLOOD PRESSURE: 79 MMHG | BODY MASS INDEX: 26.2 KG/M2 | TEMPERATURE: 98.2 F | HEIGHT: 70 IN | SYSTOLIC BLOOD PRESSURE: 133 MMHG | RESPIRATION RATE: 16 BRPM | HEART RATE: 88 BPM

## 2021-10-19 DIAGNOSIS — R78.81 STREPTOCOCCAL BACTEREMIA: Primary | ICD-10-CM

## 2021-10-19 DIAGNOSIS — B95.5 STREPTOCOCCAL BACTEREMIA: Primary | ICD-10-CM

## 2021-10-19 PROCEDURE — 2580000003 HC RX 258: Performed by: REGISTERED NURSE

## 2021-10-19 PROCEDURE — 96374 THER/PROPH/DIAG INJ IV PUSH: CPT

## 2021-10-19 PROCEDURE — 6360000002 HC RX W HCPCS: Performed by: REGISTERED NURSE

## 2021-10-19 RX ORDER — SODIUM CHLORIDE 0.9 % (FLUSH) 0.9 %
5-10 SYRINGE (ML) INJECTION PRN
Status: CANCELLED | OUTPATIENT
Start: 2021-10-20

## 2021-10-19 RX ORDER — HEPARIN SODIUM (PORCINE) LOCK FLUSH IV SOLN 100 UNIT/ML 100 UNIT/ML
300 SOLUTION INTRAVENOUS PRN
Status: DISCONTINUED | OUTPATIENT
Start: 2021-10-19 | End: 2021-10-20 | Stop reason: HOSPADM

## 2021-10-19 RX ORDER — HEPARIN SODIUM (PORCINE) LOCK FLUSH IV SOLN 100 UNIT/ML 100 UNIT/ML
300 SOLUTION INTRAVENOUS PRN
Status: CANCELLED | OUTPATIENT
Start: 2021-10-20

## 2021-10-19 RX ORDER — EPINEPHRINE 1 MG/ML
0.3 INJECTION, SOLUTION, CONCENTRATE INTRAVENOUS PRN
Status: CANCELLED | OUTPATIENT
Start: 2021-10-20

## 2021-10-19 RX ORDER — SODIUM CHLORIDE 0.9 % (FLUSH) 0.9 %
5-10 SYRINGE (ML) INJECTION PRN
Status: DISCONTINUED | OUTPATIENT
Start: 2021-10-19 | End: 2021-10-20 | Stop reason: HOSPADM

## 2021-10-19 RX ORDER — DIPHENHYDRAMINE HYDROCHLORIDE 50 MG/ML
50 INJECTION INTRAMUSCULAR; INTRAVENOUS ONCE
Status: CANCELLED | OUTPATIENT
Start: 2021-10-20 | End: 2021-10-20

## 2021-10-19 RX ORDER — DIPHENHYDRAMINE HCL 25 MG
50 TABLET ORAL ONCE
Status: CANCELLED
Start: 2021-10-20 | End: 2021-10-20

## 2021-10-19 RX ADMIN — WATER 2000 MG: 1 INJECTION INTRAMUSCULAR; INTRAVENOUS; SUBCUTANEOUS at 13:06

## 2021-10-19 RX ADMIN — SODIUM CHLORIDE, PRESERVATIVE FREE 10 ML: 5 INJECTION INTRAVENOUS at 13:04

## 2021-10-19 RX ADMIN — SODIUM CHLORIDE, PRESERVATIVE FREE 10 ML: 5 INJECTION INTRAVENOUS at 13:11

## 2021-10-19 RX ADMIN — HEPARIN SODIUM (PORCINE) LOCK FLUSH IV SOLN 100 UNIT/ML 300 UNITS: 100 SOLUTION at 13:18

## 2021-10-20 ENCOUNTER — HOSPITAL ENCOUNTER (OUTPATIENT)
Dept: INFUSION THERAPY | Age: 71
Setting detail: INFUSION SERIES
Discharge: HOME | End: 2021-10-20
Payer: MEDICARE

## 2021-10-20 VITALS
DIASTOLIC BLOOD PRESSURE: 76 MMHG | SYSTOLIC BLOOD PRESSURE: 146 MMHG | RESPIRATION RATE: 16 BRPM | TEMPERATURE: 97.9 F | HEART RATE: 93 BPM

## 2021-10-20 DIAGNOSIS — R78.81 STREPTOCOCCAL BACTEREMIA: Primary | ICD-10-CM

## 2021-10-20 DIAGNOSIS — B95.5 STREPTOCOCCAL BACTEREMIA: Primary | ICD-10-CM

## 2021-10-20 PROCEDURE — 6360000002 HC RX W HCPCS: Performed by: REGISTERED NURSE

## 2021-10-20 PROCEDURE — 2580000003 HC RX 258: Performed by: REGISTERED NURSE

## 2021-10-20 PROCEDURE — 96374 THER/PROPH/DIAG INJ IV PUSH: CPT

## 2021-10-20 RX ORDER — DIPHENHYDRAMINE HYDROCHLORIDE 50 MG/ML
50 INJECTION INTRAMUSCULAR; INTRAVENOUS ONCE
Status: CANCELLED | OUTPATIENT
Start: 2021-10-21 | End: 2021-10-21

## 2021-10-20 RX ORDER — SODIUM CHLORIDE 0.9 % (FLUSH) 0.9 %
5-10 SYRINGE (ML) INJECTION PRN
Status: CANCELLED | OUTPATIENT
Start: 2021-10-21

## 2021-10-20 RX ORDER — DIPHENHYDRAMINE HCL 25 MG
50 TABLET ORAL ONCE
Status: CANCELLED
Start: 2021-10-21 | End: 2021-10-21

## 2021-10-20 RX ORDER — HEPARIN SODIUM (PORCINE) LOCK FLUSH IV SOLN 100 UNIT/ML 100 UNIT/ML
300 SOLUTION INTRAVENOUS PRN
Status: CANCELLED | OUTPATIENT
Start: 2021-10-21

## 2021-10-20 RX ORDER — HEPARIN SODIUM (PORCINE) LOCK FLUSH IV SOLN 100 UNIT/ML 100 UNIT/ML
300 SOLUTION INTRAVENOUS PRN
Status: DISCONTINUED | OUTPATIENT
Start: 2021-10-20 | End: 2021-10-21 | Stop reason: HOSPADM

## 2021-10-20 RX ORDER — SODIUM CHLORIDE 0.9 % (FLUSH) 0.9 %
5-10 SYRINGE (ML) INJECTION PRN
Status: DISCONTINUED | OUTPATIENT
Start: 2021-10-20 | End: 2021-10-21 | Stop reason: HOSPADM

## 2021-10-20 RX ORDER — EPINEPHRINE 1 MG/ML
0.3 INJECTION, SOLUTION, CONCENTRATE INTRAVENOUS PRN
Status: CANCELLED | OUTPATIENT
Start: 2021-10-21

## 2021-10-20 RX ADMIN — SODIUM CHLORIDE, PRESERVATIVE FREE 10 ML: 5 INJECTION INTRAVENOUS at 09:52

## 2021-10-20 RX ADMIN — HEPARIN SODIUM (PORCINE) LOCK FLUSH IV SOLN 100 UNIT/ML 300 UNITS: 100 SOLUTION at 09:53

## 2021-10-20 RX ADMIN — WATER 2000 MG: 1 INJECTION INTRAMUSCULAR; INTRAVENOUS; SUBCUTANEOUS at 09:44

## 2021-10-20 RX ADMIN — SODIUM CHLORIDE, PRESERVATIVE FREE 10 ML: 5 INJECTION INTRAVENOUS at 09:41

## 2021-10-20 RX ADMIN — SODIUM CHLORIDE, PRESERVATIVE FREE 10 ML: 5 INJECTION INTRAVENOUS at 09:51

## 2021-10-21 ENCOUNTER — HOSPITAL ENCOUNTER (OUTPATIENT)
Dept: INFUSION THERAPY | Age: 71
Setting detail: INFUSION SERIES
Discharge: HOME | End: 2021-10-21
Payer: MEDICARE

## 2021-10-21 VITALS
RESPIRATION RATE: 16 BRPM | HEART RATE: 95 BPM | DIASTOLIC BLOOD PRESSURE: 78 MMHG | SYSTOLIC BLOOD PRESSURE: 135 MMHG | BODY MASS INDEX: 26.05 KG/M2 | WEIGHT: 182 LBS | TEMPERATURE: 98.2 F | OXYGEN SATURATION: 97 % | HEIGHT: 70 IN

## 2021-10-21 DIAGNOSIS — R78.81 STREPTOCOCCAL BACTEREMIA: Primary | ICD-10-CM

## 2021-10-21 DIAGNOSIS — B95.5 STREPTOCOCCAL BACTEREMIA: Primary | ICD-10-CM

## 2021-10-21 LAB
ALBUMIN SERPL-MCNC: 3.3 G/DL (ref 3.5–5.2)
ALP BLD-CCNC: 93 U/L (ref 40–129)
ALT SERPL-CCNC: 51 U/L (ref 0–40)
ANION GAP SERPL CALCULATED.3IONS-SCNC: 9 MMOL/L (ref 7–16)
AST SERPL-CCNC: 53 U/L (ref 0–39)
BASOPHILS ABSOLUTE: 0.05 E9/L (ref 0–0.2)
BASOPHILS RELATIVE PERCENT: 0.8 % (ref 0–2)
BILIRUB SERPL-MCNC: 1.7 MG/DL (ref 0–1.2)
BUN BLDV-MCNC: 9 MG/DL (ref 6–23)
CALCIUM SERPL-MCNC: 9.2 MG/DL (ref 8.6–10.2)
CHLORIDE BLD-SCNC: 104 MMOL/L (ref 98–107)
CO2: 25 MMOL/L (ref 22–29)
CREAT SERPL-MCNC: 0.6 MG/DL (ref 0.7–1.2)
EOSINOPHILS ABSOLUTE: 0.11 E9/L (ref 0.05–0.5)
EOSINOPHILS RELATIVE PERCENT: 1.7 % (ref 0–6)
GFR AFRICAN AMERICAN: >60
GFR NON-AFRICAN AMERICAN: >60 ML/MIN/1.73
GLUCOSE BLD-MCNC: 177 MG/DL (ref 74–99)
HCT VFR BLD CALC: 37.7 % (ref 37–54)
HEMOGLOBIN: 13.1 G/DL (ref 12.5–16.5)
IMMATURE GRANULOCYTES #: 0.02 E9/L
IMMATURE GRANULOCYTES %: 0.3 % (ref 0–5)
LYMPHOCYTES ABSOLUTE: 0.89 E9/L (ref 1.5–4)
LYMPHOCYTES RELATIVE PERCENT: 13.5 % (ref 20–42)
MCH RBC QN AUTO: 34.1 PG (ref 26–35)
MCHC RBC AUTO-ENTMCNC: 34.7 % (ref 32–34.5)
MCV RBC AUTO: 98.2 FL (ref 80–99.9)
MONOCYTES ABSOLUTE: 0.52 E9/L (ref 0.1–0.95)
MONOCYTES RELATIVE PERCENT: 7.9 % (ref 2–12)
NEUTROPHILS ABSOLUTE: 5.02 E9/L (ref 1.8–7.3)
NEUTROPHILS RELATIVE PERCENT: 75.8 % (ref 43–80)
PDW BLD-RTO: 14.4 FL (ref 11.5–15)
PLATELET # BLD: 106 E9/L (ref 130–450)
PMV BLD AUTO: 10.3 FL (ref 7–12)
POTASSIUM SERPL-SCNC: 4.2 MMOL/L (ref 3.5–5)
RBC # BLD: 3.84 E12/L (ref 3.8–5.8)
SODIUM BLD-SCNC: 138 MMOL/L (ref 132–146)
TOTAL PROTEIN: 6.4 G/DL (ref 6.4–8.3)
WBC # BLD: 6.6 E9/L (ref 4.5–11.5)

## 2021-10-21 PROCEDURE — 2580000003 HC RX 258: Performed by: REGISTERED NURSE

## 2021-10-21 PROCEDURE — 96374 THER/PROPH/DIAG INJ IV PUSH: CPT

## 2021-10-21 PROCEDURE — 6360000002 HC RX W HCPCS: Performed by: REGISTERED NURSE

## 2021-10-21 PROCEDURE — 85025 COMPLETE CBC W/AUTO DIFF WBC: CPT

## 2021-10-21 PROCEDURE — 36415 COLL VENOUS BLD VENIPUNCTURE: CPT

## 2021-10-21 PROCEDURE — 80053 COMPREHEN METABOLIC PANEL: CPT

## 2021-10-21 RX ORDER — EPINEPHRINE 1 MG/ML
0.3 INJECTION, SOLUTION, CONCENTRATE INTRAVENOUS PRN
Status: CANCELLED | OUTPATIENT
Start: 2021-10-22

## 2021-10-21 RX ORDER — SODIUM CHLORIDE 0.9 % (FLUSH) 0.9 %
5-10 SYRINGE (ML) INJECTION PRN
Status: DISCONTINUED | OUTPATIENT
Start: 2021-10-21 | End: 2021-10-22 | Stop reason: HOSPADM

## 2021-10-21 RX ORDER — HEPARIN SODIUM (PORCINE) LOCK FLUSH IV SOLN 100 UNIT/ML 100 UNIT/ML
300 SOLUTION INTRAVENOUS PRN
Status: DISCONTINUED | OUTPATIENT
Start: 2021-10-21 | End: 2021-10-22 | Stop reason: HOSPADM

## 2021-10-21 RX ORDER — DIPHENHYDRAMINE HCL 25 MG
50 TABLET ORAL ONCE
Status: CANCELLED
Start: 2021-10-22 | End: 2021-10-22

## 2021-10-21 RX ORDER — HEPARIN SODIUM (PORCINE) LOCK FLUSH IV SOLN 100 UNIT/ML 100 UNIT/ML
300 SOLUTION INTRAVENOUS PRN
Status: CANCELLED | OUTPATIENT
Start: 2021-10-22

## 2021-10-21 RX ORDER — DIPHENHYDRAMINE HYDROCHLORIDE 50 MG/ML
50 INJECTION INTRAMUSCULAR; INTRAVENOUS ONCE
Status: CANCELLED | OUTPATIENT
Start: 2021-10-22 | End: 2021-10-22

## 2021-10-21 RX ORDER — SODIUM CHLORIDE 0.9 % (FLUSH) 0.9 %
5-10 SYRINGE (ML) INJECTION PRN
Status: CANCELLED | OUTPATIENT
Start: 2021-10-22

## 2021-10-21 RX ADMIN — HEPARIN SODIUM (PORCINE) LOCK FLUSH IV SOLN 100 UNIT/ML 300 UNITS: 100 SOLUTION at 09:42

## 2021-10-21 RX ADMIN — Medication 10 ML: at 09:42

## 2021-10-21 RX ADMIN — WATER 2000 MG: 1 INJECTION INTRAMUSCULAR; INTRAVENOUS; SUBCUTANEOUS at 09:36

## 2021-10-21 RX ADMIN — Medication 10 ML: at 09:32

## 2021-10-21 RX ADMIN — Medication 10 ML: at 09:35

## 2021-10-21 NOTE — PROGRESS NOTES
Tolerated infusion  Push  Med  well. Therapy plan reviewed with patient. Verbalizes understanding. Reviewed AVS with patient, reviewed medication information, verbalizes good knowledge of current plan, and has no signs or symptoms to report at this time. Declines copy of AVS. Patient instructed on s/s infection/complication with midline to report and instructed on keeping midline dressing clean, dry and intact patient verbalizes understanding. Next appointment scheduled.

## 2021-10-22 ENCOUNTER — HOSPITAL ENCOUNTER (OUTPATIENT)
Dept: INFUSION THERAPY | Age: 71
Setting detail: INFUSION SERIES
Discharge: HOME | End: 2021-10-22
Payer: MEDICARE

## 2021-10-22 VITALS
DIASTOLIC BLOOD PRESSURE: 79 MMHG | RESPIRATION RATE: 16 BRPM | WEIGHT: 182 LBS | SYSTOLIC BLOOD PRESSURE: 155 MMHG | BODY MASS INDEX: 26.05 KG/M2 | HEIGHT: 70 IN | TEMPERATURE: 98.3 F | HEART RATE: 89 BPM | OXYGEN SATURATION: 96 %

## 2021-10-22 DIAGNOSIS — R78.81 STREPTOCOCCAL BACTEREMIA: Primary | ICD-10-CM

## 2021-10-22 DIAGNOSIS — B95.5 STREPTOCOCCAL BACTEREMIA: Primary | ICD-10-CM

## 2021-10-22 PROCEDURE — 96374 THER/PROPH/DIAG INJ IV PUSH: CPT

## 2021-10-22 PROCEDURE — 6360000002 HC RX W HCPCS: Performed by: REGISTERED NURSE

## 2021-10-22 PROCEDURE — 2580000003 HC RX 258: Performed by: REGISTERED NURSE

## 2021-10-22 RX ORDER — DIPHENHYDRAMINE HCL 25 MG
50 TABLET ORAL ONCE
Status: CANCELLED
Start: 2021-10-23 | End: 2021-10-23

## 2021-10-22 RX ORDER — HEPARIN SODIUM (PORCINE) LOCK FLUSH IV SOLN 100 UNIT/ML 100 UNIT/ML
300 SOLUTION INTRAVENOUS PRN
Status: DISCONTINUED | OUTPATIENT
Start: 2021-10-22 | End: 2021-10-23 | Stop reason: HOSPADM

## 2021-10-22 RX ORDER — EPINEPHRINE 1 MG/ML
0.3 INJECTION, SOLUTION, CONCENTRATE INTRAVENOUS PRN
Status: CANCELLED | OUTPATIENT
Start: 2021-10-23

## 2021-10-22 RX ORDER — DIPHENHYDRAMINE HYDROCHLORIDE 50 MG/ML
50 INJECTION INTRAMUSCULAR; INTRAVENOUS ONCE
Status: CANCELLED | OUTPATIENT
Start: 2021-10-23 | End: 2021-10-23

## 2021-10-22 RX ORDER — SODIUM CHLORIDE 0.9 % (FLUSH) 0.9 %
5-10 SYRINGE (ML) INJECTION PRN
Status: DISCONTINUED | OUTPATIENT
Start: 2021-10-22 | End: 2021-10-23 | Stop reason: HOSPADM

## 2021-10-22 RX ORDER — SODIUM CHLORIDE 0.9 % (FLUSH) 0.9 %
5-10 SYRINGE (ML) INJECTION PRN
Status: CANCELLED | OUTPATIENT
Start: 2021-10-23

## 2021-10-22 RX ORDER — HEPARIN SODIUM (PORCINE) LOCK FLUSH IV SOLN 100 UNIT/ML 100 UNIT/ML
300 SOLUTION INTRAVENOUS PRN
Status: CANCELLED | OUTPATIENT
Start: 2021-10-23

## 2021-10-22 RX ADMIN — WATER 2000 MG: 1 INJECTION INTRAMUSCULAR; INTRAVENOUS; SUBCUTANEOUS at 13:50

## 2021-10-22 RX ADMIN — SODIUM CHLORIDE, PRESERVATIVE FREE 10 ML: 5 INJECTION INTRAVENOUS at 13:55

## 2021-10-22 RX ADMIN — HEPARIN SODIUM (PORCINE) LOCK FLUSH IV SOLN 100 UNIT/ML 300 UNITS: 100 SOLUTION at 13:56

## 2021-10-22 RX ADMIN — SODIUM CHLORIDE, PRESERVATIVE FREE 10 ML: 5 INJECTION INTRAVENOUS at 13:48

## 2021-10-23 ENCOUNTER — HOSPITAL ENCOUNTER (OUTPATIENT)
Dept: INFUSION THERAPY | Age: 71
Setting detail: INFUSION SERIES
Discharge: HOME | End: 2021-10-23
Payer: MEDICARE

## 2021-10-23 VITALS
HEIGHT: 70 IN | SYSTOLIC BLOOD PRESSURE: 148 MMHG | HEART RATE: 89 BPM | OXYGEN SATURATION: 95 % | TEMPERATURE: 97.8 F | WEIGHT: 182 LBS | DIASTOLIC BLOOD PRESSURE: 72 MMHG | BODY MASS INDEX: 26.05 KG/M2 | RESPIRATION RATE: 16 BRPM

## 2021-10-23 DIAGNOSIS — B95.5 STREPTOCOCCAL BACTEREMIA: Primary | ICD-10-CM

## 2021-10-23 DIAGNOSIS — R78.81 STREPTOCOCCAL BACTEREMIA: Primary | ICD-10-CM

## 2021-10-23 PROCEDURE — 96374 THER/PROPH/DIAG INJ IV PUSH: CPT

## 2021-10-23 PROCEDURE — 6360000002 HC RX W HCPCS: Performed by: REGISTERED NURSE

## 2021-10-23 PROCEDURE — 2580000003 HC RX 258: Performed by: REGISTERED NURSE

## 2021-10-23 RX ORDER — SODIUM CHLORIDE 0.9 % (FLUSH) 0.9 %
5-10 SYRINGE (ML) INJECTION PRN
Status: CANCELLED | OUTPATIENT
Start: 2021-10-24

## 2021-10-23 RX ORDER — SODIUM CHLORIDE 0.9 % (FLUSH) 0.9 %
5-10 SYRINGE (ML) INJECTION PRN
Status: DISCONTINUED | OUTPATIENT
Start: 2021-10-23 | End: 2021-10-24 | Stop reason: HOSPADM

## 2021-10-23 RX ORDER — EPINEPHRINE 1 MG/ML
0.3 INJECTION, SOLUTION, CONCENTRATE INTRAVENOUS PRN
Status: CANCELLED | OUTPATIENT
Start: 2021-10-24

## 2021-10-23 RX ORDER — HEPARIN SODIUM (PORCINE) LOCK FLUSH IV SOLN 100 UNIT/ML 100 UNIT/ML
300 SOLUTION INTRAVENOUS PRN
Status: CANCELLED | OUTPATIENT
Start: 2021-10-24

## 2021-10-23 RX ORDER — DIPHENHYDRAMINE HYDROCHLORIDE 50 MG/ML
50 INJECTION INTRAMUSCULAR; INTRAVENOUS ONCE
Status: CANCELLED | OUTPATIENT
Start: 2021-10-24 | End: 2021-10-24

## 2021-10-23 RX ORDER — DIPHENHYDRAMINE HCL 25 MG
50 TABLET ORAL ONCE
Status: CANCELLED
Start: 2021-10-24 | End: 2021-10-24

## 2021-10-23 RX ORDER — HEPARIN SODIUM (PORCINE) LOCK FLUSH IV SOLN 100 UNIT/ML 100 UNIT/ML
300 SOLUTION INTRAVENOUS PRN
Status: DISCONTINUED | OUTPATIENT
Start: 2021-10-23 | End: 2021-10-24 | Stop reason: HOSPADM

## 2021-10-23 RX ADMIN — SODIUM CHLORIDE, PRESERVATIVE FREE 10 ML: 5 INJECTION INTRAVENOUS at 10:00

## 2021-10-23 RX ADMIN — HEPARIN SODIUM (PORCINE) LOCK FLUSH IV SOLN 100 UNIT/ML 300 UNITS: 100 SOLUTION at 10:01

## 2021-10-23 RX ADMIN — WATER 2000 MG: 1 INJECTION INTRAMUSCULAR; INTRAVENOUS; SUBCUTANEOUS at 09:55

## 2021-10-23 RX ADMIN — SODIUM CHLORIDE, PRESERVATIVE FREE 10 ML: 5 INJECTION INTRAVENOUS at 09:55

## 2021-10-24 ENCOUNTER — HOSPITAL ENCOUNTER (OUTPATIENT)
Dept: INFUSION THERAPY | Age: 71
Setting detail: INFUSION SERIES
Discharge: HOME | End: 2021-10-24
Payer: MEDICARE

## 2021-10-24 VITALS
BODY MASS INDEX: 26.05 KG/M2 | OXYGEN SATURATION: 97 % | SYSTOLIC BLOOD PRESSURE: 135 MMHG | DIASTOLIC BLOOD PRESSURE: 96 MMHG | WEIGHT: 182 LBS | RESPIRATION RATE: 16 BRPM | TEMPERATURE: 98.1 F | HEART RATE: 88 BPM | HEIGHT: 70 IN

## 2021-10-24 DIAGNOSIS — R78.81 STREPTOCOCCAL BACTEREMIA: Primary | ICD-10-CM

## 2021-10-24 DIAGNOSIS — B95.5 STREPTOCOCCAL BACTEREMIA: Primary | ICD-10-CM

## 2021-10-24 PROCEDURE — 96374 THER/PROPH/DIAG INJ IV PUSH: CPT

## 2021-10-24 PROCEDURE — 2580000003 HC RX 258: Performed by: REGISTERED NURSE

## 2021-10-24 PROCEDURE — 6360000002 HC RX W HCPCS: Performed by: REGISTERED NURSE

## 2021-10-24 RX ORDER — SODIUM CHLORIDE 0.9 % (FLUSH) 0.9 %
5-10 SYRINGE (ML) INJECTION PRN
Status: DISCONTINUED | OUTPATIENT
Start: 2021-10-24 | End: 2021-10-25 | Stop reason: HOSPADM

## 2021-10-24 RX ORDER — DIPHENHYDRAMINE HCL 25 MG
50 TABLET ORAL ONCE
Status: CANCELLED
Start: 2021-10-25 | End: 2021-10-25

## 2021-10-24 RX ORDER — HEPARIN SODIUM (PORCINE) LOCK FLUSH IV SOLN 100 UNIT/ML 100 UNIT/ML
300 SOLUTION INTRAVENOUS PRN
Status: DISCONTINUED | OUTPATIENT
Start: 2021-10-24 | End: 2021-10-25 | Stop reason: HOSPADM

## 2021-10-24 RX ORDER — DIPHENHYDRAMINE HYDROCHLORIDE 50 MG/ML
50 INJECTION INTRAMUSCULAR; INTRAVENOUS ONCE
Status: CANCELLED | OUTPATIENT
Start: 2021-10-25 | End: 2021-10-25

## 2021-10-24 RX ORDER — SODIUM CHLORIDE 0.9 % (FLUSH) 0.9 %
5-10 SYRINGE (ML) INJECTION PRN
Status: CANCELLED | OUTPATIENT
Start: 2021-10-25

## 2021-10-24 RX ORDER — EPINEPHRINE 1 MG/ML
0.3 INJECTION, SOLUTION, CONCENTRATE INTRAVENOUS PRN
Status: CANCELLED | OUTPATIENT
Start: 2021-10-25

## 2021-10-24 RX ORDER — HEPARIN SODIUM (PORCINE) LOCK FLUSH IV SOLN 100 UNIT/ML 100 UNIT/ML
300 SOLUTION INTRAVENOUS PRN
Status: CANCELLED | OUTPATIENT
Start: 2021-10-25

## 2021-10-24 RX ADMIN — WATER 2000 MG: 1 INJECTION INTRAMUSCULAR; INTRAVENOUS; SUBCUTANEOUS at 09:45

## 2021-10-24 RX ADMIN — SODIUM CHLORIDE, PRESERVATIVE FREE 10 ML: 5 INJECTION INTRAVENOUS at 09:50

## 2021-10-24 RX ADMIN — HEPARIN SODIUM (PORCINE) LOCK FLUSH IV SOLN 100 UNIT/ML 300 UNITS: 100 SOLUTION at 09:50

## 2021-10-24 RX ADMIN — SODIUM CHLORIDE, PRESERVATIVE FREE 10 ML: 5 INJECTION INTRAVENOUS at 09:43

## 2021-10-24 ASSESSMENT — PAIN SCALES - GENERAL: PAINLEVEL_OUTOF10: 0

## 2021-10-25 ENCOUNTER — HOSPITAL ENCOUNTER (OUTPATIENT)
Dept: INFUSION THERAPY | Age: 71
Setting detail: INFUSION SERIES
Discharge: HOME | End: 2021-10-25
Payer: MEDICARE

## 2021-10-25 VITALS
HEART RATE: 100 BPM | OXYGEN SATURATION: 95 % | SYSTOLIC BLOOD PRESSURE: 144 MMHG | DIASTOLIC BLOOD PRESSURE: 69 MMHG | TEMPERATURE: 98 F | WEIGHT: 182 LBS | RESPIRATION RATE: 18 BRPM | HEIGHT: 70 IN | BODY MASS INDEX: 26.05 KG/M2

## 2021-10-25 DIAGNOSIS — B95.5 STREPTOCOCCAL BACTEREMIA: Primary | ICD-10-CM

## 2021-10-25 DIAGNOSIS — R78.81 STREPTOCOCCAL BACTEREMIA: Primary | ICD-10-CM

## 2021-10-25 LAB
ALBUMIN SERPL-MCNC: 3.2 G/DL (ref 3.5–5.2)
ALP BLD-CCNC: 95 U/L (ref 40–129)
ALT SERPL-CCNC: 47 U/L (ref 0–40)
ANION GAP SERPL CALCULATED.3IONS-SCNC: 9 MMOL/L (ref 7–16)
ANISOCYTOSIS: ABNORMAL
AST SERPL-CCNC: 43 U/L (ref 0–39)
BASOPHILS ABSOLUTE: 0.06 E9/L (ref 0–0.2)
BASOPHILS RELATIVE PERCENT: 1.3 % (ref 0–2)
BILIRUB SERPL-MCNC: 1.7 MG/DL (ref 0–1.2)
BUN BLDV-MCNC: 9 MG/DL (ref 6–23)
BURR CELLS: ABNORMAL
C-REACTIVE PROTEIN: 1.2 MG/DL (ref 0–0.4)
CALCIUM SERPL-MCNC: 8.6 MG/DL (ref 8.6–10.2)
CHLORIDE BLD-SCNC: 103 MMOL/L (ref 98–107)
CO2: 25 MMOL/L (ref 22–29)
CREAT SERPL-MCNC: 0.6 MG/DL (ref 0.7–1.2)
EOSINOPHILS ABSOLUTE: 0.12 E9/L (ref 0.05–0.5)
EOSINOPHILS RELATIVE PERCENT: 2.5 % (ref 0–6)
GFR AFRICAN AMERICAN: >60
GFR NON-AFRICAN AMERICAN: >60 ML/MIN/1.73
GLUCOSE BLD-MCNC: 190 MG/DL (ref 74–99)
HCT VFR BLD CALC: 36.7 % (ref 37–54)
HEMOGLOBIN: 12.6 G/DL (ref 12.5–16.5)
IMMATURE GRANULOCYTES #: 0.03 E9/L
IMMATURE GRANULOCYTES %: 0.6 % (ref 0–5)
LYMPHOCYTES ABSOLUTE: 0.68 E9/L (ref 1.5–4)
LYMPHOCYTES RELATIVE PERCENT: 14.3 % (ref 20–42)
MCH RBC QN AUTO: 33.9 PG (ref 26–35)
MCHC RBC AUTO-ENTMCNC: 34.3 % (ref 32–34.5)
MCV RBC AUTO: 98.7 FL (ref 80–99.9)
MONOCYTES ABSOLUTE: 0.43 E9/L (ref 0.1–0.95)
MONOCYTES RELATIVE PERCENT: 9 % (ref 2–12)
NEUTROPHILS ABSOLUTE: 3.44 E9/L (ref 1.8–7.3)
NEUTROPHILS RELATIVE PERCENT: 72.3 % (ref 43–80)
OVALOCYTES: ABNORMAL
PDW BLD-RTO: 14.3 FL (ref 11.5–15)
PLATELET # BLD: 98 E9/L (ref 130–450)
PLATELET CONFIRMATION: NORMAL
PMV BLD AUTO: 10.2 FL (ref 7–12)
POIKILOCYTES: ABNORMAL
POTASSIUM SERPL-SCNC: 4.1 MMOL/L (ref 3.5–5)
RBC # BLD: 3.72 E12/L (ref 3.8–5.8)
SEDIMENTATION RATE, ERYTHROCYTE: 5 MM/HR (ref 0–15)
SODIUM BLD-SCNC: 137 MMOL/L (ref 132–146)
TOTAL PROTEIN: 6.2 G/DL (ref 6.4–8.3)
WBC # BLD: 4.8 E9/L (ref 4.5–11.5)

## 2021-10-25 PROCEDURE — 85025 COMPLETE CBC W/AUTO DIFF WBC: CPT

## 2021-10-25 PROCEDURE — 2580000003 HC RX 258: Performed by: REGISTERED NURSE

## 2021-10-25 PROCEDURE — 80053 COMPREHEN METABOLIC PANEL: CPT

## 2021-10-25 PROCEDURE — 6360000002 HC RX W HCPCS: Performed by: REGISTERED NURSE

## 2021-10-25 PROCEDURE — 85651 RBC SED RATE NONAUTOMATED: CPT

## 2021-10-25 PROCEDURE — 86140 C-REACTIVE PROTEIN: CPT

## 2021-10-25 PROCEDURE — 96374 THER/PROPH/DIAG INJ IV PUSH: CPT

## 2021-10-25 PROCEDURE — 36415 COLL VENOUS BLD VENIPUNCTURE: CPT

## 2021-10-25 RX ORDER — EPINEPHRINE 1 MG/ML
0.3 INJECTION, SOLUTION, CONCENTRATE INTRAVENOUS PRN
Status: CANCELLED | OUTPATIENT
Start: 2021-10-26

## 2021-10-25 RX ORDER — SODIUM CHLORIDE 0.9 % (FLUSH) 0.9 %
5-10 SYRINGE (ML) INJECTION PRN
Status: DISCONTINUED | OUTPATIENT
Start: 2021-10-25 | End: 2021-10-26 | Stop reason: HOSPADM

## 2021-10-25 RX ORDER — SODIUM CHLORIDE 0.9 % (FLUSH) 0.9 %
5-10 SYRINGE (ML) INJECTION PRN
Status: CANCELLED | OUTPATIENT
Start: 2021-10-26

## 2021-10-25 RX ORDER — HEPARIN SODIUM (PORCINE) LOCK FLUSH IV SOLN 100 UNIT/ML 100 UNIT/ML
300 SOLUTION INTRAVENOUS PRN
Status: DISCONTINUED | OUTPATIENT
Start: 2021-10-25 | End: 2021-10-26 | Stop reason: HOSPADM

## 2021-10-25 RX ORDER — HEPARIN SODIUM (PORCINE) LOCK FLUSH IV SOLN 100 UNIT/ML 100 UNIT/ML
300 SOLUTION INTRAVENOUS PRN
Status: CANCELLED | OUTPATIENT
Start: 2021-10-26

## 2021-10-25 RX ORDER — DIPHENHYDRAMINE HYDROCHLORIDE 50 MG/ML
50 INJECTION INTRAMUSCULAR; INTRAVENOUS ONCE
Status: CANCELLED | OUTPATIENT
Start: 2021-10-26 | End: 2021-10-26

## 2021-10-25 RX ORDER — DIPHENHYDRAMINE HCL 25 MG
50 TABLET ORAL ONCE
Status: CANCELLED
Start: 2021-10-26 | End: 2021-10-26

## 2021-10-25 RX ADMIN — SODIUM CHLORIDE, PRESERVATIVE FREE 10 ML: 5 INJECTION INTRAVENOUS at 09:29

## 2021-10-25 RX ADMIN — HEPARIN SODIUM (PORCINE) LOCK FLUSH IV SOLN 100 UNIT/ML 300 UNITS: 100 SOLUTION at 09:30

## 2021-10-25 RX ADMIN — WATER 2000 MG: 1 INJECTION INTRAMUSCULAR; INTRAVENOUS; SUBCUTANEOUS at 09:24

## 2021-10-25 RX ADMIN — SODIUM CHLORIDE, PRESERVATIVE FREE 10 ML: 5 INJECTION INTRAVENOUS at 09:12

## 2021-10-25 RX ADMIN — SODIUM CHLORIDE, PRESERVATIVE FREE 10 ML: 5 INJECTION INTRAVENOUS at 09:14

## 2021-10-26 ENCOUNTER — HOSPITAL ENCOUNTER (OUTPATIENT)
Dept: INFUSION THERAPY | Age: 71
Setting detail: INFUSION SERIES
Discharge: HOME | End: 2021-10-26
Payer: MEDICARE

## 2021-10-26 VITALS
TEMPERATURE: 98.4 F | SYSTOLIC BLOOD PRESSURE: 146 MMHG | HEART RATE: 92 BPM | WEIGHT: 182 LBS | DIASTOLIC BLOOD PRESSURE: 84 MMHG | HEIGHT: 70 IN | RESPIRATION RATE: 16 BRPM | OXYGEN SATURATION: 97 % | BODY MASS INDEX: 26.05 KG/M2

## 2021-10-26 DIAGNOSIS — R78.81 STREPTOCOCCAL BACTEREMIA: Primary | ICD-10-CM

## 2021-10-26 DIAGNOSIS — B95.5 STREPTOCOCCAL BACTEREMIA: Primary | ICD-10-CM

## 2021-10-26 PROCEDURE — 96374 THER/PROPH/DIAG INJ IV PUSH: CPT

## 2021-10-26 PROCEDURE — 2580000003 HC RX 258: Performed by: REGISTERED NURSE

## 2021-10-26 PROCEDURE — 6360000002 HC RX W HCPCS: Performed by: REGISTERED NURSE

## 2021-10-26 RX ORDER — SODIUM CHLORIDE 0.9 % (FLUSH) 0.9 %
5-10 SYRINGE (ML) INJECTION PRN
Status: DISCONTINUED | OUTPATIENT
Start: 2021-10-26 | End: 2021-10-27 | Stop reason: HOSPADM

## 2021-10-26 RX ORDER — HEPARIN SODIUM (PORCINE) LOCK FLUSH IV SOLN 100 UNIT/ML 100 UNIT/ML
300 SOLUTION INTRAVENOUS PRN
Status: CANCELLED | OUTPATIENT
Start: 2021-10-27

## 2021-10-26 RX ORDER — DIPHENHYDRAMINE HCL 25 MG
50 TABLET ORAL ONCE
Status: CANCELLED
Start: 2021-10-27 | End: 2021-10-27

## 2021-10-26 RX ORDER — HEPARIN SODIUM (PORCINE) LOCK FLUSH IV SOLN 100 UNIT/ML 100 UNIT/ML
300 SOLUTION INTRAVENOUS PRN
Status: DISCONTINUED | OUTPATIENT
Start: 2021-10-26 | End: 2021-10-27 | Stop reason: HOSPADM

## 2021-10-26 RX ORDER — EPINEPHRINE 1 MG/ML
0.3 INJECTION, SOLUTION, CONCENTRATE INTRAVENOUS PRN
Status: CANCELLED | OUTPATIENT
Start: 2021-10-27

## 2021-10-26 RX ORDER — DIPHENHYDRAMINE HYDROCHLORIDE 50 MG/ML
50 INJECTION INTRAMUSCULAR; INTRAVENOUS ONCE
Status: CANCELLED | OUTPATIENT
Start: 2021-10-27 | End: 2021-10-27

## 2021-10-26 RX ORDER — SODIUM CHLORIDE 0.9 % (FLUSH) 0.9 %
5-10 SYRINGE (ML) INJECTION PRN
Status: CANCELLED | OUTPATIENT
Start: 2021-10-27

## 2021-10-26 RX ADMIN — SODIUM CHLORIDE, PRESERVATIVE FREE 10 ML: 5 INJECTION INTRAVENOUS at 09:24

## 2021-10-26 RX ADMIN — SODIUM CHLORIDE, PRESERVATIVE FREE 10 ML: 5 INJECTION INTRAVENOUS at 10:32

## 2021-10-26 RX ADMIN — Medication 300 UNITS: at 10:33

## 2021-10-26 RX ADMIN — WATER 2000 MG: 1 INJECTION INTRAMUSCULAR; INTRAVENOUS; SUBCUTANEOUS at 09:27

## 2021-10-27 ENCOUNTER — HOSPITAL ENCOUNTER (OUTPATIENT)
Dept: INFUSION THERAPY | Age: 71
Setting detail: INFUSION SERIES
Discharge: HOME | End: 2021-10-27
Payer: MEDICARE

## 2021-10-27 VITALS
OXYGEN SATURATION: 99 % | DIASTOLIC BLOOD PRESSURE: 81 MMHG | RESPIRATION RATE: 16 BRPM | SYSTOLIC BLOOD PRESSURE: 143 MMHG | HEART RATE: 93 BPM | TEMPERATURE: 98.2 F

## 2021-10-27 DIAGNOSIS — R78.81 STREPTOCOCCAL BACTEREMIA: Primary | ICD-10-CM

## 2021-10-27 DIAGNOSIS — B95.5 STREPTOCOCCAL BACTEREMIA: Primary | ICD-10-CM

## 2021-10-27 PROCEDURE — 96374 THER/PROPH/DIAG INJ IV PUSH: CPT

## 2021-10-27 PROCEDURE — 6360000002 HC RX W HCPCS: Performed by: REGISTERED NURSE

## 2021-10-27 PROCEDURE — 2580000003 HC RX 258: Performed by: REGISTERED NURSE

## 2021-10-27 RX ORDER — HEPARIN SODIUM (PORCINE) LOCK FLUSH IV SOLN 100 UNIT/ML 100 UNIT/ML
300 SOLUTION INTRAVENOUS PRN
Status: DISCONTINUED | OUTPATIENT
Start: 2021-10-27 | End: 2021-10-28 | Stop reason: HOSPADM

## 2021-10-27 RX ORDER — SODIUM CHLORIDE 0.9 % (FLUSH) 0.9 %
5-10 SYRINGE (ML) INJECTION PRN
Status: DISCONTINUED | OUTPATIENT
Start: 2021-10-27 | End: 2021-10-28 | Stop reason: HOSPADM

## 2021-10-27 RX ORDER — SODIUM CHLORIDE 0.9 % (FLUSH) 0.9 %
5-10 SYRINGE (ML) INJECTION PRN
Status: CANCELLED | OUTPATIENT
Start: 2021-10-28

## 2021-10-27 RX ORDER — HEPARIN SODIUM (PORCINE) LOCK FLUSH IV SOLN 100 UNIT/ML 100 UNIT/ML
300 SOLUTION INTRAVENOUS PRN
Status: CANCELLED | OUTPATIENT
Start: 2021-10-28

## 2021-10-27 RX ORDER — DIPHENHYDRAMINE HYDROCHLORIDE 50 MG/ML
50 INJECTION INTRAMUSCULAR; INTRAVENOUS ONCE
Status: CANCELLED | OUTPATIENT
Start: 2021-10-28 | End: 2021-10-28

## 2021-10-27 RX ORDER — EPINEPHRINE 1 MG/ML
0.3 INJECTION, SOLUTION, CONCENTRATE INTRAVENOUS PRN
Status: CANCELLED | OUTPATIENT
Start: 2021-10-28

## 2021-10-27 RX ORDER — DIPHENHYDRAMINE HCL 25 MG
50 TABLET ORAL ONCE
Status: CANCELLED
Start: 2021-10-28 | End: 2021-10-28

## 2021-10-27 RX ADMIN — SODIUM CHLORIDE, PRESERVATIVE FREE 10 ML: 5 INJECTION INTRAVENOUS at 09:32

## 2021-10-27 RX ADMIN — WATER 2000 MG: 1 INJECTION INTRAMUSCULAR; INTRAVENOUS; SUBCUTANEOUS at 09:27

## 2021-10-27 RX ADMIN — SODIUM CHLORIDE, PRESERVATIVE FREE 10 ML: 5 INJECTION INTRAVENOUS at 09:26

## 2021-10-27 RX ADMIN — HEPARIN SODIUM (PORCINE) LOCK FLUSH IV SOLN 100 UNIT/ML 300 UNITS: 100 SOLUTION at 09:33

## 2021-10-28 ENCOUNTER — HOSPITAL ENCOUNTER (OUTPATIENT)
Dept: INFUSION THERAPY | Age: 71
Setting detail: INFUSION SERIES
Discharge: HOME | End: 2021-10-28
Payer: MEDICARE

## 2021-10-28 VITALS
TEMPERATURE: 98.3 F | RESPIRATION RATE: 16 BRPM | DIASTOLIC BLOOD PRESSURE: 79 MMHG | HEART RATE: 87 BPM | OXYGEN SATURATION: 99 % | SYSTOLIC BLOOD PRESSURE: 135 MMHG

## 2021-10-28 DIAGNOSIS — B95.5 STREPTOCOCCAL BACTEREMIA: Primary | ICD-10-CM

## 2021-10-28 DIAGNOSIS — R78.81 STREPTOCOCCAL BACTEREMIA: Primary | ICD-10-CM

## 2021-10-28 LAB
ALBUMIN SERPL-MCNC: 3.2 G/DL (ref 3.5–5.2)
ALP BLD-CCNC: 96 U/L (ref 40–129)
ALT SERPL-CCNC: 47 U/L (ref 0–40)
ANION GAP SERPL CALCULATED.3IONS-SCNC: 10 MMOL/L (ref 7–16)
ANISOCYTOSIS: ABNORMAL
AST SERPL-CCNC: 48 U/L (ref 0–39)
BASOPHILS ABSOLUTE: 0.04 E9/L (ref 0–0.2)
BASOPHILS RELATIVE PERCENT: 1 % (ref 0–2)
BILIRUB SERPL-MCNC: 1.4 MG/DL (ref 0–1.2)
BUN BLDV-MCNC: 9 MG/DL (ref 6–23)
CALCIUM SERPL-MCNC: 8.6 MG/DL (ref 8.6–10.2)
CHLORIDE BLD-SCNC: 103 MMOL/L (ref 98–107)
CO2: 23 MMOL/L (ref 22–29)
CREAT SERPL-MCNC: 0.6 MG/DL (ref 0.7–1.2)
EOSINOPHILS ABSOLUTE: 0.11 E9/L (ref 0.05–0.5)
EOSINOPHILS RELATIVE PERCENT: 2.7 % (ref 0–6)
GFR AFRICAN AMERICAN: >60
GFR NON-AFRICAN AMERICAN: >60 ML/MIN/1.73
GLUCOSE BLD-MCNC: 192 MG/DL (ref 74–99)
HCT VFR BLD CALC: 37.4 % (ref 37–54)
HEMOGLOBIN: 13 G/DL (ref 12.5–16.5)
IMMATURE GRANULOCYTES #: 0.01 E9/L
IMMATURE GRANULOCYTES %: 0.2 % (ref 0–5)
LYMPHOCYTES ABSOLUTE: 0.67 E9/L (ref 1.5–4)
LYMPHOCYTES RELATIVE PERCENT: 16.5 % (ref 20–42)
MCH RBC QN AUTO: 34.1 PG (ref 26–35)
MCHC RBC AUTO-ENTMCNC: 34.8 % (ref 32–34.5)
MCV RBC AUTO: 98.2 FL (ref 80–99.9)
MONOCYTES ABSOLUTE: 0.45 E9/L (ref 0.1–0.95)
MONOCYTES RELATIVE PERCENT: 11.1 % (ref 2–12)
NEUTROPHILS ABSOLUTE: 2.78 E9/L (ref 1.8–7.3)
NEUTROPHILS RELATIVE PERCENT: 68.5 % (ref 43–80)
OVALOCYTES: ABNORMAL
PDW BLD-RTO: 14.6 FL (ref 11.5–15)
PLATELET # BLD: 93 E9/L (ref 130–450)
PLATELET CONFIRMATION: NORMAL
PMV BLD AUTO: 10.1 FL (ref 7–12)
POIKILOCYTES: ABNORMAL
POTASSIUM SERPL-SCNC: 4 MMOL/L (ref 3.5–5)
RBC # BLD: 3.81 E12/L (ref 3.8–5.8)
SODIUM BLD-SCNC: 136 MMOL/L (ref 132–146)
TOTAL PROTEIN: 6.4 G/DL (ref 6.4–8.3)
WBC # BLD: 4.1 E9/L (ref 4.5–11.5)

## 2021-10-28 PROCEDURE — 96374 THER/PROPH/DIAG INJ IV PUSH: CPT

## 2021-10-28 PROCEDURE — 6360000002 HC RX W HCPCS: Performed by: REGISTERED NURSE

## 2021-10-28 PROCEDURE — 80053 COMPREHEN METABOLIC PANEL: CPT

## 2021-10-28 PROCEDURE — 36415 COLL VENOUS BLD VENIPUNCTURE: CPT

## 2021-10-28 PROCEDURE — 85025 COMPLETE CBC W/AUTO DIFF WBC: CPT

## 2021-10-28 PROCEDURE — 2580000003 HC RX 258: Performed by: REGISTERED NURSE

## 2021-10-28 RX ORDER — DIPHENHYDRAMINE HCL 25 MG
50 TABLET ORAL ONCE
Status: CANCELLED
Start: 2021-10-29 | End: 2021-10-29

## 2021-10-28 RX ORDER — DIPHENHYDRAMINE HYDROCHLORIDE 50 MG/ML
50 INJECTION INTRAMUSCULAR; INTRAVENOUS ONCE
Status: CANCELLED | OUTPATIENT
Start: 2021-10-29 | End: 2021-10-29

## 2021-10-28 RX ORDER — SODIUM CHLORIDE 0.9 % (FLUSH) 0.9 %
5-10 SYRINGE (ML) INJECTION PRN
Status: CANCELLED | OUTPATIENT
Start: 2021-10-29

## 2021-10-28 RX ORDER — HEPARIN SODIUM (PORCINE) LOCK FLUSH IV SOLN 100 UNIT/ML 100 UNIT/ML
300 SOLUTION INTRAVENOUS PRN
Status: CANCELLED | OUTPATIENT
Start: 2021-10-29

## 2021-10-28 RX ORDER — HEPARIN SODIUM (PORCINE) LOCK FLUSH IV SOLN 100 UNIT/ML 100 UNIT/ML
300 SOLUTION INTRAVENOUS PRN
Status: DISCONTINUED | OUTPATIENT
Start: 2021-10-28 | End: 2021-10-29 | Stop reason: HOSPADM

## 2021-10-28 RX ORDER — EPINEPHRINE 1 MG/ML
0.3 INJECTION, SOLUTION, CONCENTRATE INTRAVENOUS PRN
Status: CANCELLED | OUTPATIENT
Start: 2021-10-29

## 2021-10-28 RX ORDER — SODIUM CHLORIDE 0.9 % (FLUSH) 0.9 %
5-10 SYRINGE (ML) INJECTION PRN
Status: DISCONTINUED | OUTPATIENT
Start: 2021-10-28 | End: 2021-10-29 | Stop reason: HOSPADM

## 2021-10-28 RX ADMIN — SODIUM CHLORIDE, PRESERVATIVE FREE 10 ML: 5 INJECTION INTRAVENOUS at 09:37

## 2021-10-28 RX ADMIN — SODIUM CHLORIDE, PRESERVATIVE FREE 10 ML: 5 INJECTION INTRAVENOUS at 09:29

## 2021-10-28 RX ADMIN — SODIUM CHLORIDE, PRESERVATIVE FREE 10 ML: 5 INJECTION INTRAVENOUS at 09:27

## 2021-10-28 RX ADMIN — WATER 2000 MG: 1 INJECTION INTRAMUSCULAR; INTRAVENOUS; SUBCUTANEOUS at 09:31

## 2021-10-28 RX ADMIN — Medication 300 UNITS: at 09:37

## 2021-10-29 ENCOUNTER — HOSPITAL ENCOUNTER (OUTPATIENT)
Dept: INFUSION THERAPY | Age: 71
Setting detail: INFUSION SERIES
Discharge: HOME | End: 2021-10-29
Payer: MEDICARE

## 2021-10-29 VITALS
DIASTOLIC BLOOD PRESSURE: 83 MMHG | WEIGHT: 182 LBS | SYSTOLIC BLOOD PRESSURE: 161 MMHG | TEMPERATURE: 98.1 F | HEART RATE: 98 BPM | HEIGHT: 70 IN | RESPIRATION RATE: 18 BRPM | BODY MASS INDEX: 26.05 KG/M2 | OXYGEN SATURATION: 94 %

## 2021-10-29 DIAGNOSIS — R78.81 STREPTOCOCCAL BACTEREMIA: Primary | ICD-10-CM

## 2021-10-29 DIAGNOSIS — B95.5 STREPTOCOCCAL BACTEREMIA: Primary | ICD-10-CM

## 2021-10-29 PROCEDURE — 2580000003 HC RX 258: Performed by: REGISTERED NURSE

## 2021-10-29 PROCEDURE — 6360000002 HC RX W HCPCS: Performed by: REGISTERED NURSE

## 2021-10-29 PROCEDURE — 96374 THER/PROPH/DIAG INJ IV PUSH: CPT

## 2021-10-29 RX ORDER — HEPARIN SODIUM (PORCINE) LOCK FLUSH IV SOLN 100 UNIT/ML 100 UNIT/ML
300 SOLUTION INTRAVENOUS PRN
Status: CANCELLED | OUTPATIENT
Start: 2021-10-30

## 2021-10-29 RX ORDER — HEPARIN SODIUM (PORCINE) LOCK FLUSH IV SOLN 100 UNIT/ML 100 UNIT/ML
300 SOLUTION INTRAVENOUS PRN
Status: DISCONTINUED | OUTPATIENT
Start: 2021-10-29 | End: 2021-10-30 | Stop reason: HOSPADM

## 2021-10-29 RX ORDER — DIPHENHYDRAMINE HYDROCHLORIDE 50 MG/ML
50 INJECTION INTRAMUSCULAR; INTRAVENOUS ONCE
Status: CANCELLED | OUTPATIENT
Start: 2021-10-30 | End: 2021-10-30

## 2021-10-29 RX ORDER — DIPHENHYDRAMINE HCL 25 MG
50 TABLET ORAL ONCE
Status: CANCELLED
Start: 2021-10-30 | End: 2021-10-30

## 2021-10-29 RX ORDER — SODIUM CHLORIDE 0.9 % (FLUSH) 0.9 %
5-10 SYRINGE (ML) INJECTION PRN
Status: DISCONTINUED | OUTPATIENT
Start: 2021-10-29 | End: 2021-10-30 | Stop reason: HOSPADM

## 2021-10-29 RX ORDER — EPINEPHRINE 1 MG/ML
0.3 INJECTION, SOLUTION, CONCENTRATE INTRAVENOUS PRN
Status: CANCELLED | OUTPATIENT
Start: 2021-10-30

## 2021-10-29 RX ORDER — SODIUM CHLORIDE 0.9 % (FLUSH) 0.9 %
5-10 SYRINGE (ML) INJECTION PRN
Status: CANCELLED | OUTPATIENT
Start: 2021-10-30

## 2021-10-29 RX ADMIN — HEPARIN SODIUM (PORCINE) LOCK FLUSH IV SOLN 100 UNIT/ML 300 UNITS: 100 SOLUTION at 09:30

## 2021-10-29 RX ADMIN — SODIUM CHLORIDE, PRESERVATIVE FREE 10 ML: 5 INJECTION INTRAVENOUS at 09:30

## 2021-10-29 RX ADMIN — SODIUM CHLORIDE, PRESERVATIVE FREE 10 ML: 5 INJECTION INTRAVENOUS at 09:23

## 2021-10-29 RX ADMIN — WATER 2000 MG: 1 INJECTION INTRAMUSCULAR; INTRAVENOUS; SUBCUTANEOUS at 09:24

## 2021-10-29 ASSESSMENT — PAIN SCALES - GENERAL: PAINLEVEL_OUTOF10: 0

## 2021-10-30 ENCOUNTER — HOSPITAL ENCOUNTER (OUTPATIENT)
Dept: INFUSION THERAPY | Age: 71
Setting detail: INFUSION SERIES
Discharge: HOME | End: 2021-10-30
Payer: MEDICARE

## 2021-10-30 VITALS
HEART RATE: 94 BPM | TEMPERATURE: 98 F | RESPIRATION RATE: 18 BRPM | WEIGHT: 182 LBS | DIASTOLIC BLOOD PRESSURE: 76 MMHG | BODY MASS INDEX: 26.05 KG/M2 | OXYGEN SATURATION: 96 % | SYSTOLIC BLOOD PRESSURE: 135 MMHG | HEIGHT: 70 IN

## 2021-10-30 DIAGNOSIS — B95.5 STREPTOCOCCAL BACTEREMIA: Primary | ICD-10-CM

## 2021-10-30 DIAGNOSIS — R78.81 STREPTOCOCCAL BACTEREMIA: Primary | ICD-10-CM

## 2021-10-30 PROCEDURE — 6360000002 HC RX W HCPCS: Performed by: REGISTERED NURSE

## 2021-10-30 PROCEDURE — 96374 THER/PROPH/DIAG INJ IV PUSH: CPT

## 2021-10-30 PROCEDURE — 2580000003 HC RX 258: Performed by: REGISTERED NURSE

## 2021-10-30 RX ORDER — SODIUM CHLORIDE 0.9 % (FLUSH) 0.9 %
5-10 SYRINGE (ML) INJECTION PRN
Status: CANCELLED | OUTPATIENT
Start: 2021-10-31

## 2021-10-30 RX ORDER — HEPARIN SODIUM (PORCINE) LOCK FLUSH IV SOLN 100 UNIT/ML 100 UNIT/ML
300 SOLUTION INTRAVENOUS PRN
Status: CANCELLED | OUTPATIENT
Start: 2021-10-31

## 2021-10-30 RX ORDER — HEPARIN SODIUM (PORCINE) LOCK FLUSH IV SOLN 100 UNIT/ML 100 UNIT/ML
300 SOLUTION INTRAVENOUS PRN
Status: DISCONTINUED | OUTPATIENT
Start: 2021-10-30 | End: 2021-10-31 | Stop reason: HOSPADM

## 2021-10-30 RX ORDER — DIPHENHYDRAMINE HCL 25 MG
50 TABLET ORAL ONCE
Status: CANCELLED
Start: 2021-10-31 | End: 2021-10-31

## 2021-10-30 RX ORDER — EPINEPHRINE 1 MG/ML
0.3 INJECTION, SOLUTION, CONCENTRATE INTRAVENOUS PRN
Status: CANCELLED | OUTPATIENT
Start: 2021-10-31

## 2021-10-30 RX ORDER — DIPHENHYDRAMINE HYDROCHLORIDE 50 MG/ML
50 INJECTION INTRAMUSCULAR; INTRAVENOUS ONCE
Status: CANCELLED | OUTPATIENT
Start: 2021-10-31 | End: 2021-10-31

## 2021-10-30 RX ORDER — SODIUM CHLORIDE 0.9 % (FLUSH) 0.9 %
5-10 SYRINGE (ML) INJECTION PRN
Status: DISCONTINUED | OUTPATIENT
Start: 2021-10-30 | End: 2021-10-31 | Stop reason: HOSPADM

## 2021-10-30 RX ADMIN — SODIUM CHLORIDE, PRESERVATIVE FREE 10 ML: 5 INJECTION INTRAVENOUS at 10:03

## 2021-10-30 RX ADMIN — HEPARIN SODIUM (PORCINE) LOCK FLUSH IV SOLN 100 UNIT/ML 300 UNITS: 100 SOLUTION at 10:03

## 2021-10-30 RX ADMIN — WATER 2000 MG: 1 INJECTION INTRAMUSCULAR; INTRAVENOUS; SUBCUTANEOUS at 09:57

## 2021-10-30 RX ADMIN — SODIUM CHLORIDE, PRESERVATIVE FREE 10 ML: 5 INJECTION INTRAVENOUS at 09:56

## 2021-10-31 ENCOUNTER — HOSPITAL ENCOUNTER (OUTPATIENT)
Dept: INFUSION THERAPY | Age: 71
Setting detail: INFUSION SERIES
Discharge: HOME | End: 2021-10-31
Payer: MEDICARE

## 2021-10-31 VITALS
BODY MASS INDEX: 26.05 KG/M2 | TEMPERATURE: 98.7 F | HEIGHT: 70 IN | DIASTOLIC BLOOD PRESSURE: 66 MMHG | HEART RATE: 93 BPM | RESPIRATION RATE: 16 BRPM | WEIGHT: 182 LBS | OXYGEN SATURATION: 97 % | SYSTOLIC BLOOD PRESSURE: 149 MMHG

## 2021-10-31 DIAGNOSIS — B95.5 STREPTOCOCCAL BACTEREMIA: Primary | ICD-10-CM

## 2021-10-31 DIAGNOSIS — R78.81 STREPTOCOCCAL BACTEREMIA: Primary | ICD-10-CM

## 2021-10-31 PROCEDURE — 96374 THER/PROPH/DIAG INJ IV PUSH: CPT

## 2021-10-31 PROCEDURE — 2580000003 HC RX 258: Performed by: REGISTERED NURSE

## 2021-10-31 PROCEDURE — 6360000002 HC RX W HCPCS: Performed by: REGISTERED NURSE

## 2021-10-31 RX ORDER — SODIUM CHLORIDE 0.9 % (FLUSH) 0.9 %
5-10 SYRINGE (ML) INJECTION PRN
Status: DISCONTINUED | OUTPATIENT
Start: 2021-10-31 | End: 2021-11-01 | Stop reason: HOSPADM

## 2021-10-31 RX ORDER — SODIUM CHLORIDE 0.9 % (FLUSH) 0.9 %
5-10 SYRINGE (ML) INJECTION PRN
Status: CANCELLED | OUTPATIENT
Start: 2021-11-01

## 2021-10-31 RX ORDER — HEPARIN SODIUM (PORCINE) LOCK FLUSH IV SOLN 100 UNIT/ML 100 UNIT/ML
300 SOLUTION INTRAVENOUS PRN
Status: CANCELLED | OUTPATIENT
Start: 2021-11-01

## 2021-10-31 RX ORDER — HEPARIN SODIUM (PORCINE) LOCK FLUSH IV SOLN 100 UNIT/ML 100 UNIT/ML
300 SOLUTION INTRAVENOUS PRN
Status: DISCONTINUED | OUTPATIENT
Start: 2021-10-31 | End: 2021-11-01 | Stop reason: HOSPADM

## 2021-10-31 RX ORDER — DIPHENHYDRAMINE HCL 25 MG
50 TABLET ORAL ONCE
Status: CANCELLED
Start: 2021-11-01 | End: 2021-11-01

## 2021-10-31 RX ORDER — DIPHENHYDRAMINE HYDROCHLORIDE 50 MG/ML
50 INJECTION INTRAMUSCULAR; INTRAVENOUS ONCE
Status: CANCELLED | OUTPATIENT
Start: 2021-11-01 | End: 2021-11-01

## 2021-10-31 RX ORDER — EPINEPHRINE 1 MG/ML
0.3 INJECTION, SOLUTION, CONCENTRATE INTRAVENOUS PRN
Status: CANCELLED | OUTPATIENT
Start: 2021-11-01

## 2021-10-31 RX ADMIN — HEPARIN SODIUM (PORCINE) LOCK FLUSH IV SOLN 100 UNIT/ML 300 UNITS: 100 SOLUTION at 10:00

## 2021-10-31 RX ADMIN — SODIUM CHLORIDE, PRESERVATIVE FREE 10 ML: 5 INJECTION INTRAVENOUS at 09:53

## 2021-10-31 RX ADMIN — SODIUM CHLORIDE, PRESERVATIVE FREE 10 ML: 5 INJECTION INTRAVENOUS at 09:59

## 2021-10-31 RX ADMIN — WATER 2000 MG: 1 INJECTION INTRAMUSCULAR; INTRAVENOUS; SUBCUTANEOUS at 09:54

## 2021-11-01 ENCOUNTER — HOSPITAL ENCOUNTER (OUTPATIENT)
Dept: INFUSION THERAPY | Age: 71
Setting detail: INFUSION SERIES
Discharge: HOME | End: 2021-11-01
Payer: MEDICARE

## 2021-11-01 VITALS
SYSTOLIC BLOOD PRESSURE: 138 MMHG | WEIGHT: 182 LBS | OXYGEN SATURATION: 97 % | HEART RATE: 89 BPM | DIASTOLIC BLOOD PRESSURE: 75 MMHG | RESPIRATION RATE: 16 BRPM | HEIGHT: 70 IN | TEMPERATURE: 98 F | BODY MASS INDEX: 26.05 KG/M2

## 2021-11-01 DIAGNOSIS — B95.5 STREPTOCOCCAL BACTEREMIA: Primary | ICD-10-CM

## 2021-11-01 DIAGNOSIS — R78.81 STREPTOCOCCAL BACTEREMIA: Primary | ICD-10-CM

## 2021-11-01 LAB
ALBUMIN SERPL-MCNC: 3.2 G/DL (ref 3.5–5.2)
ALP BLD-CCNC: 100 U/L (ref 40–129)
ALT SERPL-CCNC: 47 U/L (ref 0–40)
ANION GAP SERPL CALCULATED.3IONS-SCNC: 10 MMOL/L (ref 7–16)
ANISOCYTOSIS: ABNORMAL
AST SERPL-CCNC: 43 U/L (ref 0–39)
BASOPHILS ABSOLUTE: 0.04 E9/L (ref 0–0.2)
BASOPHILS RELATIVE PERCENT: 0.9 % (ref 0–2)
BILIRUB SERPL-MCNC: 1.6 MG/DL (ref 0–1.2)
BUN BLDV-MCNC: 10 MG/DL (ref 6–23)
C-REACTIVE PROTEIN: 0.7 MG/DL (ref 0–0.4)
CALCIUM SERPL-MCNC: 8.8 MG/DL (ref 8.6–10.2)
CHLORIDE BLD-SCNC: 104 MMOL/L (ref 98–107)
CO2: 23 MMOL/L (ref 22–29)
CREAT SERPL-MCNC: 0.6 MG/DL (ref 0.7–1.2)
EOSINOPHILS ABSOLUTE: 0.13 E9/L (ref 0.05–0.5)
EOSINOPHILS RELATIVE PERCENT: 2.8 % (ref 0–6)
GFR AFRICAN AMERICAN: >60
GFR NON-AFRICAN AMERICAN: >60 ML/MIN/1.73
GLUCOSE BLD-MCNC: 168 MG/DL (ref 74–99)
HCT VFR BLD CALC: 37.7 % (ref 37–54)
HEMOGLOBIN: 13.3 G/DL (ref 12.5–16.5)
IMMATURE GRANULOCYTES #: 0.01 E9/L
IMMATURE GRANULOCYTES %: 0.2 % (ref 0–5)
LYMPHOCYTES ABSOLUTE: 0.66 E9/L (ref 1.5–4)
LYMPHOCYTES RELATIVE PERCENT: 14.2 % (ref 20–42)
MCH RBC QN AUTO: 34.2 PG (ref 26–35)
MCHC RBC AUTO-ENTMCNC: 35.3 % (ref 32–34.5)
MCV RBC AUTO: 96.9 FL (ref 80–99.9)
MONOCYTES ABSOLUTE: 0.55 E9/L (ref 0.1–0.95)
MONOCYTES RELATIVE PERCENT: 11.8 % (ref 2–12)
NEUTROPHILS ABSOLUTE: 3.26 E9/L (ref 1.8–7.3)
NEUTROPHILS RELATIVE PERCENT: 70.1 % (ref 43–80)
OVALOCYTES: ABNORMAL
PDW BLD-RTO: 14.7 FL (ref 11.5–15)
PLATELET # BLD: 91 E9/L (ref 130–450)
PLATELET CONFIRMATION: NORMAL
PMV BLD AUTO: 10 FL (ref 7–12)
POIKILOCYTES: ABNORMAL
POTASSIUM SERPL-SCNC: 4 MMOL/L (ref 3.5–5)
RBC # BLD: 3.89 E12/L (ref 3.8–5.8)
SEDIMENTATION RATE, ERYTHROCYTE: 0 MM/HR (ref 0–15)
SODIUM BLD-SCNC: 137 MMOL/L (ref 132–146)
TOTAL PROTEIN: 6.5 G/DL (ref 6.4–8.3)
WBC # BLD: 4.7 E9/L (ref 4.5–11.5)

## 2021-11-01 PROCEDURE — 85651 RBC SED RATE NONAUTOMATED: CPT

## 2021-11-01 PROCEDURE — 2580000003 HC RX 258: Performed by: REGISTERED NURSE

## 2021-11-01 PROCEDURE — 6360000002 HC RX W HCPCS: Performed by: REGISTERED NURSE

## 2021-11-01 PROCEDURE — 96374 THER/PROPH/DIAG INJ IV PUSH: CPT

## 2021-11-01 PROCEDURE — 80053 COMPREHEN METABOLIC PANEL: CPT

## 2021-11-01 PROCEDURE — 85025 COMPLETE CBC W/AUTO DIFF WBC: CPT

## 2021-11-01 PROCEDURE — 86140 C-REACTIVE PROTEIN: CPT

## 2021-11-01 PROCEDURE — 36415 COLL VENOUS BLD VENIPUNCTURE: CPT

## 2021-11-01 RX ORDER — EPINEPHRINE 1 MG/ML
0.3 INJECTION, SOLUTION, CONCENTRATE INTRAVENOUS PRN
Status: CANCELLED | OUTPATIENT
Start: 2021-11-02

## 2021-11-01 RX ORDER — HEPARIN SODIUM (PORCINE) LOCK FLUSH IV SOLN 100 UNIT/ML 100 UNIT/ML
300 SOLUTION INTRAVENOUS PRN
Status: DISCONTINUED | OUTPATIENT
Start: 2021-11-01 | End: 2021-11-02 | Stop reason: HOSPADM

## 2021-11-01 RX ORDER — DIPHENHYDRAMINE HYDROCHLORIDE 50 MG/ML
50 INJECTION INTRAMUSCULAR; INTRAVENOUS ONCE
Status: CANCELLED | OUTPATIENT
Start: 2021-11-02 | End: 2021-11-02

## 2021-11-01 RX ORDER — SODIUM CHLORIDE 0.9 % (FLUSH) 0.9 %
5-10 SYRINGE (ML) INJECTION PRN
Status: DISCONTINUED | OUTPATIENT
Start: 2021-11-01 | End: 2021-11-02 | Stop reason: HOSPADM

## 2021-11-01 RX ORDER — SODIUM CHLORIDE 0.9 % (FLUSH) 0.9 %
5-10 SYRINGE (ML) INJECTION PRN
Status: CANCELLED | OUTPATIENT
Start: 2021-11-02

## 2021-11-01 RX ORDER — HEPARIN SODIUM (PORCINE) LOCK FLUSH IV SOLN 100 UNIT/ML 100 UNIT/ML
300 SOLUTION INTRAVENOUS PRN
Status: CANCELLED | OUTPATIENT
Start: 2021-11-02

## 2021-11-01 RX ORDER — DIPHENHYDRAMINE HCL 25 MG
50 TABLET ORAL ONCE
Status: CANCELLED
Start: 2021-11-02 | End: 2021-11-02

## 2021-11-01 RX ADMIN — SODIUM CHLORIDE, PRESERVATIVE FREE 10 ML: 5 INJECTION INTRAVENOUS at 10:20

## 2021-11-01 RX ADMIN — WATER 2000 MG: 1 INJECTION INTRAMUSCULAR; INTRAVENOUS; SUBCUTANEOUS at 10:15

## 2021-11-01 RX ADMIN — HEPARIN SODIUM (PORCINE) LOCK FLUSH IV SOLN 100 UNIT/ML 300 UNITS: 100 SOLUTION at 10:21

## 2021-11-01 RX ADMIN — SODIUM CHLORIDE, PRESERVATIVE FREE 10 ML: 5 INJECTION INTRAVENOUS at 10:05

## 2021-11-01 RX ADMIN — SODIUM CHLORIDE, PRESERVATIVE FREE 10 ML: 5 INJECTION INTRAVENOUS at 10:07

## 2021-11-02 ENCOUNTER — HOSPITAL ENCOUNTER (OUTPATIENT)
Dept: INFUSION THERAPY | Age: 71
Setting detail: INFUSION SERIES
Discharge: HOME | End: 2021-11-02
Payer: MEDICARE

## 2021-11-02 VITALS
DIASTOLIC BLOOD PRESSURE: 79 MMHG | HEART RATE: 92 BPM | OXYGEN SATURATION: 99 % | HEIGHT: 70 IN | RESPIRATION RATE: 16 BRPM | TEMPERATURE: 97.8 F | BODY MASS INDEX: 26.05 KG/M2 | WEIGHT: 182 LBS | SYSTOLIC BLOOD PRESSURE: 142 MMHG

## 2021-11-02 DIAGNOSIS — B95.5 STREPTOCOCCAL BACTEREMIA: Primary | ICD-10-CM

## 2021-11-02 DIAGNOSIS — R78.81 STREPTOCOCCAL BACTEREMIA: Primary | ICD-10-CM

## 2021-11-02 PROCEDURE — 96374 THER/PROPH/DIAG INJ IV PUSH: CPT

## 2021-11-02 PROCEDURE — 96375 TX/PRO/DX INJ NEW DRUG ADDON: CPT

## 2021-11-02 PROCEDURE — 2580000003 HC RX 258: Performed by: REGISTERED NURSE

## 2021-11-02 PROCEDURE — 6360000002 HC RX W HCPCS: Performed by: REGISTERED NURSE

## 2021-11-02 RX ORDER — SODIUM CHLORIDE 0.9 % (FLUSH) 0.9 %
5-10 SYRINGE (ML) INJECTION PRN
Status: DISCONTINUED | OUTPATIENT
Start: 2021-11-02 | End: 2021-11-03 | Stop reason: HOSPADM

## 2021-11-02 RX ORDER — DIPHENHYDRAMINE HYDROCHLORIDE 50 MG/ML
50 INJECTION INTRAMUSCULAR; INTRAVENOUS ONCE
Status: CANCELLED | OUTPATIENT
Start: 2021-11-03 | End: 2021-11-03

## 2021-11-02 RX ORDER — HEPARIN SODIUM (PORCINE) LOCK FLUSH IV SOLN 100 UNIT/ML 100 UNIT/ML
300 SOLUTION INTRAVENOUS PRN
Status: CANCELLED | OUTPATIENT
Start: 2021-11-03

## 2021-11-02 RX ORDER — HEPARIN SODIUM (PORCINE) LOCK FLUSH IV SOLN 100 UNIT/ML 100 UNIT/ML
300 SOLUTION INTRAVENOUS PRN
Status: DISCONTINUED | OUTPATIENT
Start: 2021-11-02 | End: 2021-11-03 | Stop reason: HOSPADM

## 2021-11-02 RX ORDER — SODIUM CHLORIDE 0.9 % (FLUSH) 0.9 %
5-10 SYRINGE (ML) INJECTION PRN
Status: CANCELLED | OUTPATIENT
Start: 2021-11-03

## 2021-11-02 RX ORDER — EPINEPHRINE 1 MG/ML
0.3 INJECTION, SOLUTION, CONCENTRATE INTRAVENOUS PRN
Status: CANCELLED | OUTPATIENT
Start: 2021-11-03

## 2021-11-02 RX ORDER — DIPHENHYDRAMINE HCL 25 MG
50 TABLET ORAL ONCE
Status: CANCELLED
Start: 2021-11-03 | End: 2021-11-03

## 2021-11-02 RX ADMIN — WATER 2000 MG: 1 INJECTION INTRAMUSCULAR; INTRAVENOUS; SUBCUTANEOUS at 09:13

## 2021-11-02 RX ADMIN — HEPARIN SODIUM (PORCINE) LOCK FLUSH IV SOLN 100 UNIT/ML 300 UNITS: 100 SOLUTION at 09:15

## 2021-11-02 RX ADMIN — SODIUM CHLORIDE, PRESERVATIVE FREE 10 ML: 5 INJECTION INTRAVENOUS at 09:18

## 2021-11-02 RX ADMIN — SODIUM CHLORIDE, PRESERVATIVE FREE 10 ML: 5 INJECTION INTRAVENOUS at 09:12

## 2021-11-03 ENCOUNTER — HOSPITAL ENCOUNTER (OUTPATIENT)
Dept: GENERAL RADIOLOGY | Age: 71
Discharge: HOME | End: 2021-11-05
Payer: MEDICARE

## 2021-11-03 ENCOUNTER — HOSPITAL ENCOUNTER (OUTPATIENT)
Age: 71
Discharge: HOME | End: 2021-11-05
Payer: MEDICARE

## 2021-11-03 ENCOUNTER — HOSPITAL ENCOUNTER (OUTPATIENT)
Dept: INFUSION THERAPY | Age: 71
Setting detail: INFUSION SERIES
Discharge: HOME | End: 2021-11-03
Payer: MEDICARE

## 2021-11-03 VITALS
TEMPERATURE: 98 F | OXYGEN SATURATION: 98 % | RESPIRATION RATE: 16 BRPM | SYSTOLIC BLOOD PRESSURE: 131 MMHG | DIASTOLIC BLOOD PRESSURE: 70 MMHG | HEART RATE: 91 BPM

## 2021-11-03 DIAGNOSIS — J90 PLEURAL EFFUSION: ICD-10-CM

## 2021-11-03 DIAGNOSIS — B95.5 STREPTOCOCCAL BACTEREMIA: Primary | ICD-10-CM

## 2021-11-03 DIAGNOSIS — R78.81 STREPTOCOCCAL BACTEREMIA: Primary | ICD-10-CM

## 2021-11-03 PROCEDURE — 71046 X-RAY EXAM CHEST 2 VIEWS: CPT

## 2021-11-03 PROCEDURE — 6360000002 HC RX W HCPCS: Performed by: REGISTERED NURSE

## 2021-11-03 PROCEDURE — 96374 THER/PROPH/DIAG INJ IV PUSH: CPT

## 2021-11-03 PROCEDURE — 2580000003 HC RX 258: Performed by: REGISTERED NURSE

## 2021-11-03 RX ORDER — DIPHENHYDRAMINE HCL 25 MG
50 TABLET ORAL ONCE
Status: CANCELLED
Start: 2021-11-04 | End: 2021-11-04

## 2021-11-03 RX ORDER — DIPHENHYDRAMINE HYDROCHLORIDE 50 MG/ML
50 INJECTION INTRAMUSCULAR; INTRAVENOUS ONCE
Status: CANCELLED | OUTPATIENT
Start: 2021-11-04 | End: 2021-11-04

## 2021-11-03 RX ORDER — HEPARIN SODIUM (PORCINE) LOCK FLUSH IV SOLN 100 UNIT/ML 100 UNIT/ML
300 SOLUTION INTRAVENOUS PRN
Status: CANCELLED | OUTPATIENT
Start: 2021-11-04

## 2021-11-03 RX ORDER — EPINEPHRINE 1 MG/ML
0.3 INJECTION, SOLUTION, CONCENTRATE INTRAVENOUS PRN
Status: CANCELLED | OUTPATIENT
Start: 2021-11-04

## 2021-11-03 RX ORDER — HEPARIN SODIUM (PORCINE) LOCK FLUSH IV SOLN 100 UNIT/ML 100 UNIT/ML
300 SOLUTION INTRAVENOUS PRN
Status: DISCONTINUED | OUTPATIENT
Start: 2021-11-03 | End: 2021-11-04 | Stop reason: HOSPADM

## 2021-11-03 RX ORDER — SODIUM CHLORIDE 0.9 % (FLUSH) 0.9 %
5-10 SYRINGE (ML) INJECTION PRN
Status: DISCONTINUED | OUTPATIENT
Start: 2021-11-03 | End: 2021-11-04 | Stop reason: HOSPADM

## 2021-11-03 RX ORDER — SODIUM CHLORIDE 0.9 % (FLUSH) 0.9 %
5-10 SYRINGE (ML) INJECTION PRN
Status: CANCELLED | OUTPATIENT
Start: 2021-11-04

## 2021-11-03 RX ADMIN — SODIUM CHLORIDE, PRESERVATIVE FREE 10 ML: 5 INJECTION INTRAVENOUS at 09:32

## 2021-11-03 RX ADMIN — SODIUM CHLORIDE, PRESERVATIVE FREE 10 ML: 5 INJECTION INTRAVENOUS at 09:42

## 2021-11-03 RX ADMIN — WATER 2000 MG: 1 INJECTION INTRAMUSCULAR; INTRAVENOUS; SUBCUTANEOUS at 09:37

## 2021-11-03 RX ADMIN — HEPARIN SODIUM (PORCINE) LOCK FLUSH IV SOLN 100 UNIT/ML 300 UNITS: 100 SOLUTION at 09:42

## 2021-11-03 ASSESSMENT — PAIN SCALES - GENERAL: PAINLEVEL_OUTOF10: 0

## 2021-11-03 NOTE — PROGRESS NOTES
Tolerated infusion well. Therapy plan reviewed with patient. Verbalizes understanding. Reviewed AVS with patient, reviewed medication information, verbalizes good knowledge of current plan, and has no signs or symptoms to report at this time. Declines copy of AVS. Patient instructed on s/s infection/complication with picc line  to report and instructed on keeping picc line  dressing clean, dry and intact patient verbalizes understanding. Next appointment scheduled.

## 2021-11-04 ENCOUNTER — HOSPITAL ENCOUNTER (OUTPATIENT)
Dept: INFUSION THERAPY | Age: 71
Setting detail: INFUSION SERIES
Discharge: HOME | End: 2021-11-04
Payer: MEDICARE

## 2021-11-04 VITALS
OXYGEN SATURATION: 96 % | RESPIRATION RATE: 16 BRPM | DIASTOLIC BLOOD PRESSURE: 81 MMHG | SYSTOLIC BLOOD PRESSURE: 147 MMHG | TEMPERATURE: 97.8 F | HEART RATE: 90 BPM

## 2021-11-04 DIAGNOSIS — R78.81 STREPTOCOCCAL BACTEREMIA: Primary | ICD-10-CM

## 2021-11-04 DIAGNOSIS — B95.5 STREPTOCOCCAL BACTEREMIA: Primary | ICD-10-CM

## 2021-11-04 LAB
ALBUMIN SERPL-MCNC: 3.2 G/DL (ref 3.5–5.2)
ALP BLD-CCNC: 96 U/L (ref 40–129)
ALT SERPL-CCNC: 45 U/L (ref 0–40)
ANION GAP SERPL CALCULATED.3IONS-SCNC: 10 MMOL/L (ref 7–16)
ANISOCYTOSIS: ABNORMAL
AST SERPL-CCNC: 46 U/L (ref 0–39)
BASOPHILS ABSOLUTE: 0.06 E9/L (ref 0–0.2)
BASOPHILS RELATIVE PERCENT: 1.4 % (ref 0–2)
BILIRUB SERPL-MCNC: 1.6 MG/DL (ref 0–1.2)
BUN BLDV-MCNC: 12 MG/DL (ref 6–23)
BURR CELLS: ABNORMAL
CALCIUM SERPL-MCNC: 8.5 MG/DL (ref 8.6–10.2)
CHLORIDE BLD-SCNC: 103 MMOL/L (ref 98–107)
CO2: 25 MMOL/L (ref 22–29)
CREAT SERPL-MCNC: 0.6 MG/DL (ref 0.7–1.2)
EOSINOPHILS ABSOLUTE: 0.21 E9/L (ref 0.05–0.5)
EOSINOPHILS RELATIVE PERCENT: 5 % (ref 0–6)
GFR AFRICAN AMERICAN: >60
GFR NON-AFRICAN AMERICAN: >60 ML/MIN/1.73
GLUCOSE BLD-MCNC: 169 MG/DL (ref 74–99)
HCT VFR BLD CALC: 37.7 % (ref 37–54)
HEMOGLOBIN: 13 G/DL (ref 12.5–16.5)
IMMATURE GRANULOCYTES #: 0.01 E9/L
IMMATURE GRANULOCYTES %: 0.2 % (ref 0–5)
LYMPHOCYTES ABSOLUTE: 0.54 E9/L (ref 1.5–4)
LYMPHOCYTES RELATIVE PERCENT: 12.9 % (ref 20–42)
MCH RBC QN AUTO: 33.5 PG (ref 26–35)
MCHC RBC AUTO-ENTMCNC: 34.5 % (ref 32–34.5)
MCV RBC AUTO: 97.2 FL (ref 80–99.9)
MONOCYTES ABSOLUTE: 0.48 E9/L (ref 0.1–0.95)
MONOCYTES RELATIVE PERCENT: 11.5 % (ref 2–12)
NEUTROPHILS ABSOLUTE: 2.89 E9/L (ref 1.8–7.3)
NEUTROPHILS RELATIVE PERCENT: 69 % (ref 43–80)
OVALOCYTES: ABNORMAL
PDW BLD-RTO: 14.6 FL (ref 11.5–15)
PLATELET # BLD: 87 E9/L (ref 130–450)
PLATELET CONFIRMATION: NORMAL
PMV BLD AUTO: 10.2 FL (ref 7–12)
POIKILOCYTES: ABNORMAL
POTASSIUM SERPL-SCNC: 4.1 MMOL/L (ref 3.5–5)
RBC # BLD: 3.88 E12/L (ref 3.8–5.8)
SODIUM BLD-SCNC: 138 MMOL/L (ref 132–146)
TOTAL PROTEIN: 6.2 G/DL (ref 6.4–8.3)
WBC # BLD: 4.2 E9/L (ref 4.5–11.5)

## 2021-11-04 PROCEDURE — 80053 COMPREHEN METABOLIC PANEL: CPT

## 2021-11-04 PROCEDURE — 36415 COLL VENOUS BLD VENIPUNCTURE: CPT

## 2021-11-04 PROCEDURE — 2580000003 HC RX 258: Performed by: INTERNAL MEDICINE

## 2021-11-04 PROCEDURE — 85025 COMPLETE CBC W/AUTO DIFF WBC: CPT

## 2021-11-04 PROCEDURE — 6360000002 HC RX W HCPCS: Performed by: INTERNAL MEDICINE

## 2021-11-04 PROCEDURE — 96374 THER/PROPH/DIAG INJ IV PUSH: CPT

## 2021-11-04 RX ORDER — DIPHENHYDRAMINE HYDROCHLORIDE 50 MG/ML
50 INJECTION INTRAMUSCULAR; INTRAVENOUS ONCE
Status: CANCELLED | OUTPATIENT
Start: 2021-11-05 | End: 2021-11-05

## 2021-11-04 RX ORDER — EPINEPHRINE 1 MG/ML
0.3 INJECTION, SOLUTION, CONCENTRATE INTRAVENOUS PRN
Status: CANCELLED | OUTPATIENT
Start: 2021-11-05

## 2021-11-04 RX ORDER — HEPARIN SODIUM (PORCINE) LOCK FLUSH IV SOLN 100 UNIT/ML 100 UNIT/ML
300 SOLUTION INTRAVENOUS PRN
Status: CANCELLED | OUTPATIENT
Start: 2021-11-05

## 2021-11-04 RX ORDER — HEPARIN SODIUM (PORCINE) LOCK FLUSH IV SOLN 100 UNIT/ML 100 UNIT/ML
300 SOLUTION INTRAVENOUS PRN
Status: DISCONTINUED | OUTPATIENT
Start: 2021-11-04 | End: 2021-11-05 | Stop reason: HOSPADM

## 2021-11-04 RX ORDER — SODIUM CHLORIDE 0.9 % (FLUSH) 0.9 %
5-10 SYRINGE (ML) INJECTION PRN
Status: DISCONTINUED | OUTPATIENT
Start: 2021-11-04 | End: 2021-11-05 | Stop reason: HOSPADM

## 2021-11-04 RX ORDER — SODIUM CHLORIDE 0.9 % (FLUSH) 0.9 %
5-10 SYRINGE (ML) INJECTION PRN
Status: CANCELLED | OUTPATIENT
Start: 2021-11-05

## 2021-11-04 RX ORDER — DIPHENHYDRAMINE HCL 25 MG
50 TABLET ORAL ONCE
Status: CANCELLED
Start: 2021-11-05 | End: 2021-11-05

## 2021-11-04 RX ADMIN — SODIUM CHLORIDE, PRESERVATIVE FREE 10 ML: 5 INJECTION INTRAVENOUS at 09:48

## 2021-11-04 RX ADMIN — SODIUM CHLORIDE, PRESERVATIVE FREE 10 ML: 5 INJECTION INTRAVENOUS at 09:34

## 2021-11-04 RX ADMIN — HEPARIN SODIUM (PORCINE) LOCK FLUSH IV SOLN 100 UNIT/ML 300 UNITS: 100 SOLUTION at 09:48

## 2021-11-04 RX ADMIN — SODIUM CHLORIDE, PRESERVATIVE FREE 10 ML: 5 INJECTION INTRAVENOUS at 09:33

## 2021-11-04 RX ADMIN — WATER 2000 MG: 1 INJECTION INTRAMUSCULAR; INTRAVENOUS; SUBCUTANEOUS at 09:43

## 2021-11-04 ASSESSMENT — PAIN SCALES - GENERAL: PAINLEVEL_OUTOF10: 0

## 2021-11-05 ENCOUNTER — HOSPITAL ENCOUNTER (OUTPATIENT)
Dept: INFUSION THERAPY | Age: 71
Setting detail: INFUSION SERIES
Discharge: HOME | End: 2021-11-05
Payer: MEDICARE

## 2021-11-05 VITALS
OXYGEN SATURATION: 96 % | HEART RATE: 90 BPM | DIASTOLIC BLOOD PRESSURE: 74 MMHG | TEMPERATURE: 98.1 F | RESPIRATION RATE: 16 BRPM | SYSTOLIC BLOOD PRESSURE: 146 MMHG

## 2021-11-05 DIAGNOSIS — R78.81 STREPTOCOCCAL BACTEREMIA: Primary | ICD-10-CM

## 2021-11-05 DIAGNOSIS — B95.5 STREPTOCOCCAL BACTEREMIA: Primary | ICD-10-CM

## 2021-11-05 PROCEDURE — 6360000002 HC RX W HCPCS: Performed by: INTERNAL MEDICINE

## 2021-11-05 PROCEDURE — 2580000003 HC RX 258: Performed by: INTERNAL MEDICINE

## 2021-11-05 PROCEDURE — 96374 THER/PROPH/DIAG INJ IV PUSH: CPT

## 2021-11-05 RX ORDER — SODIUM CHLORIDE 0.9 % (FLUSH) 0.9 %
5-10 SYRINGE (ML) INJECTION PRN
Status: CANCELLED | OUTPATIENT
Start: 2021-11-06

## 2021-11-05 RX ORDER — EPINEPHRINE 1 MG/ML
0.3 INJECTION, SOLUTION, CONCENTRATE INTRAVENOUS PRN
Status: CANCELLED | OUTPATIENT
Start: 2021-11-06

## 2021-11-05 RX ORDER — DIPHENHYDRAMINE HCL 25 MG
50 TABLET ORAL ONCE
Status: CANCELLED
Start: 2021-11-06 | End: 2021-11-06

## 2021-11-05 RX ORDER — HEPARIN SODIUM (PORCINE) LOCK FLUSH IV SOLN 100 UNIT/ML 100 UNIT/ML
300 SOLUTION INTRAVENOUS PRN
Status: DISCONTINUED | OUTPATIENT
Start: 2021-11-05 | End: 2021-11-06 | Stop reason: HOSPADM

## 2021-11-05 RX ORDER — HEPARIN SODIUM (PORCINE) LOCK FLUSH IV SOLN 100 UNIT/ML 100 UNIT/ML
300 SOLUTION INTRAVENOUS PRN
Status: CANCELLED | OUTPATIENT
Start: 2021-11-06

## 2021-11-05 RX ORDER — DIPHENHYDRAMINE HYDROCHLORIDE 50 MG/ML
50 INJECTION INTRAMUSCULAR; INTRAVENOUS ONCE
Status: CANCELLED | OUTPATIENT
Start: 2021-11-06 | End: 2021-11-06

## 2021-11-05 RX ORDER — SODIUM CHLORIDE 0.9 % (FLUSH) 0.9 %
5-10 SYRINGE (ML) INJECTION PRN
Status: DISCONTINUED | OUTPATIENT
Start: 2021-11-05 | End: 2021-11-06 | Stop reason: HOSPADM

## 2021-11-05 NOTE — PROGRESS NOTES
Tolerated infusion well. Therapy plan reviewed with patient. Verbalizes understanding. Reviewed AVS with patient, reviewed medication information, verbalizes good knowledge of current plan, and has no signs or symptoms to report at this time. Declines copy of AVS. Patient instructed on s/s infection/complication wit picc line  to report and instructed on keeping picc line dressing clean, dry and intact patient verbalizes understanding. Next appointment scheduled.

## 2021-11-06 ENCOUNTER — APPOINTMENT (OUTPATIENT)
Dept: GENERAL RADIOLOGY | Age: 71
DRG: 189 | End: 2021-11-06
Payer: MEDICARE

## 2021-11-06 ENCOUNTER — HOSPITAL ENCOUNTER (INPATIENT)
Age: 71
LOS: 5 days | Discharge: HOME HEALTH | DRG: 189 | End: 2021-11-11
Attending: EMERGENCY MEDICINE | Admitting: INTERNAL MEDICINE
Payer: MEDICARE

## 2021-11-06 ENCOUNTER — APPOINTMENT (OUTPATIENT)
Dept: CT IMAGING | Age: 71
DRG: 189 | End: 2021-11-06
Payer: MEDICARE

## 2021-11-06 ENCOUNTER — HOSPITAL ENCOUNTER (OUTPATIENT)
Dept: INFUSION THERAPY | Age: 71
Setting detail: INFUSION SERIES
Discharge: HOME | End: 2021-11-06
Payer: MEDICARE

## 2021-11-06 VITALS
HEART RATE: 91 BPM | DIASTOLIC BLOOD PRESSURE: 75 MMHG | RESPIRATION RATE: 16 BRPM | SYSTOLIC BLOOD PRESSURE: 151 MMHG | TEMPERATURE: 98.1 F

## 2021-11-06 DIAGNOSIS — E87.20 LACTIC ACIDOSIS: ICD-10-CM

## 2021-11-06 DIAGNOSIS — R78.81 STREPTOCOCCAL BACTEREMIA: Primary | ICD-10-CM

## 2021-11-06 DIAGNOSIS — J96.01 ACUTE RESPIRATORY FAILURE WITH HYPOXIA (HCC): Primary | ICD-10-CM

## 2021-11-06 DIAGNOSIS — J90 PLEURAL EFFUSION ON RIGHT: ICD-10-CM

## 2021-11-06 DIAGNOSIS — B95.5 STREPTOCOCCAL BACTEREMIA: Primary | ICD-10-CM

## 2021-11-06 LAB
ALBUMIN SERPL-MCNC: 3.8 G/DL (ref 3.5–5.2)
ALP BLD-CCNC: 120 U/L (ref 40–129)
ALT SERPL-CCNC: 54 U/L (ref 0–40)
ANION GAP SERPL CALCULATED.3IONS-SCNC: 12 MMOL/L (ref 7–16)
AST SERPL-CCNC: 52 U/L (ref 0–39)
B.E.: -6.2 MMOL/L (ref -3–3)
BASOPHILS ABSOLUTE: 0.09 E9/L (ref 0–0.2)
BASOPHILS RELATIVE PERCENT: 1.5 % (ref 0–2)
BILIRUB SERPL-MCNC: 1.2 MG/DL (ref 0–1.2)
BILIRUBIN DIRECT: 0.4 MG/DL (ref 0–0.3)
BILIRUBIN, INDIRECT: 0.8 MG/DL (ref 0–1)
BUN BLDV-MCNC: 12 MG/DL (ref 6–23)
CALCIUM SERPL-MCNC: 9.2 MG/DL (ref 8.6–10.2)
CHLORIDE BLD-SCNC: 102 MMOL/L (ref 98–107)
CO2: 23 MMOL/L (ref 22–29)
COHB: 1 % (ref 0–1.5)
CREAT SERPL-MCNC: 0.7 MG/DL (ref 0.7–1.2)
CRITICAL: ABNORMAL
DATE ANALYZED: ABNORMAL
DATE OF COLLECTION: ABNORMAL
EOSINOPHILS ABSOLUTE: 0.22 E9/L (ref 0.05–0.5)
EOSINOPHILS RELATIVE PERCENT: 3.7 % (ref 0–6)
GFR AFRICAN AMERICAN: >60
GFR NON-AFRICAN AMERICAN: >60 ML/MIN/1.73
GLUCOSE BLD-MCNC: 292 MG/DL (ref 74–99)
HCO3: 20.7 MMOL/L (ref 22–26)
HCT VFR BLD CALC: 42.9 % (ref 37–54)
HEMOGLOBIN: 14.5 G/DL (ref 12.5–16.5)
HHB: 11.4 % (ref 0–5)
IMMATURE GRANULOCYTES #: 0.01 E9/L
IMMATURE GRANULOCYTES %: 0.2 % (ref 0–5)
LAB: ABNORMAL
LACTIC ACID, SEPSIS: 3.4 MMOL/L (ref 0.5–1.9)
LACTIC ACID, SEPSIS: 6.5 MMOL/L (ref 0.5–1.9)
LYMPHOCYTES ABSOLUTE: 1 E9/L (ref 1.5–4)
LYMPHOCYTES RELATIVE PERCENT: 16.7 % (ref 20–42)
Lab: ABNORMAL
MCH RBC QN AUTO: 33.3 PG (ref 26–35)
MCHC RBC AUTO-ENTMCNC: 33.8 % (ref 32–34.5)
MCV RBC AUTO: 98.4 FL (ref 80–99.9)
METHB: 0.2 % (ref 0–1.5)
MODE: ABNORMAL
MONOCYTES ABSOLUTE: 0.72 E9/L (ref 0.1–0.95)
MONOCYTES RELATIVE PERCENT: 12 % (ref 2–12)
NEUTROPHILS ABSOLUTE: 3.95 E9/L (ref 1.8–7.3)
NEUTROPHILS RELATIVE PERCENT: 65.9 % (ref 43–80)
O2 CONTENT: 18.4 ML/DL
O2 SATURATION: 88.5 % (ref 92–98.5)
O2HB: 87.4 % (ref 94–97)
OPERATOR ID: 1821
PATIENT TEMP: 37 C
PCO2: 46.3 MMHG (ref 35–45)
PDW BLD-RTO: 14.8 FL (ref 11.5–15)
PH BLOOD GAS: 7.27 (ref 7.35–7.45)
PLATELET # BLD: 125 E9/L (ref 130–450)
PMV BLD AUTO: 10.3 FL (ref 7–12)
PO2: 64 MMHG (ref 75–100)
POTASSIUM REFLEX MAGNESIUM: 4.2 MMOL/L (ref 3.5–5)
PRO-BNP: 59 PG/ML (ref 0–125)
RBC # BLD: 4.36 E12/L (ref 3.8–5.8)
SARS-COV-2, NAAT: NOT DETECTED
SODIUM BLD-SCNC: 137 MMOL/L (ref 132–146)
SOURCE, BLOOD GAS: ABNORMAL
THB: 15 G/DL (ref 11.5–16.5)
TIME ANALYZED: 1838
TOTAL PROTEIN: 7.4 G/DL (ref 6.4–8.3)
TROPONIN, HIGH SENSITIVITY: 32 NG/L (ref 0–11)
WBC # BLD: 6 E9/L (ref 4.5–11.5)

## 2021-11-06 PROCEDURE — 85025 COMPLETE CBC W/AUTO DIFF WBC: CPT

## 2021-11-06 PROCEDURE — 96374 THER/PROPH/DIAG INJ IV PUSH: CPT

## 2021-11-06 PROCEDURE — 71045 X-RAY EXAM CHEST 1 VIEW: CPT

## 2021-11-06 PROCEDURE — 80048 BASIC METABOLIC PNL TOTAL CA: CPT

## 2021-11-06 PROCEDURE — 87635 SARS-COV-2 COVID-19 AMP PRB: CPT

## 2021-11-06 PROCEDURE — 99284 EMERGENCY DEPT VISIT MOD MDM: CPT

## 2021-11-06 PROCEDURE — 84484 ASSAY OF TROPONIN QUANT: CPT

## 2021-11-06 PROCEDURE — 2580000003 HC RX 258: Performed by: STUDENT IN AN ORGANIZED HEALTH CARE EDUCATION/TRAINING PROGRAM

## 2021-11-06 PROCEDURE — 71275 CT ANGIOGRAPHY CHEST: CPT

## 2021-11-06 PROCEDURE — 96367 TX/PROPH/DG ADDL SEQ IV INF: CPT

## 2021-11-06 PROCEDURE — 83605 ASSAY OF LACTIC ACID: CPT

## 2021-11-06 PROCEDURE — 6360000002 HC RX W HCPCS: Performed by: INTERNAL MEDICINE

## 2021-11-06 PROCEDURE — 2580000003 HC RX 258: Performed by: INTERNAL MEDICINE

## 2021-11-06 PROCEDURE — 82805 BLOOD GASES W/O2 SATURATION: CPT

## 2021-11-06 PROCEDURE — 93005 ELECTROCARDIOGRAM TRACING: CPT | Performed by: STUDENT IN AN ORGANIZED HEALTH CARE EDUCATION/TRAINING PROGRAM

## 2021-11-06 PROCEDURE — 6360000004 HC RX CONTRAST MEDICATION: Performed by: RADIOLOGY

## 2021-11-06 PROCEDURE — 6370000000 HC RX 637 (ALT 250 FOR IP): Performed by: EMERGENCY MEDICINE

## 2021-11-06 PROCEDURE — 80076 HEPATIC FUNCTION PANEL: CPT

## 2021-11-06 PROCEDURE — 96365 THER/PROPH/DIAG IV INF INIT: CPT

## 2021-11-06 PROCEDURE — 6360000002 HC RX W HCPCS: Performed by: STUDENT IN AN ORGANIZED HEALTH CARE EDUCATION/TRAINING PROGRAM

## 2021-11-06 PROCEDURE — 94640 AIRWAY INHALATION TREATMENT: CPT

## 2021-11-06 PROCEDURE — 2060000000 HC ICU INTERMEDIATE R&B

## 2021-11-06 PROCEDURE — 83880 ASSAY OF NATRIURETIC PEPTIDE: CPT

## 2021-11-06 PROCEDURE — 6360000002 HC RX W HCPCS

## 2021-11-06 PROCEDURE — 96375 TX/PRO/DX INJ NEW DRUG ADDON: CPT

## 2021-11-06 PROCEDURE — 87040 BLOOD CULTURE FOR BACTERIA: CPT

## 2021-11-06 RX ORDER — DIPHENHYDRAMINE HCL 25 MG
50 TABLET ORAL ONCE
Status: CANCELLED
Start: 2021-11-07 | End: 2021-11-07

## 2021-11-06 RX ORDER — SODIUM CHLORIDE 0.9 % (FLUSH) 0.9 %
10 SYRINGE (ML) INJECTION PRN
Status: DISCONTINUED | OUTPATIENT
Start: 2021-11-06 | End: 2021-11-11 | Stop reason: HOSPADM

## 2021-11-06 RX ORDER — MAGNESIUM SULFATE HEPTAHYDRATE 500 MG/ML
2000 INJECTION, SOLUTION INTRAMUSCULAR; INTRAVENOUS ONCE
Status: DISCONTINUED | OUTPATIENT
Start: 2021-11-06 | End: 2021-11-06 | Stop reason: SDUPTHER

## 2021-11-06 RX ORDER — SODIUM CHLORIDE 0.9 % (FLUSH) 0.9 %
5-10 SYRINGE (ML) INJECTION PRN
Status: DISCONTINUED | OUTPATIENT
Start: 2021-11-06 | End: 2021-11-07 | Stop reason: HOSPADM

## 2021-11-06 RX ORDER — METOPROLOL TARTRATE 5 MG/5ML
5 INJECTION INTRAVENOUS EVERY 6 HOURS
Status: DISCONTINUED | OUTPATIENT
Start: 2021-11-06 | End: 2021-11-06

## 2021-11-06 RX ORDER — IPRATROPIUM BROMIDE AND ALBUTEROL SULFATE 2.5; .5 MG/3ML; MG/3ML
3 SOLUTION RESPIRATORY (INHALATION) ONCE
Status: COMPLETED | OUTPATIENT
Start: 2021-11-06 | End: 2021-11-06

## 2021-11-06 RX ORDER — HEPARIN SODIUM (PORCINE) LOCK FLUSH IV SOLN 100 UNIT/ML 100 UNIT/ML
300 SOLUTION INTRAVENOUS PRN
Status: DISCONTINUED | OUTPATIENT
Start: 2021-11-06 | End: 2021-11-07 | Stop reason: HOSPADM

## 2021-11-06 RX ORDER — DIPHENHYDRAMINE HYDROCHLORIDE 50 MG/ML
50 INJECTION INTRAMUSCULAR; INTRAVENOUS ONCE
Status: CANCELLED | OUTPATIENT
Start: 2021-11-07 | End: 2021-11-07

## 2021-11-06 RX ORDER — MAGNESIUM SULFATE IN WATER 40 MG/ML
INJECTION, SOLUTION INTRAVENOUS
Status: COMPLETED
Start: 2021-11-06 | End: 2021-11-06

## 2021-11-06 RX ORDER — METHYLPREDNISOLONE SODIUM SUCCINATE 125 MG/2ML
125 INJECTION, POWDER, LYOPHILIZED, FOR SOLUTION INTRAMUSCULAR; INTRAVENOUS ONCE
Status: COMPLETED | OUTPATIENT
Start: 2021-11-06 | End: 2021-11-06

## 2021-11-06 RX ORDER — 0.9 % SODIUM CHLORIDE 0.9 %
500 INTRAVENOUS SOLUTION INTRAVENOUS ONCE
Status: COMPLETED | OUTPATIENT
Start: 2021-11-06 | End: 2021-11-07

## 2021-11-06 RX ORDER — HEPARIN SODIUM (PORCINE) LOCK FLUSH IV SOLN 100 UNIT/ML 100 UNIT/ML
300 SOLUTION INTRAVENOUS PRN
Status: CANCELLED | OUTPATIENT
Start: 2021-11-07

## 2021-11-06 RX ORDER — SODIUM CHLORIDE 0.9 % (FLUSH) 0.9 %
5-10 SYRINGE (ML) INJECTION PRN
Status: CANCELLED | OUTPATIENT
Start: 2021-11-07

## 2021-11-06 RX ORDER — SODIUM CHLORIDE FOR INHALATION 0.9 %
3 VIAL, NEBULIZER (ML) INHALATION ONCE
Status: COMPLETED | OUTPATIENT
Start: 2021-11-06 | End: 2021-11-06

## 2021-11-06 RX ORDER — EPINEPHRINE 1 MG/ML
0.3 INJECTION, SOLUTION, CONCENTRATE INTRAVENOUS PRN
Status: CANCELLED | OUTPATIENT
Start: 2021-11-07

## 2021-11-06 RX ORDER — CEFTRIAXONE 2 G/1
INJECTION, POWDER, FOR SOLUTION INTRAMUSCULAR; INTRAVENOUS
Status: DISCONTINUED
Start: 2021-11-06 | End: 2021-11-06 | Stop reason: HOSPADM

## 2021-11-06 RX ORDER — MAGNESIUM SULFATE IN WATER 40 MG/ML
2000 INJECTION, SOLUTION INTRAVENOUS ONCE
Status: COMPLETED | OUTPATIENT
Start: 2021-11-06 | End: 2021-11-06

## 2021-11-06 RX ADMIN — MAGNESIUM SULFATE IN WATER 2000 MG: 40 INJECTION, SOLUTION INTRAVENOUS at 19:00

## 2021-11-06 RX ADMIN — RACEPINEPHRINE HYDROCHLORIDE 11.25 MG: 11.25 SOLUTION RESPIRATORY (INHALATION) at 18:58

## 2021-11-06 RX ADMIN — VANCOMYCIN HYDROCHLORIDE 1750 MG: 1 INJECTION, POWDER, LYOPHILIZED, FOR SOLUTION INTRAVENOUS at 23:18

## 2021-11-06 RX ADMIN — SODIUM CHLORIDE, PRESERVATIVE FREE 10 ML: 5 INJECTION INTRAVENOUS at 09:32

## 2021-11-06 RX ADMIN — WATER 2000 MG: 1 INJECTION INTRAMUSCULAR; INTRAVENOUS; SUBCUTANEOUS at 09:26

## 2021-11-06 RX ADMIN — SODIUM CHLORIDE 500 ML: 9 INJECTION, SOLUTION INTRAVENOUS at 22:25

## 2021-11-06 RX ADMIN — SODIUM CHLORIDE, PRESERVATIVE FREE 10 ML: 5 INJECTION INTRAVENOUS at 09:33

## 2021-11-06 RX ADMIN — METHYLPREDNISOLONE SODIUM SUCCINATE 125 MG: 125 INJECTION, POWDER, FOR SOLUTION INTRAMUSCULAR; INTRAVENOUS at 18:47

## 2021-11-06 RX ADMIN — MAGNESIUM SULFATE 2000 MG: 2 INJECTION INTRAVENOUS at 19:00

## 2021-11-06 RX ADMIN — CEFEPIME 2000 MG: 2 INJECTION, POWDER, FOR SOLUTION INTRAVENOUS at 22:27

## 2021-11-06 RX ADMIN — IPRATROPIUM BROMIDE AND ALBUTEROL SULFATE 3 AMPULE: .5; 2.5 SOLUTION RESPIRATORY (INHALATION) at 18:44

## 2021-11-06 RX ADMIN — ISODIUM CHLORIDE 3 ML: 0.03 SOLUTION RESPIRATORY (INHALATION) at 18:58

## 2021-11-06 RX ADMIN — IOPAMIDOL 75 ML: 755 INJECTION, SOLUTION INTRAVENOUS at 19:55

## 2021-11-06 RX ADMIN — HEPARIN SODIUM (PORCINE) LOCK FLUSH IV SOLN 100 UNIT/ML 300 UNITS: 100 SOLUTION at 09:33

## 2021-11-06 ASSESSMENT — ENCOUNTER SYMPTOMS
SHORTNESS OF BREATH: 1
COUGH: 1
DIARRHEA: 0
SORE THROAT: 0
SINUS PRESSURE: 0
ABDOMINAL PAIN: 0
VOMITING: 0
EYE DISCHARGE: 0
BACK PAIN: 0
EYE REDNESS: 0
EYE PAIN: 0
NAUSEA: 0
WHEEZING: 1

## 2021-11-06 ASSESSMENT — PAIN SCALES - GENERAL: PAINLEVEL_OUTOF10: 0

## 2021-11-06 NOTE — ED PROVIDER NOTES
The history is provided by the patient and the spouse. 69-year male presents emergency department complaint of shortness of breath. Been short of breath approximately 1 day since yesterday. States he is wheezing per him and the wife. He is also had a cough intermittently for the past couple of weeks as well. Wife also states he was recently in the hospital approximately back in the middle of October for streptococcal bacteremia. He had a PARESH which was negative at that time. He is on outpatient antibiotics with Rocephin. He states he does not have any history of COPD does not smoke cigarettes and is not on any oxygen at home. He has never had anything like this before in the past.  He otherwise denies any chest pain abdominal pain change bowel bladder habits, fever chills. The patient presents with sob that has been going on for 2 days. These symptoms are severe in severity. Symptoms are made better by nothing. Symptoms are made worse by nothing. Associated symptoms include cough, wheeze. Review of Systems   Constitutional: Negative for chills and fever. HENT: Negative for ear pain, sinus pressure and sore throat. Eyes: Negative for pain, discharge and redness. Respiratory: Positive for cough, shortness of breath and wheezing. Cardiovascular: Negative for chest pain. Gastrointestinal: Negative for abdominal pain, diarrhea, nausea and vomiting. Genitourinary: Negative for dysuria and frequency. Musculoskeletal: Negative for arthralgias and back pain. Skin: Negative for rash and wound. Neurological: Negative for weakness and headaches. Hematological: Negative for adenopathy. All other systems reviewed and are negative. Physical Exam  Vitals and nursing note reviewed. Constitutional:       General: He is in acute distress. Appearance: Normal appearance. He is well-developed and normal weight. HENT:      Head: Normocephalic and atraumatic.    Eyes:      General: No scleral icterus. Conjunctiva/sclera: Conjunctivae normal.   Cardiovascular:      Rate and Rhythm: Normal rate and regular rhythm. Heart sounds: Normal heart sounds. No murmur heard. Pulmonary:      Effort: Tachypnea and respiratory distress present. Breath sounds: Examination of the right-upper field reveals wheezing. Examination of the left-upper field reveals wheezing. Examination of the right-middle field reveals wheezing. Examination of the left-middle field reveals wheezing. Examination of the right-lower field reveals wheezing. Examination of the left-lower field reveals wheezing. Wheezing present. No rales. Comments: Tight and wheeze bilateral  Abdominal:      General: Bowel sounds are normal.      Palpations: Abdomen is soft. Tenderness: There is no abdominal tenderness. There is no guarding or rebound. Musculoskeletal:         General: No swelling, tenderness or deformity. Cervical back: Normal range of motion and neck supple. Skin:     General: Skin is warm and dry. Coloration: Skin is not jaundiced. Neurological:      General: No focal deficit present. Mental Status: He is alert. Psychiatric:         Mood and Affect: Mood normal.         Thought Content: Thought content normal.          Procedures     MDM   69-year male present emerged part complaint shortness of breath. Was severely tight on auscultation as well as wheezing and noted stridor. He was given duo nebs, Solu-Medrol, racemic epi, magnesium for his respiratory status. He was on 15 L nonrebreather he was 79% oxygenation on room air. Patient did improve with this. CTA of the chest did reveal right sided large pleural effusion which is also seen on previous examinations. He was noted to have a lactic acid 6.5. He is currently being treated for bacteremia for strep coccus with Rocephin as an outpatient by infectious disease.   He was placed on broad-spectrum antibiotics due to elevated lactic acidosis as well as his symptom presentation. Patient made hemodynamically stable while here. Oxygenation is improving with the medication treatments. He is safe to be admitted to the hospital at this time. --------------------------------------------- PAST HISTORY ---------------------------------------------  Past Medical History:  has a past medical history of Buccal mass, Diabetes mellitus ([facility] [address] Esophageal varices ([facility] [address] Hepatitis C, Liver cirrhosis ([facility] [address] Mild dementia ([facility] [address] and Positive colorectal cancer screening using Cologuard test.    Past Surgical History:  has a past surgical history that includes Nasal septum surgery; Tonsillectomy; Upper gastrointestinal endoscopy (N/A, 06/19/2018); hernia repair (Right, 2018); Upper gastrointestinal endoscopy (09/01/2020); Biopsy mouth lesion (Left, 06/04/2020); Mouth surgery (Right, 09/24/2020); Cholecystectomy; Colonoscopy (2017); Endoscopy, colon, diagnostic (2019 and 2020); and Insert Picc Cath, 5/> Yrs (10/14/2021). Social History:  reports that he has never smoked. He has never used smokeless tobacco. He reports that he does not drink alcohol and does not use drugs. Family History: family history includes Heart Failure in his brother; Hypertension in his brother; Seizures in his brother; Stroke in his brother. The patients home medications have been reviewed.     Allergies: Ketorolac tromethamine, Memantine hcl, and Tamsulosin hcl    -------------------------------------------------- RESULTS -------------------------------------------------    LABS:  Results for orders placed or performed during the hospital encounter of 11/06/21   COVID-19, Rapid    Specimen: Nasopharyngeal Swab   Result Value Ref Range    SARS-CoV-2, NAAT Not Detected Not Detected   Blood Gas, Arterial   Result Value Ref Range    Date Analyzed [id number]     Time Analyzed 1838     Source: Blood Arterial     pH, Blood Gas 7.269 (L) 7.350 - 7.450    PCO2 46.3 (H) 35.0 - 45.0 mmHg    PO2 64.0 (L) 75.0 - 100.0 mmHg    HCO3 20.7 (L) 22.0 - 26.0 mmol/L    B.E. -6.2 (L) -3.0 - 3.0 mmol/L    O2 Sat 88.5 (L) 92.0 - 98.5 %    O2Hb 87.4 (L) 94.0 - 97.0 %    COHb 1.0 0.0 - 1.5 %    MetHb 0.2 0.0 - 1.5 %    O2 Content 18.4 mL/dL    HHb 11.4 (H) 0.0 - 5.0 %    tHb (est) 15.0 11.5 - 16.5 g/dL    Mode NRB 15L     Date Of Collection      Time Collected      Pt Temp 37.0 C     ID 1902     Lab [id number]     Critical(s) Notified .  No Critical Values    CBC Auto Differential   Result Value Ref Range    WBC 6.0 4.5 - 11.5 E9/L    RBC 4.36 3.80 - 5.80 E12/L    Hemoglobin 14.5 12.5 - 16.5 g/dL    Hematocrit 42.9 37.0 - 54.0 %    MCV 98.4 80.0 - 99.9 fL    MCH 33.3 26.0 - 35.0 pg    MCHC 33.8 32.0 - 34.5 %    RDW 14.8 11.5 - 15.0 fL    Platelets 996 (L) 216 - 450 E9/L    MPV 10.3 7.0 - 12.0 fL    Neutrophils % 65.9 43.0 - 80.0 %    Immature Granulocytes % 0.2 0.0 - 5.0 %    Lymphocytes % 16.7 (L) 20.0 - 42.0 %    Monocytes % 12.0 2.0 - 12.0 %    Eosinophils % 3.7 0.0 - 6.0 %    Basophils % 1.5 0.0 - 2.0 %    Neutrophils Absolute 3.95 1.80 - 7.30 E9/L    Immature Granulocytes # 0.01 E9/L    Lymphocytes Absolute 1.00 (L) 1.50 - 4.00 E9/L    Monocytes Absolute 0.72 0.10 - 0.95 E9/L    Eosinophils Absolute 0.22 0.05 - 0.50 E9/L    Basophils Absolute 0.09 0.00 - 0.20 Z6/B   Basic Metabolic Panel w/ Reflex to MG   Result Value Ref Range    Sodium 137 132 - 146 mmol/L    Potassium reflex Magnesium 4.2 3.5 - 5.0 mmol/L    Chloride 102 98 - 107 mmol/L    CO2 23 22 - 29 mmol/L    Anion Gap 12 7 - 16 mmol/L    Glucose 292 (H) 74 - 99 mg/dL    BUN 12 6 - 23 mg/dL    CREATININE 0.7 0.7 - 1.2 mg/dL    GFR Non-African American >60 >=60 mL/min/1.73    GFR African American >60     Calcium 9.2 8.6 - 10.2 mg/dL   Troponin   Result Value Ref Range    Troponin, High Sensitivity 32 (H) 0 - 11 ng/L   Lactate, Sepsis   Result Value Ref Range    Lactic Acid, Sepsis 6.5 (HH) 0.5 - 1.9 mmol/L   Hepatic Function Panel Result Value Ref Range    Total Protein 7.4 6.4 - 8.3 g/dL    Albumin 3.8 3.5 - 5.2 g/dL    Alkaline Phosphatase 120 40 - 129 U/L    ALT 54 (H) 0 - 40 U/L    AST 52 (H) 0 - 39 U/L    Total Bilirubin 1.2 0.0 - 1.2 mg/dL    Bilirubin, Direct 0.4 (H) 0.0 - 0.3 mg/dL    Bilirubin, Indirect 0.8 0.0 - 1.0 mg/dL   Brain Natriuretic Peptide   Result Value Ref Range    Pro-BNP 59 0 - 125 pg/mL       RADIOLOGY:  CTA PULMONARY W CONTRAST   Final Result   No evidence of pulmonary embolism. Low lung volume due to large right pleural effusion resulting and compressive   atelectasis of the right lower lung and markedly elevated left hemidiaphragm   due to ascites. XR CHEST PORTABLE   Final Result   Low lung volume due to suboptimal inspiration. Continued opacity at the right lung base likely from combination of   atelectasis and pleural effusion. EKG:  This EKG is signed and interpreted by me. Rate: 104  Rhythm: Sinus  Interpretation: Sinus tachycardia, left axis deviation, normal sinus rhythm no acute ST elevation depression  Comparison: stable as compared to patient's most recent EKG      ------------------------- NURSING NOTES AND VITALS REVIEWED ---------------------------  Date / Time Roomed:  11/6/2021  6:24 PM  ED Bed Assignment:  18B/18B-18    The nursing notes within the ED encounter and vital signs as below have been reviewed.      Patient Vitals for the past 24 hrs:   BP Pulse Resp SpO2 Weight   11/06/21 2142 124/80 92 20 100 % --   11/06/21 1858 -- -- 26 99 % --   11/06/21 1844 -- -- 28 96 % --   11/06/21 1840 (!) 180/104 -- -- 96 % --   11/06/21 1838 -- 109 (!) 32 (!) 79 % 182 lb (82.6 kg)   11/06/21 1823 -- -- -- (!) 79 % --       Oxygen Saturation Interpretation: Abnormal, 79% on room air on 15 L oxygen saturating 99%    ------------------------------------------ PROGRESS NOTES ------------------------------------------  Re-evaluation(s):  Time: 2130  Patients symptoms are improving  Repeat physical examination is improved    Counseling:  I have spoken with the patient and discussed todays results, in addition to providing specific details for the plan of care and counseling regarding the diagnosis and prognosis. Their questions are answered at this time and they are agreeable with the plan of admission.    --------------------------------- ADDITIONAL PROVIDER NOTES ---------------------------------  Consultations:  Time: 2257. Spoke with Dr. Alyse Vieyra. Discussed case. They will admit the patient. This patient's ED course included: a personal history and physicial examination, re-evaluation prior to disposition, multiple bedside re-evaluations, IV medications, cardiac monitoring and continuous pulse oximetry    This patient has remained hemodynamically stable during their ED course. Diagnosis:  1. Acute respiratory failure with hypoxia ([address]    2. Lactic acidosis    3. Pleural effusion on right          Accordingly this patient received 37 minutes of critical care time, excluding separately billable procedures. Disposition:  Patient's disposition: Admit to telemetry  Patient's condition is stable.          Satya Trivedi MD  Resident  11/07/21 0030

## 2021-11-06 NOTE — ED NOTES
1925 assumed care of patient Doctor at bedside to reassess he will waive lab results for patient to be taken to ct tatyana RN transported patient via gurney wife remained in room safety maintained     Mansi Joe RN  11/06/21 1938

## 2021-11-06 NOTE — ED NOTES
Radiology Procedure Waiver   Name: Bentley Bonds  : 1950  MRN: [id number]    Date:  21    Time: 7:26 PM EDT    Benefits of immediately proceeding with radiology exam(s) without pre-testing outweigh the risks or are not indicated as specified below and therefore the following is/are being waived:    [x] Benefits of immediate radiology exam(s) outweigh any risk. OR    Pre-exam testing is not indicated for the following reason(s):  [] Pregnancy test   [] Patients LMP on-time and regular.   [] Patient had Tubal Ligation or has other Contraception Device. [] Patient  is Menopausal or Premenarcheal.    [] Patient had Full or Partial Hysterectomy. [] Protocol for CT contrast allegry   [] Patient has tolerated well previously   [] Patient does not have a true allergy    [] MRI Questionnaire     [] BUN/Creatinine   [] Patient age w/no hx of renal dysfunction. [] Patient on Dialysis. [] Recent Normal Labs.   Electronically signed by Lolly Shaikh MD on 21 at 7:26 PM EDT               Lolly Shaikh MD  Resident  [phone]

## 2021-11-07 ENCOUNTER — HOSPITAL ENCOUNTER (OUTPATIENT)
Dept: INFUSION THERAPY | Age: 71
Setting detail: INFUSION SERIES
Discharge: HOME | End: 2021-11-07
Payer: MEDICARE

## 2021-11-07 DIAGNOSIS — B95.5 STREPTOCOCCAL BACTEREMIA: Primary | ICD-10-CM

## 2021-11-07 DIAGNOSIS — R78.81 STREPTOCOCCAL BACTEREMIA: Primary | ICD-10-CM

## 2021-11-07 LAB
ALBUMIN SERPL-MCNC: 3.3 G/DL (ref 3.5–5.2)
ALP BLD-CCNC: 94 U/L (ref 40–129)
ALT SERPL-CCNC: 46 U/L (ref 0–40)
ANION GAP SERPL CALCULATED.3IONS-SCNC: 9 MMOL/L (ref 7–16)
ANISOCYTOSIS: ABNORMAL
AST SERPL-CCNC: 42 U/L (ref 0–39)
BASOPHILS ABSOLUTE: 0 E9/L (ref 0–0.2)
BASOPHILS RELATIVE PERCENT: 0.3 % (ref 0–2)
BILIRUB SERPL-MCNC: 1.2 MG/DL (ref 0–1.2)
BUN BLDV-MCNC: 11 MG/DL (ref 6–23)
CALCIUM SERPL-MCNC: 8.5 MG/DL (ref 8.6–10.2)
CHLORIDE BLD-SCNC: 102 MMOL/L (ref 98–107)
CO2: 24 MMOL/L (ref 22–29)
CREAT SERPL-MCNC: 0.5 MG/DL (ref 0.7–1.2)
EKG ATRIAL RATE: 105 BPM
EKG P AXIS: 44 DEGREES
EKG P-R INTERVAL: 134 MS
EKG Q-T INTERVAL: 362 MS
EKG QRS DURATION: 92 MS
EKG QTC CALCULATION (BAZETT): 478 MS
EKG R AXIS: -17 DEGREES
EKG T AXIS: 1 DEGREES
EKG VENTRICULAR RATE: 105 BPM
EOSINOPHILS ABSOLUTE: 0 E9/L (ref 0.05–0.5)
EOSINOPHILS RELATIVE PERCENT: 0 % (ref 0–6)
GFR AFRICAN AMERICAN: >60
GFR NON-AFRICAN AMERICAN: >60 ML/MIN/1.73
GLUCOSE BLD-MCNC: 246 MG/DL (ref 74–99)
HCT VFR BLD CALC: 38.9 % (ref 37–54)
HEMOGLOBIN: 13.3 G/DL (ref 12.5–16.5)
LYMPHOCYTES ABSOLUTE: 0.12 E9/L (ref 1.5–4)
LYMPHOCYTES RELATIVE PERCENT: 3.5 % (ref 20–42)
MCH RBC QN AUTO: 33.2 PG (ref 26–35)
MCHC RBC AUTO-ENTMCNC: 34.2 % (ref 32–34.5)
MCV RBC AUTO: 97 FL (ref 80–99.9)
METER GLUCOSE: 135 MG/DL (ref 74–99)
METER GLUCOSE: 436 MG/DL (ref 74–99)
MONOCYTES ABSOLUTE: 0.12 E9/L (ref 0.1–0.95)
MONOCYTES RELATIVE PERCENT: 3.5 % (ref 2–12)
NEUTROPHILS ABSOLUTE: 2.88 E9/L (ref 1.8–7.3)
NEUTROPHILS RELATIVE PERCENT: 93 % (ref 43–80)
OVALOCYTES: ABNORMAL
PDW BLD-RTO: 14.6 FL (ref 11.5–15)
PLATELET # BLD: 72 E9/L (ref 130–450)
PLATELET CONFIRMATION: NORMAL
PMV BLD AUTO: 9.6 FL (ref 7–12)
POIKILOCYTES: ABNORMAL
POTASSIUM SERPL-SCNC: 4.9 MMOL/L (ref 3.5–5)
RBC # BLD: 4.01 E12/L (ref 3.8–5.8)
SODIUM BLD-SCNC: 135 MMOL/L (ref 132–146)
TOTAL PROTEIN: 6.2 G/DL (ref 6.4–8.3)
WBC # BLD: 3.1 E9/L (ref 4.5–11.5)

## 2021-11-07 PROCEDURE — 6360000002 HC RX W HCPCS: Performed by: INTERNAL MEDICINE

## 2021-11-07 PROCEDURE — 94664 DEMO&/EVAL PT USE INHALER: CPT

## 2021-11-07 PROCEDURE — 87449 NOS EACH ORGANISM AG IA: CPT

## 2021-11-07 PROCEDURE — 2580000003 HC RX 258: Performed by: RADIOLOGY

## 2021-11-07 PROCEDURE — 6370000000 HC RX 637 (ALT 250 FOR IP): Performed by: INTERNAL MEDICINE

## 2021-11-07 PROCEDURE — 2060000000 HC ICU INTERMEDIATE R&B

## 2021-11-07 PROCEDURE — 85025 COMPLETE CBC W/AUTO DIFF WBC: CPT

## 2021-11-07 PROCEDURE — 80053 COMPREHEN METABOLIC PANEL: CPT

## 2021-11-07 PROCEDURE — 94640 AIRWAY INHALATION TREATMENT: CPT

## 2021-11-07 PROCEDURE — 2500000003 HC RX 250 WO HCPCS: Performed by: INTERNAL MEDICINE

## 2021-11-07 PROCEDURE — P9047 ALBUMIN (HUMAN), 25%, 50ML: HCPCS | Performed by: INTERNAL MEDICINE

## 2021-11-07 PROCEDURE — 6360000002 HC RX W HCPCS

## 2021-11-07 PROCEDURE — 82962 GLUCOSE BLOOD TEST: CPT

## 2021-11-07 RX ORDER — EPINEPHRINE 1 MG/ML
0.3 INJECTION, SOLUTION, CONCENTRATE INTRAVENOUS PRN
OUTPATIENT
Start: 2021-11-08

## 2021-11-07 RX ORDER — DIPHENHYDRAMINE HYDROCHLORIDE 50 MG/ML
50 INJECTION INTRAMUSCULAR; INTRAVENOUS ONCE
OUTPATIENT
Start: 2021-11-08 | End: 2021-11-08

## 2021-11-07 RX ORDER — SODIUM CHLORIDE 0.9 % (FLUSH) 0.9 %
5-10 SYRINGE (ML) INJECTION PRN
Status: DISCONTINUED | OUTPATIENT
Start: 2021-11-07 | End: 2021-11-08 | Stop reason: HOSPADM

## 2021-11-07 RX ORDER — BUMETANIDE 0.25 MG/ML
2 INJECTION, SOLUTION INTRAMUSCULAR; INTRAVENOUS ONCE
Status: COMPLETED | OUTPATIENT
Start: 2021-11-07 | End: 2021-11-07

## 2021-11-07 RX ORDER — HEPARIN SODIUM (PORCINE) LOCK FLUSH IV SOLN 100 UNIT/ML 100 UNIT/ML
300 SOLUTION INTRAVENOUS PRN
Status: DISCONTINUED | OUTPATIENT
Start: 2021-11-07 | End: 2021-11-08 | Stop reason: HOSPADM

## 2021-11-07 RX ORDER — INSULIN GLARGINE 100 [IU]/ML
26 INJECTION, SOLUTION SUBCUTANEOUS NIGHTLY
Status: DISCONTINUED | OUTPATIENT
Start: 2021-11-07 | End: 2021-11-07 | Stop reason: CLARIF

## 2021-11-07 RX ORDER — IPRATROPIUM BROMIDE AND ALBUTEROL SULFATE 2.5; .5 MG/3ML; MG/3ML
1 SOLUTION RESPIRATORY (INHALATION) 4 TIMES DAILY
Status: DISCONTINUED | OUTPATIENT
Start: 2021-11-07 | End: 2021-11-11 | Stop reason: HOSPADM

## 2021-11-07 RX ORDER — DEXTROSE MONOHYDRATE 25 G/50ML
12.5 INJECTION, SOLUTION INTRAVENOUS PRN
Status: DISCONTINUED | OUTPATIENT
Start: 2021-11-07 | End: 2021-11-11 | Stop reason: HOSPADM

## 2021-11-07 RX ORDER — HEPARIN SODIUM (PORCINE) LOCK FLUSH IV SOLN 100 UNIT/ML 100 UNIT/ML
300 SOLUTION INTRAVENOUS PRN
OUTPATIENT
Start: 2021-11-08

## 2021-11-07 RX ORDER — CEFTRIAXONE 2 G/1
INJECTION, POWDER, FOR SOLUTION INTRAMUSCULAR; INTRAVENOUS
Status: COMPLETED
Start: 2021-11-07 | End: 2021-11-07

## 2021-11-07 RX ORDER — DONEPEZIL HYDROCHLORIDE 5 MG/1
5 TABLET, FILM COATED ORAL
Status: DISCONTINUED | OUTPATIENT
Start: 2021-11-08 | End: 2021-11-11 | Stop reason: HOSPADM

## 2021-11-07 RX ORDER — ALBUMIN (HUMAN) 12.5 G/50ML
50 SOLUTION INTRAVENOUS ONCE
Status: COMPLETED | OUTPATIENT
Start: 2021-11-07 | End: 2021-11-07

## 2021-11-07 RX ORDER — SODIUM CHLORIDE 0.9 % (FLUSH) 0.9 %
5-10 SYRINGE (ML) INJECTION PRN
OUTPATIENT
Start: 2021-11-08

## 2021-11-07 RX ORDER — ESCITALOPRAM OXALATE 10 MG/1
5 TABLET ORAL NIGHTLY
Status: DISCONTINUED | OUTPATIENT
Start: 2021-11-07 | End: 2021-11-11 | Stop reason: HOSPADM

## 2021-11-07 RX ORDER — INSULIN GLARGINE 100 [IU]/ML
21 INJECTION, SOLUTION SUBCUTANEOUS NIGHTLY
Status: DISCONTINUED | OUTPATIENT
Start: 2021-11-07 | End: 2021-11-11 | Stop reason: HOSPADM

## 2021-11-07 RX ORDER — NICOTINE POLACRILEX 4 MG
15 LOZENGE BUCCAL PRN
Status: DISCONTINUED | OUTPATIENT
Start: 2021-11-07 | End: 2021-11-11 | Stop reason: HOSPADM

## 2021-11-07 RX ORDER — ESCITALOPRAM OXALATE 5 MG/1
5 TABLET ORAL DAILY
COMMUNITY
End: 2021-12-20 | Stop reason: SDUPTHER

## 2021-11-07 RX ORDER — DEXTROSE MONOHYDRATE 50 MG/ML
100 INJECTION, SOLUTION INTRAVENOUS PRN
Status: DISCONTINUED | OUTPATIENT
Start: 2021-11-07 | End: 2021-11-11 | Stop reason: HOSPADM

## 2021-11-07 RX ORDER — DIPHENHYDRAMINE HCL 25 MG
50 TABLET ORAL ONCE
Start: 2021-11-08 | End: 2021-11-08

## 2021-11-07 RX ADMIN — IPRATROPIUM BROMIDE AND ALBUTEROL SULFATE 1 AMPULE: .5; 2.5 SOLUTION RESPIRATORY (INHALATION) at 20:28

## 2021-11-07 RX ADMIN — IPRATROPIUM BROMIDE AND ALBUTEROL SULFATE 1 AMPULE: .5; 2.5 SOLUTION RESPIRATORY (INHALATION) at 10:51

## 2021-11-07 RX ADMIN — ENOXAPARIN SODIUM 40 MG: 100 INJECTION SUBCUTANEOUS at 14:34

## 2021-11-07 RX ADMIN — IPRATROPIUM BROMIDE AND ALBUTEROL SULFATE 1 AMPULE: .5; 2.5 SOLUTION RESPIRATORY (INHALATION) at 17:42

## 2021-11-07 RX ADMIN — ESCITALOPRAM 5 MG: 10 TABLET, FILM COATED ORAL at 21:17

## 2021-11-07 RX ADMIN — ALBUMIN (HUMAN) 50 G: 0.25 INJECTION, SOLUTION INTRAVENOUS at 14:22

## 2021-11-07 RX ADMIN — INSULIN LISPRO 12 UNITS: 100 INJECTION, SOLUTION INTRAVENOUS; SUBCUTANEOUS at 17:24

## 2021-11-07 RX ADMIN — SODIUM CHLORIDE, PRESERVATIVE FREE 10 ML: 5 INJECTION INTRAVENOUS at 21:19

## 2021-11-07 RX ADMIN — INSULIN GLARGINE 21 UNITS: 100 INJECTION, SOLUTION SUBCUTANEOUS at 21:17

## 2021-11-07 RX ADMIN — BUMETANIDE 2 MG: 0.25 INJECTION INTRAMUSCULAR; INTRAVENOUS at 15:50

## 2021-11-07 RX ADMIN — IPRATROPIUM BROMIDE AND ALBUTEROL SULFATE 1 AMPULE: .5; 2.5 SOLUTION RESPIRATORY (INHALATION) at 08:47

## 2021-11-07 RX ADMIN — CEFTRIAXONE SODIUM 2000 MG: 2 INJECTION, POWDER, FOR SOLUTION INTRAMUSCULAR; INTRAVENOUS at 09:02

## 2021-11-07 ASSESSMENT — PAIN SCALES - GENERAL
PAINLEVEL_OUTOF10: 0
PAINLEVEL_OUTOF10: 0

## 2021-11-07 NOTE — CONSULTS
[facility] CONSULT NOTE    Reason for Consult: Endocarditis  Requested by: Valerie Rust MD     Chief complaint: Shortness of breath    History Obtained From: Patient and EMR      HISTORY [place]              The patient is a 66 y.o. 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 Karen Sanchez and was given a 4-week course of ceftriaxone until 11/10, presented on 11/06 with shortness of breath for 2 days, accompanied by cough for 2 weeks. On admission, he was afebrile and hemodynamically stable with no leukocytosis. Chest CTA showed low lung volumes with large right pleural effusion with compressive atelectasis of right lower lung and mildly elevated left hemidiaphragm due to ascites. He received a dose of cefepime, ceftriaxone, and vancomycin. ID service was subsequently consulted for further recommendations.     Past Medical History  Past Medical History:   Diagnosis Date    Buccal mass 09/2020    Diabetes mellitus (HCC)     Esophageal varices (HCC)     Hepatitis C     treated and resolved    Liver cirrhosis (HCC)     Mild dementia (HCC)     no meds    Positive colorectal cancer screening using Cologuard test 04/18/2021    Referred to Dr Denae Hall Colonoscopy       Current Facility-Administered Medications   Medication Dose Route Frequency Provider Last Rate Last Admin    ipratropium-albuterol (DUONEB) nebulizer solution 1 ampule  1 ampule Inhalation 4x daily Valerie Rust MD   1 ampule at 11/07/21 1051    enoxaparin (LOVENOX) injection 40 mg  40 mg SubCUTAneous Daily Valerie Rust MD        insulin lispro (HUMALOG) injection vial 0-12 Units  0-12 Units SubCUTAneous TID  Valerie Rust MD        insulin lispro (HUMALOG) injection vial 0-6 Units  0-6 Units SubCUTAneous Nightly Valerie Rust MD        glucose (GLUTOSE) 40 % oral gel 15 g  15 g Oral PRN Concepcion Tobar MD        dextrose 50 % IV solution  12.5 g IntraVENous PRN Concepcion Tobar MD        glucagon (rDNA) injection 1 mg  1 mg IntraMUSCular PRN Concepcion Tobar MD        dextrose 5 % solution  100 mL/hr IntraVENous PRN Concepcion Tobar MD        albumin human 25 % IV solution 50 g  50 g IntraVENous Once Concepcion Tobar MD        bumetanide [facility]) injection 2 mg  2 mg IntraVENous Once Concepcion Tobar MD        sodium chloride flush 0.9 % injection 10 mL  10 mL IntraVENous PRN Yesi Prater DO         Current Outpatient Medications   Medication Sig Dispense Refill    furosemide (LASIX) 20 MG tablet Take 1 tablet by mouth daily 30 tablet 0    blood glucose test strips (ASCENSIA AUTODISC VI;ONE TOUCH ULTRA TEST VI) strip 1 each by In Vitro route 4 times daily One touch test strips 360 each 3    escitalopram (LEXAPRO) 5 MG tablet Take 1 tablet by mouth nightly Take 5 mg by mouth nightly 30 tablet 0    cefTRIAXone (ROCEPHIN) infusion Infuse 2,000 mg intravenously every 24 hours for 28 days Compound per protocol 56 g 0    MISC NATURAL PRODUCTS PO Take 1 tablet by mouth daily -OSKAR BLUE-      CRANBERRY PO Take 1 tablet by mouth as needed (URINARY)      Apoaequorin (PREVAGEN PO) Take 1 tablet by mouth daily       donepezil (ARICEPT) 5 MG tablet Take 1 tablet by mouth daily (with breakfast) 30 tablet 5    insulin aspart (NOVOLOG FLEXPEN) 100 UNIT/ML injection pen Inject 14 Units into the skin 3 times daily (before meals) If BS less than 100, no coverage  If greater than 150, 14 units 15 pen 1    vitamin D-3 (CHOLECALCIFEROL) 125 MCG (5000 UT) TABS Take 5,000 Units by mouth daily       Insulin Degludec (TRESIBA FLEXTOUCH) 200 UNIT/ML SOPN Inject 26 Units into the skin nightly 4 pen 0    Probiotic Product (PROBIOTIC MULTI-ENZYME) TABS Take 1 tablet by mouth daily       Omega-3 Fatty Acids (FISH OIL) 1000 MG CAPS Take 1,000 mg by mouth daily       vitamin C (ASCORBIC ACID) 500 MG tablet Take 1,000 mg by mouth daily       vitamin B-12 (CYANOCOBALAMIN) 500 MCG tablet Take 500 mcg by mouth daily       vitamin E 400 UNIT capsule Take 400 Units by mouth daily       b complex vitamins capsule Take 1 capsule by mouth daily       Calcium-Magnesium-Zinc 500-250-12.5 MG TABS Take 1 tablet by mouth daily        Facility-Administered Medications Ordered in Other Encounters   Medication Dose Route Frequency Provider Last Rate Last Admin    cefTRIAXone (ROCEPHIN) 2,000 mg in sterile water 20 mL IV syringe  2,000 mg IntraVENous Once DAVID Valle NP        sodium chloride flush 0.9 % injection 5-10 mL  5-10 mL IntraVENous PRN DAVID Valle NP        heparin flush 100 UNIT/ML injection 300 Units  300 Units IntraCATHeter PRN DAVID Valle NP           Allergies   Allergen Reactions    Ketorolac Tromethamine Shortness Of Breath and Other (See Comments)     Depression, \"walking like a zombie\", no desire to do anything.     Memantine Hcl Other (See Comments)     AMS, \"walking like a zombie\"    Tamsulosin Hcl Shortness Of Breath       Surgical History  Past Surgical History:   Procedure Laterality Date    BIOPSY MOUTH LESION Left 06/04/2020    EXCISIONAL BIOPSY LEFT BUCCAL MUCOSAL LESION performed [facility]  2017    ENDOSCOPY, COLON, DIAGNOSTIC  2019 and 2020    HC INSERT PICC CATH, 5/> YRS  10/14/2021         HERNIA REPAIR Right 2018    inguinal     MOUTH SURGERY Right 09/24/2020    EXCISIONAL BIOPSY OF RIGHT BUCCOL LESION performed by Trent Phoenix MD at [facility] [address]      UPPER GASTROINTESTINAL ENDOSCOPY N/A 06/19/2018    EGD BAND LIGATION performed by Jodie Vergara MD at [address] [facility]  09/01/2020    Dr Kayleigh Zuñiga medium sized esophageal varices--placed 6 rubber bands on varices--normal duodenum        Social History  Social History     Socioeconomic History    Marital status:     Number of children: 3   Occupational History    Occupation: retired     Comment: customer service   Tobacco Use    Smoking status: Never Smoker    Smokeless tobacco: Never Used   Vaping Use    Vaping Use: Never used   Substance and Sexual Activity    Alcohol use: Never     Comment: rare socially    Drug use: Never         Family Medical History  Family History   Problem Relation Age of Onset    Stroke Brother     Seizures Brother     Hypertension Brother     Heart Failure Brother        Review of Systems:  Constitutional: No fever, no chills  Eyes: No vision changes, no retroorbital pain  ENT: No hearing changes, no ear pain  Respiratory: No cough, has dyspnea  Cardiovascular: No chest pain, no palpitations  Gastrointestinal: No abdominal pain, no diarrhea  Genitourinary: No dysuria, no hematuria  Integumentary: No rash, no itching  Musculoskeletal: No muscle pain, no joint pain  Neurologic: No headache, has numbness in hands    Physical Examination:  Vitals:    11/07/21 0434 11/07/21 0823 11/07/21 0900 11/07/21 1051   BP:  (!) 143/82 (!) 158/90    Pulse: 100 102 103    Resp: 20 24     Temp: 98 °F (36.7 °C)      TempSrc: Oral      SpO2: 97% 98% 96% 93%   Weight:       Height: 5' 10\" (1.778 m)        Constitutional: Alert, not in distress  Eyes: Sclerae anicteric, no conjunctival erythema  ENT: No buccal lesion, no pharyngeal exudates  Neck: No nuchal rigidity, no cervical adenopathy  Lungs: Decreased breath sounds, no crackles, no wheezes  Heart: Regular rate and rhythm, no murmurs  Abdomen: Bowel sounds present, soft, nontender  Skin: Warm and dry, no active dermatoses  Musculoskeletal: No joint erythema, no joint swelling    Labs, imaging, and medical records/notes were personally reviewed.     Assessment:  Acute hypoxic respiratory failure  Pleural effusion, right  Recent high-grade Streptococcus salivarius bacteremia with probable infective endocarditis, antibiotic treatment ongoing  Cirrhosis  History of hepatitis C (viral load of 2,600,000 IU/mL in 2016)    Plan:  Continue ceftriaxone 2 g every 24 hours. Follow-up blood cultures. Follow-up Pulmonology recommendations for possible thoracentesis. Check urine Streptococcus pneumoniae and Legionella antigens. Check hep C viral load. Continue supportive care. Thank you for involving me in the care of Ulises Schrader. I will continue to follow. Please do not hesitate to call for any questions or concerns.     Electronically signed by Gianna Mcgowan MD on 11/7/2021 at 12:07 PM

## 2021-11-07 NOTE — ED NOTES
Pt again found wandering in [place] looking for spouse, escorted back to room, pt refuses to get in bed, insisting to sit in chair in doorway, states \"I need to sit here so she can see me\", pt oriented to self and time only at this time, states he is unsure of where he is or why he is here     Checo Carl RN  11/07/21 [id number]

## 2021-11-07 NOTE — H&P
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  He is currently on Rocephin on daily basis through the infusion center  Wife reports that his breathing has gotten worse with excessive lower extremity swelling and wheezing as well  No fever or chills  Patient with advanced dementia  He denies much complaints  Audible expiratory wheeze noted  Being admitted for further management  CT angiogram showed no pulmonary emboli  Pleural effusions ascites noted as well  Patient with known history of chronic liver disease and ascites  Data reviewed in detail with the wife    Past Medical History:    Past Medical History:   Diagnosis Date    Buccal mass 09/2020    Diabetes mellitus ([address]     Esophageal varices ([address]     Hepatitis C     treated and resolved    Liver cirrhosis ([address]     Mild dementia ([address]     no meds    Positive colorectal cancer screening using Cologuard test 04/18/2021    Referred to Dr Unique Vela Colonoscopy       Past Surgical History:    Past Surgical History:   Procedure Laterality Date    BIOPSY MOUTH LESION Left 06/04/2020    EXCISIONAL BIOPSY LEFT BUCCAL MUCOSAL LESION performed by Gregor Kaplan MD at [facility]  2017    ENDOSCOPY, COLON, DIAGNOSTIC  2019 and 2020    HC INSERT PICC CATH, 5/> YRS  10/14/2021         HERNIA REPAIR Right 2018    inguinal     MOUTH SURGERY Right 09/24/2020    EXCISIONAL BIOPSY OF RIGHT BUCCOL LESION performed by Gregor Kaplan MD at [address] N/A 06/19/2018 EGD BAND LIGATION performed by Jodie Vergara MD at [address] [facility]  09/01/2020    Dr Kayleigh Zuñiga medium sized esophageal varices--placed 6 rubber bands on varices--normal duodenum       Medications Prior to Admission:    Not in a hospital admission. Allergies:    Ketorolac tromethamine, Memantine hcl, and Tamsulosin hcl    Social History:    reports that he has never smoked. He has never used smokeless tobacco. He reports that he does not drink alcohol and does not use drugs. Family History:   family history includes Heart Failure in his brother; Hypertension in his brother; Seizures in his brother; Stroke in his brother. REVIEW OF SYSTEMS:  As above in the HPI, otherwise negative    PHYSICAL EXAM:    Vitals:  BP (!) 158/90   Pulse 103   Temp 98 °F (36.7 °C) (Oral)   Resp 24   Ht 5' 10\" (1.778 m)   Wt 182 lb (82.6 kg)   SpO2 93%   BMI 26.11 kg/m²     General:  Awake, alert, somewhat confused  In moderate respiratory distress  HEENT:  Normocephalic, atraumatic. Pupils equal, round, reactive to light. No scleral icterus. No conjunctival injection. No nasal discharge. Neck:  Supple  Heart:  RRR, no murmurs, gallops, rubs  Lungs: Diffuse wheezes noted throughout  Abdomen:   Bowel sounds present, soft, nontender, no masses, no organomegaly, no peritoneal signs  Distended with ascites  Extremities:  No clubbing,  Bilateral lower extremity edema is rather extensive  Skin:  Warm and dry, no open lesions or rash  Neuro:  Cranial nerves 2-12 intact, no focal deficits  Breast: deferred  Rectal: deferred  Genitalia:  deferred    LABS:  Data reviewed      ASSESSMENT:      Active Problems:    Acute respiratory failure with hypoxia (HCC)  From pleural effusion and ascites  Advanced liver disease  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

## 2021-11-08 ENCOUNTER — HOSPITAL ENCOUNTER (OUTPATIENT)
Dept: INFUSION THERAPY | Age: 71
Setting detail: INFUSION SERIES
End: 2021-11-08
Payer: MEDICARE

## 2021-11-08 ENCOUNTER — APPOINTMENT (OUTPATIENT)
Dept: GENERAL RADIOLOGY | Age: 71
DRG: 189 | End: 2021-11-08
Payer: MEDICARE

## 2021-11-08 ENCOUNTER — TELEPHONE (OUTPATIENT)
Dept: INFUSION THERAPY | Age: 71
End: 2021-11-08

## 2021-11-08 LAB
ALBUMIN SERPL-MCNC: 3.5 G/DL (ref 3.5–5.2)
ALP BLD-CCNC: 80 U/L (ref 40–129)
ALT SERPL-CCNC: 43 U/L (ref 0–40)
ANION GAP SERPL CALCULATED.3IONS-SCNC: 8 MMOL/L (ref 7–16)
AST SERPL-CCNC: 46 U/L (ref 0–39)
BASOPHILS ABSOLUTE: 0.02 E9/L (ref 0–0.2)
BASOPHILS RELATIVE PERCENT: 0.4 % (ref 0–2)
BILIRUB SERPL-MCNC: 1 MG/DL (ref 0–1.2)
BUN BLDV-MCNC: 15 MG/DL (ref 6–23)
CALCIUM SERPL-MCNC: 8.4 MG/DL (ref 8.6–10.2)
CHLORIDE BLD-SCNC: 102 MMOL/L (ref 98–107)
CO2: 28 MMOL/L (ref 22–29)
CREAT SERPL-MCNC: 0.6 MG/DL (ref 0.7–1.2)
EOSINOPHILS ABSOLUTE: 0.11 E9/L (ref 0.05–0.5)
EOSINOPHILS RELATIVE PERCENT: 2.4 % (ref 0–6)
GFR AFRICAN AMERICAN: >60
GFR NON-AFRICAN AMERICAN: >60 ML/MIN/1.73
GLUCOSE BLD-MCNC: 163 MG/DL (ref 74–99)
HCT VFR BLD CALC: 34 % (ref 37–54)
HEMOGLOBIN: 11.8 G/DL (ref 12.5–16.5)
IMMATURE GRANULOCYTES #: 0.02 E9/L
IMMATURE GRANULOCYTES %: 0.4 % (ref 0–5)
L. PNEUMOPHILA SEROGP 1 UR AG: NORMAL
LYMPHOCYTES ABSOLUTE: 0.74 E9/L (ref 1.5–4)
LYMPHOCYTES RELATIVE PERCENT: 16.4 % (ref 20–42)
MCH RBC QN AUTO: 33.2 PG (ref 26–35)
MCHC RBC AUTO-ENTMCNC: 34.7 % (ref 32–34.5)
MCV RBC AUTO: 95.8 FL (ref 80–99.9)
METER GLUCOSE: 167 MG/DL (ref 74–99)
METER GLUCOSE: 200 MG/DL (ref 74–99)
METER GLUCOSE: 203 MG/DL (ref 74–99)
METER GLUCOSE: 240 MG/DL (ref 74–99)
MONOCYTES ABSOLUTE: 0.45 E9/L (ref 0.1–0.95)
MONOCYTES RELATIVE PERCENT: 10 % (ref 2–12)
NEUTROPHILS ABSOLUTE: 3.18 E9/L (ref 1.8–7.3)
NEUTROPHILS RELATIVE PERCENT: 70.4 % (ref 43–80)
PDW BLD-RTO: 14.6 FL (ref 11.5–15)
PLATELET # BLD: 70 E9/L (ref 130–450)
PLATELET CONFIRMATION: NORMAL
PMV BLD AUTO: 10 FL (ref 7–12)
POTASSIUM SERPL-SCNC: 4.1 MMOL/L (ref 3.5–5)
RBC # BLD: 3.55 E12/L (ref 3.8–5.8)
SODIUM BLD-SCNC: 138 MMOL/L (ref 132–146)
STREP PNEUMONIAE ANTIGEN, URINE: NORMAL
TOTAL PROTEIN: 5.9 G/DL (ref 6.4–8.3)
WBC # BLD: 4.5 E9/L (ref 4.5–11.5)

## 2021-11-08 PROCEDURE — 2500000003 HC RX 250 WO HCPCS: Performed by: INTERNAL MEDICINE

## 2021-11-08 PROCEDURE — 6370000000 HC RX 637 (ALT 250 FOR IP): Performed by: INTERNAL MEDICINE

## 2021-11-08 PROCEDURE — 6360000002 HC RX W HCPCS: Performed by: INTERNAL MEDICINE

## 2021-11-08 PROCEDURE — 2580000003 HC RX 258: Performed by: RADIOLOGY

## 2021-11-08 PROCEDURE — 87522 HEPATITIS C REVRS TRNSCRPJ: CPT

## 2021-11-08 PROCEDURE — 85025 COMPLETE CBC W/AUTO DIFF WBC: CPT

## 2021-11-08 PROCEDURE — P9047 ALBUMIN (HUMAN), 25%, 50ML: HCPCS | Performed by: INTERNAL MEDICINE

## 2021-11-08 PROCEDURE — 2700000000 HC OXYGEN THERAPY PER DAY

## 2021-11-08 PROCEDURE — 2060000000 HC ICU INTERMEDIATE R&B

## 2021-11-08 PROCEDURE — 80053 COMPREHEN METABOLIC PANEL: CPT

## 2021-11-08 PROCEDURE — 71045 X-RAY EXAM CHEST 1 VIEW: CPT

## 2021-11-08 PROCEDURE — 94640 AIRWAY INHALATION TREATMENT: CPT

## 2021-11-08 PROCEDURE — 2580000003 HC RX 258: Performed by: INTERNAL MEDICINE

## 2021-11-08 PROCEDURE — 82962 GLUCOSE BLOOD TEST: CPT

## 2021-11-08 PROCEDURE — 36415 COLL VENOUS BLD VENIPUNCTURE: CPT

## 2021-11-08 RX ORDER — BUMETANIDE 0.25 MG/ML
2 INJECTION, SOLUTION INTRAMUSCULAR; INTRAVENOUS ONCE
Status: COMPLETED | OUTPATIENT
Start: 2021-11-08 | End: 2021-11-08

## 2021-11-08 RX ORDER — ALBUMIN (HUMAN) 12.5 G/50ML
50 SOLUTION INTRAVENOUS ONCE
Status: COMPLETED | OUTPATIENT
Start: 2021-11-08 | End: 2021-11-08

## 2021-11-08 RX ORDER — SPIRONOLACTONE 25 MG/1
25 TABLET ORAL DAILY
Status: DISCONTINUED | OUTPATIENT
Start: 2021-11-08 | End: 2021-11-09

## 2021-11-08 RX ADMIN — INSULIN GLARGINE 21 UNITS: 100 INJECTION, SOLUTION SUBCUTANEOUS at 21:48

## 2021-11-08 RX ADMIN — DONEPEZIL HYDROCHLORIDE 5 MG: 5 TABLET, FILM COATED ORAL at 10:03

## 2021-11-08 RX ADMIN — INSULIN LISPRO 4 UNITS: 100 INJECTION, SOLUTION INTRAVENOUS; SUBCUTANEOUS at 09:55

## 2021-11-08 RX ADMIN — CEFTRIAXONE SODIUM 2000 MG: 2 INJECTION, POWDER, FOR SOLUTION INTRAMUSCULAR; INTRAVENOUS at 10:03

## 2021-11-08 RX ADMIN — ESCITALOPRAM 5 MG: 10 TABLET, FILM COATED ORAL at 21:40

## 2021-11-08 RX ADMIN — IPRATROPIUM BROMIDE AND ALBUTEROL SULFATE 1 AMPULE: .5; 2.5 SOLUTION RESPIRATORY (INHALATION) at 13:03

## 2021-11-08 RX ADMIN — INSULIN LISPRO 4 UNITS: 100 INJECTION, SOLUTION INTRAVENOUS; SUBCUTANEOUS at 21:48

## 2021-11-08 RX ADMIN — IPRATROPIUM BROMIDE AND ALBUTEROL SULFATE 1 AMPULE: .5; 2.5 SOLUTION RESPIRATORY (INHALATION) at 21:13

## 2021-11-08 RX ADMIN — INSULIN LISPRO 2 UNITS: 100 INJECTION, SOLUTION INTRAVENOUS; SUBCUTANEOUS at 13:09

## 2021-11-08 RX ADMIN — BUMETANIDE 2 MG: 0.25 INJECTION INTRAMUSCULAR; INTRAVENOUS at 12:05

## 2021-11-08 RX ADMIN — ENOXAPARIN SODIUM 40 MG: 100 INJECTION SUBCUTANEOUS at 09:55

## 2021-11-08 RX ADMIN — INSULIN LISPRO 4 UNITS: 100 INJECTION, SOLUTION INTRAVENOUS; SUBCUTANEOUS at 18:06

## 2021-11-08 RX ADMIN — ALBUMIN (HUMAN) 50 G: 0.25 INJECTION, SOLUTION INTRAVENOUS at 10:04

## 2021-11-08 RX ADMIN — SODIUM CHLORIDE, PRESERVATIVE FREE 10 ML: 5 INJECTION INTRAVENOUS at 10:03

## 2021-11-08 RX ADMIN — IPRATROPIUM BROMIDE AND ALBUTEROL SULFATE 1 AMPULE: .5; 2.5 SOLUTION RESPIRATORY (INHALATION) at 09:55

## 2021-11-08 RX ADMIN — SPIRONOLACTONE 25 MG: 25 TABLET ORAL at 10:03

## 2021-11-08 ASSESSMENT — PAIN SCALES - GENERAL
PAINLEVEL_OUTOF10: 0
PAINLEVEL_OUTOF10: 0

## 2021-11-08 NOTE — PROGRESS NOTES
Renate Lockett MD FACP  Internal medicine  Progress notes      CHIEF COMPLAINT: Shortness of breath      HISTORY OF PRESENT ILLNESS:    67/2021  21-year-old man seen on the emergency room floor earlier this morning at main campus  Spoke with the ER physician at the time of admission  Respiratory therapist and patient's wife were both at bedside  He was recently hospitalized in [place] for streptococcal endocarditis  He is currently on Rocephin on daily basis through the infusion center  Wife reports that his breathing has gotten worse with excessive lower extremity swelling and wheezing as well  No fever or chills  Patient with advanced dementia  He denies much complaints  Audible expiratory wheeze noted  Being admitted for further management  CT angiogram showed no pulmonary emboli  Pleural effusions ascites noted as well  Patient with known history of chronic liver disease and ascites  Data reviewed in detail with the wife  11/8/2021  Patient was seen on the floor earlier this morning  Wife at bedside  He was sitting up and eating breakfast without any oxygen  Feels much better  Tolerating medications  Data reviewed in detail    Past Medical History:    Past Medical History:   Diagnosis Date    Buccal mass 09/2020    Diabetes mellitus ([address]     Esophageal varices ([address]     Hepatitis C     treated and resolved    Liver cirrhosis ([address]     Mild dementia ([address]     no meds    Positive colorectal cancer screening using Cologuard test 04/18/2021    Referred to Dr Titi Ching Colonoscopy       Past Surgical History:    Past Surgical History:   Procedure Laterality Date    BIOPSY MOUTH LESION Left 06/04/2020    EXCISIONAL BIOPSY LEFT BUCCAL MUCOSAL LESION performed by Mathew Rivas MD at [address] [place] [address]  2017    ENDOSCOPY, COLON, DIAGNOSTIC  2019 and 2020    HC INSERT PICC CATH, 5/> YRS  10/14/2021         HERNIA REPAIR Right 2018    inguinal     MOUTH SURGERY Right 09/24/2020    EXCISIONAL BIOPSY OF RIGHT BUCCOL LESION performed by Gregor Kaplan MD at [address]      UPPER GASTROINTESTINAL ENDOSCOPY N/A 06/19/2018    EGD BAND LIGATION performed by Preston Banks MD at [address] [facility]Third [facility]  09/01/2020    Dr Gorge Chou medium sized esophageal varices--placed 6 rubber bands on varices--normal duodenum       Medications Prior to Admission:    Medications Prior to Admission: escitalopram (LEXAPRO) 5 MG tablet, Take 5 mg by mouth daily  furosemide (LASIX) 20 MG tablet, Take 1 tablet by mouth daily  blood glucose test strips (ASCENSIA AUTODISC VI;ONE TOUCH ULTRA TEST VI) strip, 1 each by In Vitro route 4 times daily One touch test strips  cefTRIAXone (ROCEPHIN) infusion, Infuse 2,000 mg intravenously every 24 hours for 28 days Compound per protocol  MISC NATURAL PRODUCTS PO, Take 1 tablet by mouth daily -OSKAR BLUE-  CRANBERRY PO, Take 1 tablet by mouth as needed (URINARY)  Apoaequorin (PREVAGEN PO), Take 1 tablet by mouth daily   donepezil (ARICEPT) 5 MG tablet, Take 1 tablet by mouth daily (with breakfast)  insulin aspart (NOVOLOG FLEXPEN) 100 UNIT/ML injection pen, Inject 14 Units into the skin 3 times daily (before meals) If BS less than 100, no coverage If greater than 150, 14 units  vitamin D-3 (CHOLECALCIFEROL) 125 MCG (5000 UT) TABS, Take 5,000 Units by mouth daily   Insulin Degludec (TRESIBA FLEXTOUCH) 200 UNIT/ML SOPN, Inject 26 Units into the skin nightly  Probiotic Product (PROBIOTIC MULTI-ENZYME) TABS, Take 1 tablet by mouth daily   Omega-3 Fatty Acids (FISH OIL) 1000 MG CAPS, Take 1,000 mg by mouth daily   vitamin C (ASCORBIC ACID) 500 MG tablet, Take 1,000 mg by mouth daily   vitamin B-12 (CYANOCOBALAMIN) 500 MCG tablet, Take 500 mcg by mouth daily   vitamin E 400 UNIT capsule, Take 400 Units by mouth daily   b complex vitamins capsule, Take 1 capsule by mouth daily Calcium-Magnesium-Zinc 500-250-12.5 MG TABS, Take 1 tablet by mouth daily     Allergies:    Ketorolac tromethamine, Memantine hcl, and Tamsulosin hcl    Social History:    reports that he has never smoked. He has never used smokeless tobacco. He reports that he does not drink alcohol and does not use drugs. Family History:   family history includes Heart Failure in his brother; Hypertension in his brother; Seizures in his brother; Stroke in his brother. REVIEW OF SYSTEMS:  As above in the HPI, otherwise negative    PHYSICAL EXAM:    Vitals:  BP (!) 141/79   Pulse 96   Temp 98.2 °F (36.8 °C) (Oral)   Resp 18   Ht 5' 10\" (1.778 m)   Wt 182 lb (82.6 kg)   SpO2 97%   BMI 26.11 kg/m²     General:  Awake, alert, somewhat confused  In moderate respiratory distress  HEENT:  Normocephalic, atraumatic. Pupils equal, round, reactive to light. No scleral icterus. No conjunctival injection. No nasal discharge. Neck:  Supple  Heart:  RRR, no murmurs, gallops, rubs  Lungs: Diffuse wheezes noted throughout/much improved  Abdomen:   Bowel sounds present, soft, nontender, no masses, no organomegaly, no peritoneal signs  Distended with ascites  Extremities:  No clubbing,  Bilateral lower extremity edema is rather extensive/much improved today  Skin:  Warm and dry, no open lesions or rash  Neuro:  Cranial nerves 2-12 intact, no focal deficits  Breast: deferred  Rectal: deferred  Genitalia:  deferred    LABS:  Data reviewed      ASSESSMENT:      Active Problems:    Acute respiratory failure with hypoxia (HCC)  From pleural effusion and ascites  Advanced liver disease  Recent endocarditis with Streptococcus on IV Rocephin therapy  No signs of congestive heart failure  Advanced dementia  Type 2 diabetes mellitus      PLAN:  Repeat chest x-ray to assess the pleural effusion  Continue Rocephin  IV Bumex preceded by albumin infusion  Pulmonary and ID consults  Initiate Aldactone  Glucose monitoring with sliding scale  Monitor labs closely  Bronchodilators  Questions answered to wife satisfaction    Gina Sood MD  1:41 PM  11/8/2021

## 2021-11-08 NOTE — CONSULTS
[address] NOTE    Patient: Luzmaria Can  MRN: [id number]  : 1950    Encounter Date: 2021  Encounter Time: 1:59 PM     Date of Admission: .2021  6:24 PM    Consulting Physician:  Primary Care Physician:      Tracy Le MD     [phone]    PROBLEM LIST:  Patient Active Problem List   Diagnosis    Type 1 diabetes mellitus without complication ([address]    Anemia    Hepatic cirrhosis due to chronic hepatitis C infection ([address]    Secondary esophageal varices with bleeding ([address]    Dementia associated with other underlying disease without behavioral disturbance ([address]    B12 deficiency    Streptococcal bacteremia    Acute respiratory failure with hypoxia ([address]     Reason for Consultation: Pleural Effusions    HPI:   Luzmaria Can is a pleasant 66 y.o. 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 Wife answers most questions due to dementia. S/P EGD 18 -Three large varices, grade 3/4, banded with three bands, otherwise, unremarkable.  Stomach showed mild gastritis. Duodenum unremarkable.     S/P EGD 21 with 5 medium varices banded with 6 bands. Duodenum unremarkable. This admission patient had CTA that was negative for pulmonary embolism but noted B/L pleural effusions. Patient had no fevers, chills, sick contacts, chest pains. He remains on supplemental oxygen at this time.      PAST MEDICAL HISTORY:   Past Medical History:   Diagnosis Date    Buccal mass 2020    Diabetes mellitus ([address]     Esophageal varices (HCC)     Hepatitis C     treated and resolved    Liver cirrhosis (HCC)     Mild dementia ([address]     no meds    Positive colorectal cancer screening using Cologuard test 2021    Referred to Dr Crocker Person Colonoscopy       PAST SURGICAL HISTORY: Past Surgical History:   Procedure Laterality Date    BIOPSY MOUTH LESION Left 06/04/2020    EXCISIONAL BIOPSY LEFT BUCCAL MUCOSAL LESION performed by Kristie Ohara MD at [facility] COLONOSCOPY  2017    ENDOSCOPY, COLON, DIAGNOSTIC  2019 and 2020    HC INSERT PICC CATH, 5/> YRS  10/14/2021         HERNIA REPAIR Right 2018    inguinal     MOUTH SURGERY Right 09/24/2020    EXCISIONAL BIOPSY OF RIGHT BUCCOL LESION performed by Krist[address] Ohara MD [address]      UPPER GASTROINTESTINAL ENDOSCOPY N/A 06/19/2018    EGD BAND LIGATION performed by Phineas Mcburney, MD at [address] [place] [address]  09/01/2020    Dr Allyn Medina medium sized esophageal varices--placed 6 rubber bands on varices--normal duodenum       FAMILY HISTORY:   Family History   Problem Relation Age of Onset    Stroke Brother     Seizures Brother     Hypertension Brother     Heart Failure Brother        SOCIAL HISTORY:   Social History     Socioeconomic History    Marital status:      Spouse name: Not on file    Number of children: 3    Years of education: Not on file    Highest education level: Not on file   Occupational History    Occupation: retired     Comment: customer service   Tobacco Use    Smoking status: Never Smoker    Smokeless tobacco: Never Used   Vaping Use    Vaping Use: Never used   Substance and Sexual Activity    Alcohol use: Never     Comment: rare socially    Drug use: Never    Sexual activity: Not Currently   Other Topics Concern    Not on file   Social History Narrative    Not on file     Social Determinants of Health     Financial Resource Strain: Low Risk     Difficulty of Paying Living Expenses: Not hard at all   Food Insecurity: No Food Insecurity    Worried About Running Out of Food in the Last Year: Never true    Elisa of Food in the Last Year: Never true   Transportation Needs: No Transportation Needs    Lack of Transportation (Medical): No    Lack of Transportation (Non-Medical): No   Physical Activity:     Days of Exercise per Week: Not on file    Minutes of Exercise per Session: Not on file   Stress:     Feeling of Stress : Not on file   Social Connections:     Frequency of Communication with Friends and Family: Not on file    Frequency of Social Gatherings with Friends and Family: Not on file    Attends Zoroastrianism Services: Not on file    Active Member of [address] [place] [address] or Organizations: Not on file    Attends Club or Organization Meetings: Not on file    Marital Status: Not on file   Intimate Partner Violence:     Fear of Current or Ex-Partner: Not on file    Emotionally Abused: Not on file    Physically Abused: Not on file    Sexually Abused: Not on file   Housing Stability:     Unable to Pay for Housing in the Last Year: Not on file    Number of [place] in the Last Year: Not on file    Unstable Housing in the Last Year: Not on file     Social History     Tobacco Use   Smoking Status Never Smoker   Smokeless Tobacco Never Used     Social History     Substance and Sexual Activity   Alcohol Use Never    Comment: rare socially     Social History     Substance and Sexual Activity   Drug Use Never     HOME MEDICATIONS:  Prior to Admission medications    Medication Sig Start Date End Date Taking?  Authorizing Provider   escitalopram (LEXAPRO) 5 MG tablet Take 5 mg by mouth daily   Yes Historical Provider, MD   furosemide (LASIX) 20 MG tablet Take 1 tablet by mouth daily 11/2/21  Yes Valerie Rust MD   blood glucose test strips (ASCENSIA AUTODISC VI;ONE TOUCH ULTRA TEST VI) strip 1 each by In Vitro route 4 times daily One touch test strips 10/29/21  Yes Valerie Rust MD   cefTRIAXone (ROCEPHIN) infusion Infuse 2,000 mg intravenously every 24 hours for 28 days Compound per protocol 10/13/21 11/10/21 Yes DAVID Fam - CNP   MISC NATURAL PRODUCTS PO Take 1 tablet by mouth daily -[address] [facility] [facility]-   Yes Historical Provider, MD   CRANBERRY PO Take 1 tablet by mouth as needed (URINARY)   Yes Historical Provider, MD   Apoaequorin (PREVAGEN PO) Take 1 tablet by mouth daily    Yes Historical Provider, MD   donepezil (ARICEPT) 5 MG tablet Take 1 tablet by mouth daily (with breakfast) 8/6/21  Yes Monae Gasca MD   insulin aspart (NOVOLOG FLEXPEN) 100 UNIT/ML injection pen Inject 14 Units into the skin 3 times daily (before meals) If BS less than 100, no coverage  If greater than 150, 14 units 7/13/21 11/7/21 Yes Roseanna Menjivar MD   vitamin D-3 (CHOLECALCIFEROL) 125 MCG (5000 UT) TABS Take 5,000 Units by mouth daily    Yes Historical Provider, MD   Insulin Degludec (TRESIBA FLEXTOUCH) 200 UNIT/ML SOPN Inject 26 Units into the skin nightly 3/9/21  Yes Roseanna Menjivar MD   Probiotic Product (PROBIOTIC MULTI-ENZYME) TABS Take 1 tablet by mouth daily    Yes Historical Provider, MD   Omega-3 Fatty Acids (FISH OIL) 1000 MG CAPS Take 1,000 mg by mouth daily    Yes Historical Provider, MD   vitamin C (ASCORBIC ACID) 500 MG tablet Take 1,000 mg by mouth daily    Yes Historical Provider, MD   vitamin B-12 (CYANOCOBALAMIN) 500 MCG tablet Take 500 mcg by mouth daily    Yes Historical Provider, MD   vitamin E 400 UNIT capsule Take 400 Units by mouth daily    Yes Historical Provider, MD   b complex vitamins capsule Take 1 capsule by mouth daily    Yes Historical Provider, MD   Calcium-Magnesium-Zinc 500-250-12.5 MG TABS Take 1 tablet by mouth daily    Yes Historical Provider, MD       CURRENT MEDICATIONS:  Current Facility-Administered Medications: spironolactone (ALDACTONE) tablet 25 mg, 25 mg, Oral, Daily  ipratropium-albuterol (DUONEB) nebulizer solution 1 ampule, 1 ampule, Inhalation, 4x daily  donepezil (ARICEPT) tablet 5 mg, 5 mg, Oral, Daily with breakfast  escitalopram (LEXAPRO) tablet 5 mg, 5 mg, Oral, Nightly  enoxaparin (LOVENOX) injection 40 mg, 40 mg, SubCUTAneous, Daily  insulin lispro Genito-Urinary ROS:      - Negative For: change in urinary stream, dysuria, hematuria or incontinence   Musculoskeletal ROS:      - Negative For: joint pain, joint stiffness, joint swelling or muscle pain   Neurological ROS:     - Negative For: behavioral changes, confusion, dizziness, headaches, impaired coordination/balance or memory loss   Dermatological ROS:      - Negative For: hair changes, lumps, mole changes, nail changes or pruritus    PHYSICAL EXAMINATION:     VITAL SIGNS:  BP (!) 141/79   Pulse 96   Temp 98.2 °F (36.8 °C) (Oral)   Resp 18   Ht 5' 10\" (1.778 m)   Wt 182 lb (82.6 kg)   SpO2 97%   BMI 26.11 kg/m²   Wt Readings from Last 3 Encounters:   21 182 lb (82.6 kg)   21 182 lb (82.6 kg)   21 182 lb (82.6 kg)     Temp Readings from Last 3 Encounters:   21 98.2 °F (36.8 °C) (Oral)   21 98.1 °F (36.7 °C)   21 98.1 °F (36.7 °C) (Oral)     TMAX:  BP Readings from Last 3 Encounters:   21 (!) 141/79   21 (!) 151/75   21 (!) 146/74     Pulse Readings from Last 3 Encounters:   21 96   21 91   21 90       CURRENT PULSE OXIMETRY: SpO2: 97 %  24HR PULSE OXIMETRY RANGE: SpO2  Av %  Min: 90 %  Max: 97 %  CVP:      ________________________________________________________________________    VENTILATOR SETTINGS (if applicable):         Vent Information  SpO2: 97 %  Additional Respiratory  Assessments  Pulse: 96  Resp: 18  SpO2: 97 %  ETCO2:  Peak Inspiratory Pressure:  End-Inspiratory Plateau Pressure:    ABG:  Recent Labs     21  1838   PH 7.269*   PO2 64.0*   PCO2 46.3*   HCO3 20.7*   BE -6.2*   O2SAT 88.5*   METHB 0.2   O2HB 87.4*   COHB 1.0   O2CON 18.4   HHB 11.4*   THB 15.0           ________________________________________________________________________    IV ACCESS:    NUTRITION: ADULT DIET;  Regular; 4 carb choices (60 gm/meal)    INTAKE/OUTPUTS:  I/O last 3 completed shifts:  In: -   Out: 750 [Urine:750]    Intake/Output Summary (Last 24 hours) at 11/8/2021 1359  Last data filed at 11/8/2021 0850  Gross per 24 hour   Intake 180 ml   Output 750 ml   Net -570 ml     General Appearance: well-developed and well-nourished, in no acute distress   Eyes: pupils equal, round, and reactive to light, extraocular eye movements intact, conjunctivae normal and sclera anicteric   Neck: neck supple and non tender without mass, no thyromegaly, no thyroid nodules and no cervical adenopathy   Pulmonary/Chest: decreased breath sounds, no accessory muscles of inspiration, no focal wheezes  Cardiovascular: normal rate, regular rhythm, normal S1 and S2, no murmurs, rubs, clicks or gallops, distal pulses intact, no carotid bruits, no murmurs, no gallops, no carotid bruits and no JVD   Abdomen: soft, non-tender, non-distended, normal bowel sounds, no masses or organomegaly   Extremities: no cyanosis, no clubbing, no edema  Musculoskeletal: normal range of motion, no joint swelling, deformity or tenderness   Neurologic: reflexes normal and symmetric, no cranial nerve deficit noted    LABS/IMAGING:    CBC:  Lab Results   Component Value Date    WBC 4.5 11/08/2021    HGB 11.8 (L) 11/08/2021    HCT 34.0 (L) 11/08/2021    MCV 95.8 11/08/2021    PLT 70 (L) 11/08/2021    LYMPHOPCT 16.4 (L) 11/08/2021    RBC 3.55 (L) 11/08/2021    MCH 33.2 11/08/2021    MCHC 34.7 (H) 11/08/2021    RDW 14.6 11/08/2021    NEUTOPHILPCT 70.4 11/08/2021    MONOPCT 10.0 11/08/2021    EOSPCT 2.0 10/21/2020    BASOPCT 0.4 11/08/2021    NEUTROABS 3.18 11/08/2021    LYMPHSABS 0.74 (L) 11/08/2021    MONOSABS 0.45 11/08/2021    EOSABS 0.11 11/08/2021    BASOSABS 0.02 11/08/2021       Recent Labs     11/08/21  0504 11/07/21  0429 11/06/21  1843   WBC 4.5 3.1* 6.0   HGB 11.8* 13.3 14.5   HCT 34.0* 38.9 42.9   MCV 95.8 97.0 98.4   PLT 70* 72* 125*       BMP:   Recent Labs     11/06/21  1843 11/07/21  0429 11/08/21  0504    135 138   K 4.2 4.9 4.1    102 also increased since November 6 and are suggestive of either   multifocal pneumonia or pulmonary edema. ASSESSMENT:  · B/L Pleural effusion  · H/O Streptococcus Bacteremia   · H/O Hematochezia   · VEGA with Hepatic Hydrothorax   · Liver cirrhosis  · Esophageal varices  · DM  · Hx Hep C (treated)    PLAN:  · O2, IS  · Thoracentesis vs chest tube discussed with wife, she defers on this at this time and will reconsider at future time   · Serial labs  · Supportive care  · COVID negative     Thank you Pat Hopson MD very much for allowing me to see this patient in consultation and follow up. Care reviewed with nursing staff, medical and surgical specialty care, primary care and the patient's family as available. Restraints are ordered when the patient can do harm to him/herself by pulling out devices.     Cordelia Barone MD, M.D.

## 2021-11-08 NOTE — TELEPHONE ENCOUNTER
Wife called  11-8-21 AM and left message patient hospitalized [place] and will not be in for infusion.     Electronically signed by Yana Joe on 11/8/2021 at 8:32 AM

## 2021-11-08 NOTE — PROGRESS NOTES
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 Piter Vargas and was given a 4-week course of ceftriaxone until 11/10, presented on 11/06 with shortness of breath for 2 days, accompanied by cough for 2 weeks. On admission, he was afebrile and hemodynamically stable with no leukocytosis. Chest CTA showed low lung volumes with large right pleural effusion with compressive atelectasis of right lower lung and mildly elevated left hemidiaphragm due to ascites. Urine Streptococcus pneumoniae and Legionella antigens were negative. He received a dose of cefepime, ceftriaxone, and vancomycin. Subjective: Patient was seen and examined. No chills, no abdominal pain, no diarrhea, no rash, no itching, less dyspnea. Objective:    Vitals:    11/08/21 0850   BP: (!) 141/79   Pulse: 96   Resp: 18   Temp: 98.2 °F (36.8 °C)   SpO2: 97%     Constitutional: Alert, not in distress  Respiratory: Clear breath sounds, no crackles, no wheezes  Cardiovascular: Regular rate and rhythm, no murmurs  Gastrointestinal: Bowel sounds present, soft, nontender  Skin: Warm and dry, no active dermatoses  Musculoskeletal: No joint swelling, no joint erythema    Labs, imaging, and medical records/notes were personally reviewed. 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 Follow-up blood cultures.    Follow-up Pulmonology recommendations for possible thoracentesis. Check hep C viral load. Continue supportive care.     Thank you for involving me in the care of Carl Cowden. I will continue to follow. Please do not hesitate to call for any questions or concerns.     Electronically signed by Keisha Braxton MD on 11/8/2021 at 10:09 AM

## 2021-11-09 LAB
ALBUMIN SERPL-MCNC: 3.5 G/DL (ref 3.5–5.2)
ALBUMIN SERPL-MCNC: 3.5 G/DL (ref 3.5–5.2)
ALP BLD-CCNC: 70 U/L (ref 40–129)
ALP BLD-CCNC: 71 U/L (ref 40–129)
ALT SERPL-CCNC: 36 U/L (ref 0–40)
ALT SERPL-CCNC: 36 U/L (ref 0–40)
ANION GAP SERPL CALCULATED.3IONS-SCNC: 10 MMOL/L (ref 7–16)
ANION GAP SERPL CALCULATED.3IONS-SCNC: 7 MMOL/L (ref 7–16)
ANISOCYTOSIS: ABNORMAL
ANISOCYTOSIS: ABNORMAL
AST SERPL-CCNC: 39 U/L (ref 0–39)
AST SERPL-CCNC: 40 U/L (ref 0–39)
BASOPHILS ABSOLUTE: 0.04 E9/L (ref 0–0.2)
BASOPHILS ABSOLUTE: 0.04 E9/L (ref 0–0.2)
BASOPHILS RELATIVE PERCENT: 0.9 % (ref 0–2)
BASOPHILS RELATIVE PERCENT: 0.9 % (ref 0–2)
BILIRUB SERPL-MCNC: 1.6 MG/DL (ref 0–1.2)
BILIRUB SERPL-MCNC: 1.9 MG/DL (ref 0–1.2)
BUN BLDV-MCNC: 14 MG/DL (ref 6–23)
BUN BLDV-MCNC: 14 MG/DL (ref 6–23)
CALCIUM SERPL-MCNC: 8.6 MG/DL (ref 8.6–10.2)
CALCIUM SERPL-MCNC: 8.9 MG/DL (ref 8.6–10.2)
CHLORIDE BLD-SCNC: 100 MMOL/L (ref 98–107)
CHLORIDE BLD-SCNC: 99 MMOL/L (ref 98–107)
CO2: 28 MMOL/L (ref 22–29)
CO2: 30 MMOL/L (ref 22–29)
CREAT SERPL-MCNC: 0.6 MG/DL (ref 0.7–1.2)
CREAT SERPL-MCNC: 0.6 MG/DL (ref 0.7–1.2)
EOSINOPHILS ABSOLUTE: 0.07 E9/L (ref 0.05–0.5)
EOSINOPHILS ABSOLUTE: 0.07 E9/L (ref 0.05–0.5)
EOSINOPHILS RELATIVE PERCENT: 1.7 % (ref 0–6)
EOSINOPHILS RELATIVE PERCENT: 1.8 % (ref 0–6)
GFR AFRICAN AMERICAN: >60
GFR AFRICAN AMERICAN: >60
GFR NON-AFRICAN AMERICAN: >60 ML/MIN/1.73
GFR NON-AFRICAN AMERICAN: >60 ML/MIN/1.73
GLUCOSE BLD-MCNC: 66 MG/DL (ref 74–99)
GLUCOSE BLD-MCNC: 66 MG/DL (ref 74–99)
HCT VFR BLD CALC: 32 % (ref 37–54)
HCT VFR BLD CALC: 32.6 % (ref 37–54)
HEMOGLOBIN: 11.1 G/DL (ref 12.5–16.5)
HEMOGLOBIN: 11.4 G/DL (ref 12.5–16.5)
HYPOCHROMIA: ABNORMAL
HYPOCHROMIA: ABNORMAL
LYMPHOCYTES ABSOLUTE: 0.42 E9/L (ref 1.5–4)
LYMPHOCYTES ABSOLUTE: 0.51 E9/L (ref 1.5–4)
LYMPHOCYTES RELATIVE PERCENT: 13.2 % (ref 20–42)
LYMPHOCYTES RELATIVE PERCENT: 9.6 % (ref 20–42)
MCH RBC QN AUTO: 33.2 PG (ref 26–35)
MCH RBC QN AUTO: 34.1 PG (ref 26–35)
MCHC RBC AUTO-ENTMCNC: 34.7 % (ref 32–34.5)
MCHC RBC AUTO-ENTMCNC: 35 % (ref 32–34.5)
MCV RBC AUTO: 95.8 FL (ref 80–99.9)
MCV RBC AUTO: 97.6 FL (ref 80–99.9)
METER GLUCOSE: 153 MG/DL (ref 74–99)
METER GLUCOSE: 156 MG/DL (ref 74–99)
METER GLUCOSE: 236 MG/DL (ref 74–99)
MONOCYTES ABSOLUTE: 0.16 E9/L (ref 0.1–0.95)
MONOCYTES ABSOLUTE: 0.17 E9/L (ref 0.1–0.95)
MONOCYTES RELATIVE PERCENT: 3.5 % (ref 2–12)
MONOCYTES RELATIVE PERCENT: 4.3 % (ref 2–12)
NEUTROPHILS ABSOLUTE: 3.16 E9/L (ref 1.8–7.3)
NEUTROPHILS ABSOLUTE: 3.53 E9/L (ref 1.8–7.3)
NEUTROPHILS RELATIVE PERCENT: 80.7 % (ref 43–80)
NEUTROPHILS RELATIVE PERCENT: 83.5 % (ref 43–80)
OVALOCYTES: ABNORMAL
OVALOCYTES: ABNORMAL
PDW BLD-RTO: 14.4 FL (ref 11.5–15)
PDW BLD-RTO: 14.4 FL (ref 11.5–15)
PLATELET # BLD: 67 E9/L (ref 130–450)
PLATELET # BLD: 67 E9/L (ref 130–450)
PLATELET CONFIRMATION: NORMAL
PLATELET CONFIRMATION: NORMAL
PMV BLD AUTO: 10.3 FL (ref 7–12)
PMV BLD AUTO: 9.9 FL (ref 7–12)
POIKILOCYTES: ABNORMAL
POIKILOCYTES: ABNORMAL
POTASSIUM SERPL-SCNC: 3.7 MMOL/L (ref 3.5–5)
POTASSIUM SERPL-SCNC: 3.9 MMOL/L (ref 3.5–5)
PROCALCITONIN: 0.1 NG/ML (ref 0–0.08)
RBC # BLD: 3.34 E12/L (ref 3.8–5.8)
RBC # BLD: 3.34 E12/L (ref 3.8–5.8)
SODIUM BLD-SCNC: 137 MMOL/L (ref 132–146)
SODIUM BLD-SCNC: 137 MMOL/L (ref 132–146)
TOTAL PROTEIN: 5.6 G/DL (ref 6.4–8.3)
TOTAL PROTEIN: 5.8 G/DL (ref 6.4–8.3)
WBC # BLD: 3.9 E9/L (ref 4.5–11.5)
WBC # BLD: 4.2 E9/L (ref 4.5–11.5)

## 2021-11-09 PROCEDURE — 97161 PT EVAL LOW COMPLEX 20 MIN: CPT

## 2021-11-09 PROCEDURE — 36415 COLL VENOUS BLD VENIPUNCTURE: CPT

## 2021-11-09 PROCEDURE — 82962 GLUCOSE BLOOD TEST: CPT

## 2021-11-09 PROCEDURE — 2060000000 HC ICU INTERMEDIATE R&B

## 2021-11-09 PROCEDURE — 80053 COMPREHEN METABOLIC PANEL: CPT

## 2021-11-09 PROCEDURE — 2580000003 HC RX 258: Performed by: RADIOLOGY

## 2021-11-09 PROCEDURE — 2580000003 HC RX 258: Performed by: INTERNAL MEDICINE

## 2021-11-09 PROCEDURE — 97165 OT EVAL LOW COMPLEX 30 MIN: CPT

## 2021-11-09 PROCEDURE — 84145 PROCALCITONIN (PCT): CPT

## 2021-11-09 PROCEDURE — 94640 AIRWAY INHALATION TREATMENT: CPT

## 2021-11-09 PROCEDURE — 6360000002 HC RX W HCPCS: Performed by: INTERNAL MEDICINE

## 2021-11-09 PROCEDURE — 2700000000 HC OXYGEN THERAPY PER DAY

## 2021-11-09 PROCEDURE — 36592 COLLECT BLOOD FROM PICC: CPT

## 2021-11-09 PROCEDURE — 6370000000 HC RX 637 (ALT 250 FOR IP): Performed by: INTERNAL MEDICINE

## 2021-11-09 PROCEDURE — 97535 SELF CARE MNGMENT TRAINING: CPT

## 2021-11-09 PROCEDURE — 85025 COMPLETE CBC W/AUTO DIFF WBC: CPT

## 2021-11-09 PROCEDURE — 2500000003 HC RX 250 WO HCPCS: Performed by: INTERNAL MEDICINE

## 2021-11-09 RX ORDER — SPIRONOLACTONE 25 MG/1
25 TABLET ORAL 2 TIMES DAILY
Status: DISCONTINUED | OUTPATIENT
Start: 2021-11-09 | End: 2021-11-11 | Stop reason: HOSPADM

## 2021-11-09 RX ORDER — BUMETANIDE 0.25 MG/ML
2 INJECTION, SOLUTION INTRAMUSCULAR; INTRAVENOUS DAILY
Status: DISCONTINUED | OUTPATIENT
Start: 2021-11-09 | End: 2021-11-11 | Stop reason: HOSPADM

## 2021-11-09 RX ADMIN — ESCITALOPRAM 5 MG: 10 TABLET, FILM COATED ORAL at 21:09

## 2021-11-09 RX ADMIN — INSULIN LISPRO 4 UNITS: 100 INJECTION, SOLUTION INTRAVENOUS; SUBCUTANEOUS at 17:55

## 2021-11-09 RX ADMIN — DONEPEZIL HYDROCHLORIDE 5 MG: 5 TABLET, FILM COATED ORAL at 10:00

## 2021-11-09 RX ADMIN — ENOXAPARIN SODIUM 40 MG: 100 INJECTION SUBCUTANEOUS at 10:01

## 2021-11-09 RX ADMIN — BUMETANIDE 2 MG: 0.25 INJECTION INTRAMUSCULAR; INTRAVENOUS at 11:54

## 2021-11-09 RX ADMIN — SPIRONOLACTONE 25 MG: 25 TABLET ORAL at 17:56

## 2021-11-09 RX ADMIN — SPIRONOLACTONE 25 MG: 25 TABLET ORAL at 10:01

## 2021-11-09 RX ADMIN — SODIUM CHLORIDE, PRESERVATIVE FREE 10 ML: 5 INJECTION INTRAVENOUS at 11:54

## 2021-11-09 RX ADMIN — IPRATROPIUM BROMIDE AND ALBUTEROL SULFATE 1 AMPULE: .5; 2.5 SOLUTION RESPIRATORY (INHALATION) at 09:30

## 2021-11-09 RX ADMIN — INSULIN GLARGINE 21 UNITS: 100 INJECTION, SOLUTION SUBCUTANEOUS at 21:10

## 2021-11-09 RX ADMIN — IPRATROPIUM BROMIDE AND ALBUTEROL SULFATE 1 AMPULE: .5; 2.5 SOLUTION RESPIRATORY (INHALATION) at 13:25

## 2021-11-09 RX ADMIN — SODIUM CHLORIDE, PRESERVATIVE FREE 10 ML: 5 INJECTION INTRAVENOUS at 10:00

## 2021-11-09 RX ADMIN — INSULIN LISPRO 1 UNITS: 100 INJECTION, SOLUTION INTRAVENOUS; SUBCUTANEOUS at 21:11

## 2021-11-09 RX ADMIN — IPRATROPIUM BROMIDE AND ALBUTEROL SULFATE 1 AMPULE: .5; 2.5 SOLUTION RESPIRATORY (INHALATION) at 20:11

## 2021-11-09 RX ADMIN — CEFTRIAXONE SODIUM 2000 MG: 2 INJECTION, POWDER, FOR SOLUTION INTRAMUSCULAR; INTRAVENOUS at 10:00

## 2021-11-09 RX ADMIN — INSULIN LISPRO 2 UNITS: 100 INJECTION, SOLUTION INTRAVENOUS; SUBCUTANEOUS at 12:55

## 2021-11-09 RX ADMIN — IPRATROPIUM BROMIDE AND ALBUTEROL SULFATE 1 AMPULE: .5; 2.5 SOLUTION RESPIRATORY (INHALATION) at 15:49

## 2021-11-09 ASSESSMENT — PAIN SCALES - GENERAL
PAINLEVEL_OUTOF10: 0

## 2021-11-09 NOTE — PROGRESS NOTES
Physical Therapy    Physical Therapy Initial Assessment     Name: Carl Cowden  : 1950  MRN: [id number]      Date of Service: 2021    Evaluating PT:  Mario Eduardo, PT, DPT  [id number]     Room #:  9305/6247-F  Diagnosis:  Lactic acidosis [E87.2]  Pleural effusion on right [J90]  Acute respiratory failure with hypoxia (HCC) [J96.01]  PMHx/PSHx:  DM, buccal mass, esophageal varices, Hep C, liver cirrhosis, dementia, colorectal CA    Precautions:  Falls, O2, Cognition, Bed/chair alarm   Equipment Needs:  NA    SUBJECTIVE:    Pt lives with his wife in a 2 story home with 5 stairs to enter and 1 rail. Bedroom and bathroom are on the 1st level. Pt ambulated with no AD PTA. OBJECTIVE:   Initial Evaluation  Date: 21 Treatment Short Term/ Long Term   Goals   AM-PAC 6 Clicks 79/36     Was pt agreeable to Eval/treatment? Yes      Does pt have pain?  No c/o pain      Bed Mobility  Rolling: Supervision   Supine to sit: Supervision   Sit to supine: Supervision   Scooting: Supervision   Rolling: Supervision   Supine to sit: Supervision   Sit to supine: Supervision   Scooting: Supervision    Transfers Sit to stand: SBA  Stand to sit: SBA  Stand pivot: SBA  Sit to stand: Supervision   Stand to sit: Supervision   Stand pivot: Supervision    Ambulation    400 feet with no AD SBA  >500 feet with AAD if needed Supervision    Stair negotiation: ascended and descended  3 steps with 1 rail CGA   >5 steps with 1 rail Supervision    ROM BUE:  Per OT eval  BLE:  WFL     Strength BUE:  Per OT eval   BLE:  5/5     Balance Sitting EOB:  Supervision   Dynamic Standing:  SBA  Sitting EOB:  Supervision   Dynamic Standing:  Supervision      Pt is A & O x self, location   Sensation:  Pt denies numbness and tingling to extremities  Edema:  Unremarkable    Vitals:   bpm, SpO2 96% on 4 L post ambulation and stairs     Therapeutic Exercises:    BLE ROM    Patient education  Pt educated on PT role, safety during functional mobility Patient response to education:   Pt verbalized understanding Pt demonstrated skill Pt requires further education in this area   Yes  Yes  Yes      ASSESSMENT:    Conditions Requiring Skilled Therapeutic Intervention:    []Decreased strength     []Decreased ROM  [x]Decreased functional mobility  [x]Decreased balance   []Decreased endurance   []Decreased posture  []Decreased sensation  []Decreased coordination   []Decreased vision  [x]Decreased safety awareness   []Increased pain       Comments:  Pt received supine with wife present and agreeable to PT evaluation. Vitals monitored during session. Pt limited mostly by cognition. His mobility is fair but safety is poor d/t hx of dementia. Pt is able to stand and ambulate as well as complete stair negotiation. Standby assistance given for safety and vitals monitoring. Pt denied SOB but he did appear a bit SOB during ambulation/stairs. SpO2 96%,  bpm.  Pt returned to bed in chair position on exit, declined to sit in chair at this time. Pt left with call button in reach, lines attached, and needs met. Treatment:  Patient practiced and was instructed in the following treatment:     eval only     Pt's/ family goals   1. Home with wife     Prognosis is fair for reaching above PT goals. Patient and or family understand(s) diagnosis, prognosis, and plan of care.   Yes     PHYSICAL THERAPY PLAN OF CARE:    PT POC is established based on physician order and patient diagnosis     Referring provider/PT Order:    11/08/21 0930  PT eval and treat  Start:  11/08/21 0930,   End:  11/08/21 0930,   ONE TIME,   Standing Count:  1 Occurrences,   R         Elizabeth Roberts MD       Diagnosis:  Lactic acidosis [E87.2]  Pleural effusion on right [J90]  Acute respiratory failure with hypoxia ([facility] [J96.01]  Specific instructions for next treatment:  Gait, stairs, safety training, fall prevention     Current Treatment Recommendations:     [] Strengthening to improve

## 2021-11-09 NOTE — PROGRESS NOTES
[id number] [address] PROGRESS NOTE    Patient: Bentley Bonds  MRN: [id number]  : 1950    Encounter Date: 2021  Encounter Time: 12:17 PM     Date of Admission: .2021  6:24 PM    Consulting Physician:  Primary Care Physician:      Jackie Hunt MD     [phone]    PROBLEM LIST:  Patient Active Problem List   Diagnosis    Type 1 diabetes mellitus without complication ([facility]    Anemia    Hepatic cirrhosis due to chronic hepatitis C infection ([facility]    Secondary esophageal varices with bleeding ([facility]    Dementia associated with other underlying disease without behavioral disturbance ([facility]    B12 deficiency    Streptococcal bacteremia    Acute respiratory failure with hypoxia ([facility]     Subjective:   Sitting on edge of bed working crossword puzzles  On 4L nc  Denies dyspnea, cough     HOME MEDICATIONS:  Prior to Admission medications    Medication Sig Start Date End Date Taking?  Authorizing Provider   escitalopram (LEXAPRO) 5 MG tablet Take 5 mg by mouth daily   Yes Historical Provider, MD   furosemide (LASIX) 20 MG tablet Take 1 tablet by mouth daily 21  Yes Jackie Hunt MD   blood glucose test strips (ASCENSIA AUTODISC VI;ONE TOUCH ULTRA TEST VI) strip 1 each by In Vitro route 4 times daily One touch test strips 10/29/21  Yes Jackie Hunt MD   cefTRIAXone (ROCEPHIN) infusion Infuse 2,000 mg intravenously every 24 hours for 28 days Compound per protocol 10/13/21 11/10/21 Yes DAVID Hernández - CNP   MISC NATURAL PRODUCTS PO Take 1 tablet by mouth daily -[address]-   Yes Historical Provider, MD   CRANBERRY PO Take 1 tablet by mouth as needed (URINARY)   Yes Historical Provider, MD   Apoaequorin (PREVAGEN PO) Take 1 tablet by mouth daily    Yes Historical Provider, MD   donepezil (ARICEPT) 5 MG tablet Take 1 tablet by mouth daily (with breakfast) 21  Yes Jasmyn Tolliver MD   insulin aspart (NOVOLOG FLEXPEN) 100 UNIT/ML injection pen Inject 14 Units into the skin 3 times daily (before meals) If BS less than 100, no coverage  If greater than 150, 14 units 7/13/21 11/7/21 Yes Anamika Perrin MD   vitamin D-3 (CHOLECALCIFEROL) 125 MCG (5000 UT) TABS Take 5,000 Units by mouth daily    Yes Historical Provider, MD   Insulin Degludec (TRESIBA FLEXTOUCH) 200 UNIT/ML SOPN Inject 26 Units into the skin nightly 3/9/21  Yes Anamika Perrin MD   Probiotic Product (PROBIOTIC MULTI-ENZYME) TABS Take 1 tablet by mouth daily    Yes Historical Provider, MD   Omega-3 Fatty Acids (FISH OIL) 1000 MG CAPS Take 1,000 mg by mouth daily    Yes Historical Provider, MD   vitamin C (ASCORBIC ACID) 500 MG tablet Take 1,000 mg by mouth daily    Yes Historical Provider, MD   vitamin B-12 (CYANOCOBALAMIN) 500 MCG tablet Take 500 mcg by mouth daily    Yes Historical Provider, MD   vitamin E 400 UNIT capsule Take 400 Units by mouth daily    Yes Historical Provider, MD   b complex vitamins capsule Take 1 capsule by mouth daily    Yes Historical Provider, MD   Calcium-Magnesium-Zinc 500-250-12.5 MG TABS Take 1 tablet by mouth daily    Yes Historical Provider, MD       CURRENT MEDICATIONS:  Current Facility-Administered Medications: bumetanide (BUMEX) injection 2 mg, 2 mg, IntraVENous, Daily  spironolactone (ALDACTONE) tablet 25 mg, 25 mg, Oral, BID  ipratropium-albuterol (DUONEB) nebulizer solution 1 ampule, 1 ampule, Inhalation, 4x daily  donepezil (ARICEPT) tablet 5 mg, 5 mg, Oral, Daily with breakfast  escitalopram (LEXAPRO) tablet 5 mg, 5 mg, Oral, Nightly  enoxaparin (LOVENOX) injection 40 mg, 40 mg, SubCUTAneous, Daily  insulin lispro (HUMALOG) injection vial 0-12 Units, 0-12 Units, SubCUTAneous, TID WC  insulin lispro (HUMALOG) injection vial 0-6 Units, 0-6 Units, SubCUTAneous, Nightly  glucose (GLUTOSE) 40 % oral gel 15 g, 15 g, Oral, PRN  dextrose 50 % IV solution, 12.5 g, IntraVENous, PRN  glucagon (rDNA) injection 1 mg, 1 mg, IntraMUSCular, PRN  dextrose 5 % solution, 100 mL/hr, IntraVENous, PRN  cefTRIAXone (ROCEPHIN) 2,000 mg in sterile water 20 mL IV syringe, 2,000 mg, IntraVENous, Q24H  insulin glargine (LANTUS) injection vial 21 Units, 21 Units, SubCUTAneous, Nightly  sodium chloride flush 0.9 % injection 10 mL, 10 mL, IntraVENous, PRN    IV MEDICATIONS:   dextrose         ALLERGIES:  Allergies   Allergen Reactions    Ketorolac Tromethamine Shortness Of Breath and Other (See Comments)     Depression, \"walking like a zombie\", no desire to do anything.     Memantine Hcl Other (See Comments)     AMS, \"walking like a zombie\"    Tamsulosin Hcl Shortness Of Breath     PHYSICAL EXAMINATION:     VITAL SIGNS:  /65   Pulse 99   Temp 100.8 °F (38.2 °C) (Oral)   Resp 20   Ht 5' 10\" (1.778 m)   Wt 182 lb (82.6 kg)   SpO2 91%   BMI 26.11 kg/m²   Wt Readings from Last 3 Encounters:   21 182 lb (82.6 kg)   21 182 lb (82.6 kg)   21 182 lb (82.6 kg)     Temp Readings from Last 3 Encounters:   21 100.8 °F (38.2 °C) (Oral)   21 98.1 °F (36.7 °C)   21 98.1 °F (36.7 °C) (Oral)     TMAX:  BP Readings from Last 3 Encounters:   21 119/65   21 (!) 151/75   21 (!) 146/74     Pulse Readings from Last 3 Encounters:   21 99   21 91   21 90       CURRENT PULSE OXIMETRY: SpO2: 91 %  24HR PULSE OXIMETRY RANGE: SpO2  Av %  Min: 9 %  Max: 97 %  CVP:      ________________________________________________________________________    VENTILATOR SETTINGS (if applicable):         Vent Information  SpO2: 91 %  Additional Respiratory  Assessments  Pulse: 99  Resp: 20  SpO2: 91 %  ETCO2:  Peak Inspiratory Pressure:  End-Inspiratory Plateau Pressure:    ABG:    Recent Labs     21  8328   PH 7.269*   PO2 64.0*   PCO2 46.3*   HCO3 20.7*   BE -6.2*   O2SAT 88.5*   METHB 0.2   O2HB 87.4*   COHB 1.0   O2CON 18.4   HHB 11.4*   THB 15.0           ________________________________________________________________________    IV ACCESS:    NUTRITION: ADULT DIET; Regular; 4 carb choices (60 gm/meal)    INTAKE/OUTPUTS:  I/O last 3 completed shifts:   In: 680 [P.O.:480; IV Piggyback:200]  Out: 1825 [Urine:1825]    Intake/Output Summary (Last 24 hours) at 11/9/2021 1217  Last data filed at 11/9/2021 0840  Gross per 24 hour   Intake 660 ml   Output 1825 ml   Net -1165 ml     General Appearance: well-developed and well-nourished, in no acute distress   Eyes: pupils equal, round, and reactive to light, extraocular eye movements intact, conjunctivae normal and sclera anicteric   Neck: neck supple and non tender without mass, no thyromegaly, no thyroid nodules and no cervical adenopathy   Pulmonary/Chest: decreased breath sounds, no accessory muscles of inspiration, no focal wheezes  Cardiovascular: normal rate, regular rhythm, normal S1 and S2, no murmurs, rubs, clicks or gallops, distal pulses intact, no carotid bruits and no JVD   Abdomen: soft, non-tender, non-distended, normal bowel sounds, no masses or organomegaly   Extremities: no cyanosis, no clubbing, BLE 2-3+ edema  Musculoskeletal: normal range of motion, no joint swelling, deformity or tenderness   Neurologic: reflexes normal and symmetric, no cranial nerve deficit noted    LABS/IMAGING:    CBC:  Lab Results   Component Value Date    WBC 4.2 (L) 11/09/2021    HGB 11.4 (L) 11/09/2021    HCT 32.6 (L) 11/09/2021    MCV 97.6 11/09/2021    PLT 67 (L) 11/09/2021    LYMPHOPCT 9.6 (L) 11/09/2021    RBC 3.34 (L) 11/09/2021    MCH 34.1 11/09/2021    MCHC 35.0 (H) 11/09/2021    RDW 14.4 11/09/2021    NEUTOPHILPCT 83.5 (H) 11/09/2021    MONOPCT 4.3 11/09/2021    EOSPCT 2.0 10/21/2020    BASOPCT 0.9 11/09/2021    NEUTROABS 3.53 11/09/2021    LYMPHSABS 0.42 (L) 11/09/2021    MONOSABS 0.17 11/09/2021    EOSABS 0.07 11/09/2021    BASOSABS 0.04 11/09/2021       Recent Labs     11/09/21  0655 11/09/21  0411 11/08/21  0504   WBC 4.2* 3.9* 4.5   HGB 11.4* 11.1* 11.8*   HCT 32.6* 32.0* 34.0*   MCV 97.6 95.8 95.8   PLT 67* 67* 70*       BMP:   Recent Labs     11/08/21  0504 11/09/21  0411 11/09/21  0655    137 137   K 4.1 3.9 3.7    100 99   CO2 28 30* 28   BUN 15 14 14   CREATININE 0.6* 0.6* 0.6*       MG:   Lab Results   Component Value Date    MG 1.8 10/14/2021     Ca/Phos:   Lab Results   Component Value Date    CALCIUM 8.6 11/09/2021    PHOS 2.8 06/21/2018     Amylase:   Lab Results   Component Value Date    AMYLASE 39 10/10/2021     Lipase:   Lab Results   Component Value Date    LIPASE 22 10/12/2021     LIVER PROFILE:   Recent Labs     11/06/21  1843 11/07/21  0429 11/08/21  0504 11/09/21  0411 11/09/21  0655   AST 52*   < > 46* 39 40*   ALT 54*   < > 43* 36 36   BILIDIR 0.4*  --   --   --   --    BILITOT 1.2   < > 1.0 1.6* 1.9*   ALKPHOS 120   < > 80 71 70    < > = values in this interval not displayed. PT/INR: No results for input(s): PROTIME, INR in the last 72 hours. APTT: No results for input(s): APTT in the last 72 hours.     Cardiac Enzymes:  Lab Results   Component Value Date    TROPONINI <0.01 06/18/2018       Hgb A1C:   Lab Results   Component Value Date    LABA1C 6.1 (H) 10/13/2021     No results found for: EAG  CORAL:   Lab Results   Component Value Date    CORAL NEGATIVE 02/02/2016     ESR:   Lab Results   Component Value Date    SEDRATE 0 11/01/2021     CRP:   Lab Results   Component Value Date    CRP 0.7 (H) 11/01/2021     D Dimer: No results found for: DDIMER  Folate and B12:   Lab Results   Component Value Date    BZGPANYR90 4661 (H) 02/26/2021   ,   Lab Results   Component Value Date    FOLATE >20.0 06/18/2018       Lactic Acid:   Lab Results   Component Value Date    LACTA 1.9 10/12/2021     Ammonia:   Cortisol:  Thyroid Studies:  Lab Results   Component Value Date    TSH 2.27 10/21/2020    J4WMNZL 9.3 02/02/2016     CTA Chest 11/6/2021:  No evidence of pulmonary embolism.       Low lung volume due to large right pleural effusion resulting and compressive   atelectasis of the right lower lung and markedly elevated left hemidiaphragm   due to ascites. CXR 11/8/2021:  Increased airspace opacities throughout both lungs with a new or enlarged   bilateral pleural effusions, right greater than left.  Airspace opacities in   both lungs have also increased since November 6 and are suggestive of either   multifocal pneumonia or pulmonary edema. ASSESSMENT:  · B/L Pleural effusion  · H/O Streptococcus Bacteremia   · H/O Hematochezia   · VEGA with Hepatic Hydrothorax   · Liver cirrhosis  · Esophageal varices  · DM  · Hx Hep C (treated)    PLAN:  · Continue O2, currently at 4L nc. Wean to keep pox >90%  · Thoracentesis vs chest tube discussed with wife yesterday, she defers on this at this time and will reconsider at future time. · On bumex IV 2mg and aldactone with I/O -1.1L last 24hrs  · On Rocephin - will check procal  · Supportive care  · COVID negative   · IS      DAVID Parker - CNP    Attending Attestation Note:    Patient seen and examined with NP. I agree with above.     In addition, the following apply:    - family opts not for thoracentesis or chest tube at this time  - diuresis, diamox if alkalosis starts to become an issue  - O2, IS  - supportive care  - look to possible LTACH placement if needed    Verenice Mckeon MD  11/9/2021  3:35 PM

## 2021-11-09 NOTE — PROGRESS NOTES
Dewey Josue MD [facility]  Internal medicine  Progress notes      CHIEF COMPLAINT: Shortness of breath      HISTORY OF PRESENT ILLNESS:    67/2021  30-year-old man seen on the emergency room floor earlier this morning at main campus  Spoke with the ER physician at the time of admission  Respiratory therapist and patient's wife were both at bedside  He was recently hospitalized in [facility] for streptococcal endocarditis  He is currently on Rocephin on daily basis through the infusion center  Wife reports that his breathing has gotten worse with excessive lower extremity swelling and wheezing as well  No fever or chills  Patient with advanced dementia  He denies much complaints  Audible expiratory wheeze noted  Being admitted for further management  CT angiogram showed no pulmonary emboli  Pleural effusions ascites noted as well  Patient with known history of chronic liver disease and ascites  Data reviewed in detail with the wife  11/8/2021  Patient was seen on the floor earlier this morning  Wife at bedside  He was sitting up and eating breakfast without any oxygen  Feels much better  Tolerating medications  Data reviewed in detail  11/9/2021  Patient was seen on the floor earlier this morning  Sitting up and eating breakfast  Wife at bedside  On supplemental oxygen 3 L nasal cannula  Tolerating medications well    Past Medical History:    Past Medical History:   Diagnosis Date    Buccal mass 09/2020    Diabetes mellitus ([address]     Esophageal varices ([address]     Hepatitis C     treated and resolved    Liver cirrhosis ([address]     Mild dementia ([address]     no meds    Positive colorectal cancer screening using Cologuard test 04/18/2021    Referred to Dr Felton Mack Colonoscopy       Past Surgical History:    Past Surgical History:   Procedure Laterality Date    BIOPSY MOUTH LESION Left 06/04/2020    EXCISIONAL BIOPSY LEFT BUCCAL MUCOSAL LESION performed by Robin Garibay MD at [address] [place] [address] COLONOSCOPY  2017    ENDOSCOPY, COLON, DIAGNOSTIC  2019 and 2020    HC INSERT PICC CATH, 5/> YRS  10/14/2021         HERNIA REPAIR Right 2018    inguinal     MOUTH SURGERY Right 09/24/2020    EXCISIONAL BIOPSY OF RIGHT BUCCOL LESION performed by Willie Salazar MD at [address]      UPPER GASTROINTESTINAL ENDOSCOPY N/A 06/19/2018    EGD BAND LIGATION performed by Wilmer Woodard MD at [facility]  09/01/2020    Dr Loulou Jiménez medium sized esophageal varices--placed 6 rubber bands on varices--normal duodenum       Medications Prior to Admission:    Medications Prior to Admission: escitalopram (LEXAPRO) 5 MG tablet, Take 5 mg by mouth daily  furosemide (LASIX) 20 MG tablet, Take 1 tablet by mouth daily  blood glucose test strips (ASCENSIA AUTODISC VI;ONE TOUCH ULTRA TEST VI) strip, 1 each by In Vitro route 4 times daily One touch test strips  cefTRIAXone (ROCEPHIN) infusion, Infuse 2,000 mg intravenously every 24 hours for 28 days Compound per protocol  MISC NATURAL PRODUCTS PO, Take 1 tablet by mouth daily -OSKAR BLUE-  CRANBERRY PO, Take 1 tablet by mouth as needed (URINARY)  Apoaequorin (PREVAGEN PO), Take 1 tablet by mouth daily   donepezil (ARICEPT) 5 MG tablet, Take 1 tablet by mouth daily (with breakfast)  insulin aspart (NOVOLOG FLEXPEN) 100 UNIT/ML injection pen, Inject 14 Units into the skin 3 times daily (before meals) If BS less than 100, no coverage If greater than 150, 14 units  vitamin D-3 (CHOLECALCIFEROL) 125 MCG (5000 UT) TABS, Take 5,000 Units by mouth daily   Insulin Degludec (TRESIBA FLEXTOUCH) 200 UNIT/ML SOPN, Inject 26 Units into the skin nightly  Probiotic Product (PROBIOTIC MULTI-ENZYME) TABS, Take 1 tablet by mouth daily   Omega-3 Fatty Acids (FISH OIL) 1000 MG CAPS, Take 1,000 mg by mouth daily   vitamin C (ASCORBIC ACID) 500 MG tablet, Take 1,000 mg by mouth daily   vitamin B-12 (CYANOCOBALAMIN) 500 MCG tablet, Take 500 mcg by mouth daily   vitamin E 400 UNIT capsule, Take 400 Units by mouth daily   b complex vitamins capsule, Take 1 capsule by mouth daily   Calcium-Magnesium-Zinc 500-250-12.5 MG TABS, Take 1 tablet by mouth daily     Allergies:    Ketorolac tromethamine, Memantine hcl, and Tamsulosin hcl    Social History:    reports that he has never smoked. He has never used smokeless tobacco. He reports that he does not drink alcohol and does not use drugs. Family History:   family history includes Heart Failure in his brother; Hypertension in his brother; Seizures in his brother; Stroke in his brother. REVIEW OF SYSTEMS:  As above in the HPI, otherwise negative    PHYSICAL EXAM:    Vitals:  /65   Pulse 99   Temp 100.8 °F (38.2 °C) (Oral)   Resp 20   Ht 5' 10\" (1.778 m)   Wt 182 lb (82.6 kg)   SpO2 97%   BMI 26.11 kg/m²     General:  Awake, alert, somewhat confused  In moderate respiratory distress  HEENT:  Normocephalic, atraumatic. Pupils equal, round, reactive to light. No scleral icterus. No conjunctival injection. No nasal discharge. Neck:  Supple  Heart:  RRR, no murmurs, gallops, rubs  Lungs: Diffuse wheezes noted throughout/much improved  Abdomen:   Bowel sounds present, soft, nontender, no masses, no organomegaly, no peritoneal signs  Distended with ascites  Extremities:  No clubbing,  Bilateral lower extremity edema is rather extensive/much improved today  Skin:  Warm and dry, no open lesions or rash  Neuro:  Cranial nerves 2-12 intact, no focal deficits  Breast: deferred  Rectal: deferred  Genitalia:  deferred    LABS:  Data reviewed      ASSESSMENT:      Active Problems:    Acute respiratory failure with hypoxia (HCC)  From pleural effusion and ascites  Advanced liver disease  Recent endocarditis with Streptococcus on IV Rocephin therapy  No signs of congestive heart failure  Advanced dementia  Type 2 diabetes mellitus      PLAN:  Continue Rocephin  IV Bumex 2 mg daily  Pulmonary and ID consults appreciated  Increase Aldactone 25 twice daily  Glucose monitoring with sliding scale  Monitor labs closely  Bronchodilators  Questions answered to wife satisfaction    Aaron Cardenas MD  2:44 PM  11/9/2021

## 2021-11-09 NOTE — CARE COORDINATION
11/9/2021 - Pulmonology and ID following. On 4L NC. IV rocephin q24h. PT/OT evals ordered and need completed. Spoke with pt wife regarding [employer] [facility] list and if she got to review list and make choices. She does not have a choice from list - she states any company that is covered by insurance. [facility] referral message sent to Addison Mixon at [facility]. He will review and let us know if he can accept. SW/CM will follow. The Plan for Transition of Care is related to the following treatment goals: discharge planning when medically stable    The Patient and/or patient representative Jessa Cazares was provided with a choice of provider and agrees   with the discharge plan. [x] Yes [] No    Freedom of choice list was provided with basic dialogue that supports the patient's individualized plan of care/goals, treatment preferences and shares the quality data associated with the providers.  [x] Yes [] No

## 2021-11-09 NOTE — PROGRESS NOTES
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 Stone Haywood and was given a 4-week course of ceftriaxone until 11/10, presented on 11/06 with shortness of breath for 2 days, accompanied by cough for 2 weeks. On admission, he was afebrile and hemodynamically stable with no leukocytosis. Chest CTA showed low lung volumes with large right pleural effusion with compressive atelectasis of right lower lung and mildly elevated left hemidiaphragm due to ascites. Urine Streptococcus pneumoniae and Legionella antigens were negative. He received a dose of cefepime, ceftriaxone, and vancomycin. Subjective: Patient was seen and examined. No chills, no abdominal pain, no diarrhea, no rash, no itching, less dyspnea. Had an episode of low-grade fever of 100.8 F this morning. Objective:  /65   Pulse 99   Temp 100.8 °F (38.2 °C) (Oral)   Resp 20   Ht 5' 10\" (1.778 m)   Wt 182 lb (82.6 kg)   SpO2 97%   BMI 26.11 kg/m²   Constitutional: Alert, not in distress  Respiratory: Clear breath sounds, no crackles, no wheezes  Cardiovascular: Regular rate and rhythm, no murmurs  Gastrointestinal: Bowel sounds present, soft, nontender  Skin: Warm and dry, no active dermatoses  Musculoskeletal: No joint swelling, no joint erythema    Labs, imaging, and medical records/notes were personally reviewed. Assessment:  Acute hypoxic respiratory failure  Pleural effusion, right.    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 I agree with Pulmonology recommendations for thoracentesis vs chest tube insertion but patient's wife deferred for now. Follow-up blood cultures. Follow up hep C viral load. Continue supportive care.     Thank you for involving me in the care of Rupert Amaya. I will continue to follow. Please do not hesitate to call for any questions or concerns.     Electronically signed by Alyce Monge MD on 11/9/2021 at 10:36 AM

## 2021-11-09 NOTE — PROGRESS NOTES
[address]      CTRB:                                                  Patient Name: Brynda Heimlich  MRN: [id number]  : 1950  Room: [address] [place] [address]    Evaluating OT: ARMIDA De Leon, OTR/L  # [id number]    Referring Provider:  James Jasso MD  Specific Provider Orders:  Asha Nagy and Treat\"  21    Diagnosis: Lactic acidosis [E87.2]  Pleural effusion on right [J90]  Acute respiratory failure with hypoxia ([address] [J96.01]    Pt was admitted w/ SOB, Hypoxia - O2 sats 79% on room air     Pertinent Medical History:  Pt has a past medical history of Buccal mass, Diabetes mellitus ([address] Esophageal varices ([address] Hepatitis C, Liver cirrhosis ([address] Mild dementia ([address] and Positive colorectal cancer screening using Cologuard test.,  has a past surgical history that includes Nasal septum surgery; Tonsillectomy; Upper gastrointestinal endoscopy (N/A, 2018); hernia repair (Right, ); Upper gastrointestinal endoscopy (2020); Biopsy mouth lesion (Left, 2020); Mouth surgery (Right, 2020); Cholecystectomy; Colonoscopy (); Endoscopy, colon, diagnostic ( and ); and Insert Picc Cath, 5/> Yrs (10/14/2021).     Surgeries this admission: None     Precautions:  Fall Risk  Cognition - Bed alarm  O2 - continuous    Assessment of current deficits   [x] Functional mobility   [x]ADLs  [x] Strength               [x]Cognition   [x] Functional transfers   [x] IADLs         [x] Safety Awareness   [x]Endurance   [] Fine Coordination              [x] Balance      [] Vision/perception   []Sensation    []Gross Motor Coordination  [] ROM  [] Delirium                   [] Motor Control       OT PLAN OF CARE   OT POC based on physician orders, patient diagnosis and results of clinical assessment    Frequency/Duration 1-3 days/wk for 2 weeks PRN   Specific OT Treatment to include:   * Instruction/training on adapted ADL techniques and AE recommendations to increase functional independence within precautions       * Training on energy conservation strategies, correct breathing pattern and techniques to improve independence/tolerance for self-care routine  * Functional transfer/mobility training/DME recommendations for increased independence, safety, and fall prevention  * Patient/Family education to increase follow through with safety techniques and functional independence  * Recommendation of environmental modifications for increased safety with functional transfers/mobility and ADLs  * Cognitive retraining/development of therapeutic activities to improve problem solving, judgement, memory, and attention for increased safety/participation in ADL/IADL tasks  * Therapeutic exercise to improve motor endurance, ROM, and functional strength for ADLs/functional transfers  * Therapeutic activities to facilitate/challenge dynamic balance, stand tolerance for increased safety and independence with ADLs  * Therapeutic activities to facilitate gross/fine motor skills for increased independence with ADLs  * Neuro-muscular re-education: facilitation of righting/equilibrium reactions, midline orientation, scapular stability/mobility, normalization of muscle tone, and facilitation of volitional active controled movement  * Positioning to improve skin integrity, interaction with environment and functional independence  * Delirium prevention/treatment  Other:    Recommended Adaptive Equipment: TBD as pt progresses       Home Living:  Pt lives with his wife in a 2-story house. Currently using Bed/Bath on 1st floor - typically uses Bed/Bath on 2nd floor.  (+) Basement.    Bathroom setup:  Tub-Shower on 1st and 2nd floor, [facility]  Equipment owned:  W/C, [facility], [employer], [facility], Shower chair    Available Family Assist:  Wife provides 24/7 assist    Prior Level of Function:  Per wife's report, she provides SUP/Assist As needed for all ADLs, Transfers and Mobility using No AD for ambulation. Driving:  No  Occupation:  Retired  - originally from the [address]    Pain Level:  Denied pain   Additional Complaints:  Mild SOB w/ standing ax    Cognition: A & O x 1 - Mod VCs for current month, Able to Report Name,  and current year w/ Min VCs - Dementia at baseline   Able to Follow Multi-Step Commands w/ Min-Mod VCs   Memory:  fair (-)   Sequencing:  fair    Problem solving:  fair    Judgement/safety:  fair (-)  Additional Comments:  Pt was pleasant and cooperative. Flat affect, fair eye contact    Vitals/Lab Values:     With 4L O2:  O2 sats in semi-supine w/ 4L o2 = 98%, HR 99, seated EOB O2 sats 96%,     With 2L O2:  Sats seated EOB = 94%, , w/ ~ 5 mins of standing ax = 91%, , mild SOB, O2 sats seated EOB 94%, in Semi-supine 96%, HR 99    Spoke with ANGELITO Arriaza - O2 kept at 2 L at end of session w/ pt positioned in semi-supine       Functional Assessment:  AM-PAC Daily Activity Raw Score:      Initial Eval Status  Date: 21   Treatment Status  Date: STGs = LTGs  Time frame: 10-14 days   Feeding SUP/Set up    VCs    NA   Grooming SUP/Set up    Min VCs - standing at the sink    SUP  Standing at the [employer] A/Min VCs    Simulated - EOB    SUP     LB Dressing Min A/VCs    Able to don slip-on shoes w/ use of cross-legged tech, Min A for safety w/ pants - simulated in standing  Pt ed for safe/adaptive techs, use of adaptive equip    SUP     Bathing NT    Pt/Family ed re: Benefits of use of Shower chair    SUP      Toileting Min A, VCs    Standing at commode to urinate VCs for safety    SUP     Bed Mobility  Supine to sit: SUP   Sit to supine:  SUP         Supine to sit: Remote SUP  Sit to supine: Remote SUP     Functional Transfers SUP    EOB, declined to sit in chair  Pt ed for safety/hand placement    SUP     Functional Mobility Min A, VCs    Min A w/o AD short distances in room, mild SOB, No LOB  Pt ed for safety/improved safety awareness    SUP     Balance Sitting:     Static:  SUP EOB    Dynamic:  SUP w/ functional ax EOB     Standing:     Static:  Close SUP w/o AD    Dynamic:  Min A w/ functional ax/mobility w/o AD    Sitting:     Static:  SUP    Dynamic:  SUP w/ functional ax      Standing:     Static:  SUP    Dynamic:  SUP w/ functional ax/mobility    Activity Tolerance Fair(+)    See vitals above - Mild SOB w/ standing ax    Good(-)   Visual/  Perceptual    Hearing: WNL   Glasses: Yes    WFL   Hearing Aids:  No               Hand Dominance: Right   AROM Strength Additional Info:    RUE  WFL WNL Good ;   Good FMC/dexterity noted during ADL tasks     LUE WFL WNL Good ;    Good FMC/dexterity noted during ADL tasks       Sensation:  Denies numbness or tingling [place] [facility]   Tone: WFL [place] [facility]   Edema: None Noted [place] [facility]     Comments: Upon arrival, patient was found resting in semi-supine. He was agreeable to participate in therapeutic ax. His wife was present during session. Received permission from RN prior to engaging pt in OT services. At the end of the session, patient was properly positioned in Semi-Supine - declined to sit up in chair despite pt/family ed re: Benefits of increasing ax level, time spent upright/OOB. Encouraged pt to sit in chair for all meals remaining in chair for short time. Call light and phone within reach, all lines and tubes intact. Oriented pt to call bell. Made all appropriate Environmental Modifications to facilitate pt's level of IND and safety. All needs met. Wife remained at b/         Overall patient demonstrated decreased independence and safety during completion of ADL/functional transfer/mobility tasks. Pt would benefit from continued skilled OT to increase safety and independence with completion of ADL/IADL tasks for functional independence and quality of life.     Treatment: OT treatment provided this date includes:    Instruction/training on safety and adapted techniques for completion of ADLs    Instruction/training on safe functional mobility/transfer techniques, use of DME/AD:     Instruction/training on energy conservation techs (EC)/Pursed-Lip Breathing (PLB)/work simplification for completion of ADLs:      Neuromuscular Reeducation to facilitate balance/righting reactions for increased function with ADLs:     Skilled positioning/alignment to maximize Pt's safety and ability to [facility] interact w/ his/her environment, maximize respiratory status   Activity tolerance - Sitting/Standing to improve endurance w/ functional ax    Cognitive retraining -  Oriented pt to current Date, Place and Situation; Cues for safety/safety awareness, sequencing, problem solving     Skilled monitoring of Vitals during session and pt's response to tx ax       Consulted RN     Made all appropriate Environmental Modifications to facilitate pt's level of IND and safety.  Recommendations for Continued Participation in OT services during Hospitalization and at D/C     Pt and/or Family verbalized/demonstrated a Fair understanding of education provided. Will Review PRN. Rehab Potential: Good(-) for established goals     Patient / Family Goal: Return home with family assist      Patient and/or family were instructed on functional diagnosis, prognosis/goals and OT plan of care. Demonstrated Fair understanding.      Eval Complexity: Low    Time In: 1506  Time Out: 1531  Total Treatment Time: 10 minutes    Min Units   OT Eval Low 97165  X  1   OT Eval Medium 83635      OT Eval High 22484      OT Re-Eval [id number]       Therapeutic Ex 72165       Therapeutic Activities 07846       ADL/Self Care 93092  10  1   Orthotic Management 51365       Manual [id number]     Neuro Re-Ed 33720       Non-Billable Time              Evaluation Time additionally includes thorough review of current medical information, gathering information on past medical history/social history and prior level of function, completion of standardized testing/informal observation of tasks, assessment of data and education on plan of care and goals.             Ania Sol, MOT, OTR/L  # [id number]

## 2021-11-10 ENCOUNTER — APPOINTMENT (OUTPATIENT)
Dept: GENERAL RADIOLOGY | Age: 71
DRG: 189 | End: 2021-11-10
Payer: MEDICARE

## 2021-11-10 LAB
ALBUMIN SERPL-MCNC: 3.6 G/DL (ref 3.5–5.2)
ALBUMIN SERPL-MCNC: 3.6 G/DL (ref 3.5–5.2)
ALP BLD-CCNC: 74 U/L (ref 40–129)
ALP BLD-CCNC: 74 U/L (ref 40–129)
ALT SERPL-CCNC: 36 U/L (ref 0–40)
ALT SERPL-CCNC: 38 U/L (ref 0–40)
ANION GAP SERPL CALCULATED.3IONS-SCNC: 8 MMOL/L (ref 7–16)
ANION GAP SERPL CALCULATED.3IONS-SCNC: 8 MMOL/L (ref 7–16)
AST SERPL-CCNC: 38 U/L (ref 0–39)
AST SERPL-CCNC: 39 U/L (ref 0–39)
BASOPHILS ABSOLUTE: 0.01 E9/L (ref 0–0.2)
BASOPHILS ABSOLUTE: 0.02 E9/L (ref 0–0.2)
BASOPHILS RELATIVE PERCENT: 0.3 % (ref 0–2)
BASOPHILS RELATIVE PERCENT: 0.6 % (ref 0–2)
BILIRUB SERPL-MCNC: 1.9 MG/DL (ref 0–1.2)
BILIRUB SERPL-MCNC: 2.1 MG/DL (ref 0–1.2)
BUN BLDV-MCNC: 10 MG/DL (ref 6–23)
BUN BLDV-MCNC: 11 MG/DL (ref 6–23)
CALCIUM SERPL-MCNC: 8.3 MG/DL (ref 8.6–10.2)
CALCIUM SERPL-MCNC: 8.5 MG/DL (ref 8.6–10.2)
CHLORIDE BLD-SCNC: 99 MMOL/L (ref 98–107)
CHLORIDE BLD-SCNC: 99 MMOL/L (ref 98–107)
CO2: 26 MMOL/L (ref 22–29)
CO2: 27 MMOL/L (ref 22–29)
CREAT SERPL-MCNC: 0.5 MG/DL (ref 0.7–1.2)
CREAT SERPL-MCNC: 0.6 MG/DL (ref 0.7–1.2)
EOSINOPHILS ABSOLUTE: 0.11 E9/L (ref 0.05–0.5)
EOSINOPHILS ABSOLUTE: 0.12 E9/L (ref 0.05–0.5)
EOSINOPHILS RELATIVE PERCENT: 3.3 % (ref 0–6)
EOSINOPHILS RELATIVE PERCENT: 3.5 % (ref 0–6)
GFR AFRICAN AMERICAN: >60
GFR AFRICAN AMERICAN: >60
GFR NON-AFRICAN AMERICAN: >60 ML/MIN/1.73
GFR NON-AFRICAN AMERICAN: >60 ML/MIN/1.73
GLUCOSE BLD-MCNC: 100 MG/DL (ref 74–99)
GLUCOSE BLD-MCNC: 91 MG/DL (ref 74–99)
HCT VFR BLD CALC: 33.8 % (ref 37–54)
HCT VFR BLD CALC: 34 % (ref 37–54)
HEMOGLOBIN: 11.7 G/DL (ref 12.5–16.5)
HEMOGLOBIN: 11.7 G/DL (ref 12.5–16.5)
IMMATURE GRANULOCYTES #: 0.01 E9/L
IMMATURE GRANULOCYTES #: 0.01 E9/L
IMMATURE GRANULOCYTES %: 0.3 % (ref 0–5)
IMMATURE GRANULOCYTES %: 0.3 % (ref 0–5)
LYMPHOCYTES ABSOLUTE: 0.54 E9/L (ref 1.5–4)
LYMPHOCYTES ABSOLUTE: 0.64 E9/L (ref 1.5–4)
LYMPHOCYTES RELATIVE PERCENT: 15.9 % (ref 20–42)
LYMPHOCYTES RELATIVE PERCENT: 18.9 % (ref 20–42)
MCH RBC QN AUTO: 33.1 PG (ref 26–35)
MCH RBC QN AUTO: 33.6 PG (ref 26–35)
MCHC RBC AUTO-ENTMCNC: 34.4 % (ref 32–34.5)
MCHC RBC AUTO-ENTMCNC: 34.6 % (ref 32–34.5)
MCV RBC AUTO: 95.8 FL (ref 80–99.9)
MCV RBC AUTO: 97.7 FL (ref 80–99.9)
METER GLUCOSE: 100 MG/DL (ref 74–99)
METER GLUCOSE: 141 MG/DL (ref 74–99)
METER GLUCOSE: 195 MG/DL (ref 74–99)
METER GLUCOSE: 253 MG/DL (ref 74–99)
MONOCYTES ABSOLUTE: 0.37 E9/L (ref 0.1–0.95)
MONOCYTES ABSOLUTE: 0.39 E9/L (ref 0.1–0.95)
MONOCYTES RELATIVE PERCENT: 10.9 % (ref 2–12)
MONOCYTES RELATIVE PERCENT: 11.5 % (ref 2–12)
NEUTROPHILS ABSOLUTE: 2.23 E9/L (ref 1.8–7.3)
NEUTROPHILS ABSOLUTE: 2.33 E9/L (ref 1.8–7.3)
NEUTROPHILS RELATIVE PERCENT: 66 % (ref 43–80)
NEUTROPHILS RELATIVE PERCENT: 68.5 % (ref 43–80)
OVALOCYTES: ABNORMAL
PDW BLD-RTO: 14.3 FL (ref 11.5–15)
PDW BLD-RTO: 14.3 FL (ref 11.5–15)
PLATELET # BLD: 53 E9/L (ref 130–450)
PLATELET # BLD: 57 E9/L (ref 130–450)
PLATELET CONFIRMATION: NORMAL
PLATELET CONFIRMATION: NORMAL
PMV BLD AUTO: 10.1 FL (ref 7–12)
PMV BLD AUTO: 10.4 FL (ref 7–12)
POIKILOCYTES: ABNORMAL
POTASSIUM SERPL-SCNC: 3.9 MMOL/L (ref 3.5–5)
POTASSIUM SERPL-SCNC: 4.1 MMOL/L (ref 3.5–5)
RBC # BLD: 3.48 E12/L (ref 3.8–5.8)
RBC # BLD: 3.53 E12/L (ref 3.8–5.8)
SODIUM BLD-SCNC: 133 MMOL/L (ref 132–146)
SODIUM BLD-SCNC: 134 MMOL/L (ref 132–146)
TOTAL PROTEIN: 6 G/DL (ref 6.4–8.3)
TOTAL PROTEIN: 6 G/DL (ref 6.4–8.3)
WBC # BLD: 3.4 E9/L (ref 4.5–11.5)
WBC # BLD: 3.4 E9/L (ref 4.5–11.5)

## 2021-11-10 PROCEDURE — 2700000000 HC OXYGEN THERAPY PER DAY

## 2021-11-10 PROCEDURE — 2580000003 HC RX 258: Performed by: INTERNAL MEDICINE

## 2021-11-10 PROCEDURE — 6370000000 HC RX 637 (ALT 250 FOR IP): Performed by: INTERNAL MEDICINE

## 2021-11-10 PROCEDURE — 82962 GLUCOSE BLOOD TEST: CPT

## 2021-11-10 PROCEDURE — 94640 AIRWAY INHALATION TREATMENT: CPT

## 2021-11-10 PROCEDURE — 2060000000 HC ICU INTERMEDIATE R&B

## 2021-11-10 PROCEDURE — 2500000003 HC RX 250 WO HCPCS: Performed by: INTERNAL MEDICINE

## 2021-11-10 PROCEDURE — 2580000003 HC RX 258: Performed by: RADIOLOGY

## 2021-11-10 PROCEDURE — 6360000002 HC RX W HCPCS: Performed by: INTERNAL MEDICINE

## 2021-11-10 PROCEDURE — 80053 COMPREHEN METABOLIC PANEL: CPT

## 2021-11-10 PROCEDURE — 85025 COMPLETE CBC W/AUTO DIFF WBC: CPT

## 2021-11-10 PROCEDURE — 36415 COLL VENOUS BLD VENIPUNCTURE: CPT

## 2021-11-10 PROCEDURE — 71045 X-RAY EXAM CHEST 1 VIEW: CPT

## 2021-11-10 RX ADMIN — INSULIN GLARGINE 21 UNITS: 100 INJECTION, SOLUTION SUBCUTANEOUS at 20:58

## 2021-11-10 RX ADMIN — BUMETANIDE 2 MG: 0.25 INJECTION INTRAMUSCULAR; INTRAVENOUS at 10:51

## 2021-11-10 RX ADMIN — SPIRONOLACTONE 25 MG: 25 TABLET ORAL at 17:29

## 2021-11-10 RX ADMIN — INSULIN LISPRO 6 UNITS: 100 INJECTION, SOLUTION INTRAVENOUS; SUBCUTANEOUS at 17:29

## 2021-11-10 RX ADMIN — DONEPEZIL HYDROCHLORIDE 5 MG: 5 TABLET, FILM COATED ORAL at 11:06

## 2021-11-10 RX ADMIN — SODIUM CHLORIDE, PRESERVATIVE FREE 10 ML: 5 INJECTION INTRAVENOUS at 10:51

## 2021-11-10 RX ADMIN — IPRATROPIUM BROMIDE AND ALBUTEROL SULFATE 1 AMPULE: .5; 2.5 SOLUTION RESPIRATORY (INHALATION) at 12:19

## 2021-11-10 RX ADMIN — IPRATROPIUM BROMIDE AND ALBUTEROL SULFATE 1 AMPULE: .5; 2.5 SOLUTION RESPIRATORY (INHALATION) at 07:55

## 2021-11-10 RX ADMIN — INSULIN LISPRO 2 UNITS: 100 INJECTION, SOLUTION INTRAVENOUS; SUBCUTANEOUS at 14:10

## 2021-11-10 RX ADMIN — INSULIN LISPRO 1 UNITS: 100 INJECTION, SOLUTION INTRAVENOUS; SUBCUTANEOUS at 20:58

## 2021-11-10 RX ADMIN — SPIRONOLACTONE 25 MG: 25 TABLET ORAL at 10:51

## 2021-11-10 RX ADMIN — CEFTRIAXONE SODIUM 2000 MG: 2 INJECTION, POWDER, FOR SOLUTION INTRAMUSCULAR; INTRAVENOUS at 10:51

## 2021-11-10 RX ADMIN — IPRATROPIUM BROMIDE AND ALBUTEROL SULFATE 1 AMPULE: .5; 2.5 SOLUTION RESPIRATORY (INHALATION) at 20:46

## 2021-11-10 RX ADMIN — ESCITALOPRAM 5 MG: 10 TABLET, FILM COATED ORAL at 20:52

## 2021-11-10 RX ADMIN — IPRATROPIUM BROMIDE AND ALBUTEROL SULFATE 1 AMPULE: .5; 2.5 SOLUTION RESPIRATORY (INHALATION) at 16:30

## 2021-11-10 ASSESSMENT — PAIN SCALES - GENERAL
PAINLEVEL_OUTOF10: 0

## 2021-11-10 NOTE — CARE COORDINATION
11/10/2021 - Pulmonology, ID following. On room air. IV bumex bid. IV rocephin completed today. PT 18/24, OT 18/24. Received message from Miguel at [facility] - they can accept for [facility]. Spoke with pt wife and informed her that [facility] can accept pt. Discharge remains home with [facility] when medically stable. SW/CM will follow.

## 2021-11-10 NOTE — PROGRESS NOTES
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 Lilian Castro and was given a 4-week course of ceftriaxone until 11/10, presented on 11/06 with shortness of breath for 2 days, accompanied by cough for 2 weeks. On admission, he was afebrile and hemodynamically stable with no leukocytosis. Chest CTA showed low lung volumes with large right pleural effusion with compressive atelectasis of right lower lung and mildly elevated left hemidiaphragm due to ascites. Urine Streptococcus pneumoniae and Legionella antigens were negative. He received a dose of cefepime, ceftriaxone, and vancomycin. Blood cultures showed no growth. Subjective: Patient was seen and examined. No chills, no abdominal pain, no diarrhea, no rash, no itching, less dyspnea, sitting up on the side of the bed. Afebrile. Objective:  /70   Pulse 90   Temp 97.8 °F (36.6 °C) (Oral)   Resp 20   Ht 5' 10\" (1.778 m)   Wt 182 lb (82.6 kg)   SpO2 95%   BMI 26.11 kg/m²   Constitutional: Alert, not in distress  Respiratory: Clear breath sounds, no crackles, no wheezes  Cardiovascular: Regular rate and rhythm, no murmurs  Gastrointestinal: Bowel sounds present, soft, nontender  Skin: Warm and dry, no active dermatoses  Musculoskeletal: No joint swelling, no joint erythema    Labs, imaging, and medical records/notes were personally reviewed. Assessment:  Acute hypoxic respiratory failure  Pleural effusion, right. Recent high-grade Streptococcus salivarius bacteremia with probable infective endocarditis. Finished  4-week course of ceftriaxone until 11/10.   Cirrhosis  History of hepatitis C (viral load of 2,600,000 IU/mL in 2016)    Recommendations:  Remove PICC after last dose of ceftriaxone today. No further antibiotic therapy indicated for now. Thoracentesis vs chest tube insertion deferred by patient's wife for now. Follow-up blood cultures. Follow up hep C viral load. Continue supportive care.     Thank you for involving me in the care of Sahra Weir. I will sign off for now. Please do not hesitate to call for any questions, concerns, or new findings.       Electronically signed by Bety Augustine MD on 11/10/2021 at 10:15 AM

## 2021-11-10 NOTE — PROGRESS NOTES
[address] [address] PROGRESS NOTE    Patient: Axel Ireland  MRN: [id number]  : 1950    Encounter Date: 11/10/2021  Encounter Time: 1:53 PM     Date of Admission: .2021  6:24 PM    Consulting Physician:  Primary Care Physician:      Allan Reid MD     [phone]    PROBLEM LIST:  Patient Active Problem List   Diagnosis    Type 1 diabetes mellitus without complication [facility]    Anemia    Hepatic cirrhosis due to chronic hepatitis C infection ([facility]    Secondary esophageal varices with bleeding ([facility]    Dementia associated with other underlying disease without behavioral disturbance ([facility]    B12 deficiency    Streptococcal bacteremia    [facility]     Subjective:   Sitting on edge of bed on 2L n/c. Spouse at bedside. Was on 1L previously, pox 97%, however, when up to bathroom, he was very short of breath so O2 was brought up to 2L, however, pox remained 97%. Patient denies cough.        CURRENT MEDICATIONS:  Current Facility-Administered Medications: bumetanide (BUMEX) injection 2 mg, 2 mg, IntraVENous, Daily  spironolactone (ALDACTONE) tablet 25 mg, 25 mg, Oral, BID  ipratropium-albuterol (DUONEB) nebulizer solution 1 ampule, 1 ampule, Inhalation, 4x daily  donepezil (ARICEPT) tablet 5 mg, 5 mg, Oral, Daily with breakfast  escitalopram (LEXAPRO) tablet 5 mg, 5 mg, Oral, Nightly  enoxaparin (LOVENOX) injection 40 mg, 40 mg, SubCUTAneous, Daily  insulin lispro (HUMALOG) injection vial 0-12 Units, 0-12 Units, SubCUTAneous, TID WC  insulin lispro (HUMALOG) injection vial 0-6 Units, 0-6 Units, SubCUTAneous, Nightly  glucose (GLUTOSE) 40 % oral gel 15 g, 15 g, Oral, PRN  dextrose 50 % IV solution, 12.5 g, IntraVENous, PRN  glucagon (rDNA) injection 1 mg, 1 mg, IntraMUSCular, PRN  dextrose 5 % solution, 100 mL/hr, IntraVENous, PRN  insulin glargine (LANTUS) injection vial 21 Units, 21 Units, SubCUTAneous, Nightly  sodium chloride flush 0.9 % injection 10 mL, 10 mL, IntraVENous, PRN    IV MEDICATIONS:   dextrose         ALLERGIES:  Allergies   Allergen Reactions    Ketorolac Tromethamine Shortness Of Breath and Other (See Comments)     Depression, \"walking like a zombie\", no desire to do anything.  Memantine Hcl Other (See Comments)     AMS, \"walking like a zombie\"    Tamsulosin Hcl Shortness Of Breath     PHYSICAL EXAMINATION:     VITAL SIGNS:  /70   Pulse 90   Temp 97.8 °F (36.6 °C) (Oral)   Resp 18   Ht 5' 10\" (1.778 m)   Wt 182 lb (82.6 kg)   SpO2 97%   BMI 26.11 kg/m²   Wt Readings from Last 3 Encounters:   21 182 lb (82.6 kg)   21 182 lb (82.6 kg)   21 182 lb (82.6 kg)     Temp Readings from Last 3 Encounters:   11/10/21 97.8 °F (36.6 °C) (Oral)   21 98.1 °F (36.7 °C)   21 98.1 °F (36.7 °C) (Oral)     TMAX:  BP Readings from Last 3 Encounters:   11/10/21 127/70   21 (!) 151/75   21 (!) 146/74     Pulse Readings from Last 3 Encounters:   11/10/21 90   21 91   21 90       CURRENT PULSE OXIMETRY: SpO2: 97 %  24HR PULSE OXIMETRY RANGE: SpO2  Av.4 %  Min: 92 %  Max: 97 %  CVP:      ________________________________________________________________________    VENTILATOR SETTINGS (if applicable):         Vent Information  SpO2: 97 %  Additional Respiratory  Assessments  Pulse: 90  Resp: 18  SpO2: 97 %  ETCO2:  Peak Inspiratory Pressure:  End-Inspiratory Plateau Pressure:    ABG:    No results for input(s): PH, PO2, PCO2, HCO3, BE, O2SAT, METHB, O2HB, COHB, O2CON, HHB, THB in the last 72 hours. ________________________________________________________________________    IV ACCESS:    NUTRITION: ADULT DIET; Regular; 4 carb choices (60 gm/meal)    INTAKE/OUTPUTS:  I/O last 3 completed shifts:   In: 900 [P.O.:900]  Out: 1000 [Urine:1000]    Intake/Output Summary (Last 24 hours) at 11/10/2021 1353  Last data filed at 11/10/2021 0855  Gross per 24 hour   Intake 420 ml   Output 1100 ml   Net -680 ml     General Appearance: well-developed and well-nourished, in no acute distress   Neck: neck supple, thin  Pulmonary/Chest: No accessory muscle use, equal air entry, clear to bases b/l. no focal wheezes  Cardiovascular: normal rate, regular rhythm, normal S1 and S2, no murmurs, no JVD   Abdomen: soft, non-tender, non-distended,    Extremities: no cyanosis, no clubbing, BLE 2-3+ edema L>R  Musculoskeletal: normal range of motion, no joint swelling, deformity or tenderness   Neurologic: reflexes normal and symmetric, no cranial nerve deficit noted    LABS/IMAGING:    CBC:  Lab Results   Component Value Date    WBC 3.4 (L) 11/10/2021    HGB 11.7 (L) 11/10/2021    HCT 33.8 (L) 11/10/2021    MCV 95.8 11/10/2021    PLT 57 (L) 11/10/2021    LYMPHOPCT 18.9 (L) 11/10/2021    RBC 3.53 (L) 11/10/2021    MCH 33.1 11/10/2021    MCHC 34.6 (H) 11/10/2021    RDW 14.3 11/10/2021    NEUTOPHILPCT 66.0 11/10/2021    MONOPCT 10.9 11/10/2021    EOSPCT 2.0 10/21/2020    BASOPCT 0.6 11/10/2021    NEUTROABS 2.23 11/10/2021    LYMPHSABS 0.64 (L) 11/10/2021    MONOSABS 0.37 11/10/2021    EOSABS 0.11 11/10/2021    BASOSABS 0.02 11/10/2021       Recent Labs     11/10/21  0715 11/10/21  0412 11/09/21  0655   WBC 3.4* 3.4* 4.2*   HGB 11.7* 11.7* 11.4*   HCT 33.8* 34.0* 32.6*   MCV 95.8 97.7 97.6   PLT 57* 53* 67*       BMP:   Recent Labs     11/09/21  0655 11/10/21  0412 11/10/21  0715    134 133   K 3.7 4.1 3.9   CL 99 99 99   CO2 28 27 26   BUN 14 11 10   CREATININE 0.6* 0.6* 0.5*       MG:   Lab Results   Component Value Date    MG 1.8 10/14/2021     Ca/Phos:   Lab Results   Component Value Date    CALCIUM 8.3 (L) 11/10/2021    PHOS 2.8 06/21/2018     Amylase:   Lab Results   Component Value Date    AMYLASE 39 10/10/2021     Lipase:   Lab Results   Component Value Date    LIPASE 22 10/12/2021     LIVER PROFILE:   Recent Labs     11/09/21  0655 11/10/21  0412 11/10/21  0715   AST 40* 39 38   ALT 36 38 36   BILITOT 1.9* 1.9* 2.1*   ALKPHOS 70 74 74       PT/INR: No results for input(s): PROTIME, INR in the last 72 hours. APTT: No results for input(s): APTT in the last 72 hours. Cardiac Enzymes:  Lab Results   Component Value Date    TROPONINI <0.01 06/18/2018       Hgb A1C:   Lab Results   Component Value Date    LABA1C 6.1 (H) 10/13/2021     No results found for: EAG  CORAL:   Lab Results   Component Value Date    CORAL NEGATIVE 02/02/2016     ESR:   Lab Results   Component Value Date    SEDRATE 0 11/01/2021     CRP:   Lab Results   Component Value Date    CRP 0.7 (H) 11/01/2021     D Dimer: No results found for: DDIMER  Folate and B12:   Lab Results   Component Value Date    CQXYNOUB96 5678 (H) 02/26/2021   ,   Lab Results   Component Value Date    FOLATE >20.0 06/18/2018       Lactic Acid:   Lab Results   Component Value Date    LACTA 1.9 10/12/2021     Ammonia:   Cortisol:  Thyroid Studies:  Lab Results   Component Value Date    TSH 2.27 10/21/2020    D5YYELD 9.3 02/02/2016     CTA Chest 11/6/2021:  No evidence of pulmonary embolism.       Low lung volume due to large right pleural effusion resulting and compressive   atelectasis of the right lower lung and markedly elevated left hemidiaphragm   due to ascites. CXR 11/8/2021:  Increased airspace opacities throughout both lungs with a new or enlarged   bilateral pleural effusions, right greater than left.  Airspace opacities in   both lungs have also increased since November 6 and are suggestive of either   multifocal pneumonia or pulmonary edema.      ASSESSMENT:  · B/L Pleural effusion  · Acute hypoxemia - improved  · H/O Streptococcus Bacteremia with probable infective endocarditis   · H/O Hematochezia   · VEGA with Hepatic Hydrothorax   · Liver cirrhosis  · Esophageal varices  · DM  · Hx Hep C (treated)    PLAN:  · Continue to wean O2 to keep pox >90%  · Thoracentesis vs chest tube discussed with wife;    · On bumex and aldactone with I/O

## 2021-11-10 NOTE — PROGRESS NOTES
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  He is currently on Rocephin on daily basis through the infusion center  Wife reports that his breathing has gotten worse with excessive lower extremity swelling and wheezing as well  No fever or chills  Patient with advanced dementia  He denies much complaints  Audible expiratory wheeze noted  Being admitted for further management  CT angiogram showed no pulmonary emboli  Pleural effusions ascites noted as well  Patient with known history of chronic liver disease and ascites  Data reviewed in detail with the wife  11/8/2021  Patient was seen on the floor earlier this morning  Wife at bedside  He was sitting up and eating breakfast without any oxygen  Feels much better  Tolerating medications  Data reviewed in detail  11/9/2021  Patient was seen on the floor earlier this morning  Sitting up and eating breakfast  Wife at bedside  On supplemental oxygen 3 L nasal cannula  Tolerating medications well  11/10/2021  Patient was seen on the floor earlier this morning  Wife at bedside patient was sitting up comfortably eating    On 3 L of oxygen through nasal cannula  Low-grade temperatures noted  Past Medical History:    Past Medical History:   Diagnosis Date    Buccal mass 09/2020    Diabetes mellitus ([address]     Esophageal varices ([address]     Hepatitis C     treated and resolved    Liver cirrhosis ([address]     Mild dementia ([address]     no meds    Positive colorectal cancer screening using Cologuard test 04/18/2021    Referred to Dr Sreedhar Shane Colonoscopy       Past Surgical History:    Past Surgical History: Procedure Laterality Date    BIOPSY MOUTH LESION Left 06/04/2020    EXCISIONAL BIOPSY LEFT BUCCAL MUCOSAL LESION performed by Erica Mariano MD at [facility]  2017    ENDOSCOPY, COLON, DIAGNOSTIC  2019 and 2020    HC INSERT PICC CATH, 5/> YRS  10/14/2021         HERNIA REPAIR Right 2018    inguinal     MOUTH SURGERY Right 09/24/2020    EXCISIONAL BIOPSY OF RIGHT BUCCOL LESION performed by Erica Mariano MD at [address]      UPPER GASTROINTESTINAL ENDOSCOPY N/A 06/19/2018    EGD BAND LIGATION performed by Orin Lew MD at [facility]  09/01/2020    Dr Wendy Cooper medium sized esophageal varices--placed 6 rubber bands on varices--normal duodenum       Medications Prior to Admission:    Medications Prior to Admission: escitalopram (LEXAPRO) 5 MG tablet, Take 5 mg by mouth daily  furosemide (LASIX) 20 MG tablet, Take 1 tablet by mouth daily  blood glucose test strips (ASCENSIA AUTODISC VI;ONE TOUCH ULTRA TEST VI) strip, 1 each by In Vitro route 4 times daily One touch test strips  cefTRIAXone (ROCEPHIN) infusion, Infuse 2,000 mg intravenously every 24 hours for 28 days Compound per protocol  MISC NATURAL PRODUCTS PO, Take 1 tablet by mouth daily -OSKAR BLUE-  CRANBERRY PO, Take 1 tablet by mouth as needed (URINARY)  Apoaequorin (PREVAGEN PO), Take 1 tablet by mouth daily   donepezil (ARICEPT) 5 MG tablet, Take 1 tablet by mouth daily (with breakfast)  insulin aspart (NOVOLOG FLEXPEN) 100 UNIT/ML injection pen, Inject 14 Units into the skin 3 times daily (before meals) If BS less than 100, no coverage If greater than 150, 14 units  vitamin D-3 (CHOLECALCIFEROL) 125 MCG (5000 UT) TABS, Take 5,000 Units by mouth daily   Insulin Degludec (TRESIBA FLEXTOUCH) 200 UNIT/ML SOPN, Inject 26 Units into the skin nightly  Probiotic Product (PROBIOTIC MULTI-ENZYME) TABS, Take 1 tablet by mouth daily   Omega-3 Fatty Acids (FISH OIL) 1000 MG CAPS, Take 1,000 mg by mouth daily   vitamin C (ASCORBIC ACID) 500 MG tablet, Take 1,000 mg by mouth daily   vitamin B-12 (CYANOCOBALAMIN) 500 MCG tablet, Take 500 mcg by mouth daily   vitamin E 400 UNIT capsule, Take 400 Units by mouth daily   b complex vitamins capsule, Take 1 capsule by mouth daily   Calcium-Magnesium-Zinc 500-250-12.5 MG TABS, Take 1 tablet by mouth daily     Allergies:    Ketorolac tromethamine, Memantine hcl, and Tamsulosin hcl    Social History:    reports that he has never smoked. He has never used smokeless tobacco. He reports that he does not drink alcohol and does not use drugs. Family History:   family history includes Heart Failure in his brother; Hypertension in his brother; Seizures in his brother; Stroke in his brother. REVIEW OF SYSTEMS:  As above in the HPI, otherwise negative    PHYSICAL EXAM:    Vitals:  BP (!) 142/86   Pulse 100   Temp 100 °F (37.8 °C) (Oral)   Resp 18   Ht 5' 10\" (1.778 m)   Wt 182 lb (82.6 kg)   SpO2 92%   BMI 26.11 kg/m²     General:  Awake, alert, somewhat confused  In moderate respiratory distress  HEENT:  Normocephalic, atraumatic. Pupils equal, round, reactive to light. No scleral icterus. No conjunctival injection. No nasal discharge. Neck:  Supple  Heart:  RRR, no murmurs, gallops, rubs  Lungs: Diffuse wheezes noted throughout/much improved  Abdomen:   Bowel sounds present, soft, nontender, no masses, no organomegaly, no peritoneal signs  Distended with ascites  Extremities:  No clubbing,  Bilateral lower extremity edema is rather extensive/much improved today  Skin:  Warm and dry, no open lesions or rash  Neuro:  Cranial nerves 2-12 intact, no focal deficits  Breast: deferred  Rectal: deferred  Genitalia:  deferred    LABS:  Data reviewed      ASSESSMENT:      Active Problems:    Acute respiratory failure with hypoxia (HCC)  From pleural effusion and ascites  Advanced liver disease  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

## 2021-11-11 VITALS
TEMPERATURE: 98.4 F | SYSTOLIC BLOOD PRESSURE: 130 MMHG | WEIGHT: 182 LBS | HEIGHT: 70 IN | HEART RATE: 90 BPM | BODY MASS INDEX: 26.05 KG/M2 | DIASTOLIC BLOOD PRESSURE: 78 MMHG | OXYGEN SATURATION: 96 % | RESPIRATION RATE: 22 BRPM

## 2021-11-11 LAB
ALBUMIN SERPL-MCNC: 3.5 G/DL (ref 3.5–5.2)
ALP BLD-CCNC: 76 U/L (ref 40–129)
ALT SERPL-CCNC: 34 U/L (ref 0–40)
ANION GAP SERPL CALCULATED.3IONS-SCNC: 11 MMOL/L (ref 7–16)
ANISOCYTOSIS: ABNORMAL
AST SERPL-CCNC: 31 U/L (ref 0–39)
BASOPHILS ABSOLUTE: 0 E9/L (ref 0–0.2)
BASOPHILS RELATIVE PERCENT: 0.5 % (ref 0–2)
BILIRUB SERPL-MCNC: 1.6 MG/DL (ref 0–1.2)
BUN BLDV-MCNC: 11 MG/DL (ref 6–23)
CALCIUM SERPL-MCNC: 8.3 MG/DL (ref 8.6–10.2)
CHLORIDE BLD-SCNC: 99 MMOL/L (ref 98–107)
CO2: 26 MMOL/L (ref 22–29)
CREAT SERPL-MCNC: 0.6 MG/DL (ref 0.7–1.2)
EOSINOPHILS ABSOLUTE: 0.18 E9/L (ref 0.05–0.5)
EOSINOPHILS RELATIVE PERCENT: 4.3 % (ref 0–6)
GFR AFRICAN AMERICAN: >60
GFR NON-AFRICAN AMERICAN: >60 ML/MIN/1.73
GLUCOSE BLD-MCNC: 106 MG/DL (ref 74–99)
HCT VFR BLD CALC: 34.3 % (ref 37–54)
HCV QNT BY NAAT IU/ML: NOT DETECTED IU/ML
HCV QNT BY NAAT LOG IU/ML: NOT DETECTED LOG IU/ML
HEMOGLOBIN: 12 G/DL (ref 12.5–16.5)
HYPOCHROMIA: ABNORMAL
INTERPRETATION: NOT DETECTED
LYMPHOCYTES ABSOLUTE: 0.41 E9/L (ref 1.5–4)
LYMPHOCYTES RELATIVE PERCENT: 10.4 % (ref 20–42)
MCH RBC QN AUTO: 33.7 PG (ref 26–35)
MCHC RBC AUTO-ENTMCNC: 35 % (ref 32–34.5)
MCV RBC AUTO: 96.3 FL (ref 80–99.9)
METER GLUCOSE: 161 MG/DL (ref 74–99)
METER GLUCOSE: 247 MG/DL (ref 74–99)
MONOCYTES ABSOLUTE: 0.16 E9/L (ref 0.1–0.95)
MONOCYTES RELATIVE PERCENT: 4.3 % (ref 2–12)
NEUTROPHILS ABSOLUTE: 3.32 E9/L (ref 1.8–7.3)
NEUTROPHILS RELATIVE PERCENT: 80.9 % (ref 43–80)
NUCLEATED RED BLOOD CELLS: 0.9 /100 WBC
OVALOCYTES: ABNORMAL
PDW BLD-RTO: 14.3 FL (ref 11.5–15)
PLATELET # BLD: 64 E9/L (ref 130–450)
PLATELET CONFIRMATION: NORMAL
PMV BLD AUTO: 10.4 FL (ref 7–12)
POIKILOCYTES: ABNORMAL
POTASSIUM SERPL-SCNC: 4.1 MMOL/L (ref 3.5–5)
RBC # BLD: 3.56 E12/L (ref 3.8–5.8)
SODIUM BLD-SCNC: 136 MMOL/L (ref 132–146)
TARGET CELLS: ABNORMAL
TOTAL PROTEIN: 5.8 G/DL (ref 6.4–8.3)
WBC # BLD: 4.1 E9/L (ref 4.5–11.5)

## 2021-11-11 PROCEDURE — 94640 AIRWAY INHALATION TREATMENT: CPT

## 2021-11-11 PROCEDURE — 6370000000 HC RX 637 (ALT 250 FOR IP): Performed by: INTERNAL MEDICINE

## 2021-11-11 PROCEDURE — 85025 COMPLETE CBC W/AUTO DIFF WBC: CPT

## 2021-11-11 PROCEDURE — 2500000003 HC RX 250 WO HCPCS: Performed by: INTERNAL MEDICINE

## 2021-11-11 PROCEDURE — 36415 COLL VENOUS BLD VENIPUNCTURE: CPT

## 2021-11-11 PROCEDURE — 2700000000 HC OXYGEN THERAPY PER DAY

## 2021-11-11 PROCEDURE — 80053 COMPREHEN METABOLIC PANEL: CPT

## 2021-11-11 PROCEDURE — 82962 GLUCOSE BLOOD TEST: CPT

## 2021-11-11 RX ORDER — SPIRONOLACTONE 25 MG/1
25 TABLET ORAL 2 TIMES DAILY
Qty: 30 TABLET | Refills: 3 | Status: ON HOLD | OUTPATIENT
Start: 2021-11-11 | End: 2021-12-08 | Stop reason: SDUPTHER

## 2021-11-11 RX ORDER — BUMETANIDE 2 MG/1
2 TABLET ORAL DAILY
Qty: 30 TABLET | Refills: 3 | Status: ON HOLD | OUTPATIENT
Start: 2021-11-11 | End: 2021-12-08 | Stop reason: SDUPTHER

## 2021-11-11 RX ADMIN — INSULIN LISPRO 2 UNITS: 100 INJECTION, SOLUTION INTRAVENOUS; SUBCUTANEOUS at 11:39

## 2021-11-11 RX ADMIN — INSULIN LISPRO 4 UNITS: 100 INJECTION, SOLUTION INTRAVENOUS; SUBCUTANEOUS at 09:12

## 2021-11-11 RX ADMIN — BUMETANIDE 2 MG: 0.25 INJECTION INTRAMUSCULAR; INTRAVENOUS at 09:11

## 2021-11-11 RX ADMIN — IPRATROPIUM BROMIDE AND ALBUTEROL SULFATE 1 AMPULE: .5; 2.5 SOLUTION RESPIRATORY (INHALATION) at 12:48

## 2021-11-11 RX ADMIN — DONEPEZIL HYDROCHLORIDE 5 MG: 5 TABLET, FILM COATED ORAL at 09:11

## 2021-11-11 RX ADMIN — IPRATROPIUM BROMIDE AND ALBUTEROL SULFATE 1 AMPULE: .5; 2.5 SOLUTION RESPIRATORY (INHALATION) at 09:33

## 2021-11-11 ASSESSMENT — PAIN SCALES - GENERAL: PAINLEVEL_OUTOF10: 0

## 2021-11-11 NOTE — CARE COORDINATION
11/11/2021 - Discharge order on chart. [address] orders on chart. Sent message to Trang Locke from [place] [facility] that pt is discharging. Pt wife will provide pt a ride home. SW/CM will follow.

## 2021-11-11 NOTE — DISCHARGE SUMMARY
Physician Discharge Summary     Patient ID:  Nicolle Ji  [id number]  22 y.o.  1950    Admit date: 11/6/2021    Discharge date and time: 11/11/2021    Admission Diagnoses: Lactic acidosis [E87.2]  Pleural effusion on right [J90]  Acute respiratory failure with hypoxia (HCC) [J96.01]    Discharge Diagnoses:   Acute hypoxic respiratory failure  Volume overload from advanced liver disease  Pleural effusion  Ascites  Recent endocarditis with Streptococcus  Hypoalbuminemia  Patient Active Problem List   Diagnosis    [facility]    Anemia    Hepatic cirrhosis due to chronic hepatitis C infection ([facility]    Secondary esophageal varices with bleeding ([facility]    D[facility] with other [facility] disease [facility]    B12 deficiency    Streptococcal bacteremia    Acute respiratory failure with [facility]       Consults: Pulmonary and ID    Procedures: Removal of PICC line    Hospital Course:   79-year-old  man with recent endocarditis on IV Rocephin at home  Patient with advanced liver disease and advanced dementia  Admitted through emergency room due to extensive ascites and pleural effusion resulting in hypoxic respiratory failure  Responded well to IV albumin infusion followed by IV Bumex  Spironolactone was initiated and a dose raised to 25 twice daily which he tolerated well  PICC line was removed as he finished the endocarditis protocol of Rocephin therapy  He was on 3 L of oxygen at the time of discharge  Wife did not like the idea of thoracentesis  Being discharged home in stable condition    Discharge Exam:  Patient was seen on the floor earlier this morning  Wife at bedside  Remains stable  Oxygenating well on 3 L  Edema nearly resolved  Breath sounds are better  Ascites is better  Creatinine stable  Discharge instructions given to the patient and the wife  Disposition: Home with home care  Stable at the time of discharge  Patient Instructions:   Current Discharge Medication List      START taking these medications    Details   spironolactone (ALDACTONE) 25 MG tablet Take 1 tablet by mouth 2 times daily  Qty: 30 tablet, Refills: 3      bumetanide (BUMEX) 2 MG tablet Take 1 tablet by mouth daily  Qty: 30 tablet, Refills: 3         CONTINUE these medications which have NOT CHANGED    Details   escitalopram (LEXAPRO) 5 MG tablet Take 5 mg by mouth daily      blood glucose test strips (ASCENSIA AUTODISC VI;ONE TOUCH ULTRA TEST VI) strip 1 each by In Vitro route 4 times daily One touch test strips  Qty: 360 each, Refills: 3    Comments: One Touch Ultra  Associated Diagnoses: Type 1 diabetes mellitus without complication (HCC)      MISC NATURAL PRODUCTS PO Take 1 tablet by mouth daily -OSKAR BLUE-      CRANBERRY PO Take 1 tablet by mouth as needed (URINARY)      Apoaequorin (PREVAGEN PO) Take 1 tablet by mouth daily       donepezil (ARICEPT) 5 MG tablet Take 1 tablet by mouth daily (with breakfast)  Qty: 30 tablet, Refills: 5      insulin aspart (NOVOLOG FLEXPEN) 100 UNIT/ML injection pen Inject 14 Units into the skin 3 times daily (before meals) If BS less than 100, no coverage  If greater than 150, 14 units  Qty: 15 pen, Refills: 1      vitamin D-3 (CHOLECALCIFEROL) 125 MCG (5000 UT) TABS Take 5,000 Units by mouth daily       Insulin Degludec (TRESIBA FLEXTOUCH) 200 UNIT/ML SOPN Inject 26 Units into the skin nightly  Qty: 4 pen, Refills: 0    Comments: Patient was given sample pens in the office on 3/9/2021    Lot # [id number] (3 PENS)  Lot # [id number] (1PEN)  Exp: Apr 2022      Probiotic Product (PROBIOTIC MULTI-ENZYME) TABS Take 1 tablet by mouth daily       Omega-3 Fatty Acids (FISH OIL) 1000 MG CAPS Take 1,000 mg by mouth daily       vitamin C (ASCORBIC ACID) 500 MG tablet Take 1,000 mg by mouth daily       vitamin B-12 (CYANOCOBALAMIN) 500 MCG tablet Take 500 mcg by mouth daily       vitamin E 400 UNIT capsule Take 400 Units by mouth daily       b complex vitamins capsule Take 1 capsule by mouth daily       Calcium-Magnesium-Zinc 500-250-12.5 MG TABS Take 1 tablet by mouth daily          STOP taking these medications       furosemide (LASIX) 20 MG tablet Comments:   Reason for Stopping:         cefTRIAXone (ROCEPHIN) infusion Comments:   Reason for Stopping:             Activity: As tolerated  Diet: General    Follow-up with PCP    Note that over 40 minutes was spent in preparing discharge papers, discussing discharge with patient, medication review, etc.    Signed:  Renetta Palacios MD  11/11/2021  1:33 PM

## 2021-11-12 LAB
BLOOD CULTURE, ROUTINE: NORMAL
CULTURE, BLOOD 2: NORMAL

## 2021-11-15 RX ORDER — DONEPEZIL HYDROCHLORIDE 5 MG/1
5 TABLET, FILM COATED ORAL
Qty: 90 TABLET | Refills: 0 | Status: SHIPPED
Start: 2021-11-15 | End: 2021-12-13 | Stop reason: ALTCHOICE

## 2021-11-18 DIAGNOSIS — J90 PLEURAL EFFUSION: ICD-10-CM

## 2021-11-18 LAB
ALBUMIN SERPL-MCNC: 3.4 G/DL (ref 3.5–5.2)
ALP BLD-CCNC: 94 U/L (ref 40–129)
ALT SERPL-CCNC: 37 U/L (ref 0–40)
ANION GAP SERPL CALCULATED.3IONS-SCNC: 9 MMOL/L (ref 7–16)
AST SERPL-CCNC: 33 U/L (ref 0–39)
BASOPHILS ABSOLUTE: 0.05 E9/L (ref 0–0.2)
BASOPHILS RELATIVE PERCENT: 0.8 % (ref 0–2)
BILIRUB SERPL-MCNC: 1.1 MG/DL (ref 0–1.2)
BUN BLDV-MCNC: 22 MG/DL (ref 6–23)
CALCIUM SERPL-MCNC: 9.1 MG/DL (ref 8.6–10.2)
CHLORIDE BLD-SCNC: 101 MMOL/L (ref 98–107)
CO2: 26 MMOL/L (ref 22–29)
CREAT SERPL-MCNC: 0.9 MG/DL (ref 0.7–1.2)
EOSINOPHILS ABSOLUTE: 0.19 E9/L (ref 0.05–0.5)
EOSINOPHILS RELATIVE PERCENT: 3.1 % (ref 0–6)
GFR AFRICAN AMERICAN: >60
GFR NON-AFRICAN AMERICAN: >60 ML/MIN/1.73
GLUCOSE BLD-MCNC: 252 MG/DL (ref 74–99)
HCT VFR BLD CALC: 37.6 % (ref 37–54)
HEMOGLOBIN: 12.8 G/DL (ref 12.5–16.5)
IMMATURE GRANULOCYTES #: 0.01 E9/L
IMMATURE GRANULOCYTES %: 0.2 % (ref 0–5)
LYMPHOCYTES ABSOLUTE: 0.85 E9/L (ref 1.5–4)
LYMPHOCYTES RELATIVE PERCENT: 13.7 % (ref 20–42)
MCH RBC QN AUTO: 32.7 PG (ref 26–35)
MCHC RBC AUTO-ENTMCNC: 34 % (ref 32–34.5)
MCV RBC AUTO: 96.2 FL (ref 80–99.9)
MONOCYTES ABSOLUTE: 0.64 E9/L (ref 0.1–0.95)
MONOCYTES RELATIVE PERCENT: 10.3 % (ref 2–12)
NEUTROPHILS ABSOLUTE: 4.48 E9/L (ref 1.8–7.3)
NEUTROPHILS RELATIVE PERCENT: 71.9 % (ref 43–80)
PDW BLD-RTO: 14.1 FL (ref 11.5–15)
PLATELET # BLD: 135 E9/L (ref 130–450)
PMV BLD AUTO: 10.6 FL (ref 7–12)
POTASSIUM SERPL-SCNC: 5 MMOL/L (ref 3.5–5)
RBC # BLD: 3.91 E12/L (ref 3.8–5.8)
SODIUM BLD-SCNC: 136 MMOL/L (ref 132–146)
TOTAL PROTEIN: 6.2 G/DL (ref 6.4–8.3)
WBC # BLD: 6.2 E9/L (ref 4.5–11.5)

## 2021-11-23 ENCOUNTER — HOSPITAL ENCOUNTER (OUTPATIENT)
Age: 71
Discharge: HOME | End: 2021-11-23
Payer: MEDICARE

## 2021-11-23 PROCEDURE — 36415 COLL VENOUS BLD VENIPUNCTURE: CPT

## 2021-11-23 PROCEDURE — 87040 BLOOD CULTURE FOR BACTERIA: CPT

## 2021-11-28 LAB
BLOOD CULTURE, ROUTINE: NORMAL
CULTURE, BLOOD 2: NORMAL

## 2021-12-03 ENCOUNTER — APPOINTMENT (OUTPATIENT)
Dept: CT IMAGING | Age: 71
DRG: 432 | End: 2021-12-03
Payer: MEDICARE

## 2021-12-03 ENCOUNTER — APPOINTMENT (OUTPATIENT)
Dept: GENERAL RADIOLOGY | Age: 71
DRG: 432 | End: 2021-12-03
Payer: MEDICARE

## 2021-12-03 ENCOUNTER — HOSPITAL ENCOUNTER (INPATIENT)
Age: 71
LOS: 5 days | Discharge: HOME HEALTH | DRG: 432 | End: 2021-12-08
Attending: EMERGENCY MEDICINE | Admitting: STUDENT IN AN ORGANIZED HEALTH CARE EDUCATION/TRAINING PROGRAM
Payer: MEDICARE

## 2021-12-03 ENCOUNTER — OFFICE VISIT (OUTPATIENT)
Dept: GERIATRIC MEDICINE | Age: 71
End: 2021-12-03
Payer: MEDICARE

## 2021-12-03 VITALS
HEART RATE: 92 BPM | BODY MASS INDEX: 23.25 KG/M2 | TEMPERATURE: 97.9 F | HEIGHT: 69 IN | WEIGHT: 157 LBS | DIASTOLIC BLOOD PRESSURE: 74 MMHG | SYSTOLIC BLOOD PRESSURE: 118 MMHG | RESPIRATION RATE: 20 BRPM

## 2021-12-03 DIAGNOSIS — K92.2 UPPER GI BLEED: Primary | ICD-10-CM

## 2021-12-03 DIAGNOSIS — R40.0 SOMNOLENCE: Primary | ICD-10-CM

## 2021-12-03 DIAGNOSIS — Z87.19 HISTORY OF ESOPHAGEAL VARICES: ICD-10-CM

## 2021-12-03 LAB
ABO/RH: NORMAL
ALBUMIN SERPL-MCNC: 3.6 G/DL (ref 3.5–5.2)
ALP BLD-CCNC: 104 U/L (ref 40–129)
ALT SERPL-CCNC: 48 U/L (ref 0–40)
AMMONIA: 33.5 UMOL/L (ref 16–60)
ANION GAP SERPL CALCULATED.3IONS-SCNC: 11 MMOL/L (ref 7–16)
ANTIBODY SCREEN: NORMAL
APTT: 27.9 SEC (ref 24.5–35.1)
AST SERPL-CCNC: 36 U/L (ref 0–39)
BASOPHILS ABSOLUTE: 0.04 E9/L (ref 0–0.2)
BASOPHILS RELATIVE PERCENT: 0.6 % (ref 0–2)
BILIRUB SERPL-MCNC: 1.2 MG/DL (ref 0–1.2)
BILIRUBIN DIRECT: 0.4 MG/DL (ref 0–0.3)
BILIRUBIN, INDIRECT: 0.8 MG/DL (ref 0–1)
BUN BLDV-MCNC: 31 MG/DL (ref 6–23)
CALCIUM SERPL-MCNC: 9.3 MG/DL (ref 8.6–10.2)
CHLORIDE BLD-SCNC: 98 MMOL/L (ref 98–107)
CO2: 25 MMOL/L (ref 22–29)
CREAT SERPL-MCNC: 0.8 MG/DL (ref 0.7–1.2)
EOSINOPHILS ABSOLUTE: 0.12 E9/L (ref 0.05–0.5)
EOSINOPHILS RELATIVE PERCENT: 1.9 % (ref 0–6)
GFR AFRICAN AMERICAN: >60
GFR NON-AFRICAN AMERICAN: >60 ML/MIN/1.73
GLUCOSE BLD-MCNC: 266 MG/DL (ref 74–99)
HCT VFR BLD CALC: 34.6 % (ref 37–54)
HEMOGLOBIN: 12.2 G/DL (ref 12.5–16.5)
IMMATURE GRANULOCYTES #: 0.04 E9/L
IMMATURE GRANULOCYTES %: 0.6 % (ref 0–5)
INR BLD: 1.3
LACTIC ACID, SEPSIS: 2.7 MMOL/L (ref 0.5–1.9)
LYMPHOCYTES ABSOLUTE: 0.78 E9/L (ref 1.5–4)
LYMPHOCYTES RELATIVE PERCENT: 12.5 % (ref 20–42)
MAGNESIUM: 2 MG/DL (ref 1.6–2.6)
MCH RBC QN AUTO: 33.6 PG (ref 26–35)
MCHC RBC AUTO-ENTMCNC: 35.3 % (ref 32–34.5)
MCV RBC AUTO: 95.3 FL (ref 80–99.9)
MONOCYTES ABSOLUTE: 0.68 E9/L (ref 0.1–0.95)
MONOCYTES RELATIVE PERCENT: 10.9 % (ref 2–12)
NEUTROPHILS ABSOLUTE: 4.59 E9/L (ref 1.8–7.3)
NEUTROPHILS RELATIVE PERCENT: 73.5 % (ref 43–80)
PDW BLD-RTO: 14 FL (ref 11.5–15)
PLATELET # BLD: 124 E9/L (ref 130–450)
PMV BLD AUTO: 10.2 FL (ref 7–12)
POTASSIUM SERPL-SCNC: 4.4 MMOL/L (ref 3.5–5)
PRO-BNP: 36 PG/ML (ref 0–125)
PROTHROMBIN TIME: 14.7 SEC (ref 9.3–12.4)
RBC # BLD: 3.63 E12/L (ref 3.8–5.8)
SODIUM BLD-SCNC: 134 MMOL/L (ref 132–146)
TOTAL PROTEIN: 6.6 G/DL (ref 6.4–8.3)
TROPONIN, HIGH SENSITIVITY: 27 NG/L (ref 0–11)
WBC # BLD: 6.3 E9/L (ref 4.5–11.5)

## 2021-12-03 PROCEDURE — 86900 BLOOD TYPING SEROLOGIC ABO: CPT

## 2021-12-03 PROCEDURE — 80143 DRUG ASSAY ACETAMINOPHEN: CPT

## 2021-12-03 PROCEDURE — 80076 HEPATIC FUNCTION PANEL: CPT

## 2021-12-03 PROCEDURE — 84484 ASSAY OF TROPONIN QUANT: CPT

## 2021-12-03 PROCEDURE — 99213 OFFICE O/P EST LOW 20 MIN: CPT | Performed by: INTERNAL MEDICINE

## 2021-12-03 PROCEDURE — 85730 THROMBOPLASTIN TIME PARTIAL: CPT

## 2021-12-03 PROCEDURE — 2580000003 HC RX 258

## 2021-12-03 PROCEDURE — 80179 DRUG ASSAY SALICYLATE: CPT

## 2021-12-03 PROCEDURE — 80048 BASIC METABOLIC PNL TOTAL CA: CPT

## 2021-12-03 PROCEDURE — 6360000002 HC RX W HCPCS

## 2021-12-03 PROCEDURE — 86901 BLOOD TYPING SEROLOGIC RH(D): CPT

## 2021-12-03 PROCEDURE — 71045 X-RAY EXAM CHEST 1 VIEW: CPT

## 2021-12-03 PROCEDURE — 2000000000 HC ICU R&B

## 2021-12-03 PROCEDURE — 83605 ASSAY OF LACTIC ACID: CPT

## 2021-12-03 PROCEDURE — 82077 ASSAY SPEC XCP UR&BREATH IA: CPT

## 2021-12-03 PROCEDURE — 86850 RBC ANTIBODY SCREEN: CPT

## 2021-12-03 PROCEDURE — 96375 TX/PRO/DX INJ NEW DRUG ADDON: CPT

## 2021-12-03 PROCEDURE — 82140 ASSAY OF AMMONIA: CPT

## 2021-12-03 PROCEDURE — 80307 DRUG TEST PRSMV CHEM ANLYZR: CPT

## 2021-12-03 PROCEDURE — 99282 EMERGENCY DEPT VISIT SF MDM: CPT

## 2021-12-03 PROCEDURE — 85610 PROTHROMBIN TIME: CPT

## 2021-12-03 PROCEDURE — 85025 COMPLETE CBC W/AUTO DIFF WBC: CPT

## 2021-12-03 PROCEDURE — 83880 ASSAY OF NATRIURETIC PEPTIDE: CPT

## 2021-12-03 PROCEDURE — 96365 THER/PROPH/DIAG IV INF INIT: CPT

## 2021-12-03 PROCEDURE — 70450 CT HEAD/BRAIN W/O DYE: CPT

## 2021-12-03 PROCEDURE — 93005 ELECTROCARDIOGRAM TRACING: CPT | Performed by: EMERGENCY MEDICINE

## 2021-12-03 PROCEDURE — 99212 OFFICE O/P EST SF 10 MIN: CPT | Performed by: INTERNAL MEDICINE

## 2021-12-03 PROCEDURE — 83735 ASSAY OF MAGNESIUM: CPT

## 2021-12-03 PROCEDURE — C9113 INJ PANTOPRAZOLE SODIUM, VIA: HCPCS

## 2021-12-03 RX ORDER — SODIUM CHLORIDE 0.9 % (FLUSH) 0.9 %
10 SYRINGE (ML) INJECTION PRN
Status: DISCONTINUED | OUTPATIENT
Start: 2021-12-03 | End: 2021-12-08 | Stop reason: HOSPADM

## 2021-12-03 RX ORDER — DEXTROSE MONOHYDRATE 50 MG/ML
100 INJECTION, SOLUTION INTRAVENOUS PRN
Status: DISCONTINUED | OUTPATIENT
Start: 2021-12-03 | End: 2021-12-08 | Stop reason: HOSPADM

## 2021-12-03 RX ORDER — SODIUM CHLORIDE 9 MG/ML
20 INJECTION INTRAVENOUS ONCE
Status: COMPLETED | OUTPATIENT
Start: 2021-12-03 | End: 2021-12-03

## 2021-12-03 RX ORDER — ACETAMINOPHEN 325 MG/1
650 TABLET ORAL EVERY 6 HOURS PRN
Status: DISCONTINUED | OUTPATIENT
Start: 2021-12-03 | End: 2021-12-08 | Stop reason: HOSPADM

## 2021-12-03 RX ORDER — NICOTINE POLACRILEX 4 MG
15 LOZENGE BUCCAL PRN
Status: DISCONTINUED | OUTPATIENT
Start: 2021-12-03 | End: 2021-12-08 | Stop reason: HOSPADM

## 2021-12-03 RX ORDER — PANTOPRAZOLE SODIUM 40 MG/10ML
80 INJECTION, POWDER, LYOPHILIZED, FOR SOLUTION INTRAVENOUS ONCE
Status: COMPLETED | OUTPATIENT
Start: 2021-12-03 | End: 2021-12-03

## 2021-12-03 RX ORDER — ACETAMINOPHEN 650 MG/1
650 SUPPOSITORY RECTAL EVERY 6 HOURS PRN
Status: DISCONTINUED | OUTPATIENT
Start: 2021-12-03 | End: 2021-12-08 | Stop reason: HOSPADM

## 2021-12-03 RX ORDER — ONDANSETRON 2 MG/ML
4 INJECTION INTRAMUSCULAR; INTRAVENOUS EVERY 6 HOURS PRN
Status: DISCONTINUED | OUTPATIENT
Start: 2021-12-03 | End: 2021-12-08 | Stop reason: HOSPADM

## 2021-12-03 RX ORDER — SODIUM CHLORIDE 9 MG/ML
25 INJECTION, SOLUTION INTRAVENOUS PRN
Status: DISCONTINUED | OUTPATIENT
Start: 2021-12-03 | End: 2021-12-08 | Stop reason: HOSPADM

## 2021-12-03 RX ORDER — ONDANSETRON 4 MG/1
4 TABLET, ORALLY DISINTEGRATING ORAL EVERY 8 HOURS PRN
Status: DISCONTINUED | OUTPATIENT
Start: 2021-12-03 | End: 2021-12-08 | Stop reason: HOSPADM

## 2021-12-03 RX ORDER — SODIUM CHLORIDE 0.9 % (FLUSH) 0.9 %
10 SYRINGE (ML) INJECTION EVERY 12 HOURS SCHEDULED
Status: DISCONTINUED | OUTPATIENT
Start: 2021-12-03 | End: 2021-12-08 | Stop reason: HOSPADM

## 2021-12-03 RX ORDER — DEXTROSE MONOHYDRATE 25 G/50ML
12.5 INJECTION, SOLUTION INTRAVENOUS PRN
Status: DISCONTINUED | OUTPATIENT
Start: 2021-12-03 | End: 2021-12-08 | Stop reason: HOSPADM

## 2021-12-03 RX ORDER — SENNA PLUS 8.6 MG/1
1 TABLET ORAL DAILY PRN
Status: DISCONTINUED | OUTPATIENT
Start: 2021-12-03 | End: 2021-12-08 | Stop reason: HOSPADM

## 2021-12-03 RX ADMIN — SODIUM CHLORIDE 20 ML: 9 INJECTION, SOLUTION INTRAMUSCULAR; INTRAVENOUS; SUBCUTANEOUS at 21:46

## 2021-12-03 RX ADMIN — OCTREOTIDE ACETATE 50 MCG/HR: 500 INJECTION, SOLUTION INTRAVENOUS; SUBCUTANEOUS at 22:06

## 2021-12-03 RX ADMIN — PANTOPRAZOLE SODIUM 80 MG: 40 INJECTION, POWDER, FOR SOLUTION INTRAVENOUS at 21:47

## 2021-12-03 NOTE — PROGRESS NOTES
CC Evaluate confusion    Now on 2 diuretics and concerned  Picking and scratching  Knows wife and by name    She is concerned about his weight loss   Recently in hospital with strep bacteremia   Not much speech and shaking head yes or no'  May be eating   She is concerned about too much diuretic ADL wife helping in most and may be bathroom independent   May wonder to basemsnt at night , puts lights on   May not recognize wife   Did not talk to speech therapist  On Lexapro at night and sedated during the day   Concern about leaving home   Eating food that his wife cooks   Will stop Donepezil for now due to anorexia and weight loss  Bumex qod and follow weight   Impression: SDAT and abrupt decline since Septic                              admission                      Trance like state and Hep C Cirrhosis                       Not likely NH 3                     Failure to thrive and poor appetite                        Night time rummaging and risk he may l                     leave house   Plan:  D/C Aricept             Bumex 2 mg qod and follow weight            RTO              CT head

## 2021-12-03 NOTE — PROGRESS NOTES
Chief Complaint   Patient presents with    Follow-up     August follow up re: Alzheimer's. Here with wife. She feels pt has had a decline in memory & general status since his August visit.  Weight Loss     Weight is down 26.4 lb since August visit. Wife states pt is eating less, eats slowly, & is losing weight.  Diabetes     Per wife, states pt's FBS run between 100 and 200 and that PCP is aware.  Discuss Medications     Wife is concerned about pt being on Spironolactone & Bumex with the weight loss. States pt loses his balance at times. Also wonders if Lexapro & Aricept are helping -- states she notices no improvement on these.  Cough     Chronic dry cough, per wife.  Nausea     Nauseated this morning, no vomiting. Wife questions if it may be med related.  Labs Only     Blood cultures done on 11/23/21 (appear to have no growth in 5 days). Encouraged wife to call Infectious Disease DR (ordering DR) to check results and any plan of care. Above concerns discussed with Dr Rachel Escobar prior to his seeing the pt.

## 2021-12-04 ENCOUNTER — ANESTHESIA EVENT (OUTPATIENT)
Dept: ENDOSCOPY | Age: 71
DRG: 432 | End: 2021-12-04
Payer: MEDICARE

## 2021-12-04 ENCOUNTER — APPOINTMENT (OUTPATIENT)
Dept: ULTRASOUND IMAGING | Age: 71
DRG: 432 | End: 2021-12-04
Payer: MEDICARE

## 2021-12-04 LAB
ACETAMINOPHEN LEVEL: <5 MCG/ML (ref 10–30)
ALBUMIN SERPL-MCNC: 3.2 G/DL (ref 3.5–5.2)
ALP BLD-CCNC: 91 U/L (ref 40–129)
ALT SERPL-CCNC: 45 U/L (ref 0–40)
AMMONIA: 235.2 UMOL/L (ref 16–60)
AMMONIA: 91.5 UMOL/L (ref 16–60)
ANION GAP SERPL CALCULATED.3IONS-SCNC: 9 MMOL/L (ref 7–16)
AST SERPL-CCNC: 34 U/L (ref 0–39)
BACTERIA: NORMAL /HPF
BASOPHILS ABSOLUTE: 0.06 E9/L (ref 0–0.2)
BASOPHILS RELATIVE PERCENT: 1.2 % (ref 0–2)
BILIRUB SERPL-MCNC: 1.3 MG/DL (ref 0–1.2)
BILIRUBIN URINE: NEGATIVE
BLOOD, URINE: ABNORMAL
BUN BLDV-MCNC: 30 MG/DL (ref 6–23)
CALCIUM SERPL-MCNC: 8.9 MG/DL (ref 8.6–10.2)
CHLORIDE BLD-SCNC: 101 MMOL/L (ref 98–107)
CLARITY: CLEAR
CO2: 24 MMOL/L (ref 22–29)
COLOR: YELLOW
CREAT SERPL-MCNC: 0.7 MG/DL (ref 0.7–1.2)
EKG ATRIAL RATE: 97 BPM
EKG P AXIS: 57 DEGREES
EKG P-R INTERVAL: 142 MS
EKG Q-T INTERVAL: 366 MS
EKG QRS DURATION: 94 MS
EKG QTC CALCULATION (BAZETT): 464 MS
EKG R AXIS: 15 DEGREES
EKG T AXIS: 55 DEGREES
EKG VENTRICULAR RATE: 97 BPM
EOSINOPHILS ABSOLUTE: 0.17 E9/L (ref 0.05–0.5)
EOSINOPHILS RELATIVE PERCENT: 3.3 % (ref 0–6)
EPITHELIAL CELLS, UA: NORMAL /HPF
ETHANOL: <10 MG/DL (ref 0–0.08)
GFR AFRICAN AMERICAN: >60
GFR NON-AFRICAN AMERICAN: >60 ML/MIN/1.73
GLUCOSE BLD-MCNC: 214 MG/DL (ref 74–99)
GLUCOSE URINE: NEGATIVE MG/DL
HCT VFR BLD CALC: 30.1 % (ref 37–54)
HCT VFR BLD CALC: 31.9 % (ref 37–54)
HEMOGLOBIN: 10.6 G/DL (ref 12.5–16.5)
HEMOGLOBIN: 10.7 G/DL (ref 12.5–16.5)
IMMATURE GRANULOCYTES #: 0 E9/L
IMMATURE GRANULOCYTES %: 0 % (ref 0–5)
INR BLD: 1.4
KETONES, URINE: NEGATIVE MG/DL
LEUKOCYTE ESTERASE, URINE: NEGATIVE
LYMPHOCYTES ABSOLUTE: 0.91 E9/L (ref 1.5–4)
LYMPHOCYTES RELATIVE PERCENT: 17.9 % (ref 20–42)
MAGNESIUM: 2 MG/DL (ref 1.6–2.6)
MCH RBC QN AUTO: 33.8 PG (ref 26–35)
MCHC RBC AUTO-ENTMCNC: 35.5 % (ref 32–34.5)
MCV RBC AUTO: 95 FL (ref 80–99.9)
METER GLUCOSE: 218 MG/DL (ref 74–99)
METER GLUCOSE: 293 MG/DL (ref 74–99)
MONOCYTES ABSOLUTE: 0.59 E9/L (ref 0.1–0.95)
MONOCYTES RELATIVE PERCENT: 11.6 % (ref 2–12)
NEUTROPHILS ABSOLUTE: 3.36 E9/L (ref 1.8–7.3)
NEUTROPHILS RELATIVE PERCENT: 66 % (ref 43–80)
NITRITE, URINE: NEGATIVE
PDW BLD-RTO: 13.9 FL (ref 11.5–15)
PH UA: 6 (ref 5–9)
PHOSPHORUS: 3.4 MG/DL (ref 2.5–4.5)
PLATELET # BLD: 98 E9/L (ref 130–450)
PLATELET CONFIRMATION: NORMAL
PMV BLD AUTO: 10.2 FL (ref 7–12)
POTASSIUM REFLEX MAGNESIUM: 4.7 MMOL/L (ref 3.5–5)
PROTEIN UA: NEGATIVE MG/DL
PROTHROMBIN TIME: 15.3 SEC (ref 9.3–12.4)
RBC # BLD: 3.17 E12/L (ref 3.8–5.8)
RBC UA: >20 /HPF (ref 0–2)
SALICYLATE, SERUM: <0.3 MG/DL (ref 0–30)
SARS-COV-2, NAAT: NOT DETECTED
SODIUM BLD-SCNC: 134 MMOL/L (ref 132–146)
SPECIFIC GRAVITY UA: 1.02 (ref 1–1.03)
TOTAL PROTEIN: 5.9 G/DL (ref 6.4–8.3)
TRICYCLIC ANTIDEPRESSANTS SCREEN SERUM: NEGATIVE NG/ML
UROBILINOGEN, URINE: 0.2 E.U./DL
WBC # BLD: 5.1 E9/L (ref 4.5–11.5)
WBC UA: NORMAL /HPF (ref 0–5)

## 2021-12-04 PROCEDURE — 83735 ASSAY OF MAGNESIUM: CPT

## 2021-12-04 PROCEDURE — 93975 VASCULAR STUDY: CPT

## 2021-12-04 PROCEDURE — 84100 ASSAY OF PHOSPHORUS: CPT

## 2021-12-04 PROCEDURE — 82962 GLUCOSE BLOOD TEST: CPT

## 2021-12-04 PROCEDURE — 81001 URINALYSIS AUTO W/SCOPE: CPT

## 2021-12-04 PROCEDURE — 2580000003 HC RX 258: Performed by: STUDENT IN AN ORGANIZED HEALTH CARE EDUCATION/TRAINING PROGRAM

## 2021-12-04 PROCEDURE — 36415 COLL VENOUS BLD VENIPUNCTURE: CPT

## 2021-12-04 PROCEDURE — 6370000000 HC RX 637 (ALT 250 FOR IP): Performed by: STUDENT IN AN ORGANIZED HEALTH CARE EDUCATION/TRAINING PROGRAM

## 2021-12-04 PROCEDURE — 87081 CULTURE SCREEN ONLY: CPT

## 2021-12-04 PROCEDURE — 2580000003 HC RX 258

## 2021-12-04 PROCEDURE — 6360000002 HC RX W HCPCS

## 2021-12-04 PROCEDURE — 85014 HEMATOCRIT: CPT

## 2021-12-04 PROCEDURE — 82140 ASSAY OF AMMONIA: CPT

## 2021-12-04 PROCEDURE — 6360000002 HC RX W HCPCS: Performed by: STUDENT IN AN ORGANIZED HEALTH CARE EDUCATION/TRAINING PROGRAM

## 2021-12-04 PROCEDURE — 93010 ELECTROCARDIOGRAM REPORT: CPT | Performed by: INTERNAL MEDICINE

## 2021-12-04 PROCEDURE — 87635 SARS-COV-2 COVID-19 AMP PRB: CPT

## 2021-12-04 PROCEDURE — 1200000000 HC SEMI PRIVATE

## 2021-12-04 PROCEDURE — C9113 INJ PANTOPRAZOLE SODIUM, VIA: HCPCS | Performed by: STUDENT IN AN ORGANIZED HEALTH CARE EDUCATION/TRAINING PROGRAM

## 2021-12-04 PROCEDURE — 85610 PROTHROMBIN TIME: CPT

## 2021-12-04 PROCEDURE — 6370000000 HC RX 637 (ALT 250 FOR IP): Performed by: NURSE PRACTITIONER

## 2021-12-04 PROCEDURE — 85025 COMPLETE CBC W/AUTO DIFF WBC: CPT

## 2021-12-04 PROCEDURE — 80053 COMPREHEN METABOLIC PANEL: CPT

## 2021-12-04 PROCEDURE — 85018 HEMOGLOBIN: CPT

## 2021-12-04 PROCEDURE — 51702 INSERT TEMP BLADDER CATH: CPT

## 2021-12-04 PROCEDURE — 76705 ECHO EXAM OF ABDOMEN: CPT

## 2021-12-04 PROCEDURE — 6370000000 HC RX 637 (ALT 250 FOR IP): Performed by: INTERNAL MEDICINE

## 2021-12-04 RX ORDER — LACTULOSE 10 G/15ML
20 SOLUTION ORAL 3 TIMES DAILY
Status: DISCONTINUED | OUTPATIENT
Start: 2021-12-04 | End: 2021-12-04

## 2021-12-04 RX ORDER — CEFTRIAXONE 1 G/1
INJECTION, POWDER, FOR SOLUTION INTRAMUSCULAR; INTRAVENOUS
Status: COMPLETED
Start: 2021-12-04 | End: 2021-12-04

## 2021-12-04 RX ORDER — LACTULOSE 10 G/15ML
20 SOLUTION ORAL EVERY 6 HOURS SCHEDULED
Status: DISCONTINUED | OUTPATIENT
Start: 2021-12-04 | End: 2021-12-04

## 2021-12-04 RX ORDER — LACTULOSE 10 G/15ML
20 SOLUTION ORAL EVERY 8 HOURS
Status: DISCONTINUED | OUTPATIENT
Start: 2021-12-04 | End: 2021-12-06

## 2021-12-04 RX ORDER — PANTOPRAZOLE SODIUM 40 MG/10ML
40 INJECTION, POWDER, LYOPHILIZED, FOR SOLUTION INTRAVENOUS 2 TIMES DAILY
Status: DISCONTINUED | OUTPATIENT
Start: 2021-12-04 | End: 2021-12-08 | Stop reason: HOSPADM

## 2021-12-04 RX ADMIN — INSULIN LISPRO 3 UNITS: 100 INJECTION, SOLUTION INTRAVENOUS; SUBCUTANEOUS at 18:01

## 2021-12-04 RX ADMIN — RIFAXIMIN 550 MG: 550 TABLET ORAL at 21:06

## 2021-12-04 RX ADMIN — LACTULOSE 20 G: 20 SOLUTION ORAL at 15:40

## 2021-12-04 RX ADMIN — OCTREOTIDE ACETATE 50 MCG/HR: 500 INJECTION, SOLUTION INTRAVENOUS; SUBCUTANEOUS at 07:05

## 2021-12-04 RX ADMIN — Medication 10 ML: at 02:49

## 2021-12-04 RX ADMIN — Medication 10 ML: at 09:43

## 2021-12-04 RX ADMIN — LACTULOSE 20 G: 10 SOLUTION ORAL at 23:22

## 2021-12-04 RX ADMIN — Medication 10 ML: at 21:06

## 2021-12-04 RX ADMIN — PANTOPRAZOLE SODIUM 40 MG: 40 INJECTION, POWDER, FOR SOLUTION INTRAVENOUS at 09:43

## 2021-12-04 RX ADMIN — PANTOPRAZOLE SODIUM 40 MG: 40 INJECTION, POWDER, FOR SOLUTION INTRAVENOUS at 21:06

## 2021-12-04 RX ADMIN — INSULIN LISPRO 2 UNITS: 100 INJECTION, SOLUTION INTRAVENOUS; SUBCUTANEOUS at 01:08

## 2021-12-04 RX ADMIN — CEFTRIAXONE 1000 MG: 1 INJECTION, POWDER, FOR SOLUTION INTRAMUSCULAR; INTRAVENOUS at 01:08

## 2021-12-04 RX ADMIN — WATER: 1 INJECTION INTRAMUSCULAR; INTRAVENOUS; SUBCUTANEOUS at 02:32

## 2021-12-04 RX ADMIN — OCTREOTIDE ACETATE 50 MCG/HR: 500 INJECTION, SOLUTION INTRAVENOUS; SUBCUTANEOUS at 18:43

## 2021-12-04 RX ADMIN — RIFAXIMIN 550 MG: 550 TABLET ORAL at 15:40

## 2021-12-04 RX ADMIN — CEFTRIAXONE 1000 MG: 1 INJECTION, POWDER, FOR SOLUTION INTRAMUSCULAR; INTRAVENOUS at 21:05

## 2021-12-04 RX ADMIN — CEFTRIAXONE SODIUM: 1 INJECTION, POWDER, FOR SOLUTION INTRAMUSCULAR; INTRAVENOUS at 02:32

## 2021-12-04 ASSESSMENT — PAIN SCALES - GENERAL
PAINLEVEL_OUTOF10: 0

## 2021-12-04 ASSESSMENT — ENCOUNTER SYMPTOMS
CHEST TIGHTNESS: 0
ABDOMINAL DISTENTION: 0
ABDOMINAL PAIN: 1
DIARRHEA: 0
VOMITING: 1
NAUSEA: 0
BACK PAIN: 0
EYE REDNESS: 0
TROUBLE SWALLOWING: 0
SHORTNESS OF BREATH: 1
WHEEZING: 0
RHINORRHEA: 0
EYE ITCHING: 0
CONSTIPATION: 0

## 2021-12-04 ASSESSMENT — PAIN SCALES - WONG BAKER: WONGBAKER_NUMERICALRESPONSE: 0

## 2021-12-04 NOTE — H&P
Arleen Nagel MD FACP  Internal medicine   History and Physical      CHIEF COMPLAINT: Hematemesis      HISTORY OF PRESENT ILLNESS:    76-year-old  man seen in ICU at [address] earlier this morning  Admission details reviewed  Wife at bedside  Patient with advanced cirrhosis and dementia  Presented to ER with melena and hematemesis  Patient is a poor historian  Denied abdominal pain  No fever or chills  Admitted for further management    Past Medical History:    Past Medical History:   Diagnosis Date    Buccal mass 09/2020    Diabetes mellitus ([address]     Esophageal varices (HCC)     Hepatitis C     treated and resolved    Liver cirrhosis ([address]     Mild dementia ([facility]     no meds    Positive colorectal cancer screening using Cologuard test 04/18/2021    Referred to Dr Parker Standard Colonoscopy       Past Surgical History:    Past Surgical History:   Procedure Laterality Date    BIOPSY MOUTH LESION Left 06/04/2020    EXCISIONAL BIOPSY LEFT BUCCAL MUCOSAL LESION performed by Willie Salazar MD at [facility]  2017    ENDOSCOPY, COLON, DIAGNOSTIC  2019 and 2020    HC INSERT PICC CATH, 5/> YRS  10/14/2021         HERNIA REPAIR Right 2018    inguinal     MOUTH SURGERY Right 09/24/2020    EXCISIONAL BIOPSY OF RIGHT BUCCOL LESION performed by Willie Salazar MD at [address] [place] [address] ENDOSCOPY N/A 06/19/2018    EGD BAND LIGATION performed by Wilmer Woodard MD at [address]  09/01/2020    Dr Loulou Jiménez medium sized esophageal varices--placed 6 rubber bands on varices--normal duodenum       Medications Prior to Admission:    Medications Prior to Admission: mupirocin (BACTROBAN) 2 % cream, Apply topically 2 times daily Apply topically 2 times daily  donepezil (ARICEPT) 5 MG tablet, Take 1 tablet by mouth daily (with breakfast)  spironolactone (ALDACTONE) 25 MG tablet, Take 1 tablet by mouth 2 times daily  bumetanide (BUMEX) 2 MG tablet, Take 1 tablet by mouth daily (Patient taking differently: Take 2 mg by mouth every other day )  escitalopram (LEXAPRO) 5 MG tablet, Take 5 mg by mouth daily  blood glucose test strips (ASCENSIA AUTODISC VI;ONE TOUCH ULTRA TEST VI) strip, 1 each by In Vitro route 4 times daily One touch test strips  MISC NATURAL PRODUCTS PO, Take 1 tablet by mouth daily -OSKAR BLUE-  CRANBERRY PO, Take 1 tablet by mouth as needed (URINARY)  Apoaequorin (PREVAGEN PO), Take 1 tablet by mouth daily   insulin aspart (NOVOLOG FLEXPEN) 100 UNIT/ML injection pen, Inject 14 Units into the skin 3 times daily (before meals) If BS less than 100, no coverage If greater than 150, 14 units  vitamin D-3 (CHOLECALCIFEROL) 125 MCG (5000 UT) TABS, Take 5,000 Units by mouth daily   Insulin Degludec (TRESIBA FLEXTOUCH) 200 UNIT/ML SOPN, Inject 26 Units into the skin nightly  Probiotic Product (PROBIOTIC MULTI-ENZYME) TABS, Take 1 tablet by mouth daily   Omega-3 Fatty Acids (FISH OIL) 1000 MG CAPS, Take 1,000 mg by mouth daily   vitamin C (ASCORBIC ACID) 500 MG tablet, Take 1,000 mg by mouth daily   vitamin B-12 (CYANOCOBALAMIN) 500 MCG tablet, Take 500 mcg by mouth daily   vitamin E 400 UNIT capsule, Take 400 Units by mouth daily   b complex vitamins capsule, Take 1 capsule by mouth daily   Calcium-Magnesium-Zinc 500-250-12.5 MG TABS, Take 1 tablet by mouth daily     Allergies:    Ketorolac tromethamine, Memantine hcl, and Tamsulosin hcl    Social History:    reports that he has never smoked. He has never used smokeless tobacco. He reports that he does not drink alcohol and does not use drugs. Family History:   family history includes Heart Failure in his brother; Hypertension in his brother; Seizures in his brother; Stroke in his brother.     REVIEW OF SYSTEMS:  As above in the HPI, otherwise negative    PHYSICAL EXAM:    Vitals:  /70   Pulse 83 Temp 98 °F (36.7 °C) (Bladder)   Resp 13   Ht 5' 9\" (1.753 m)   Wt 156 lb 15.5 oz (71.2 kg)   SpO2 98%   BMI 23.18 kg/m²     General: Somewhat thin built awake and alert  Disoriented and somewhat confused  HEENT:  Normocephalic, atraumatic. Pupils equal, round, reactive to light. No scleral icterus. No conjunctival injection. .  No nasal discharge. Neck:  Supple no adenopathy  Heart:  RRR, no murmurs, gallops, rubs  Lungs:  CTA bilaterally, bilat symmetrical expansion, no wheeze, rales, or rhonchi  Abdomen:   Bowel sounds present, soft, nontender, no masses, no organomegaly, no peritoneal signs  Extremities:  No clubbing, cyanosis, or edema  Skin:  Warm and dry, no open lesions or rash  Neuro:  Cranial nerves 2-12 intact, no focal deficits  Breast: deferred  Rectal: deferred  Genitalia:  deferred    LABS:  Data reviewed      ASSESSMENT:    Upper GI bleeding  Advanced cirrhosis  Esophageal varices  Advanced dementia  Type 2 diabetes mellitus  Patient Active Problem List   Diagnosis    Type 1 diabetes mellitus without complication ([address]    Anemia    Hepatic cirrhosis due to chronic hepatitis C infection ([address]    Secondary esophageal varices with bleeding ([address]    Dementia associated with other underlying disease without behavioral disturbance ([address]    B12 deficiency    Streptococcal bacteremia    Acute respiratory failure with hypoxia ([address]    Upper GI bleed           PLAN:  Monitor closely in the ICU  GI consult  IV PPI  Resume home medications  Questions answered to wife satisfaction    Gina Sood MD  4:24 PM  12/4/2021

## 2021-12-04 NOTE — PROGRESS NOTES
Patient admitted from ED room 27 to ICU room 213, with the following belongings a wedding ring on patients finger and a pair of socks, placed on monitor, patient oriented to room and unit visiting hours. Patient guide at bedside, reviewed patient rights and responsibilities. MRSA nasal swab obtained. Bed alarm on. Call light within reach.

## 2021-12-04 NOTE — CONSULTS
Critical Care Admit/Consult Note         Patient - Ajith Daley   MRN -  [id number]   [place] # - [de-identified]   - 1950      Date of Admission -  12/3/2021  8:27 PM  Date of evaluation -  2021   Hospital Day - 1            ADMIT/CONSULT DETAILS     Reason for Admit/Consult   Esophageal varices    Consulting Coco Chamorro DO  Primary Care Physician - Modesto German MD         HPI   The patient is a 66 y.o. male with significant past medical history of hepatitis C, esophageal varices, type 1 diabetes, hepatic cirrhosis, dementia. Patient presents with his wife. Patient is nonverbal and wife would speak for him. Patient had 2 episodes of of initially coffee-ground emesis followed by bright red blood in emesis earlier today. Patient previously had a banding of his esophageal varices by Dr. Marleen Keller last year per patients wife. Patient has no abdominal pain. Patient had no repeat bleeding. Patient arrived into the emergency room and had normal blood pressure. Patient's hemoglobin was 12.2. Patient's hemoglobin from  was 12.8. Patient had a normal blood pressure in the ER. Patient is complaining of no abdominal pain. Patient does not drink alcohol. Patient was recently admitted to the hospital and was discharged on  when he was diagnosed with endocarditis patient completed course of antibiotics.   Patient was evaluated in the emergency room         Past Medical History         Diagnosis Date    Buccal mass 2020    [facility] mellitus [facility]     Esophageal varices (HCC)     Hepatitis C     treated and resolved    Liver cirrhosis ([address]     Mild dementia ([address]     no meds    Positive colorectal cancer screening using Cologuard test 2021    Referred to Dr Tomasa Zarate Colonoscopy        Past Surgical History           Procedure Laterality Date    BIOPSY MOUTH LESION Left 2020    EXCISIONAL BIOPSY LEFT BUCCAL MUCOSAL LESION performed by Heather Lynne Leyda Britton MD at [facility] COLONOSCOPY  2017    ENDOSCOPY, COLON, DIAGNOSTIC  2019 and 2020    HC INSERT PICC CATH, 5/> YRS  10/14/2021         HERNIA REPAIR Right 2018    inguinal     MOUTH SURGERY Right 09/24/2020    EXCISIONAL BIOPSY OF RIGHT BUCCOL LESION performed by Richard Palma MD at [facility] TONSILLECTOMY      UPPER GASTROINTESTINAL ENDOSCOPY N/A 06/19/2018    EGD BAND LIGATION [facility]    Dr Mc Fournier medium sized esophageal varices--placed 6 rubber bands on varices--normal duodenum       Current Medications   Current Medications    cefTRIAXone (ROCEPHIN) IV  1,000 mg IntraVENous Once    sodium chloride flush  10 mL IntraVENous 2 times per day    insulin lispro  0-6 Units SubCUTAneous TID WC    insulin lispro  0-3 Units SubCUTAneous Nightly     sodium chloride flush, sodium chloride, ondansetron **OR** ondansetron, senna, acetaminophen **OR** acetaminophen, glucose, dextrose, glucagon (rDNA), dextrose  IV Drips/Infusions   octreotide (SANDOSTATIN) infusion 50 mcg/hr (12/03/21 2206)    sodium chloride      dextrose       Home Medications  Not in a hospital admission. Diet/Nutrition   Diet NPO    Allergies   Ketorolac tromethamine, Memantine hcl, and Tamsulosin hcl    Social History   Tobacco   reports that he has never smoked. He has never used smokeless tobacco.    Alcohol     reports no history of alcohol use.     Occupational history :    Family History         Problem Relation Age of Onset    Stroke Brother     Seizures Brother     Hypertension Brother     Heart Failure Brother            ROS     REVIEW OF SYSTEMS:  Patient is nonverbal    Lines and Devices   20-gauge in the left antecubital    Mechanical Ventilation Data   VENT SETTINGS (Comprehensive)  Vent Information  SpO2: 98 %  Additional Respiratory pedal edema, no clubbing or cyanosis  Skin - normal coloration and turgor, no rashes, no suspicious skin lesions noted     Data   Old records and images have been reviewed    Lab Results   CBC     Lab Results   Component Value Date    WBC 6.3 12/03/2021    RBC 3.63 12/03/2021    HGB 12.2 12/03/2021    HCT 34.6 12/03/2021     12/03/2021    MCV 95.3 12/03/2021    MCH 33.6 12/03/2021    MCHC 35.3 12/03/2021    RDW 14.0 12/03/2021    NRBC 0.9 11/11/2021    LYMPHOPCT 12.5 12/03/2021    MONOPCT 10.9 12/03/2021    MYELOPCT 0.9 04/30/2021    EOSPCT 2.0 10/21/2020    BASOPCT 0.6 12/03/2021    MONOSABS 0.68 12/03/2021    LYMPHSABS 0.78 12/03/2021    EOSABS 0.12 12/03/2021    BASOSABS 0.04 12/03/2021       BMP   Lab Results   Component Value Date     12/03/2021    K 4.4 12/03/2021    K 4.2 11/06/2021    CL 98 12/03/2021    CO2 25 12/03/2021    BUN 31 12/03/2021    CREATININE 0.8 12/03/2021    GLUCOSE 266 12/03/2021    GLUCOSE 114 03/29/2012    CALCIUM 9.3 12/03/2021       LFTS  Lab Results   Component Value Date    ALKPHOS 104 12/03/2021    ALT 48 12/03/2021    AST 36 12/03/2021    PROT 6.6 12/03/2021    BILITOT 1.2 12/03/2021    BILIDIR 0.4 12/03/2021    IBILI 0.8 12/03/2021    LABALBU 3.6 12/03/2021    LABALBU 4.3 03/29/2012       INR  Recent Labs     12/03/21 2052   PROTIME 14.7*   INR 1.3       APTT  Recent Labs     12/03/21 2052   APTT 27.9       Lactic Acid  Lab Results   Component Value Date    LACTA 1.9 10/12/2021    LACTA 2.1 04/30/2021    LACTA 1.7 06/18/2018        BNP   No results for input(s): BNP in the last 72 hours. Cultures     No results for input(s): BC in the last 72 hours. No results for input(s): Marinus Risk in the last 72 hours. No results for input(s): LABURIN in the last 72 hours.           Radiology       SYSTEMS ASSESSMENT    Neuro   Dementia  Home Aricept  Home Lexapro      Respiratory   History of pleural effusions  Not requiring supplemental oxygen at this time  Wean oxygen as tolerated. Keep O2 sat 90-92%    Cardiovascular   History of endocarditis  Hypertension  Spironolactone  Bumex    Gastrointestinal   VEGA  Liver cirrhosis  Esophageal varices   Octreotide drip    Protonix drip   Rocephin 1 g daily  Zofran as needed  History of treated hepatitis C  INR 1.3  Consult GI appreciate Recs      Renal   Creatinine 0.8  Replace and monitor electrolytes     Infectious Disease   No infectious etiology at this time  Rocephin    Hematology/Oncology   Hematemesis  Hemoglobin 12.2   Daily CBC  Continue to monitor and replace below 7    Endocrine   History of type 1 diabetes  Sliding scale insulin      Social/Spiritual/DNR/Other   Full code    Alekasndr Can MD  PGY -2    12/4/2021  12:15 AM    Attending Physician Attestation: Dr. Enzo Carlisle    Thank you very much for allowing me to see this patient in consultation and follow up. I personally saw, examined and provided care for the patient. Radiographs, labs and medication list were reviewed by me independently. I spoke with bedside nursing, respiratory therapists and consultants. Critical care services and times documented are independent of procedures and multidisciplinary rounds with Residents. Additionally comprehensive, multidisciplinary rounds were conducted with the MICU team. The case was discussed in detail and plans for care were established. Review of Residents documentation was conducted and revisions were made as appropriate. I agree with the the above documented information.      Physical Examination:  Vitals:   Vitals:    12/04/21 0600 12/04/21 0700 12/04/21 0800 12/04/21 0828   BP: 124/84 124/84  125/68   Pulse: 87 89  88   Resp: 20 13  12   Temp:   97.8 °F (36.6 °C) 97.8 °F (36.6 °C)   TempSrc:   Bladder Bladder   SpO2: 97%   96%   Weight:       Height:          General: No Acute Distress  HEENT: normocephalic, atruamatic  CVS: RRR, S1, S2, No S3 or S4  Respiratory: decreased breath sounds at lung bases, no focal wheezes noted  Extremities: no clubbing/cyanosis/edema  Neuro: limited verbal communicated     ASSESSMENT:  1.) GI Bleed  2.) Cirrhosis  - autoimmune liver serology negative   3.) H/O Esophageal Varices   4.) Hepatic Encephalopathy   5.) Dementia, Baseline     In addition the following applies:    Check: serial labs, COVID PCR, repeat serum ammonia level   Medication Alterations: PPI BID, Octreotide Drip  Procedures: possible EGD  Imaging: reviewed  New Consultations: GI  VENT: N/A    Access: Peripheral   Consults: GI  Drips: Octreotide   Nutrition: NPO  ABX: Ceftriaxone     - look to transfer patient to floors with pulmonary/critical care to sign off  - if serum ammonia levels remain > 50 then will look to lactulose and rifaximin administration     Thank you for allowing me to participate in the care of this patient. Care reviewed with nursing staff, medical and surgical specialty care, primary care and the patient's family as available. Restraints are ordered when the patient can do harm to him/herself by pulling out devices. Pramod Goodman M.D.     Pramod Goodman MD  12/4/2021  9:29 AM

## 2021-12-04 NOTE — CONSULTS
Name:  Rod Gonsalves  :    MRN:  [id number]  Room:  [address] [place] [address]  DOS:  2021    [facility]  Dr. Smooth Tariq M.D. Dr. Brennon Mo M.D. FREN[address] Dr., M.D. Dr. Ryan Duke D.O.     -NP Consult Note-    PCP:  Shukri Harris MD  Admitting Physician:  Og Sims MD  Consultants:  GI, critical care  Chief Complaint:    Chief Complaint   Patient presents with    Emesis     per squad pt had 2 episodes of bloody vomitting Hx esophageal varices hx dementia     Reason for Consult:  Hematemesis / cirrhosis    History of Present Illness  Rod Gonsalves is a 66 y.o. male on consult for nausea, vomiting, hematemesis, and cirrhosis. Patient is currently resting in bed. History of dementia. Currently fairly obtunded and minimally responsive. Wife is at bedside and provides the majority of patient history. Reportedly, patient started vomiting about 2 days ago. Yesterday, emesis was dark in color, progressed to bright red vomit. Wife reports there was a moderate amount of blood. Patient had no complaints prior to this. He had been eating and drinking normally. Bowels have been moving normally. Most recent bowel movement reported as yesterday. Stools described as brown and formed. Denied blood or melena. Per wife, patient had no complaints of abdominal pain, chest pain, shortness of breath. No reported fevers, chills, or diaphoresis. Patient has a history of cirrhosis. History of hepatitis C. Most recent EGD 2020. Findings of 5 medium sized esophageal varices, bands placed x6, no gastric varices, normal examined duodenum. PMH: Cirrhosis, diabetes, dementia, history of hepatitis C (treated/cleared), remote history of peptic ulcer disease. PSH: Hernia repair. EGD 2020 as described above. Colonoscopy most recently by Dr. Theresa Mondragon. Reports not readily available at this time. Family history: Cousin with dementia.   Mother with breast cancer. Father with alcoholic liver disease. Patient's wife is unaware of any other personal or family history of GI issues or malignancy    Social history: Wife reports patient was never a significant alcohol user. He was never smoker. Denies current or past recreational or illicit drug use    Patient is currently resting in bed. History of dementia. Fairly obtunded currently on arrival, WBC 6.3, hemoglobin 12.2, hematocrit 34.6. Normocytic. Platelets 484. BUN 31, creatinine 0.8, sodium 134, potassium 4.4, chloride 98, CO2 25, calcium 9.3. Blood glucose 266. Lactic acid 2.7. Mildly elevated troponin 27. Normal proBNP 36. ALT 48, AST 36, total bilirubin 1.2, alkaline phosphatase 104. Alcohol level negative. Tylenol and salicylate level negative. INR 1.3. Head CT showed evidence of chronic microvascular disease, no acute findings. Chest x-ray showing possible left pleural effusion, otherwise no acute findings.         TRIAGE VITALS  BP: 120/70, Temp: 98 °F (36.7 °C), Pulse: 83, Resp: 13, SpO2: 98 %    Vitals:    12/04/21 0800 12/04/21 0828 12/04/21 1100 12/04/21 1217   BP:  125/68 120/70 120/70   Pulse:  88 88 83   Resp:  12 25 13   Temp: 97.8 °F (36.6 °C) 97.8 °F (36.6 °C)  98 °F (36.7 °C)   TempSrc: Bladder Bladder  Bladder   SpO2:  96%  98%   Weight:       Height:             Histories  Past Medical History:   Diagnosis Date    Buccal mass 09/2020    Diabetes mellitus (HCC)     Esophageal varices (HCC)     Hepatitis C     treated and resolved    Liver cirrhosis (HCC)     Mild dementia (HCC)     no meds    Positive colorectal cancer screening using Cologuard test 04/18/2021    Referred to Dr Natlay Moore Colonoscopy     Past Surgical History:   Procedure Laterality Date    BIOPSY MOUTH LESION Left 06/04/2020    EXCISIONAL BIOPSY LEFT BUCCAL MUCOSAL LESION performed by Leti Rubi MD at [address] [place] [address]  2017    ENDOSCOPY, COLON, DIAGNOSTIC  2019 and 2020  HC INSERT PICC CATH, 5/> YRS  10/14/2021         HERNIA REPAIR Right 2018    inguinal     MOUTH SURGERY Right 09/24/2020    EXCISIONAL BIOPSY OF RIGHT BUCCOL LESION performed by Domo Montero MD at [address]      UPPER GASTROINTESTINAL ENDOSCOPY N/A 06/19/2018    EGD BAND LIGATION performed by Florence Quintero MD at [address]  09/01/2020    Dr Agnieszka Decker medium sized esophageal varices--placed 6 rubber bands on varices--normal duodenum     Family History   Problem Relation Age of Onset    Stroke Brother     Seizures Brother     Hypertension Brother     Heart Failure Brother        Home Medications  Prior to Admission medications    Medication Sig Start Date End Date Taking?  Authorizing Provider   mupirocin (BACTROBAN) 2 % cream Apply topically 2 times daily Apply topically 2 times daily 12/2/21   Mary Jimenezr, MD   donepezil (ARICEPT) 5 MG tablet Take 1 tablet by mouth daily (with breakfast) 11/15/21   Donato Yoo MD   spironolactone (ALDACTONE) 25 MG tablet Take 1 tablet by mouth 2 times daily 11/11/21   Mary Fischer MD   bumetanide (BUMEX) 2 MG tablet Take 1 tablet by mouth daily  Patient taking differently: Take 2 mg by mouth every other day  11/11/21   Mary Fischer MD   escitalopram (LEXAPRO) 5 MG tablet Take 5 mg by mouth daily    Historical Provider, MD   blood glucose test strips (ASCENSIA AUTODISC VI;ONE TOUCH ULTRA TEST VI) strip 1 each by In Vitro route 4 times daily One touch test strips 10/29/21   Mary Jimenezr, MD   MISC NATURAL PRODUCTS PO Take 1 tablet by mouth daily -[address] [place]-    Historical Provider, MD   CRANBERRY PO Take 1 tablet by mouth as needed (URINARY)    Historical Provider, MD   Apoaequorin (PREVAGEN PO) Take 1 tablet by mouth daily     Historical Provider, MD   insulin aspart (NOVOLOG FLEXPEN) 100 UNIT/ML injection pen Inject 14 Units into the skin 3 times daily (before meals) If BS less than 100, no coverage  If greater than 150, 14 units 7/13/21 12/1/21  Ingrid Jones MD   vitamin D-3 (CHOLECALCIFEROL) 125 MCG (5000 UT) TABS Take 5,000 Units by mouth daily     Historical Provider, MD   Insulin Degludec (TRESIBA FLEXTOUCH) 200 UNIT/ML SOPN Inject 26 Units into the skin nightly 3/9/21   Ingrid Jones MD   Probiotic Product (PROBIOTIC MULTI-ENZYME) TABS Take 1 tablet by mouth daily     Historical Provider, MD   Omega-3 Fatty Acids (FISH OIL) 1000 MG CAPS Take 1,000 mg by mouth daily     Historical Provider, MD   vitamin C (ASCORBIC ACID) 500 MG tablet Take 1,000 mg by mouth daily     Historical Provider, MD   vitamin B-12 (CYANOCOBALAMIN) 500 MCG tablet Take 500 mcg by mouth daily     Historical Provider, MD   vitamin E 400 UNIT capsule Take 400 Units by mouth daily     Historical Provider, MD   b complex vitamins capsule Take 1 capsule by mouth daily     Historical Provider, MD   Calcium-Magnesium-Zinc 500-250-12.5 MG TABS Take 1 tablet by mouth daily     Historical Provider, MD       Allergies  Ketorolac tromethamine, Memantine hcl, and Tamsulosin hcl    Social Hx  Social History     Socioeconomic History    Marital status:      Spouse name: Not on file    Number of children: 3    Years of education: Not on file    Highest education level: Not on file   Occupational History    Occupation: retired     Comment: customer service   Tobacco Use    Smoking status: Never Smoker    Smokeless tobacco: Never Used   Vaping Use    Vaping Use: Never used   Substance and Sexual Activity    Alcohol use: Never     Comment: rare socially    Drug use: Never    Sexual activity: Not Currently   Other Topics Concern    Not on file   Social History Narrative    Not on file     Social Determinants of Health     Financial Resource Strain: Low Risk     Difficulty of Paying Living Expenses: Not hard at all   Food Insecurity: No Food Insecurity    Worried About Running Out of Food in the Last Year: Never true    [address] in the Last Year: Never true   Transportation Needs: No Transportation Needs    Lack of Transportation (Medical): No    Lack of Transportation (Non-Medical): No   Physical Activity:     Days of Exercise per Week: Not on file    Minutes of Exercise per Session: Not on file   Stress:     Feeling of Stress : Not on file   Social Connections:     Frequency of Communication with Friends and Family: Not on file    Frequency of Social Gatherings with Friends and Family: Not on file    Attends Caodaism Services: Not on file    Active Member of [address] [place] [address] or Organizations: Not on file    Attends Club or Organization Meetings: Not on file    Marital Status: Not on file   Intimate Partner Violence:     Fear of Current or Ex-Partner: Not on file    Emotionally Abused: Not on file    Physically Abused: Not on file    Sexually Abused: Not on file   Housing Stability:     Unable to Pay for Housing in the Last Year: Not on file    Number of [place] in the Last Year: Not on file    Unstable Housing in the Last Year: Not on file       Review of Systems  All bolded are positive; please see HPI  General:  Fever, chills, diaphoresis, fatigue, malaise, night sweats, weight loss  Psychological:  Anxiety, disorientation, hallucinations, obtunded. ENT:  Epistaxis, headaches, vertigo, visual changes. Cardiovascular:  Chest pain, irregular heartbeats, palpitations, paroxysmal nocturnal dyspnea. Respiratory:  Shortness of breath, coughing, sputum production, hemoptysis, wheezing, orthopnea.   Gastrointestinal:  Nausea, vomiting, diarrhea, heartburn, constipation, abdominal pain, hematemesis, hematochezia, melena, acholic stools  Genito-Urinary:  Dysuria, urgency, frequency, hematuria  Musculoskeletal:  Joint pain, joint stiffness, joint swelling, muscle pain  Neurology:  Headache, focal neurological deficits, weakness, numbness, paresthesia  Derm:  Rashes, ulcers, excoriations, bruising  Extremities:  Decreased ROM, peripheral edema, mottling    Physical Examination  Vitals:  /70   Pulse 83   Temp 98 °F (36.7 °C) (Bladder)   Resp 13   Ht 5' 9\" (1.753 m)   Wt 156 lb 15.5 oz (71.2 kg)   SpO2 98%   BMI 23.18 kg/m²   General Appearance:  awake, fairly obtunded; appears stated age and cooperative; no apparent distress no labored breathing  HEENT:  NCAT; PERRL; conjunctiva pink, sclera clear  Neck:  no adenopathy, bruit, JVD, tenderness, masses, or nodules; supple, symmetrical, trachea midline, thyroid not enlarged  Lung:  clear to auscultation bilaterally; no use of accessory muscles; no rhonchi, rales, or crackles  Heart:  regular rate and regular rhythm without murmur, rub, or gallop  Abdomen:  soft, nontender, nondistended; normoactive bowel sounds; no organomegaly  Extremities:  extremities normal, atraumatic, no cyanosis or edema  Musculokeletal:  no joint swelling, no muscle tenderness. ROM normal in all joints of extremities.    Neurologic:  Obtunded, not following commands    Laboratory Data  Recent Results (from the past 24 hour(s))   Lactate, Sepsis    Collection Time: 12/03/21  8:52 PM   Result Value Ref Range    Lactic Acid, Sepsis 2.7 (H) 0.5 - 1.9 mmol/L   CBC Auto Differential    Collection Time: 12/03/21  8:52 PM   Result Value Ref Range    WBC 6.3 4.5 - 11.5 E9/L    RBC 3.63 (L) 3.80 - 5.80 E12/L    Hemoglobin 12.2 (L) 12.5 - 16.5 g/dL    Hematocrit 34.6 (L) 37.0 - 54.0 %    MCV 95.3 80.0 - 99.9 fL    MCH 33.6 26.0 - 35.0 pg    MCHC 35.3 (H) 32.0 - 34.5 %    RDW 14.0 11.5 - 15.0 fL    Platelets 718 (L) 888 - 450 E9/L    MPV 10.2 7.0 - 12.0 fL    Neutrophils % 73.5 43.0 - 80.0 %    Immature Granulocytes % 0.6 0.0 - 5.0 %    Lymphocytes % 12.5 (L) 20.0 - 42.0 %    Monocytes % 10.9 2.0 - 12.0 %    Eosinophils % 1.9 0.0 - 6.0 %    Basophils % 0.6 0.0 - 2.0 %    Neutrophils Absolute 4.59 1.80 - 7.30 E9/L    Immature Granulocytes # 0.04 E9/L    Lymphocytes Absolute 0.78 (L) 1.50 - 4.00 E9/L    Monocytes Absolute 0.68 0.10 - 0.95 E9/L    Eosinophils Absolute 0.12 0.05 - 0.50 E9/L    Basophils Absolute 0.04 0.00 - 0.20 G0/H   Basic Metabolic Panel    Collection Time: 12/03/21  8:52 PM   Result Value Ref Range    Sodium 134 132 - 146 mmol/L    Potassium 4.4 3.5 - 5.0 mmol/L    Chloride 98 98 - 107 mmol/L    CO2 25 22 - 29 mmol/L    Anion Gap 11 7 - 16 mmol/L    Glucose 266 (H) 74 - 99 mg/dL    BUN 31 (H) 6 - 23 mg/dL    CREATININE 0.8 0.7 - 1.2 mg/dL    GFR Non-African American >60 >=60 mL/min/1.73    GFR African American >60     Calcium 9.3 8.6 - 10.2 mg/dL   Magnesium    Collection Time: 12/03/21  8:52 PM   Result Value Ref Range    Magnesium 2.0 1.6 - 2.6 mg/dL   Hepatic Function Panel    Collection Time: 12/03/21  8:52 PM   Result Value Ref Range    Total Protein 6.6 6.4 - 8.3 g/dL    Albumin 3.6 3.5 - 5.2 g/dL    Alkaline Phosphatase 104 40 - 129 U/L    ALT 48 (H) 0 - 40 U/L    AST 36 0 - 39 U/L    Total Bilirubin 1.2 0.0 - 1.2 mg/dL    Bilirubin, Direct 0.4 (H) 0.0 - 0.3 mg/dL    Bilirubin, Indirect 0.8 0.0 - 1.0 mg/dL   Ammonia    Collection Time: 12/03/21  8:52 PM   Result Value Ref Range    Ammonia 33.5 16.0 - 60.0 umol/L   Troponin    Collection Time: 12/03/21  8:52 PM   Result Value Ref Range    Troponin, High Sensitivity 27 (H) 0 - 11 ng/L   Brain Natriuretic Peptide    Collection Time: 12/03/21  8:52 PM   Result Value Ref Range    Pro-BNP 36 0 - 125 pg/mL   Protime-INR    Collection Time: 12/03/21  8:52 PM   Result Value Ref Range    Protime 14.7 (H) 9.3 - 12.4 sec    INR 1.3    APTT    Collection Time: 12/03/21  8:52 PM   Result Value Ref Range    aPTT 27.9 24.5 - 35.1 sec   Serum Drug Screen    Collection Time: 12/03/21  8:52 PM   Result Value Ref Range    Ethanol Lvl <10 mg/dL    Acetaminophen Level <5.0 (L) 10.0 - 15.0 mcg/mL    Salicylate, Serum <5.1 0.0 - 30.0 mg/dL    TCA Scrn NEGATIVE Cutoff:300 ng/mL   TYPE AND SCREEN    Collection Time: 12/03/21 9:18 PM   Result Value Ref Range    ABO/Rh O POS     Antibody Screen NEG    EKG 12 Lead    Collection Time: 12/03/21  9:50 PM   Result Value Ref Range    Ventricular Rate 97 BPM    Atrial Rate 97 BPM    P-R Interval 142 ms    QRS Duration 94 ms    Q-T Interval 366 ms    QTc Calculation (Bazett) 464 ms    P Axis 57 degrees    R Axis 15 degrees    T Axis 55 degrees   Urinalysis, reflex to microscopic    Collection Time: 12/04/21  2:20 AM   Result Value Ref Range    Color, UA Yellow Straw/Yellow    Clarity, UA Clear Clear    Glucose, Ur Negative Negative mg/dL    Bilirubin Urine Negative Negative    Ketones, Urine Negative Negative mg/dL    Specific Gravity, UA 1.025 1.005 - 1.030    Blood, Urine LARGE (A) Negative    pH, UA 6.0 5.0 - 9.0    Protein, UA Negative Negative mg/dL    Urobilinogen, Urine 0.2 <2.0 E.U./dL    Nitrite, Urine Negative Negative    Leukocyte Esterase, Urine Negative Negative   Microscopic Urinalysis    Collection Time: 12/04/21  2:20 AM   Result Value Ref Range    WBC, UA 1-3 0 - 5 /HPF    RBC, UA >20 0 - 2 /HPF    Epithelial Cells, UA FEW /HPF    Bacteria, UA NONE SEEN None Seen /HPF   Magnesium    Collection Time: 12/04/21  5:20 AM   Result Value Ref Range    Magnesium 2.0 1.6 - 2.6 mg/dL   PHOSPHORUS    Collection Time: 12/04/21  5:20 AM   Result Value Ref Range    Phosphorus 3.4 2.5 - 4.5 mg/dL   Comprehensive Metabolic Panel w/ Reflex to MG    Collection Time: 12/04/21  5:20 AM   Result Value Ref Range    Sodium 134 132 - 146 mmol/L    Potassium reflex Magnesium 4.7 3.5 - 5.0 mmol/L    Chloride 101 98 - 107 mmol/L    CO2 24 22 - 29 mmol/L    Anion Gap 9 7 - 16 mmol/L    Glucose 214 (H) 74 - 99 mg/dL    BUN 30 (H) 6 - 23 mg/dL    CREATININE 0.7 0.7 - 1.2 mg/dL    GFR Non-African American >60 >=60 mL/min/1.73    GFR African American >60     Calcium 8.9 8.6 - 10.2 mg/dL    Total Protein 5.9 (L) 6.4 - 8.3 g/dL    Albumin 3.2 (L) 3.5 - 5.2 g/dL    Total Bilirubin 1.3 (H) 0.0 - 1.2 mg/dL unselect if not a suspected or confirmed emergency medical condition->Emergency Medical Condition (MA) FINDINGS: BRAIN/VENTRICLES: There is no acute intracranial hemorrhage, mass effect or midline shift. No abnormal extra-axial fluid collection. The gray-white differentiation is maintained without evidence of an acute infarct. There is no evidence of hydrocephalus. Periventricular white matter changes consistent chronic microvascular disease. Diffuse volume loss. ORBITS: The visualized portion of the orbits demonstrate no acute abnormality. SINUSES: The visualized paranasal sinuses and mastoid air cells demonstrate no acute abnormality. SOFT TISSUES/SKULL:  No acute abnormality of the visualized skull or soft tissues. No acute intracranial abnormality. Periventricular white matter changes consistent chronic microvascular disease. Diffuse volume loss. XR CHEST PORTABLE    Result Date: 12/3/2021  EXAMINATION: ONE XRAY VIEW OF THE CHEST 12/3/2021 8:58 pm COMPARISON: 11/10/2021 HISTORY: ORDERING SYSTEM PROVIDED HISTORY: hematemesis TECHNOLOGIST PROVIDED HISTORY: Reason for exam:->hematemesis FINDINGS: There is unchanged elevation of the left hemidiaphragm. Adjacent airspace disease is probably atelectasis. There may be a small left pleural effusion versus pleural thickening. Right lung is clear. There is no pneumothorax. There is no cardiomegaly or vascular congestion     Unchanged elevation of left hemidiaphragm with left basilar atelectasis and question of small left pleural effusion. XR CHEST PORTABLE    Result Date: 11/6/2021  EXAMINATION: ONE XRAY VIEW OF THE CHEST 11/6/2021 7:46 pm COMPARISON: November 3, 2021 HISTORY: ORDERING SYSTEM PROVIDED HISTORY: sob TECHNOLOGIST PROVIDED HISTORY: Reason for exam:->sob What reading provider will be dictating this exam?->CRC FINDINGS: The patient is again in suboptimal inspiration. Stable left-sided PICC line catheter.  There is continued opacification in upper extremity PICC tip overlies the SVC. Increased airspace opacities throughout both lungs with a new or enlarged bilateral pleural effusions, right greater than left. Airspace opacities in both lungs have also increased since November 6 and are suggestive of either multifocal pneumonia or pulmonary edema. CTA PULMONARY W CONTRAST    Result Date: 11/6/2021  EXAMINATION: CTA OF THE CHEST 11/6/2021 7:56 pm TECHNIQUE: CTA of the chest was performed after the administration of intravenous contrast.  Multiplanar reformatted images are provided for review. MIP images are provided for review. Dose modulation, iterative reconstruction, and/or weight based adjustment of the mA/kV was utilized to reduce the radiation dose to as low as reasonably achievable. COMPARISON: None. HISTORY: ORDERING SYSTEM PROVIDED HISTORY: sob TECHNOLOGIST PROVIDED HISTORY: Reason for exam:->sob Decision Support Exception - unselect if not a suspected or confirmed emergency medical condition->Emergency Medical Condition (MA) What reading provider will be dictating this exam?->CRC FINDINGS: Pulmonary Arteries: Pulmonary arteries are adequately opacified for evaluation. No evidence of intraluminal filling defect to suggest pulmonary embolism. Main pulmonary artery is normal in caliber. Mediastinum: No evidence of mediastinal lymphadenopathy. The heart and pericardium demonstrate no acute abnormality. There is no acute abnormality of the thoracic aorta. Lungs/pleura: There is a large right pleural effusion and compressive atelectasis of the right lower lung. There is marked elevation of the left hemidiaphragm. Upper Abdomen: Limited images of the upper abdomen demonstrates ascites. Soft Tissues/Bones: No acute bone or soft tissue abnormality. No evidence of pulmonary embolism.  Low lung volume due to large right pleural effusion resulting and compressive atelectasis of the right lower lung and markedly elevated left hemidiaphragm

## 2021-12-04 NOTE — PATIENT CARE CONFERENCE
Intensive Care Daily Quality Rounding Checklist      ICU Team Members:  Dr. Monson Bachelor, residents, charge nurse, bedside nurse and respiratory therapy    ICU Day #: NUMBER: 1    Intubation Date:  n/a    Ventilator Day #:     Central Line Insertion Date:  n/a        Day #:      Arterial Line Insertion Date:  n/a      Day #:     Temporary Hemodialysis Catheter Insertion Date:  n/a      Day #     DVT Prophylaxis:     GI Prophylaxis: Protonix gtt    Alanis Catheter Insertion Date: December 4,2021       Day #: 1      Continued need (if yes, reason documented and discussed with physician): yes, strict Is and Os    Skin Issues/ Wounds and ordered treatment discussed on rounds:     Goals/ Plans for the Day: monitor labs, check transfer.  Repeat ammonia level

## 2021-12-04 NOTE — ED PROVIDER NOTES
Nash Castro is a 66 y.o. male    Chief Complaint   Patient presents with    Emesis     per squad pt had 2 episodes of bloody vomitting Hx esophageal varices hx dementia         HPI   Nash Castro is a 66 y.o. male presenting to the ED for Emesis (per squad pt had 2 episodes of bloody vomitting Hx esophageal varices hx dementia)    Patient presents to the emergency room after vomiting up a coffee cup sized amount of blood at his geriatricians office today. 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 Patient also has a history of dementia and currently is in no distress however his wife is very concerned saying he is recently had some shortness of breath and upper abdominal pain. Banding was done by Dr. Suzanne Archer in the past.    Symptoms began today with mild severity, and is Only 2 episodes of vomiting. No modifying factors and no associated symptoms. History comes primarily from the patient and Family. On arrival, the patient was assessed by history, physical exam, imaging studies, laboratory studies and ekg, vital signs. Vital signs and stable and the patient was afebrile. Review of Systems   Constitutional: Negative for appetite change, fatigue and fever. HENT: Negative for congestion, rhinorrhea and trouble swallowing. Eyes: Negative for redness and itching. Respiratory: Positive for shortness of breath. Negative for chest tightness and wheezing. Cardiovascular: Negative for palpitations and leg swelling. Gastrointestinal: Positive for abdominal pain and vomiting (blood). Negative for abdominal distention, constipation, diarrhea and nausea. Genitourinary: Negative for decreased urine volume, difficulty urinating and frequency. Musculoskeletal: Negative for arthralgias, back pain and myalgias. Neurological: Negative for dizziness, syncope, weakness, numbness and headaches.    Psychiatric/Behavioral: Positive for behavioral problems (dementia). Negative for agitation, confusion and decreased concentration. The patient is not nervous/anxious. All other systems reviewed and are negative. Physical Exam  Vitals reviewed. Constitutional:       General: He is not in acute distress. Appearance: Normal appearance. He is not ill-appearing. HENT:      Head: Normocephalic and atraumatic. Comments: She has some blood in his mouth but no active bleeding apparent. Nose: Nose normal. No congestion or rhinorrhea. Mouth/Throat:      Mouth: Mucous membranes are moist.      Pharynx: Oropharynx is clear. No oropharyngeal exudate or posterior oropharyngeal erythema. Eyes:      Extraocular Movements: Extraocular movements intact. Conjunctiva/sclera: Conjunctivae normal.      Pupils: Pupils are equal, round, and reactive to light. Cardiovascular:      Rate and Rhythm: Normal rate and regular rhythm. Heart sounds: Normal heart sounds. No murmur heard. Pulmonary:      Effort: Pulmonary effort is normal. No respiratory distress. Breath sounds: Normal breath sounds. Abdominal:      General: Abdomen is flat. There is no distension. Tenderness: There is no abdominal tenderness. There is no guarding. Musculoskeletal:         General: No swelling or tenderness. Normal range of motion. Cervical back: Normal range of motion. No rigidity or tenderness. Skin:     General: Skin is warm and dry. Capillary Refill: Capillary refill takes less than 2 seconds. Coloration: Skin is not jaundiced or pale. Findings: No bruising or erythema. Neurological:      General: No focal deficit present. Mental Status: He is alert and oriented to person, place, and time. Cranial Nerves: No cranial nerve deficit. Motor: No weakness.       Comments: Patient has significant dementia at baseline   Psychiatric:         Mood and Affect: Mood normal.         Behavior: Behavior normal. Thought Content: Thought content normal.          Procedures     MDM   Patient presented to the Emergency Department for Emesis (per squad pt had 2 episodes of bloody vomitting Hx esophageal varices hx dementia)    Patient presents for vomiting up blood today and the amount of about a coffee cup. He has a history of variceal bleeding due to liver cirrhosis due to hep C. His varices have been banded at least twice last in September 2020. Patient is stable here in the emergency room. Labs:  Lactic acid 2.7  Hemoglobin shows a slight decrease from previous at 12.2    Imaging:  CT head shows no acute intracranial abnormalities but does show chronic degenerative changes. Chest x-ray shows unchanged elevation of left hemidiaphragm with left basilar atelectasis and possible small left pleural effusion. Patient to be admitted to the hospital.  Dr. Yong Curling requested that the patient be sent to the ICU due to his possible rapid change with variceal bleeds. EKG: This EKG is signed and interpreted by me. Rate: 97  Rhythm: Sinus rhythm with 1 PVC  Axis: normal  Interpretation: no acute changes  Comparison: changes compared to previous EKG         --------------------------------------------- PAST HISTORY ---------------------------------------------  Past Medical History:  has a past medical history of Buccal mass, Diabetes mellitus ([facility] Esophageal varices ([facility] [address] [facility] Hepatitis C, Liver cirrhosis ([facility] [address] [facility] and Positive colorectal cancer screening using Cologuard test.    Past Surgical History:  has a past surgical history that includes Nasal septum surgery; Tonsillectomy; Upper gastrointestinal endoscopy (N/A, 06/19/2018); hernia repair (Right, 2018); Upper gastrointestinal endoscopy (09/01/2020); Biopsy mouth lesion (Left, 06/04/2020); Mouth surgery (Right, 09/24/2020); Cholecystectomy; Colonoscopy (2017);  Endoscopy, colon, diagnostic (2019 and 2020); and Insert Picc Cath, 5/> Yrs (10/14/2021). Social History:  reports that he has never smoked. He has never used smokeless tobacco. He reports that he does not drink alcohol and does not use drugs. Family History: family history includes Heart Failure in his brother; Hypertension in his brother; Seizures in his brother; Stroke in his brother. The patients home medications have been reviewed.     Allergies: Ketorolac tromethamine, Memantine hcl, and Tamsulosin hcl    -------------------------------------------------- RESULTS -------------------------------------------------    LABS:  Results for orders placed or performed during the hospital encounter of 12/03/21   Lactate, Sepsis   Result Value Ref Range    Lactic Acid, Sepsis 2.7 (H) 0.5 - 1.9 mmol/L   CBC Auto Differential   Result Value Ref Range    WBC 6.3 4.5 - 11.5 E9/L    RBC 3.63 (L) 3.80 - 5.80 E12/L    Hemoglobin 12.2 (L) 12.5 - 16.5 g/dL    Hematocrit 34.6 (L) 37.0 - 54.0 %    MCV 95.3 80.0 - 99.9 fL    MCH 33.6 26.0 - 35.0 pg    MCHC 35.3 (H) 32.0 - 34.5 %    RDW 14.0 11.5 - 15.0 fL    Platelets 284 (L) 815 - 450 E9/L    MPV 10.2 7.0 - 12.0 fL    Neutrophils % 73.5 43.0 - 80.0 %    Immature Granulocytes % 0.6 0.0 - 5.0 %    Lymphocytes % 12.5 (L) 20.0 - 42.0 %    Monocytes % 10.9 2.0 - 12.0 %    Eosinophils % 1.9 0.0 - 6.0 %    Basophils % 0.6 0.0 - 2.0 %    Neutrophils Absolute 4.59 1.80 - 7.30 E9/L    Immature Granulocytes # 0.04 E9/L    Lymphocytes Absolute 0.78 (L) 1.50 - 4.00 E9/L    Monocytes Absolute 0.68 0.10 - 0.95 E9/L    Eosinophils Absolute 0.12 0.05 - 0.50 E9/L    Basophils Absolute 0.04 0.00 - 0.20 M5/G   Basic Metabolic Panel   Result Value Ref Range    Sodium 134 132 - 146 mmol/L    Potassium 4.4 3.5 - 5.0 mmol/L    Chloride 98 98 - 107 mmol/L    CO2 25 22 - 29 mmol/L    Anion Gap 11 7 - 16 mmol/L    Glucose 266 (H) 74 - 99 mg/dL    BUN 31 (H) 6 - 23 mg/dL    CREATININE 0.8 0.7 - 1.2 mg/dL    GFR Non-African American >60 >=60 mL/min/1.73    GFR African American >60     Calcium 9.3 8.6 - 10.2 mg/dL   Magnesium   Result Value Ref Range    Magnesium 2.0 1.6 - 2.6 mg/dL   Hepatic Function Panel   Result Value Ref Range    Total Protein 6.6 6.4 - 8.3 g/dL    Albumin 3.6 3.5 - 5.2 g/dL    Alkaline Phosphatase 104 40 - 129 U/L    ALT 48 (H) 0 - 40 U/L    AST 36 0 - 39 U/L    Total Bilirubin 1.2 0.0 - 1.2 mg/dL    Bilirubin, Direct 0.4 (H) 0.0 - 0.3 mg/dL    Bilirubin, Indirect 0.8 0.0 - 1.0 mg/dL   Ammonia   Result Value Ref Range    Ammonia 33.5 16.0 - 60.0 umol/L   Troponin   Result Value Ref Range    Troponin, High Sensitivity 27 (H) 0 - 11 ng/L   Brain Natriuretic Peptide   Result Value Ref Range    Pro-BNP 36 0 - 125 pg/mL   Protime-INR   Result Value Ref Range    Protime 14.7 (H) 9.3 - 12.4 sec    INR 1.3    APTT   Result Value Ref Range    aPTT 27.9 24.5 - 35.1 sec   Serum Drug Screen   Result Value Ref Range    Ethanol Lvl <10 mg/dL    Acetaminophen Level <5.0 (L) 10.0 - 18.7 mcg/mL    Salicylate, Serum <6.7 0.0 - 30.0 mg/dL   EKG 12 Lead   Result Value Ref Range    Ventricular Rate 97 BPM    Atrial Rate 97 BPM    P-R Interval 142 ms    QRS Duration 94 ms    Q-T Interval 366 ms    QTc Calculation (Bazett) 464 ms    P Axis 57 degrees    R Axis 15 degrees    T Axis 55 degrees   TYPE AND SCREEN   Result Value Ref Range    ABO/Rh O POS     Antibody Screen NEG        RADIOLOGY:  XR CHEST PORTABLE   Final Result   Unchanged elevation of left hemidiaphragm with left basilar atelectasis and   question of small left pleural effusion. CT Head WO Contrast   Final Result   No acute intracranial abnormality. Periventricular white matter changes consistent chronic microvascular   disease.   Diffuse volume loss.               ------------------------- NURSING NOTES AND VITALS REVIEWED ---------------------------  Date / Time Roomed:  12/3/2021  8:27 PM  ED Bed Assignment:  27/27    The nursing notes within the ED encounter and vital signs as below have been reviewed. Patient Vitals for the past 24 hrs:   BP Temp Temp src Pulse Resp SpO2   12/03/21 2035 127/74 98.6 °F (37 °C) Oral 97 18 98 %       Oxygen Saturation Interpretation: Normal    ------------------------------------------ PROGRESS NOTES ------------------------------------------  Re-evaluation(s):  Time: Multiple reevaluations patients symptoms show no change  Repeat physical examination is not changed    Counseling:  I have spoken with the patient and Spouse and discussed todays results, in addition to providing specific details for the plan of care and counseling regarding the diagnosis and prognosis. Their questions are answered at this time and they are agreeable with the plan of admission.    --------------------------------- ADDITIONAL PROVIDER NOTES ---------------------------------  Consultations:  Time: 2330. Spoke with Dr. Teo Mcnamara. Discussed case. They will admit the patient. This patient's ED course included: a personal history and physicial examination, multiple bedside re-evaluations, cardiac monitoring and continuous pulse oximetry  I also spoke with Dr. Isabel Hernandez who accepts the patient to the ICU. I spoke with patient's surgeon Dr. Ermias Vickers who will follow the case. This patient has remained hemodynamically stable during their ED course. Diagnosis:  1. Upper GI bleed    2. History of esophageal varices        Disposition:  Patient's disposition: Admit to CCU/ICU  Patient's condition is stable.            Keo Clement MD  Resident  12/04/21 7020

## 2021-12-04 NOTE — ANESTHESIA PRE PROCEDURE
Department of Anesthesiology  Preprocedure Note       Name:  Bentley Bonds   Age:  66 y.o.  :  1950                                          MRN:  [id number]         Date:  2021      Surgeon: Mame Parra):  Fredrick Syed MD    Procedure: Procedure(s):  EGD ESOPHAGOGASTRODUODENOSCOPY    Medications prior to admission:   Prior to Admission medications    Medication Sig Start Date End Date Taking?  Authorizing Provider   mupirocin (BACTROBAN) 2 % cream Apply topically 2 times daily Apply topically 2 times daily 21   Jackie Hunt MD   donepezil (ARICEPT) 5 MG tablet Take 1 tablet by mouth daily (with breakfast) 11/15/21   Jasmyn Tolliver MD   spironolactone (ALDACTONE) 25 MG tablet Take 1 tablet by mouth 2 times daily 21   Jackie Hunt MD   bumetanide (BUMEX) 2 MG tablet Take 1 tablet by mouth daily  Patient taking differently: Take 2 mg by mouth every other day  21   Jackie Hunt MD   escitalopram (LEXAPRO) 5 MG tablet Take 5 mg by mouth daily    Historical Provider, MD   blood glucose test strips (ASCENSIA AUTODISC VI;ONE TOUCH ULTRA TEST VI) strip 1 each by In Vitro route 4 times daily One touch test strips 10/29/21   Jackie Hunt MD   MISC NATURAL PRODUCTS PO Take 1 tablet by mouth daily -[address] [place]-    Historical Provider, MD   CRANBERRY PO Take 1 tablet by mouth as needed (URINARY)    Historical Provider, MD   Apoaequorin (PREVAGEN PO) Take 1 tablet by mouth daily     Historical Provider, MD   insulin aspart (NOVOLOG FLEXPEN) 100 UNIT/ML injection pen Inject 14 Units into the skin 3 times daily (before meals) If BS less than 100, no coverage  If greater than 150, 14 units 21  Jackie Hunt MD   vitamin D-3 (CHOLECALCIFEROL) 125 MCG (5000 UT) TABS Take 5,000 Units by mouth daily     Historical Provider, MD   Insulin Degludec (TRESIBA FLEXTOUCH) 200 UNIT/ML SOPN Inject 26 Units into the skin nightly 3/9/21   Jackie Hunt MD   Probiotic Product (PROBIOTIC MULTI-ENZYME) TABS Take 1 tablet by mouth daily     Historical Provider, MD   Omega-3 Fatty Acids (FISH OIL) 1000 MG CAPS Take 1,000 mg by mouth daily     Historical Provider, MD   vitamin C (ASCORBIC ACID) 500 MG tablet Take 1,000 mg by mouth daily     Historical Provider, MD   vitamin B-12 (CYANOCOBALAMIN) 500 MCG tablet Take 500 mcg by mouth daily     Historical Provider, MD   vitamin E 400 UNIT capsule Take 400 Units by mouth daily     Historical Provider, MD   b complex vitamins capsule Take 1 capsule by mouth daily     Historical Provider, MD   Calcium-Magnesium-Zinc 500-250-12.5 MG TABS Take 1 tablet by mouth daily     Historical Provider, MD       Current medications:    No current facility-administered medications for this visit. No current outpatient medications on file.      Facility-Administered Medications Ordered in Other Visits   Medication Dose Route Frequency Provider Last Rate Last Admin    cefTRIAXone (ROCEPHIN) 1,000 mg in sterile water 10 mL IV syringe  1,000 mg IntraVENous Q24H Deon Lam MD        pantoprazole (PROTONIX) injection 40 mg  40 mg IntraVENous BID Abeba Pinon DO   40 mg at 12/04/21 0943    lactulose (CHRONULAC) 10 GM/15ML solution 20 g  20 g Oral 4 times per day DAVID Franco - CNP   20 g at 12/04/21 1540    rifaximin (XIFAXAN) tablet 550 mg  550 mg Oral BID DAVID Paul - CNP   550 mg at 12/04/21 1540    octreotide (SANDOSTATIN) 500 mcg in sodium chloride 0.9 % 100 mL infusion  50 mcg/hr IntraVENous Continuous Delroy Morel MD 10 mL/hr at 12/04/21 0705 50 mcg/hr at 12/04/21 0705    sodium chloride flush 0.9 % injection 10 mL  10 mL IntraVENous 2 times per day Shayla Goldstein MD   10 mL at 12/04/21 0943    sodium chloride flush 0.9 % injection 10 mL  10 mL IntraVENous PRN Shayla Goldstein MD        0.9 % sodium chloride infusion  25 mL IntraVENous PRN Shayla Goldstein MD        ondansetron (ZOFRAN-ODT) disintegrating tablet 4 mg  4 mg Oral Q8H PRN Duayne Course MD Rogelio        Or    ondansetron (ZOFRAN) injection 4 mg  4 mg IntraVENous Q6H PRN Suzanna Garcia MD        [facility]) tablet 8.6 mg  1 tablet Oral Daily PRN Suzanna Garcia MD        acetaminophen (TYLENOL) tablet 650 mg  650 mg Oral Q6H PRN Suzanna Garcia MD        Or    acetaminophen (TYLENOL) suppository 650 mg  650 mg Rectal Q6H PRN Suzanna Garcia MD        insulin lispro (HUMALOG) injection vial 0-6 Units  0-6 Units SubCUTAneous TID WC Suzanna Garcia MD        insulin lispro (HUMALOG) injection vial 0-3 Units  0-3 Units SubCUTAneous Nightly Suzanna Garcia MD   2 Units at 12/04/21 0108    glucose (GLUTOSE) 40 % oral gel 15 g  15 g Oral PRN Suzanna Garcia MD        dextrose 50 % IV solution  12.5 g IntraVENous PRN Suzanna Garcia MD        glucagon (rDNA) injection 1 mg  1 mg IntraMUSCular PRN Suzanna Garcia MD        dextrose 5 % solution  100 mL/hr IntraVENous PRN Suzanna Garcia MD           Allergies: Allergies   Allergen Reactions    Ketorolac Tromethamine Shortness Of Breath and Other (See Comments)     Depression, \"walking like a zombie\", no desire to do anything.     Memantine Hcl Other (See Comments)     AMS, \"walking like a zombie\"    Tamsulosin Hcl Shortness Of Breath       Problem List:    Patient Active Problem List   Diagnosis Code    Type 1 diabetes mellitus without complication (HCC) J52.7    Anemia D64.9    Hepatic cirrhosis due to chronic hepatitis C infection ([facility] [address] B18.2, K74.60    Secondary esophageal varices with bleeding (HCC) I85.11    Dementia associated with other underlying disease without behavioral disturbance (HCC) F02.80    B12 deficiency E53.8    Streptococcal bacteremia R78.81, B95.5    Acute respiratory failure with hypoxia (HCC) J96.01    Upper GI bleed K92.2       Past Medical History:        Diagnosis Date    Buccal [facility] 09/2020    Diabetes mellitus (HCC)     Esophageal varices (HCC)     Hepatitis C     treated and resolved    Liver cirrhosis ([facility] [address] [facility]     Mild dementia ([facility] [address]     no meds    Positive colorectal cancer screening using Cologuard test 04/18/2021    Referred to Dr Clarissa James Colonoscopy       Past Surgical History:        Procedure Laterality Date    BIOPSY MOUTH LESION Left 06/04/2020    EXCISIONAL BIOPSY LEFT BUCCAL MUCOSAL LESION performed by Kiara Cary MD at [facility]  2017    ENDOSCOPY, COLON, DIAGNOSTIC  2019 and 2020    HC INSERT PICC CATH, 5/> YRS  10/14/2021         HERNIA REPAIR Right 2018    inguinal     MOUTH SURGERY Right 09/24/2020    EXCISIONAL BIOPSY OF RIGHT BUCCOL LESION performed by Kiara Cary MD at [address]      UPPER GASTROINTESTINAL ENDOSCOPY N/A 06/19/2018    EGD BAND LIGATION performed by Veronika Stallings MD at [address]  09/01/2020    Dr Danielle Fonseca medium sized esophageal varices--placed 6 rubber bands on varices--normal duodenum       Social History:    Social History     Tobacco Use    Smoking status: Never Smoker    Smokeless tobacco: Never Used   Substance Use Topics    Alcohol use: Never     Comment: rare socially                                Counseling given: Not Answered      Vital Signs (Current): There were no vitals filed for this visit.                                            BP Readings from Last 3 Encounters:   12/04/21 120/70   12/03/21 118/74   12/01/21 118/70       NPO Status:  greater than 8 hours                                                                               BMI:   Wt Readings from Last 3 Encounters:   12/04/21 156 lb 15.5 oz (71.2 kg)   12/03/21 157 lb (71.2 kg)   12/01/21 160 lb 6.4 oz (72.8 kg)     There is no height or weight on file to calculate BMI.    CBC:   Lab Results   Component Value Date    WBC 5.1 12/04/2021    RBC 3.17 12/04/2021    HGB 10.7 12/04/2021    HCT 30.1 12/04/2021    MCV 95.0 12/04/2021    RDW 13.9 12/04/2021 PLT 98 12/04/2021       CMP:   Lab Results   Component Value Date     12/04/2021    K 4.7 12/04/2021     12/04/2021    CO2 24 12/04/2021    BUN 30 12/04/2021    CREATININE 0.7 12/04/2021    GFRAA >60 12/04/2021    LABGLOM >60 12/04/2021    GLUCOSE 214 12/04/2021    GLUCOSE 114 03/29/2012    PROT 5.9 12/04/2021    CALCIUM 8.9 12/04/2021    BILITOT 1.3 12/04/2021    ALKPHOS 91 12/04/2021    AST 34 12/04/2021    ALT 45 12/04/2021       POC Tests: No results for input(s): POCGLU, POCNA, POCK, POCCL, POCBUN, POCHEMO, POCHCT in the last 72 hours.     Coags:   Lab Results   Component Value Date    PROTIME 15.3 12/04/2021    PROTIME 11.3 03/29/2012    INR 1.4 12/04/2021    APTT 27.9 12/03/2021       HCG (If Applicable): No results found for: PREGTESTUR, PREGSERUM, HCG, HCGQUANT     ABGs: No results found for: PHART, PO2ART, URO2SAZ, GPM0FIP, BEART, X0ZWJMSW     Type & Screen (If Applicable):  No results found for: LABABO, LABRH    Drug/Infectious Status (If Applicable):  No results found for: HIV, HEPCAB    COVID-19 Screening (If Applicable):   Lab Results   Component Value Date    COVID19 Not Detected 12/04/2021    COVID19 Not Detected 09/18/2020           Anesthesia Evaluation  Patient summary reviewed and Nursing notes reviewed no history of anesthetic complications:   Airway: Mallampati: Unable to assess / NA        Dental:          Pulmonary:Negative Pulmonary ROS   (+) decreased breath sounds,                             Cardiovascular:        (-) past MI, CAD and CABG/stent      Rhythm: regular  Rate: normal           Beta Blocker:  Not on Beta Blocker      ROS comment: Per notes ---> \" recently admitted to the hospital and was discharged on 11/11 when he was diagnosed with endocarditis patient completed course of antibiotics\"     Neuro/Psych:   (+) psychiatric history (Dementia associated with other underlying disease without behavioral disturbance ([facility] [address])):             ROS comment: dementia GI/Hepatic/Renal:   (+) hepatitis: C, liver disease (cirrhosis): hepatic encephalopathy, esophageal varices,          ROS comment: Upper GI bleed. Endo/Other:    (+) DiabetesType I DM, , blood dyscrasia: thrombocytopenia:., .                 Abdominal:             Vascular: negative vascular ROS. Other Findings:             Anesthesia Plan      MAC     ASA 4     (Pt confused. Hx from chart, wife.)  Induction: intravenous. Anesthetic plan and risks discussed with spouse. Plan discussed with surgical team.      patient seen and evaluated  DAVID Ford - CRNA          Diallo Pittman MD   12/4/2021      Patient will need to be re-evaluated prior to surgery by DOS anesthesiologist.    Diallo Pittman MD           12/4/2021        4:10 PM      Agree with above assessment. Physical exam unchanged.   Will proceed with anesthetic plan.    (this addendum was done preop but unable to be filed electronically at that time)

## 2021-12-05 ENCOUNTER — ANESTHESIA (OUTPATIENT)
Dept: ENDOSCOPY | Age: 71
DRG: 432 | End: 2021-12-05
Payer: MEDICARE

## 2021-12-05 VITALS — DIASTOLIC BLOOD PRESSURE: 74 MMHG | SYSTOLIC BLOOD PRESSURE: 132 MMHG | OXYGEN SATURATION: 94 %

## 2021-12-05 LAB
ALBUMIN SERPL-MCNC: 3.3 G/DL (ref 3.5–5.2)
ALP BLD-CCNC: 99 U/L (ref 40–129)
ALT SERPL-CCNC: 52 U/L (ref 0–40)
AMMONIA: 143.3 UMOL/L (ref 16–60)
ANION GAP SERPL CALCULATED.3IONS-SCNC: 9 MMOL/L (ref 7–16)
AST SERPL-CCNC: 39 U/L (ref 0–39)
BASOPHILS ABSOLUTE: 0.05 E9/L (ref 0–0.2)
BASOPHILS RELATIVE PERCENT: 1 % (ref 0–2)
BILIRUB SERPL-MCNC: 1.4 MG/DL (ref 0–1.2)
BUN BLDV-MCNC: 26 MG/DL (ref 6–23)
CALCIUM SERPL-MCNC: 8.9 MG/DL (ref 8.6–10.2)
CHLORIDE BLD-SCNC: 103 MMOL/L (ref 98–107)
CO2: 25 MMOL/L (ref 22–29)
CREAT SERPL-MCNC: 0.8 MG/DL (ref 0.7–1.2)
EOSINOPHILS ABSOLUTE: 0.28 E9/L (ref 0.05–0.5)
EOSINOPHILS RELATIVE PERCENT: 5.6 % (ref 0–6)
GFR AFRICAN AMERICAN: >60
GFR NON-AFRICAN AMERICAN: >60 ML/MIN/1.73
GLUCOSE BLD-MCNC: 194 MG/DL (ref 74–99)
HCT VFR BLD CALC: 26.2 % (ref 37–54)
HCT VFR BLD CALC: 30 % (ref 37–54)
HCT VFR BLD CALC: 35 % (ref 37–54)
HEMOGLOBIN: 10.7 G/DL (ref 12.5–16.5)
HEMOGLOBIN: 11.5 G/DL (ref 12.5–16.5)
HEMOGLOBIN: 8.8 G/DL (ref 12.5–16.5)
IMMATURE GRANULOCYTES #: 0.01 E9/L
IMMATURE GRANULOCYTES %: 0.2 % (ref 0–5)
INR BLD: 1.4
LYMPHOCYTES ABSOLUTE: 0.73 E9/L (ref 1.5–4)
LYMPHOCYTES RELATIVE PERCENT: 14.7 % (ref 20–42)
MAGNESIUM: 2.2 MG/DL (ref 1.6–2.6)
MCH RBC QN AUTO: 34.1 PG (ref 26–35)
MCHC RBC AUTO-ENTMCNC: 35.7 % (ref 32–34.5)
MCV RBC AUTO: 95.5 FL (ref 80–99.9)
METER GLUCOSE: 192 MG/DL (ref 74–99)
METER GLUCOSE: 281 MG/DL (ref 74–99)
METER GLUCOSE: 338 MG/DL (ref 74–99)
MONOCYTES ABSOLUTE: 0.51 E9/L (ref 0.1–0.95)
MONOCYTES RELATIVE PERCENT: 10.3 % (ref 2–12)
MRSA CULTURE ONLY: NORMAL
NEUTROPHILS ABSOLUTE: 3.38 E9/L (ref 1.8–7.3)
NEUTROPHILS RELATIVE PERCENT: 68.2 % (ref 43–80)
PDW BLD-RTO: 14.1 FL (ref 11.5–15)
PHOSPHORUS: 4 MG/DL (ref 2.5–4.5)
PLATELET # BLD: 98 E9/L (ref 130–450)
PLATELET CONFIRMATION: NORMAL
PMV BLD AUTO: 10.1 FL (ref 7–12)
POTASSIUM REFLEX MAGNESIUM: 4.6 MMOL/L (ref 3.5–5)
PROTHROMBIN TIME: 15 SEC (ref 9.3–12.4)
RBC # BLD: 3.14 E12/L (ref 3.8–5.8)
SODIUM BLD-SCNC: 137 MMOL/L (ref 132–146)
TOTAL PROTEIN: 6.2 G/DL (ref 6.4–8.3)
WBC # BLD: 5 E9/L (ref 4.5–11.5)

## 2021-12-05 PROCEDURE — 2709999900 HC NON-CHARGEABLE SUPPLY: Performed by: INTERNAL MEDICINE

## 2021-12-05 PROCEDURE — 2580000003 HC RX 258

## 2021-12-05 PROCEDURE — 82140 ASSAY OF AMMONIA: CPT

## 2021-12-05 PROCEDURE — 6360000002 HC RX W HCPCS: Performed by: STUDENT IN AN ORGANIZED HEALTH CARE EDUCATION/TRAINING PROGRAM

## 2021-12-05 PROCEDURE — 2060000000 HC ICU INTERMEDIATE R&B

## 2021-12-05 PROCEDURE — 2700000000 HC OXYGEN THERAPY PER DAY

## 2021-12-05 PROCEDURE — 6360000002 HC RX W HCPCS

## 2021-12-05 PROCEDURE — 83735 ASSAY OF MAGNESIUM: CPT

## 2021-12-05 PROCEDURE — 06L38CZ OCCLUSION OF ESOPHAGEAL VEIN WITH EXTRALUMINAL DEVICE, VIA NATURAL OR ARTIFICIAL OPENING ENDOSCOPIC: ICD-10-PCS | Performed by: INTERNAL MEDICINE

## 2021-12-05 PROCEDURE — 2580000003 HC RX 258: Performed by: STUDENT IN AN ORGANIZED HEALTH CARE EDUCATION/TRAINING PROGRAM

## 2021-12-05 PROCEDURE — 85014 HEMATOCRIT: CPT

## 2021-12-05 PROCEDURE — 3609012300 HC EGD BAND LIGATION ESOPHGEAL/GASTRIC VARICES: Performed by: INTERNAL MEDICINE

## 2021-12-05 PROCEDURE — 6360000002 HC RX W HCPCS: Performed by: NURSE ANESTHETIST, CERTIFIED REGISTERED

## 2021-12-05 PROCEDURE — 36415 COLL VENOUS BLD VENIPUNCTURE: CPT

## 2021-12-05 PROCEDURE — 82962 GLUCOSE BLOOD TEST: CPT

## 2021-12-05 PROCEDURE — 3700000001 HC ADD 15 MINUTES (ANESTHESIA): Performed by: INTERNAL MEDICINE

## 2021-12-05 PROCEDURE — 6370000000 HC RX 637 (ALT 250 FOR IP): Performed by: STUDENT IN AN ORGANIZED HEALTH CARE EDUCATION/TRAINING PROGRAM

## 2021-12-05 PROCEDURE — 2580000003 HC RX 258: Performed by: NURSE ANESTHETIST, CERTIFIED REGISTERED

## 2021-12-05 PROCEDURE — 3700000000 HC ANESTHESIA ATTENDED CARE: Performed by: INTERNAL MEDICINE

## 2021-12-05 PROCEDURE — 6370000000 HC RX 637 (ALT 250 FOR IP): Performed by: INTERNAL MEDICINE

## 2021-12-05 PROCEDURE — 85610 PROTHROMBIN TIME: CPT

## 2021-12-05 PROCEDURE — 85018 HEMOGLOBIN: CPT

## 2021-12-05 PROCEDURE — 85025 COMPLETE CBC W/AUTO DIFF WBC: CPT

## 2021-12-05 PROCEDURE — 80053 COMPREHEN METABOLIC PANEL: CPT

## 2021-12-05 PROCEDURE — 2500000003 HC RX 250 WO HCPCS: Performed by: NURSE ANESTHETIST, CERTIFIED REGISTERED

## 2021-12-05 PROCEDURE — C9113 INJ PANTOPRAZOLE SODIUM, VIA: HCPCS | Performed by: STUDENT IN AN ORGANIZED HEALTH CARE EDUCATION/TRAINING PROGRAM

## 2021-12-05 PROCEDURE — 84100 ASSAY OF PHOSPHORUS: CPT

## 2021-12-05 PROCEDURE — 2720000010 HC SURG SUPPLY STERILE: Performed by: INTERNAL MEDICINE

## 2021-12-05 RX ORDER — LIDOCAINE HYDROCHLORIDE 20 MG/ML
INJECTION, SOLUTION EPIDURAL; INFILTRATION; INTRACAUDAL; PERINEURAL PRN
Status: DISCONTINUED | OUTPATIENT
Start: 2021-12-05 | End: 2021-12-05 | Stop reason: SDUPTHER

## 2021-12-05 RX ORDER — PROPOFOL 10 MG/ML
INJECTION, EMULSION INTRAVENOUS PRN
Status: DISCONTINUED | OUTPATIENT
Start: 2021-12-05 | End: 2021-12-05 | Stop reason: SDUPTHER

## 2021-12-05 RX ORDER — SODIUM CHLORIDE 9 MG/ML
INJECTION, SOLUTION INTRAVENOUS CONTINUOUS PRN
Status: DISCONTINUED | OUTPATIENT
Start: 2021-12-05 | End: 2021-12-05 | Stop reason: SDUPTHER

## 2021-12-05 RX ADMIN — INSULIN LISPRO 1 UNITS: 100 INJECTION, SOLUTION INTRAVENOUS; SUBCUTANEOUS at 08:52

## 2021-12-05 RX ADMIN — Medication 10 ML: at 21:59

## 2021-12-05 RX ADMIN — LACTULOSE 20 G: 10 SOLUTION ORAL at 16:41

## 2021-12-05 RX ADMIN — PROPOFOL 20 MG: 10 INJECTION, EMULSION INTRAVENOUS at 08:25

## 2021-12-05 RX ADMIN — OCTREOTIDE ACETATE 50 MCG/HR: 500 INJECTION, SOLUTION INTRAVENOUS; SUBCUTANEOUS at 16:15

## 2021-12-05 RX ADMIN — PANTOPRAZOLE SODIUM 40 MG: 40 INJECTION, POWDER, FOR SOLUTION INTRAVENOUS at 22:00

## 2021-12-05 RX ADMIN — PROPOFOL 70 MG: 10 INJECTION, EMULSION INTRAVENOUS at 08:21

## 2021-12-05 RX ADMIN — SODIUM CHLORIDE: 9 INJECTION, SOLUTION INTRAVENOUS at 08:00

## 2021-12-05 RX ADMIN — Medication 10 ML: at 08:00

## 2021-12-05 RX ADMIN — PANTOPRAZOLE SODIUM 40 MG: 40 INJECTION, POWDER, FOR SOLUTION INTRAVENOUS at 08:52

## 2021-12-05 RX ADMIN — PROPOFOL 50 MG: 10 INJECTION, EMULSION INTRAVENOUS at 08:12

## 2021-12-05 RX ADMIN — Medication 1000 MG: at 21:59

## 2021-12-05 RX ADMIN — INSULIN LISPRO 4 UNITS: 100 INJECTION, SOLUTION INTRAVENOUS; SUBCUTANEOUS at 16:41

## 2021-12-05 RX ADMIN — PHENYLEPHRINE HYDROCHLORIDE 200 MCG: 10 INJECTION INTRAVENOUS at 08:23

## 2021-12-05 RX ADMIN — INSULIN LISPRO 2 UNITS: 100 INJECTION, SOLUTION INTRAVENOUS; SUBCUTANEOUS at 22:26

## 2021-12-05 RX ADMIN — LACTULOSE 20 G: 10 SOLUTION ORAL at 06:20

## 2021-12-05 RX ADMIN — OCTREOTIDE ACETATE 50 MCG/HR: 500 INJECTION, SOLUTION INTRAVENOUS; SUBCUTANEOUS at 07:00

## 2021-12-05 RX ADMIN — LIDOCAINE HYDROCHLORIDE 40 MG: 20 INJECTION, SOLUTION EPIDURAL; INFILTRATION; INTRACAUDAL; PERINEURAL at 08:12

## 2021-12-05 ASSESSMENT — PAIN SCALES - GENERAL
PAINLEVEL_OUTOF10: 0

## 2021-12-05 ASSESSMENT — PAIN SCALES - PAIN ASSESSMENT IN ADVANCED DEMENTIA (PAINAD)
CONSOLABILITY: 0
FACIALEXPRESSION: 0
BODYLANGUAGE: 0
BREATHING: 0
NEGVOCALIZATION: 0
TOTALSCORE: 0

## 2021-12-05 ASSESSMENT — PAIN SCALES - WONG BAKER
WONGBAKER_NUMERICALRESPONSE: 0
WONGBAKER_NUMERICALRESPONSE: 0

## 2021-12-05 NOTE — PATIENT CARE CONFERENCE
Intensive Care Daily Quality Rounding Checklist      ICU Team Members:     ICU Day #: NUMBER: 1    Intubation Date:  n/a    Ventilator Day #: n/a    Central Line Insertion Date:  n/a        Day #: n/a     Arterial Line Insertion Date: n/a       Day #: n/a    Temporary Hemodialysis Catheter Insertion Date:  n/a      Day # n/a    DVT Prophylaxis:    GI Prophylaxis:    Alanis Catheter Insertion Date: December 4, 21     Day #: 1      Continued need (if yes, reason documented and discussed with physician): yes,     Skin Issues/ Wounds and ordered treatment discussed on rounds: n    Goals/ Plans for the Day: Labs - Report Results and EGD today

## 2021-12-05 NOTE — PROCEDURES
Immediately prior to the procedure the patient's History and Physical was reviewed- there are no changes with the current vitals. Blood pressure 132/81, pulse 81, temperature 97.5 °F (36.4 °C), temperature source Bladder, resp. rate 19, height 5' 9\" (1.753 m), weight 156 lb 15.5 oz (71.2 kg), SpO2 96 %.

## 2021-12-05 NOTE — PROGRESS NOTES
Report called to [place] [facility] RN on [address] for patient to be transferred to room 605. All pertinent information disclosed during nurse to nurse report. Patient's wife notified of upcoming transfer to room 605. Wife updated on current condition and plan of care. All patient needs met at this time. Awaiting transport.

## 2021-12-05 NOTE — PROGRESS NOTES
Pulmonary/Critical Care to sign off at this time. Please let us know if our services are needed any further.     Jerry Chen MD

## 2021-12-05 NOTE — PROGRESS NOTES
Migdalia Nevarez MD FACP  Internal medicine  Progress notes         CHIEF COMPLAINT: Hematemesis      HISTORY OF PRESENT ILLNESS:    154/2021  65-year-old  man seen in ICU at [address] earlier this morning  Admission details reviewed  Wife at bedside  Patient with advanced cirrhosis and dementia  Presented to ER with melena and hematemesis  Patient is a poor historian  Denied abdominal pain  No fever or chills  Admitted for further management  12/5/2021  Patient was seen in the ICU earlier this morning  Back from EGD  Findings reviewed  No active bleeding  Hematocrit stable  Wife at bedside    Past Medical History:    Past Medical History:   Diagnosis Date    Buccal mass 09/2020    Diabetes mellitus ([address]     Esophageal varices (HCC)     Hepatitis C     treated and resolved    Liver cirrhosis ([address]     Mild dementia ([address]     no meds    Positive colorectal cancer screening using Cologuard test 04/18/2021    Referred to Dr Cong Hemphill Colonoscopy       Past Surgical History:    Past Surgical History:   Procedure Laterality Date    BIOPSY MOUTH LESION Left 06/04/2020    EXCISIONAL BIOPSY LEFT BUCCAL MUCOSAL LESION performed by Kristie Ohara MD at [facility]  2017    ENDOSCOPY, COLON, DIAGNOSTIC  2019 and 2020    HC INSERT PICC CATH, 5/> YRS  10/14/2021         HERNIA REPAIR Right 2018    inguinal     MOUTH SURGERY Right 09/24/2020    EXCISIONAL BIOPSY OF RIGHT BUCCOL LESION performed by Kristie Ohara MD at [address] [place] [address] ENDOSCOPY N/A 06/19/2018    EGD BAND LIGATION performed by Phineas Mcburney, MD at [facility]  09/01/2020    Dr Allyn Medina medium sized esophageal varices--placed 6 rubber bands on varices--normal duodenum       Medications Prior to Admission:    Medications Prior to Admission: mupirocin (BACTROBAN) 2 % cream, Apply topically 2 times daily Apply topically 2 times daily  donepezil (ARICEPT) 5 MG tablet, Take 1 tablet by mouth daily (with breakfast)  spironolactone (ALDACTONE) 25 MG tablet, Take 1 tablet by mouth 2 times daily  bumetanide (BUMEX) 2 MG tablet, Take 1 tablet by mouth daily (Patient taking differently: Take 2 mg by mouth every other day )  escitalopram (LEXAPRO) 5 MG tablet, Take 5 mg by mouth daily  blood glucose test strips (ASCENSIA AUTODISC VI;ONE TOUCH ULTRA TEST VI) strip, 1 each by In Vitro route 4 times daily One touch test strips  MISC NATURAL PRODUCTS PO, Take 1 tablet by mouth daily -OSKAR BLUE-  CRANBERRY PO, Take 1 tablet by mouth as needed (URINARY)  Apoaequorin (PREVAGEN PO), Take 1 tablet by mouth daily   insulin aspart (NOVOLOG FLEXPEN) 100 UNIT/ML injection pen, Inject 14 Units into the skin 3 times daily (before meals) If BS less than 100, no coverage If greater than 150, 14 units  vitamin D-3 (CHOLECALCIFEROL) 125 MCG (5000 UT) TABS, Take 5,000 Units by mouth daily   Insulin Degludec (TRESIBA FLEXTOUCH) 200 UNIT/ML SOPN, Inject 26 Units into the skin nightly  Probiotic Product (PROBIOTIC MULTI-ENZYME) TABS, Take 1 tablet by mouth daily   Omega-3 Fatty Acids (FISH OIL) 1000 MG CAPS, Take 1,000 mg by mouth daily   vitamin C (ASCORBIC ACID) 500 MG tablet, Take 1,000 mg by mouth daily   vitamin B-12 (CYANOCOBALAMIN) 500 MCG tablet, Take 500 mcg by mouth daily   vitamin E 400 UNIT capsule, Take 400 Units by mouth daily   b complex vitamins capsule, Take 1 capsule by mouth daily   Calcium-Magnesium-Zinc 500-250-12.5 MG TABS, Take 1 tablet by mouth daily     Allergies:    Ketorolac tromethamine, Memantine hcl, and Tamsulosin hcl    Social History:    reports that he has never smoked. He has never used smokeless tobacco. He reports that he does not drink alcohol and does not use drugs.     Family History:   family history includes Heart Failure in his brother; Hypertension in his brother; Seizures in his brother; Stroke in his brother. REVIEW OF SYSTEMS:  As above in the HPI, otherwise negative    PHYSICAL EXAM:    Vitals:  /66   Pulse 79   Temp 97.2 °F (36.2 °C) (Bladder)   Resp 15   Ht 5' 9\" (1.753 m)   Wt 156 lb 15.5 oz (71.2 kg)   SpO2 96%   BMI 23.18 kg/m²     General: Somewhat thin built awake and alert  Disoriented and somewhat confused  HEENT:  Normocephalic, atraumatic. Pupils equal, round, reactive to light. No scleral icterus. No conjunctival injection. .  No nasal discharge. Neck:  Supple no adenopathy  Heart:  RRR, no murmurs, gallops, rubs  Lungs:  CTA bilaterally, bilat symmetrical expansion, no wheeze, rales, or rhonchi  Abdomen:   Bowel sounds present, soft, nontender, no masses, no organomegaly, no peritoneal signs  Extremities:  No clubbing, cyanosis, or edema  Skin:  Warm and dry, no open lesions or rash  Neuro:  Cranial nerves 2-12 intact, no focal deficits  Breast: deferred  Rectal: deferred  Genitalia:  deferred    LABS:  Data reviewed      ASSESSMENT:    Upper GI bleeding  Status post EGD today banding of the varices  Advanced cirrhosis  Esophageal varices  Advanced dementia  Type 2 diabetes mellitus  Patient Active Problem List   Diagnosis    Type 1 diabetes mellitus without complication ([address]    Anemia    Hepatic cirrhosis due to chronic hepatitis C infection ([address]    Secondary esophageal varices with bleeding ([address]    Dementia associated with other underlying disease without behavioral disturbance ([address]    B12 deficiency    Streptococcal bacteremia    Acute respiratory failure with hypoxia ([address]    Upper GI bleed           PLAN:  Monitor labs  Advance diet as tolerated  May transfer to stepdown  Resume home medications  Questions answered to wife Mitchell Clay MD  10:54 AM  12/5/2021

## 2021-12-05 NOTE — CARE COORDINATION
S/p EGD w/ banding today. Met w/ wife in room. Pt has hx dementia. Lives in a 2 story house- 5 steps to entrance. Has WC, [employer], walking sticks, cane, crutches but does not use. PCP is Dr. Neha Ocampo and pharmacy is [facility]. Confirmed pt is active w/ [facility]- will need resume order on discharge-notify when pt is discharging. Currently on Sandostatin drip, iv abx. Awaiting PT/OT evals. Per wife, plan is to return home w/ [facility] on discharge.  Will follow Saulo Collado, RN case manager

## 2021-12-05 NOTE — PROCEDURES
Procedure:  Esophagogastroduodenoscopy with banding of esophageal varices    Indication: Hematemesis, cirrhosis, history of esophageal varices    Sedation  MAC    Endoscope was advanced easily through mouth to second portion of duodenum      Oropharynx views are limited but grossly normal.    Esophagus:   3 columns of grade 4 esophageal varices 1 varix had a white fibrin clot. Active bleeding ensued during the endoscopy. 6 bands were placed including 1 band directly on the fibrin clot. After the 6 bands had been placed there was complete cessation of all bleeding and collapse of the distal varices. There were some more proximal varices which I did not band. Stomach:   Antrum  and body have moderate portal hypertensive gastropathy    Retroflexed views show normal fundus and cardia, no gastric varices    Duodenum: Bulb is normal.    Second portion of duodenum is normal.    EBL: 2 to 3 cc    IMPRESSION AND PLAN:     1.  Grade 4 esophageal varices, one with a white fibrin clot. 6 bands placed with collapse of all distal varices and cessation of bleeding. 2.  Proximal esophageal varices observed only  3. No gastric varices  4. Moderate diffuse portal hypertensive gastropathy    Recommendations: Continue current management including octreotide drip. Repeat endoscopy 2 weeks. Follow up as outpatient in office, call [phone] to schedule for appointment.       SHASHA Styles MD  12/5/2021  8:29 AM

## 2021-12-05 NOTE — ANESTHESIA POSTPROCEDURE EVALUATION
Department of Anesthesiology  Postprocedure Note    Patient: Yolande Saavedra  MRN: [id number]  YOB: 1950  Date of evaluation: 12/5/2021  Time:  8:32 AM     Procedure Summary     Date: 12/05/21 Room / Location: [address] / [facility]    Anesthesia Start: 0800 Anesthesia Stop:     Procedure: EGD ESOPHAGOGASTRODUODENOSCOPY (N/A ) Diagnosis: (GI BLEED)    Surgeons: Richar Osborne MD Responsible Provider: Cliff Valentin MD    Anesthesia Type: MAC ASA Status: 4          Anesthesia Type: No value filed. Nino Phase I:      Nino Phase II:      Last vitals: Reviewed and per EMR flowsheets. Anesthesia Post Evaluation    Patient location during evaluation: ICU  Patient participation: complete - patient cannot participate (baseline dementia (same as preop))  Airway patency: patent  Nausea & Vomiting: no vomiting  Cardiovascular status: blood pressure returned to baseline  Respiratory status: acceptable  Comments: There was bleeding noted during the procedure and it was suctioned both by GI endoscopist and by anesthesia in the oral cavity. Banding did control and stop the bleeding.   No further issues.    (this addendum was done preop but unable to be filed electronically at that time)

## 2021-12-06 ENCOUNTER — APPOINTMENT (OUTPATIENT)
Dept: MRI IMAGING | Age: 71
DRG: 432 | End: 2021-12-06
Payer: MEDICARE

## 2021-12-06 PROBLEM — J96.01 ACUTE RESPIRATORY FAILURE WITH HYPOXIA (HCC): Status: RESOLVED | Noted: 2021-11-06 | Resolved: 2021-12-06

## 2021-12-06 LAB
ALBUMIN SERPL-MCNC: 3.1 G/DL (ref 3.5–5.2)
ALP BLD-CCNC: 91 U/L (ref 40–129)
ALT SERPL-CCNC: 39 U/L (ref 0–40)
AMMONIA: 59.5 UMOL/L (ref 16–60)
ANION GAP SERPL CALCULATED.3IONS-SCNC: 8 MMOL/L (ref 7–16)
ANISOCYTOSIS: ABNORMAL
AST SERPL-CCNC: 23 U/L (ref 0–39)
BASOPHILS ABSOLUTE: 0.03 E9/L (ref 0–0.2)
BASOPHILS RELATIVE PERCENT: 0.6 % (ref 0–2)
BILIRUB SERPL-MCNC: 1.2 MG/DL (ref 0–1.2)
BUN BLDV-MCNC: 21 MG/DL (ref 6–23)
CALCIUM SERPL-MCNC: 8.4 MG/DL (ref 8.6–10.2)
CHLORIDE BLD-SCNC: 104 MMOL/L (ref 98–107)
CO2: 25 MMOL/L (ref 22–29)
CREAT SERPL-MCNC: 0.8 MG/DL (ref 0.7–1.2)
EOSINOPHILS ABSOLUTE: 0.26 E9/L (ref 0.05–0.5)
EOSINOPHILS RELATIVE PERCENT: 5.4 % (ref 0–6)
GFR AFRICAN AMERICAN: >60
GFR NON-AFRICAN AMERICAN: >60 ML/MIN/1.73
GLUCOSE BLD-MCNC: 300 MG/DL (ref 74–99)
HCT VFR BLD CALC: 29.7 % (ref 37–54)
HCT VFR BLD CALC: 30.3 % (ref 37–54)
HCT VFR BLD CALC: 30.8 % (ref 37–54)
HEMOGLOBIN: 10.3 G/DL (ref 12.5–16.5)
HEMOGLOBIN: 10.3 G/DL (ref 12.5–16.5)
HEMOGLOBIN: 10.6 G/DL (ref 12.5–16.5)
IMMATURE GRANULOCYTES #: 0.01 E9/L
IMMATURE GRANULOCYTES %: 0.2 % (ref 0–5)
INR BLD: 1.5
LYMPHOCYTES ABSOLUTE: 0.62 E9/L (ref 1.5–4)
LYMPHOCYTES RELATIVE PERCENT: 12.9 % (ref 20–42)
MCH RBC QN AUTO: 33.4 PG (ref 26–35)
MCHC RBC AUTO-ENTMCNC: 34 % (ref 32–34.5)
MCV RBC AUTO: 98.4 FL (ref 80–99.9)
METER GLUCOSE: 203 MG/DL (ref 74–99)
METER GLUCOSE: 239 MG/DL (ref 74–99)
METER GLUCOSE: 276 MG/DL (ref 74–99)
METER GLUCOSE: 293 MG/DL (ref 74–99)
MONOCYTES ABSOLUTE: 0.44 E9/L (ref 0.1–0.95)
MONOCYTES RELATIVE PERCENT: 9.2 % (ref 2–12)
NEUTROPHILS ABSOLUTE: 3.44 E9/L (ref 1.8–7.3)
NEUTROPHILS RELATIVE PERCENT: 71.7 % (ref 43–80)
OVALOCYTES: ABNORMAL
PDW BLD-RTO: 13.9 FL (ref 11.5–15)
PLATELET # BLD: 86 E9/L (ref 130–450)
PLATELET CONFIRMATION: NORMAL
PMV BLD AUTO: 10.3 FL (ref 7–12)
POIKILOCYTES: ABNORMAL
POTASSIUM REFLEX MAGNESIUM: 4.4 MMOL/L (ref 3.5–5)
PROTHROMBIN TIME: 16.1 SEC (ref 9.3–12.4)
RBC # BLD: 3.08 E12/L (ref 3.8–5.8)
SODIUM BLD-SCNC: 137 MMOL/L (ref 132–146)
TOTAL PROTEIN: 5.7 G/DL (ref 6.4–8.3)
WBC # BLD: 4.8 E9/L (ref 4.5–11.5)

## 2021-12-06 PROCEDURE — 85018 HEMOGLOBIN: CPT

## 2021-12-06 PROCEDURE — 97165 OT EVAL LOW COMPLEX 30 MIN: CPT

## 2021-12-06 PROCEDURE — 97530 THERAPEUTIC ACTIVITIES: CPT

## 2021-12-06 PROCEDURE — 6370000000 HC RX 637 (ALT 250 FOR IP): Performed by: INTERNAL MEDICINE

## 2021-12-06 PROCEDURE — 85025 COMPLETE CBC W/AUTO DIFF WBC: CPT

## 2021-12-06 PROCEDURE — 80053 COMPREHEN METABOLIC PANEL: CPT

## 2021-12-06 PROCEDURE — 2580000003 HC RX 258: Performed by: STUDENT IN AN ORGANIZED HEALTH CARE EDUCATION/TRAINING PROGRAM

## 2021-12-06 PROCEDURE — 51702 INSERT TEMP BLADDER CATH: CPT

## 2021-12-06 PROCEDURE — 97162 PT EVAL MOD COMPLEX 30 MIN: CPT

## 2021-12-06 PROCEDURE — 6370000000 HC RX 637 (ALT 250 FOR IP): Performed by: NURSE PRACTITIONER

## 2021-12-06 PROCEDURE — 70551 MRI BRAIN STEM W/O DYE: CPT

## 2021-12-06 PROCEDURE — 36415 COLL VENOUS BLD VENIPUNCTURE: CPT

## 2021-12-06 PROCEDURE — 6370000000 HC RX 637 (ALT 250 FOR IP): Performed by: STUDENT IN AN ORGANIZED HEALTH CARE EDUCATION/TRAINING PROGRAM

## 2021-12-06 PROCEDURE — 2580000003 HC RX 258: Performed by: INTERNAL MEDICINE

## 2021-12-06 PROCEDURE — 85014 HEMATOCRIT: CPT

## 2021-12-06 PROCEDURE — 6360000002 HC RX W HCPCS

## 2021-12-06 PROCEDURE — 6360000002 HC RX W HCPCS: Performed by: INTERNAL MEDICINE

## 2021-12-06 PROCEDURE — C9113 INJ PANTOPRAZOLE SODIUM, VIA: HCPCS | Performed by: STUDENT IN AN ORGANIZED HEALTH CARE EDUCATION/TRAINING PROGRAM

## 2021-12-06 PROCEDURE — 85610 PROTHROMBIN TIME: CPT

## 2021-12-06 PROCEDURE — 92526 ORAL FUNCTION THERAPY: CPT | Performed by: SPEECH-LANGUAGE PATHOLOGIST

## 2021-12-06 PROCEDURE — 2580000003 HC RX 258

## 2021-12-06 PROCEDURE — 92610 EVALUATE SWALLOWING FUNCTION: CPT | Performed by: SPEECH-LANGUAGE PATHOLOGIST

## 2021-12-06 PROCEDURE — 2060000000 HC ICU INTERMEDIATE R&B

## 2021-12-06 PROCEDURE — 6360000002 HC RX W HCPCS: Performed by: STUDENT IN AN ORGANIZED HEALTH CARE EDUCATION/TRAINING PROGRAM

## 2021-12-06 PROCEDURE — 82962 GLUCOSE BLOOD TEST: CPT

## 2021-12-06 PROCEDURE — 82140 ASSAY OF AMMONIA: CPT

## 2021-12-06 RX ORDER — SODIUM CHLORIDE 9 MG/ML
INJECTION, SOLUTION INTRAVENOUS CONTINUOUS
Status: DISCONTINUED | OUTPATIENT
Start: 2021-12-06 | End: 2021-12-08

## 2021-12-06 RX ORDER — LACTULOSE 10 G/15ML
30 SOLUTION ORAL EVERY 8 HOURS
Status: DISCONTINUED | OUTPATIENT
Start: 2021-12-06 | End: 2021-12-08 | Stop reason: HOSPADM

## 2021-12-06 RX ORDER — HALOPERIDOL 5 MG/ML
2 INJECTION INTRAMUSCULAR EVERY 6 HOURS PRN
Status: DISCONTINUED | OUTPATIENT
Start: 2021-12-06 | End: 2021-12-08 | Stop reason: HOSPADM

## 2021-12-06 RX ADMIN — LACTULOSE 20 G: 10 SOLUTION ORAL at 08:11

## 2021-12-06 RX ADMIN — Medication 10 ML: at 20:17

## 2021-12-06 RX ADMIN — PANTOPRAZOLE SODIUM 40 MG: 40 INJECTION, POWDER, FOR SOLUTION INTRAVENOUS at 20:13

## 2021-12-06 RX ADMIN — INSULIN LISPRO 3 UNITS: 100 INJECTION, SOLUTION INTRAVENOUS; SUBCUTANEOUS at 11:49

## 2021-12-06 RX ADMIN — HALOPERIDOL LACTATE 2 MG: 5 INJECTION, SOLUTION INTRAMUSCULAR at 23:36

## 2021-12-06 RX ADMIN — SODIUM CHLORIDE: 9 INJECTION, SOLUTION INTRAVENOUS at 11:44

## 2021-12-06 RX ADMIN — LACTULOSE 30 G: 20 SOLUTION ORAL at 15:46

## 2021-12-06 RX ADMIN — OCTREOTIDE ACETATE 50 MCG/HR: 500 INJECTION, SOLUTION INTRAVENOUS; SUBCUTANEOUS at 15:38

## 2021-12-06 RX ADMIN — INSULIN LISPRO 2 UNITS: 100 INJECTION, SOLUTION INTRAVENOUS; SUBCUTANEOUS at 16:01

## 2021-12-06 RX ADMIN — PANTOPRAZOLE SODIUM 40 MG: 40 INJECTION, POWDER, FOR SOLUTION INTRAVENOUS at 08:11

## 2021-12-06 RX ADMIN — OCTREOTIDE ACETATE 50 MCG/HR: 500 INJECTION, SOLUTION INTRAVENOUS; SUBCUTANEOUS at 02:29

## 2021-12-06 RX ADMIN — LACTULOSE 20 G: 10 SOLUTION ORAL at 01:05

## 2021-12-06 RX ADMIN — Medication 1000 MG: at 20:14

## 2021-12-06 RX ADMIN — Medication 10 ML: at 11:51

## 2021-12-06 RX ADMIN — RIFAXIMIN 550 MG: 550 TABLET ORAL at 08:11

## 2021-12-06 RX ADMIN — INSULIN LISPRO 2 UNITS: 100 INJECTION, SOLUTION INTRAVENOUS; SUBCUTANEOUS at 23:05

## 2021-12-06 ASSESSMENT — PAIN SCALES - PAIN ASSESSMENT IN ADVANCED DEMENTIA (PAINAD)
CONSOLABILITY: 0
BREATHING: 0
CONSOLABILITY: 0
NEGVOCALIZATION: 0
BREATHING: 0
FACIALEXPRESSION: 0
FACIALEXPRESSION: 0
BODYLANGUAGE: 0
BODYLANGUAGE: 0
CONSOLABILITY: 0
NEGVOCALIZATION: 0
TOTALSCORE: 0
BREATHING: 0
FACIALEXPRESSION: 0
TOTALSCORE: 0
BODYLANGUAGE: 0
TOTALSCORE: 0
NEGVOCALIZATION: 0

## 2021-12-06 ASSESSMENT — PAIN SCALES - GENERAL
PAINLEVEL_OUTOF10: 0
PAINLEVEL_OUTOF10: 0

## 2021-12-06 ASSESSMENT — PAIN SCALES - WONG BAKER
WONGBAKER_NUMERICALRESPONSE: 0

## 2021-12-06 NOTE — PROGRESS NOTES
Physician Progress Note      PATIENT:               Wen Guan  CSN #:                  [id number]  :                       1950  ADMIT DATE:       12/3/2021 8:27 PM  [address] [place] DATE:  RESPONDING  PROVIDER #:        SALTY DING DO          QUERY TEXT:    Pt admitted with esophageal varices. Pt noted to have cirrhosis. If possible,   please document in progress notes and discharge summary the relationship, if   any, between *** and ***. The medical record reflects the following:  Risk Factors: h/o esophageal varices with banding, cirrhosis d/t Hep C  Clinical Indicators: pt with h/o cirrhosis 2/2 Hep C with h/o esophageal   varices with banding. EGD  \" Grade 4 esophageal varices, one with a white fibrin clot. 6 bands   placed with collapse of all distal varices and cessation of bleeding. \"  Treatment: banding, octreotide drip    Thank you,  Smiley Nowak RN, CCDS  Clinical Documentation Improvement Specialist  [email or web address]  [phone]  Options provided:  -- Esophageal varices due to cirrhosis  -- Esophageal varices unrelated to cirrhosis  -- Other - I will add my own diagnosis  -- Disagree - Not applicable / Not valid  -- Disagree - Clinically unable to determine / Unknown  -- Refer to Clinical Documentation Reviewer    PROVIDER RESPONSE TEXT:    This patient has esophageal varices due to cirrhosis.     Query created by: Elizabeth Borjas on 2021 5:12 PM      Electronically signed by:  Celeste Mascorro DO 2021 6:16 PM

## 2021-12-06 NOTE — PROGRESS NOTES
MRI attempted. [place] through test pt became agitated and tried to crawl out of scanner and would not hold still for further images. The images that were obtained were sent through for radiologist read as partial study.

## 2021-12-06 NOTE — CARE COORDINATION
Social work / Discharge Planning:      Social work provided a private duty care list to patient's wife.   Electronically signed by VETO Cody on 12/6/2021 at 2:50 PM

## 2021-12-06 NOTE — PROGRESS NOTES
safety/participation in ADL/IADL tasks  * Therapeutic exercise to improve motor endurance, ROM, and functional strength for ADLs/functional transfers  * Therapeutic activities to facilitate/challenge dynamic balance, stand tolerance for increased safety and independence with ADLs  * Neuro-muscular re-education: facilitation of righting/equilibrium reactions, midline orientation, scapular stability/mobility, normalization of muscle tone, and facilitation of volitional active controled movement    Recommended Adaptive Equipment: TBD     Home Living: Patient lives with his wife in a one-floor setup; patient does not have to access a basement. Bathroom Setup: tub shower (with tub seat)  Equipment Owned: walker, wheelchair, BSC    Prior Level of Function (PLOF): Per patient's wife, patient was needing assistance with all ADLs and IADLs. Patient was independent with functional mobility (without device) prior to this hospitalization. Pain Level: No complaints/indications of pain demonstrated. Cognition: Patient alert; decreased affect and no verbalizations noted throughout this session. Impaired command follow demonstrated. Slowed processing noted. Memory: Impaired  Sequencing: Impaired  Problem Solving: Impaired  Judgement/Safety: Impaired    Functional Assessment:  -PAC Daily Activity Raw Score: 11/24   Initial Eval Status  Date: 12/6/2021 Treatment Status  Date:  Short Term Goals = Long Term Goals   Feeding Mod A  Min A   Grooming Max A  Mod A  (seated/standing at sink)   UB Dressing Max A  Mod A   LB Dressing Dependent  Mod A - with use of AE, as needed/appropriate   Bathing Max A  Mod A - with use of AE/DME, as needed/appropriate   Toileting Max A  Patient with castillo catheter currently. Mod A   Bed Mobility  Supine-to-Sit: Max A  Sit-to-Supine: Min A     Functional Transfers Sit-to-Stand: Min A   from EOB  Supervision   Functional Mobility Min A   (with handheld assistance) within patient's room. Verbal/visual/tactile cues needed. Increased time needed. Supervision with functional mobility (with device, as needed/appropriate) in order to maximize independence with ADLs/IADLs and other functional tasks. Balance Sitting: Good  (at EOB)  Standing: Fair-  (with handheld assistance)  SBA dynamic standing balance during completion of ADLs/IADLs and other functional tasks. Activity Tolerance Fair  Patient will demonstrate Good understanding and consistent implementation of energy conservation techniques and work simplification techniques into ADL routines. Visual/  Perceptual WFL grossly     N/A     Additional Long-Term Goal: Patient will increase functional independence to PLOF in order to allow patient to live in least restrictive environment. Strength: ROM: Additional Information:    R UE  WFL WFL    L UE WFL WFL      Hearing: WFL grossly  Sensation: No indications of numbness/tingling in B UEs. Tone: WFL  Edema: No    Comments: RN approved patient's participation in [address] [place] [address] activities. Upon arrival, patient supine in bed. At end of session, patient supine in bed with call light and phone within reach, bed alarm activated, patient's wife present, and all lines and tubes intact. Patient would benefit from continued skilled OT to increase safety and independence with completion of ADL/IADL tasks for functional independence and quality of life. Rehab Potential: Good for established goals. Patient / Family Goal: Patient's wife anticipates taking patient home upon discharge. Patient and/or family were instructed on functional diagnosis, prognosis/goals, and OT plan of care. Patient's wife indicated understanding.     Eval Complexity: Low    Time In: 1425  Time Out: 1445  Total Treatment Time: 0 minutes      Minutes Units   OT Eval Low 82051 20 1   OT Eval Medium 11113     OT Eval High 55360     OT Re-Eval [id number]     Therapeutic Ex 21679     Therapeutic Activities 59335     ADL/Self Care 33187 Orthotic Management 26744     Neuro Re-Ed 42868     Non-Billable Time N/A ---     Evaluation time includes thorough review of current medical information, gathering information on past medical history/social history and prior level of function, completion of standardized testing/informal observation of tasks, assessment of data, and education on plan of care and goals. Sadia New, OTR/L  License Number: [id number]

## 2021-12-06 NOTE — PROGRESS NOTES
PROGRESS NOTE  By Joseph Jacobsen M.D. [facility]  Dr. Titi Sharma M.D.,  Dr. Trinity Mishra M.D.,   Dr. Neda Chatterjee D.O.,  Dr. John Mace M.D.,  Dr. Sunni Cameron D.O.,  Shivani Carbajal D.O. Ulises Spurr  66 y.o.  male    SUBJECTIVE:  Does not verbalize any complaints. OBJECTIVE:    /75   Pulse 88   Temp 97.3 °F (36.3 °C)   Resp 20   Ht 5' 9\" (1.753 m)   Wt 158 lb 8 oz (71.9 kg)   SpO2 98%   BMI 23.41 kg/m²     General: Confused  male. NAD  HEENT: Anicteric sclera/moist oral mucosa  Neck: Supple with trachea midline  Chest: Symmetric excursion/nonlabored respiration/CTA B  Cor: Regular/S1-S2  Abd.: Soft/appears nontender nondistended  Extr.:  No significant peripheral edema. Decreased muscle tone and bulk throughout  Skin: Warm and dry/anicteric        DATA:    Monitor data reviewed -leads off noted. Lab Results   Component Value Date    WBC 4.8 12/06/2021    RBC 3.08 12/06/2021    HGB 10.3 12/06/2021    HCT 30.3 12/06/2021    MCV 98.4 12/06/2021    MCH 33.4 12/06/2021    MCHC 34.0 12/06/2021    RDW 13.9 12/06/2021    PLT 86 12/06/2021    MPV 10.3 12/06/2021     Lab Results   Component Value Date     12/06/2021    K 4.4 12/06/2021     12/06/2021    CO2 25 12/06/2021    BUN 21 12/06/2021    CREATININE 0.8 12/06/2021    CALCIUM 8.4 12/06/2021    PROT 5.7 12/06/2021    LABALBU 3.1 12/06/2021    LABALBU 4.3 03/29/2012    BILITOT 1.2 12/06/2021    ALKPHOS 91 12/06/2021    AST 23 12/06/2021    ALT 39 12/06/2021     Lab Results   Component Value Date    LIPASE 22 10/12/2021     Lab Results   Component Value Date    AMYLASE 39 10/10/2021         ASSESSMENT/PLAN:    1.   Cirrhosis  -Initial MELD = 9, MELD-Na = 12 oh  -History of HCV - treated - RNA negative (11/8/2021)  -Autoimmune liver serology negative  -Previously with elevated ferritin (2016) - repeat  -Nonelevated AFP = 1 (10/13/2021)  -Ultrasound of the liver consistent with cirrhosis  -History of esophageal varices - see below     2. Esophageal varices/anemia/GI bleed  -S/P EGD revealing grade 4 esophageal varices with placement of 6 rubber bands; no gastric varices  -Continue octreotide drip -wean off tomorrow  -Advance diet     3. Hepatic encephalopathy / AMS  -Significantly elevated ammonia (235.2) on arrival - repeat pending  -Significantly improved ammonia at 59 today.  -Titrate lactulose to 2-3 bowel movements per day  -continue rifaximin    4. Ascites  -Small ascites noted on prior CT - likely not amendable to paracentesis  -Dedicated ultrasound consistent with liver cirrhosis with patent portal/hepatic veins     5. Nausea / vomiting / hematemesis  -As above     7. Comorbidities  -DM, dementia  -Per admitting / consultants        Jennie Robin MD  12/6/2021  4:34 PM    NOTE:  This report was transcribed using voice recognition software. Every effort was made to ensure accuracy; however, inadvertent computerized transcription errors may be present.

## 2021-12-06 NOTE — CARE COORDINATION
Social work / Discharge Planning:       Initial assessment completed on 12/5/21. Patient is active with [facility]. If plan is to return, he will need order to resume HC. Awaiting PT/OT evaluations.    Electronically signed by VETO Nugent on 12/6/2021 at 8:15 AM

## 2021-12-06 NOTE — PROGRESS NOTES
Call placed to Dr. Rubia Mcbride to check on type of material used for banding for MRI purposes. It is rubber.     Electronically signed by Cierra Gomez RN on 12/6/2021 at 3:29 PM

## 2021-12-06 NOTE — PROGRESS NOTES
SPEECH/LANGUAGE PATHOLOGY  CLINICAL ASSESSMENT OF SWALLOWING FUNCTION   and PLAN OF CARE    PATIENT NAME:  Brynda Heimlich  (male)     MRN:  [id number]    :  1950  (66 y.o.)  STATUS:  Inpatient: Room -A    TODAY'S DATE:  2021  REFERRING PROVIDER:  Otilio Welch DO   SPECIFIC PROVIDER ORDER: SLP swallowing-dysphagia evaluation and treatment Date of order:  21  REASON FOR REFERRAL: dysphagia tx    EVALUATING THERAPIST: Milan Oliveira                 RESULTS:    DYSPHAGIA DIAGNOSIS:   Clinical indicators of oral phase dysphagia       DIET RECOMMENDATIONS:  Pureed consistency solids (IDDSI level 4) with  thin liquids (IDDSI level 0)     FEEDING RECOMMENDATIONS:     Assistance level:  Full assistance is needed during all oral intake      Compensatory strategies recommended: Small bites/sips and Alternate solids and liquids      Discussed recommendations with nursing and/or faxed report to referring provider: Yes    SPEECH THERAPY  PLAN OF CARE   The dysphagia POC is established based on physician order, dysphagia diagnosis and results of clinical assessment     Meal time assessment for 1-2 sessions to provide diet modification and compensatory strategy implementation due to inconsistent episodes of clinical indicators of dysphagia during intake    Conditions Requiring Skilled Therapeutic Intervention for dysphagia:    Reduced oral control of bolus     Specific dysphagia interventions to include:     Training in positioning for improved integrity of swallow    Specific instructions for next treatment:  development and training of compensatory swallow strategies to improve airway protection and swallow function  Patient Treatment Goals:    Short Term Goals:  Pt will implement identified compensatory swallowing strategies on 90% of opportunities or greater to improve airway protection and swallow function.   Pt will participate in meal time assessment for 1-2 sessions to provide diet modification and compensatory strategy implementation due to inconsistent episodes of clinical indicators of dysphagia during intake    Long Term Goals:   Pt will maintain adequate nutrition/hydration via PO intake of the least restrictive oral diet with implementation of safe swallow/ compensatory strategies and decrease signs/symptoms of aspiration to less than 1 x/day. Patient/family Goal:    Did not state. Will further assess during treatment. Plan of care discussed with Patient and Family   Unable to determine patient's understanding due to current cognitive status and Family understand(s) the diagnosis, prognosis and plan of care     Rehabilitation Potential/Prognosis: good                    ADMITTING DIAGNOSIS: Upper GI bleed [K92.2]  History of esophageal varices [Z87.19]    VISIT DIAGNOSIS:   Visit Diagnoses       Codes    History of esophageal varices     Z87.19           PATIENT REPORT/COMPLAINT: Family member reports inconsistent coughing/choking and reports spitting out solids/medications     RN cleared patient for participation in assessment     yes     PRIOR LEVEL OF SWALLOW FUNCTION:    PAST HISTORY OF DYSPHAGIA?: no    Diet during hospital admission: clear liquid     PROCEDURE:  Consistencies Administered During the Evaluation   Liquids: thin liquid   Solids:  pureed foods and solid foods (pt spit out)     Method of Intake:   cup, straw, spoon  Fed by clinician      Position:   Seated, upright    CLINICAL ASSESSMENT:  Oral Stage:       Pt is able to achieve adequate cohesive bolus prep as evidenced by satisfactory mastication patterns, timely A-P bolus propulsion, and minimal oral residuals. Reduced oral acceptance of solid foods      Pharyngeal Stage:    No signs of aspiration were noted during this evaluation however, silent aspiration cannot be ruled out at bedside.   If silent aspiration is suspected, a Videofluoroscopic Study of Swallowing (MBS) is recommended and requires a physician order.    Cognition:    Non-verbal during this assessment. Oral Peripheral Examination   Adequate lingual/labial strength     Current Respiratory Status    room air     Parameters of Speech Production  Respiration:  Adequate for speech production  Quality:   Could not test, pt non verbal  Intensity: Could not test, pt non verbal     Volitional Swallow: present     Volitional Cough:   DNT     Pain: No pain reported. EDUCATION:   The Speech Language Pathologist (SLP) completed education regarding results of evaluation and that intervention is warranted at this time. Learner: Patient, family   Education: Reviewed results and recommendations of this evaluation  Evaluation of Education:  Family Verbalizes understanding    INTERVENTION    Pt/family educated on above results and plan of care. Pt/family trained on compensatory strategies for safe swallow and signs and symptoms of aspiration (throat clearing, coughing/choking, wet vocal quality. , etc.) and encouraged to notify staff if observed. Handouts provided as warranted. Pt/family encouraged to engage in question/answer session. All questions answered and pt/family verbalized understanding of above. This plan may be re-evaluated and revised as warranted. Evaluation Time includes thorough review of current medical information, gathering information on past medical history/social history and prior level of function, completion of standardized testing/informal observation of tasks, assessment of data and education on plan of care and goals. [x]The admitting diagnosis and active problem list, have been reviewed prior to initiation of this evaluation.         ACTIVE PROBLEM LIST:   Patient Active Problem List   Diagnosis    Type [facility] diabetes mellitus without complication [facility]    Anemia    Hepatic cirrhosis due to chronic hepatitis C infection ([facility]    Secondary esophageal varices with bleeding ([facility]    D[facility] associated with other underlying disease [facility]    B12 deficiency    H/o streptococcal bacteremia    Upper GI bleed         CPT code:  34576  bedside swallow eval, 45361 dysphagia tx    [employer]     Supervising SLP was present for evaluation and reviewed results and recommendations with graduate clinician.      Delbert Gomez M.S., [address] Pathologist  [id number]  12/6/2021

## 2021-12-06 NOTE — PROGRESS NOTES
Physical Therapy Initial Assessment     Name: Rocio Mcknight  : 1950  MRN: [id number]      Date of Service: 2021    Evaluating PT:  Shruti Dominique, PT, DPT [id number]      Room #:  5684/7876-D  Diagnosis:  Upper GI bleed [K92.2]  History of esophageal varices [Z87.19]  PMHx/PSHx:  Hep C, DM, Esophageal varices, Liver cirrhosis, Mild dementia, Cholecystectomy  Procedure/Surgery:  2021 EGD band ligation  Precautions:  Falls, Dementia  Equipment Needs:  Has [employer], [facility]    SUBJECTIVE:    Pt lives with his wife in a 2 story home with 5 stairs to enter and 1 rail. Bedroom and bathroom are on the 1st level. Pt ambulated with no AD PTA. Per wife, pt has had a decline in function since previous admission. OBJECTIVE:   Initial Evaluation  Date: 2021 Treatment Date:  NA Short Term/ Long Term   Goals   AM-PAC 6 Clicks 56/62     Was pt agreeable to Eval/treatment? Yes      Does pt have pain? No indications of pain. Bed Mobility  Rolling: Min A  Supine to sit: Min A  Sit to supine: Min A  Scooting: Min A  Rolling: SBA  Supine to sit: SBA  Sit to supine: SBA  Scooting: SBA   Transfers Sit to stand: Min A  Stand to sit: Min A  Stand pivot: Min A  Sit to stand: SBA  Stand to sit: SBA  Stand pivot: SBA   Ambulation    50 feet with no AD with Min A  >200 feet with AAD with SBA   Stair negotiation: ascended and descended  NT  5 steps with 1 rail with SBA   ROM BUE:  WFL  BLE:  WFL     Strength BUE:  4/5  BLE:  4/5  Increase strength 1/3 grade. Balance Sitting EOB:  SBA  Dynamic Standing: Min A  Sitting EOB:  SBA  Dynamic Standing:  SBA     Pt is A & O x 1, self only. Pt non-verbal throughout. Pt able to follow commands intermittently.   Sensation:  Pt denies numbness and tingling to extremities  Edema:  None noted    Vitals:  Blood Pressure at rest - Blood Pressure post session -   Heart Rate at rest - Heart Rate post session -   SPO2 at rest - SPO2 post session -     Therapeutic Exercises:  NT    Patient education  Pt educated on purpose of PT assessment, importance of mobility, safety with mobility, transfers, gait, use of AD for safety    Patient response to education:   Pt verbalized understanding Pt demonstrated skill Pt requires further education in this area   No  Yes with verbal cues and assist Yes/Reinforcement     ASSESSMENT:    Conditions Requiring Skilled Therapeutic Intervention:     [x]Decreased strength     []Decreased ROM  [x]Decreased functional mobility  []Decreased balance   [x]Decreased endurance   []Decreased posture  []Decreased sensation  []Decreased coordination   []Decreased vision  [x]Decreased safety awareness   []Increased pain       Comments:  Patient cleared by RN and agreeable to treatment. Patient found in semi Godfrey's on left turn via TAPS with wife present. Patient sleeping but awakened easily to name, but not verbal throughout. Patient assisted to seated EOB and denied dizziness with positional change. Patient did not fully follow commands, and gait assessed in the room for safety. Patient with slower gait speed, short stride length, and mildly unsteady on his feet. Patient easily distracted by the environment and required frequent redirection. Patient assisted back to seated EOB, and then to supine with call light and tray table in reach. Patient placed on left turn via TAPS as found. Treatment:  Patient practiced and was instructed in the following treatment:    · Bed mobility: Verbal/tactile cues for sequencing BUEs/BLEs for safe technique with rolling/supine<>sit task. · Transfer training: Verbal/tactile cues to facilitate proper hand placement, technique and safety during sit to stand task. · Gait training: Verbal and tactile cues to facilitate upright posture and safety as well as provided with physical assistance to complete task. · Therapeutic exercises: As noted above  · Neuromuscular education: NT    Pt's/ family goals   1. To return to PLOF.     Prognosis is good for reaching above PT goals. Patient and or family understand(s) diagnosis, prognosis, and plan of care. Wife, yes. PHYSICAL THERAPY PLAN OF CARE:    PT POC is established based on physician order and patient diagnosis     Referring provider/PT Order:    12/03/21 2345  PT evaluation and treat  Start:  12/03/21 2345,   End:  12/03/21 2345,   ONE TIME,   Standing Count:  1 Occurrences,   R         Jovany Grant MD     Diagnosis:  Upper GI bleed [K92.2]  History of esophageal varices [Z87.19]  Specific instructions for next treatment:  Subsequent PT sessions with focus on improved safety with mobility, ambulation distance    Current Treatment Recommendations:     [x] Strengthening to improve independence with functional mobility   [] ROM to improve independence with functional mobility   [x] Balance Training to improve static/dynamic balance and to reduce fall risk  [x] Endurance Training to improve activity tolerance during functional mobility   [x] Transfer Training to improve safety and independence with all functional transfers   [x] Gait Training to improve gait mechanics, endurance and asses need for appropriate assistive device  [] Stair Training in preparation for safe discharge home and/or into the community   [x] Positioning to prevent skin breakdown and contractures  [x] Safety and Education Training   [x] Patient/Caregiver Education   [] HEP  [] Other     PT long term treatment goals are located in above grid    Frequency of treatments: 2-5x/week x 1-2 weeks. Time in  1425  Time out  1450    Total Treatment Time  15 minutes     Evaluation Time includes thorough review of current medical information, gathering information on past medical history/social history and prior level of function, completion of standardized testing/informal observation of tasks, assessment of data and education on plan of care and goals.     CPT codes:  [] Low Complexity PT evaluation 60141  [x] Moderate Complexity PT evaluation [id number]  [] High Complexity PT evaluation [id number]  [] PT Re-evaluation [id number]  [] Gait training 66736 - minutes  [] Manual therapy 97584 - minutes  [x] Therapeutic activities 00096 15 minutes  [] Therapeutic exercises 71813 - minutes  [] Neuromuscular reeducation 06024 - minutes     Mo Dewey, PT, DPT  License [id number]

## 2021-12-06 NOTE — PROGRESS NOTES
Internal Medicine Progress Note    Patient's name: Kwame Maldonado  : 1950  Chief complaints (on day of admission): Emesis (per squad pt had 2 episodes of bloody vomitting Hx esophageal varices hx dementia)  Admission date: 12/3/2021  Date of service: 2021   Room: [facility]  Primary care physician: Britta Bro MD  Reason for visit: Follow-up for GI bleed    Subjective  Elias Samano was seen and examined. S/p EGD with banding on . Wife at bedside feeding patient, he is tolerating clear liquids. No events overnight, no new bleeding noted. Patient is nonverbal since admission but well appearing. Wife states his dementia has been progressing fast, he had a CT scheduled for Tuesday and is requesting one done while inpatient. Review of Systems  Patient is nonverbal, unable to assess ROS from patient. Wife denies labor breathing, cough, vomiting or diarrhea.      Hospital Medications  Current Facility-Administered Medications   Medication Dose Route Frequency Provider Last Rate Last Admin    cefTRIAXone (ROCEPHIN) 1,000 mg in sodium chloride flush 10 mL IV syringe  1,000 mg IntraVENous Q24H Zarina Gonzales MD   1,000 mg at 21    pantoprazole (PROTONIX) injection 40 mg  40 mg IntraVENous BID Regulo Funes DO   40 mg at 21    rifaximin (XIFAXAN) tablet 550 mg  550 mg Oral BID DAVID Bonner - CNP   550 mg at 21    lactulose (CHRONULAC) 10 GM/15ML solution 20 g  20 g Oral Q8H Ian Oneill MD   20 g at 21 0105    octreotide (SANDOSTATIN) 500 mcg in sodium chloride 0.9 % 100 mL infusion  50 mcg/hr IntraVENous Continuous Palak Camarena MD 10 mL/hr at 21 0229 50 mcg/hr at 21 022    sodium chloride flush 0.9 % injection 10 mL  10 mL IntraVENous 2 times per day Kathy Johnson MD   10 mL at 21    sodium chloride flush 0.9 % injection 10 mL  10 mL IntraVENous PRN Kathy Johnson MD        0.9 % sodium chloride infusion  25 mL IntraVENous PRN Wendie Hirsch MD        ondansetron (ZOFRAN-ODT) disintegrating tablet 4 mg  4 mg Oral Q8H PRN Wendie Hirsch MD        Or    [facility]ron [facility] injection 4 mg  4 mg IntraVENous Q6H PRN Wendie Hirsch MD        [facility]) tablet 8.6 mg  1 tablet Oral Daily PRN Wendie Hirsch MD        acetaminophen (TYLENOL) tablet 650 mg  650 mg Oral Q6H PRN Wendie Hirsch MD        Or    acetaminophen (TYLENOL) suppository 650 mg  650 mg Rectal Q6H PRN Wendie Hirsch MD        insulin lispro (HUMALOG) injection vial 0-6 Units  0-6 Units SubCUTAneous TID WC Wendie Hirsch MD   4 Units at 12/05/21 1641    insulin lispro (HUMALOG) injection vial 0-3 Units  0-3 Units SubCUTAneous Nightly Wendie Hirsch MD   2 Units at 12/05/21 2226    glucose (GLUTOSE) 40 % oral gel 15 g  15 g Oral PRN Wendie Hirsch MD        dextrose 50 % IV solution  12.5 g IntraVENous PRN Wendie Hirsch MD        glucagon (rDNA) injection 1 mg  1 mg IntraMUSCular PRN Wendie Hirsch MD        dextrose 5 % solution  100 mL/hr IntraVENous PRN Wendie Hirsch MD           PRN Medications  sodium chloride flush, sodium chloride, ondansetron **OR** ondansetron, senna, acetaminophen **OR** acetaminophen, glucose, dextrose, glucagon (rDNA), dextrose    Objective  Most Recent Recorded Vitals  /79   Pulse 101   Temp 98.6 °F (37 °C) (Axillary)   Resp 16   Ht 5' 9\" (1.753 m)   Wt 158 lb 8 oz (71.9 kg)   SpO2 95%   BMI 23.41 kg/m²   I/O last 3 completed shifts:   In: 682.7 [I.V.:500; IV Piggyback:182.7]  Out: 1225 [Urine:1225]  I/O this shift:  In: -   Out: 350 [Urine:350]    Physical Exam:  General: Nonverbal/trance-like state, NAD, no labored breathing  Eyes: conjunctivae/corneas clear, sclera non icteric  Skin: color/texture/turgor normal, no rashes or lesions  Lungs: CTAB, no retractions/use of accessory muscles, no vocal fremitus, no rhonchi, no crackle, no rales  Heart: regular rate, regular rhythm, no murmur  Abdomen: soft, NT, bowel sounds normal  Extremities: atraumatic, no edema  Neurologic: cranial nerves 2-12 grossly intact    Most Recent Labs  Lab Results   Component Value Date    WBC 5.0 12/05/2021    HGB 10.3 (L) 12/06/2021    HCT 29.7 (L) 12/06/2021    PLT 98 (L) 12/05/2021     12/05/2021    K 4.6 12/05/2021     12/05/2021    CREATININE 0.8 12/05/2021    BUN 26 (H) 12/05/2021    CO2 25 12/05/2021    GLUCOSE 194 (H) 12/05/2021    ALT 52 (H) 12/05/2021    AST 39 12/05/2021    INR 1.4 12/05/2021    TSH 2.27 10/21/2020    LABA1C 6.1 (H) 10/13/2021    LABMICR <12.0 07/30/2021       US DUP ABD PEL RETRO SCROT COMPLETE   Final Result   1. Cirrhotic liver with mild to moderate ascites. 2. Gallbladder wall thickening with tiny amount of pericholecystic fluid. The fluid may represent ascites, however. I cannot confirm or exclude acute   cholecystitis. 3. Unchanged gallbladder polyp. No cholelithiasis seen. 4. Small non-obstructing right intrarenal calculus. There is is is the   5. Doppler evaluation demonstrates patent paraumbilical vein. 6. Main portal venous flow is hepatopetal. Left portal vein is not well   visualized. 7. Hepatic artery patent and unremarkable. Hepatic veins are patent. US LIVER   Final Result   1. Cirrhotic liver with mild to moderate ascites. 2. Gallbladder wall thickening with tiny amount of pericholecystic fluid. The fluid may represent ascites, however. I cannot confirm or exclude acute   cholecystitis. 3. Unchanged gallbladder polyp. No cholelithiasis seen. 4. Small non-obstructing right intrarenal calculus. There is is is the   5. Doppler evaluation demonstrates patent paraumbilical vein. 6. Main portal venous flow is hepatopetal.  Left portal vein is not well   visualized. 7. Hepatic artery patent and unremarkable. Hepatic veins are patent.          XR CHEST PORTABLE   Final Result   Unchanged elevation of left hemidiaphragm with left basilar atelectasis and   question of small left pleural effusion. CT Head WO Contrast   Final Result   No acute intracranial abnormality. Periventricular white matter changes consistent chronic microvascular   disease. Diffuse volume loss. Assessment   Active Hospital Problems    Diagnosis     Upper GI bleed [K92.2]      Priority: High    H/o streptococcal bacteremia [R78.81, B95.5]     [facility] to [facility] [B18.2, K74.60]     Secondary esophageal varices with bleeding (HCC) [I85.11]     Dementia associated with other underlying disease without behavioral disturbance (HCC) [F02.80]     B12 deficiency [E53.8]     Type 1 diabetes mellitus without complication ([facility] [B85.3]     Anemia [D64.9]          Plan  · Upper GI bleed (esophageal varices) with acute blood loss anemia:   · Transferred out of ICU on 12/5  · GI following-- sp EGD w/ banding on 12/5  · Octreotide infusion  · PPI. · Follow H&H--pRBC if Hb < 7.   · Currently CLD-- diet needs advanced  · Advanced liver cirrhosis w/ h/o hepatic encephalopathy:  · Lactulose/Xifaxin  · Follow ammonia level  · PT AM-PAC-- TBD  · Follow labs   · DVT prophylaxis-- SCD's  · Please see orders for further management and care. · Discharge plan: TBD  ·   The patient was seen and evaluated by myself and Dr. Isaias Stock MD PGY-1  12/6/2021  9:02 AM             I can be reached through [employer]. Addendum: I have personally participated in a face-to-face history and physical exam on the date of service with the patient. I have discussed the case with the resident. I also participated in medical decision making with the resident on the date of service and I agree with all of the pertinent clinical information unless indicated in my editing of the note. I have reviewed and edited the note above based on my findings during my history, exam, and decision making on the same day of service.      My additional thoughts:    Increase lactulose-- follow ammonia level   Attempt to advance diet if ok with GI   MRI brain due to AMS/trance like state   Gentle IVF-- dark urine and dry membranes    Octreotide still needed? Defer to GI    Electronically signed by Jaspal Beckman DO on 12/6/2021 at 10:15 AM    I can be reached through [address] [place] [address].

## 2021-12-07 LAB
ALBUMIN SERPL-MCNC: 2.9 G/DL (ref 3.5–5.2)
ALP BLD-CCNC: 85 U/L (ref 40–129)
ALT SERPL-CCNC: 36 U/L (ref 0–40)
AMMONIA: 109.1 UMOL/L (ref 16–60)
ANION GAP SERPL CALCULATED.3IONS-SCNC: 10 MMOL/L (ref 7–16)
AST SERPL-CCNC: 24 U/L (ref 0–39)
BASOPHILS ABSOLUTE: 0.04 E9/L (ref 0–0.2)
BASOPHILS RELATIVE PERCENT: 0.9 % (ref 0–2)
BILIRUB SERPL-MCNC: 1.1 MG/DL (ref 0–1.2)
BUN BLDV-MCNC: 18 MG/DL (ref 6–23)
CALCIUM SERPL-MCNC: 8.4 MG/DL (ref 8.6–10.2)
CHLORIDE BLD-SCNC: 101 MMOL/L (ref 98–107)
CO2: 22 MMOL/L (ref 22–29)
CREAT SERPL-MCNC: 0.7 MG/DL (ref 0.7–1.2)
EOSINOPHILS ABSOLUTE: 0.3 E9/L (ref 0.05–0.5)
EOSINOPHILS RELATIVE PERCENT: 6.6 % (ref 0–6)
GFR AFRICAN AMERICAN: >60
GFR NON-AFRICAN AMERICAN: >60 ML/MIN/1.73
GLUCOSE BLD-MCNC: 289 MG/DL (ref 74–99)
HCT VFR BLD CALC: 27.8 % (ref 37–54)
HCT VFR BLD CALC: 28.9 % (ref 37–54)
HCT VFR BLD CALC: 30.1 % (ref 37–54)
HEMOGLOBIN: 10.2 G/DL (ref 12.5–16.5)
HEMOGLOBIN: 9.5 G/DL (ref 12.5–16.5)
HEMOGLOBIN: 9.8 G/DL (ref 12.5–16.5)
IMMATURE GRANULOCYTES #: 0.02 E9/L
IMMATURE GRANULOCYTES %: 0.4 % (ref 0–5)
INR BLD: 1.6
LYMPHOCYTES ABSOLUTE: 0.92 E9/L (ref 1.5–4)
LYMPHOCYTES RELATIVE PERCENT: 20.2 % (ref 20–42)
MCH RBC QN AUTO: 34.1 PG (ref 26–35)
MCHC RBC AUTO-ENTMCNC: 34.2 % (ref 32–34.5)
MCV RBC AUTO: 99.6 FL (ref 80–99.9)
METER GLUCOSE: 181 MG/DL (ref 74–99)
METER GLUCOSE: 191 MG/DL (ref 74–99)
METER GLUCOSE: 214 MG/DL (ref 74–99)
METER GLUCOSE: 254 MG/DL (ref 74–99)
MONOCYTES ABSOLUTE: 0.51 E9/L (ref 0.1–0.95)
MONOCYTES RELATIVE PERCENT: 11.2 % (ref 2–12)
NEUTROPHILS ABSOLUTE: 2.77 E9/L (ref 1.8–7.3)
NEUTROPHILS RELATIVE PERCENT: 60.7 % (ref 43–80)
PDW BLD-RTO: 14 FL (ref 11.5–15)
PLATELET # BLD: 82 E9/L (ref 130–450)
PLATELET CONFIRMATION: NORMAL
PMV BLD AUTO: 10.6 FL (ref 7–12)
POTASSIUM REFLEX MAGNESIUM: 4.3 MMOL/L (ref 3.5–5)
PROTHROMBIN TIME: 16.8 SEC (ref 9.3–12.4)
RBC # BLD: 2.79 E12/L (ref 3.8–5.8)
SODIUM BLD-SCNC: 133 MMOL/L (ref 132–146)
TOTAL PROTEIN: 5.6 G/DL (ref 6.4–8.3)
WBC # BLD: 4.6 E9/L (ref 4.5–11.5)

## 2021-12-07 PROCEDURE — 82962 GLUCOSE BLOOD TEST: CPT

## 2021-12-07 PROCEDURE — 85014 HEMATOCRIT: CPT

## 2021-12-07 PROCEDURE — C9113 INJ PANTOPRAZOLE SODIUM, VIA: HCPCS | Performed by: STUDENT IN AN ORGANIZED HEALTH CARE EDUCATION/TRAINING PROGRAM

## 2021-12-07 PROCEDURE — 6360000002 HC RX W HCPCS: Performed by: STUDENT IN AN ORGANIZED HEALTH CARE EDUCATION/TRAINING PROGRAM

## 2021-12-07 PROCEDURE — 6370000000 HC RX 637 (ALT 250 FOR IP): Performed by: INTERNAL MEDICINE

## 2021-12-07 PROCEDURE — 36415 COLL VENOUS BLD VENIPUNCTURE: CPT

## 2021-12-07 PROCEDURE — 6370000000 HC RX 637 (ALT 250 FOR IP): Performed by: NURSE PRACTITIONER

## 2021-12-07 PROCEDURE — 85610 PROTHROMBIN TIME: CPT

## 2021-12-07 PROCEDURE — 6370000000 HC RX 637 (ALT 250 FOR IP): Performed by: STUDENT IN AN ORGANIZED HEALTH CARE EDUCATION/TRAINING PROGRAM

## 2021-12-07 PROCEDURE — 85025 COMPLETE CBC W/AUTO DIFF WBC: CPT

## 2021-12-07 PROCEDURE — 2580000003 HC RX 258: Performed by: STUDENT IN AN ORGANIZED HEALTH CARE EDUCATION/TRAINING PROGRAM

## 2021-12-07 PROCEDURE — 85018 HEMOGLOBIN: CPT

## 2021-12-07 PROCEDURE — 82140 ASSAY OF AMMONIA: CPT

## 2021-12-07 PROCEDURE — 2060000000 HC ICU INTERMEDIATE R&B

## 2021-12-07 PROCEDURE — 80053 COMPREHEN METABOLIC PANEL: CPT

## 2021-12-07 RX ADMIN — Medication 10 ML: at 21:38

## 2021-12-07 RX ADMIN — INSULIN LISPRO 1 UNITS: 100 INJECTION, SOLUTION INTRAVENOUS; SUBCUTANEOUS at 22:01

## 2021-12-07 RX ADMIN — LACTULOSE 30 G: 20 SOLUTION ORAL at 16:13

## 2021-12-07 RX ADMIN — Medication 1000 MG: at 21:33

## 2021-12-07 RX ADMIN — LACTULOSE 30 G: 20 SOLUTION ORAL at 11:31

## 2021-12-07 RX ADMIN — INSULIN LISPRO 1 UNITS: 100 INJECTION, SOLUTION INTRAVENOUS; SUBCUTANEOUS at 16:13

## 2021-12-07 RX ADMIN — PANTOPRAZOLE SODIUM 40 MG: 40 INJECTION, POWDER, FOR SOLUTION INTRAVENOUS at 11:32

## 2021-12-07 RX ADMIN — Medication 10 ML: at 13:28

## 2021-12-07 RX ADMIN — INSULIN LISPRO 1 UNITS: 100 INJECTION, SOLUTION INTRAVENOUS; SUBCUTANEOUS at 07:09

## 2021-12-07 RX ADMIN — RIFAXIMIN 550 MG: 550 TABLET ORAL at 21:33

## 2021-12-07 RX ADMIN — PANTOPRAZOLE SODIUM 40 MG: 40 INJECTION, POWDER, FOR SOLUTION INTRAVENOUS at 21:34

## 2021-12-07 RX ADMIN — INSULIN LISPRO 3 UNITS: 100 INJECTION, SOLUTION INTRAVENOUS; SUBCUTANEOUS at 11:33

## 2021-12-07 ASSESSMENT — PAIN SCALES - PAIN ASSESSMENT IN ADVANCED DEMENTIA (PAINAD)
NEGVOCALIZATION: 0
BODYLANGUAGE: 0
BREATHING: 0
FACIALEXPRESSION: 0
NEGVOCALIZATION: 0
FACIALEXPRESSION: 0
CONSOLABILITY: 0
TOTALSCORE: 0
BREATHING: 0
BODYLANGUAGE: 0
CONSOLABILITY: 0
TOTALSCORE: 0

## 2021-12-07 ASSESSMENT — PAIN SCALES - GENERAL: PAINLEVEL_OUTOF10: 0

## 2021-12-07 ASSESSMENT — PAIN SCALES - WONG BAKER
WONGBAKER_NUMERICALRESPONSE: 0
WONGBAKER_NUMERICALRESPONSE: 0

## 2021-12-07 NOTE — CONSULTS
Consult received for possible geropsych admission. Chart reviewed. Patient with history of cirrhosis and dementia admitted for GI bleed. Intermittently confused and agitated. Ammonia level today is over 100. Suspect delirium secondary to hepatic encephalopathy superimposed on possible dementia. We cannot admit someone psychiatrically for delirium and furthermore patient will not be considered medically stable for such. Agree with prn antipsychotics if needed and obviously would avoid Depakote given hepatic status. Psychiatry sign off for now.     Electronically signed by Ashely Noel MD on 12/7/2021 at 12:12 PM

## 2021-12-07 NOTE — PROGRESS NOTES
PROGRESS NOTE  By Oneil Hall M.D. [facility]  Dr. Ermias Vickers M.D.,  Dr. Raman Dominguez M.D.,   Dr. Santa Valentine D.O.,  Dr. Tiny Lake M.D.,  Dr. Silverio Campbell D.O.,  Maye Mayers D.O.         Bebe Gutierrezo  66 y.o.  male    SUBJECTIVE:  Remains nonverbal.  No family at bedside    OBJECTIVE:    BP (!) 114/58   Pulse 82   Temp 97.7 °F (36.5 °C) (Oral)   Resp 18   Ht 5' 9\" (1.753 m)   Wt 158 lb 8 oz (71.9 kg)   SpO2 97%   BMI 23.41 kg/m²     General: NAD/ male  HEENT: Anicteric sclera/moist oral mucosa  Neck: Supple with trachea midline  Chest: CTA B/symmetrical excursions  Cor: Regular/S1-S2  Abd.: Soft/NT/ND  Extr.:  No peripheral edema/decreased muscle tone and bulk throughout  Skin: Warm and dry/anicteric        DATA:     Lab Results   Component Value Date    WBC 4.6 12/07/2021    RBC 2.79 12/07/2021    HGB 9.5 12/07/2021    HCT 27.8 12/07/2021    MCV 99.6 12/07/2021    MCH 34.1 12/07/2021    MCHC 34.2 12/07/2021    RDW 14.0 12/07/2021    PLT 82 12/07/2021    MPV 10.6 12/07/2021     Lab Results   Component Value Date     12/07/2021    K 4.3 12/07/2021     12/07/2021    CO2 22 12/07/2021    BUN 18 12/07/2021    CREATININE 0.7 12/07/2021    CALCIUM 8.4 12/07/2021    PROT 5.6 12/07/2021    LABALBU 2.9 12/07/2021    LABALBU 4.3 03/29/2012    BILITOT 1.1 12/07/2021    ALKPHOS 85 12/07/2021    AST 24 12/07/2021    ALT 36 12/07/2021     Lab Results   Component Value Date    LIPASE 22 10/12/2021     Lab Results   Component Value Date    AMYLASE 39 10/10/2021         ASSESSMENT/PLAN:    1.  Cirrhosis  -Initial MELD = 9, MELD-Na = 12 oh  -History of HCV - treated - RNA negative (11/8/2021)  -Autoimmune liver serology negative  -Previously with elevated ferritin (2016) - repeat  -Nonelevated AFP = 1 (10/13/2021)  -Ultrasound of the liver consistent with cirrhosis  -History of esophageal varices - see below     2.  Esophageal varices/anemia/GI bleed  -S/P EGD revealing grade 4 esophageal varices with placement of 6 rubber bands; no gastric varices  -Discontinue Sandostatin drip  -Advance diet     3.  Hepatic encephalopathy / AMS  -Significantly elevated ammonia (235.2) on arrival -increased today -review of medication administration record indicates that patient did not receive the evening dose yesterday -continue current dosing  -Titrate lactulose to 2-3 bowel movements per day  -continue rifaximin     4.  Ascites  -Small ascites noted on prior CT - likely not amendable to paracentesis  -Dedicated ultrasound consistent with liver cirrhosis with patent portal/hepatic veins     5.  Nausea / vomiting / hematemesis  -As above     7.  Comorbidities  -DM, dementia  -Per admitting / consultants    Estee Alejandro MD  12/7/2021  3:35 PM    NOTE:  This report was transcribed using voice recognition software. Every effort was made to ensure accuracy; however, inadvertent computerized transcription errors may be present.

## 2021-12-07 NOTE — PROGRESS NOTES
Internal Medicine Progress Note    Patient's name: Yolande Saavedra  : 1950  Chief complaints (on day of admission): Emesis (per squad pt had 2 episodes of bloody vomitting Hx esophageal varices hx dementia)  Admission date: 12/3/2021  Date of service: 2021   Room: [facility] SURG  Primary care physician: Diony Howard MD  Reason for visit: Follow-up for GI bleed    Subjective  Jacklynn Hashimoto was seen and examined. Patient mental status has significantly improved today, he is alert to self but does not cooperate with other questions. Patient did have a bowel movement this morning, dark stool, he ambulated to bathroom with no issues. Patient became agitated during MRI scan and was unable to complete it, MRI partially read with no acute findings. Patient also had worsening of agitation overnight. Wife is concerned about his mental status changes. Wife is also concerned about difficult of obtaining blood, will consult IV team for next blood draw. Review of Systems  Patient is nonverbal, unable to assess ROS from patient. Wife denies labor breathing, cough, vomiting or diarrhea.       Hospital Medications  Current Facility-Administered Medications   Medication Dose Route Frequency Provider Last Rate Last Admin    lactulose (CHRONULAC) 10 GM/15ML solution 30 g  30 g Oral Q8H Marco Zendejas, DO   30 g at 21 1546    0.9 % sodium chloride infusion   IntraVENous Continuous Marco Zendejas, DO 75 mL/hr at 21 1144 New Bag at 21 1144    haloperidol lactate (HALDOL) injection 2 mg  2 mg IntraMUSCular Q6H PRN Marco Zendejas DO   2 mg at 21 2336    cefTRIAXone (ROCEPHIN) 1,000 mg in sodium chloride flush 10 mL IV syringe  1,000 mg IntraVENous Q24H Aleksandr Can MD   1,000 mg at 21    pantoprazole (PROTONIX) injection 40 mg  40 mg IntraVENous BID Marla Plate, DO   40 mg at 21 2013    rifaximin (XIFAXAN) tablet 550 mg  550 mg Oral BID Caldwell Zbigniew Masters, APRN - CNP   550 mg at 12/06/21 0811    octreotide (SANDOSTATIN) 500 mcg in sodium chloride 0.9 % 100 mL infusion  50 mcg/hr IntraVENous Alejandrina Morel MD 10 mL/hr at 12/06/21 1538 50 mcg/hr at 12/06/21 1538    sodium chloride flush 0.9 % injection 10 mL  10 mL IntraVENous 2 times per day Shayla Goldstein MD   10 mL at 12/06/21 2017    sodium chloride flush 0.9 % injection 10 mL  10 mL IntraVENous PRN Shayla Goldstein MD        0.9 % sodium chloride infusion  25 mL IntraVENous PRN Shayla Goldstein MD        ondansetron (ZOFRAN-ODT) disintegrating tablet 4 mg  4 mg Oral Q8H PRN Shayla Goldstein MD        Or    [facility]dansetron [facility]) injection 4 mg  4 mg IntraVENous Q6H PRN Shayla Goldstein MD        [facility]) tablet 8.6 mg  1 tablet Oral Daily PRN Shayla Goldstein MD        acetaminophen (TYLENOL) tablet 650 mg  650 mg Oral Q6H PRN Shayla Goldstein MD        Or    acetaminophen (TYLENOL) suppository 650 mg  650 mg Rectal Q6H PRN Shayla Goldstein MD        insulin lispro (HUMALOG) injection vial 0-6 Units  0-6 Units SubCUTAneous TID WC Shayla Goldstein MD   2 Units at 12/06/21 1601    insulin lispro (HUMALOG) injection vial 0-3 Units  0-3 Units SubCUTAneous Nightly Shayla Goldstein MD   2 Units at 12/06/21 2305    glucose (GLUTOSE) 40 % oral gel 15 g  15 g Oral PRN Shayla Goldstein MD        dextrose 50 % IV solution  12.5 g IntraVENous PRN Shayla Goldstein MD        glucagon (rDNA) injection 1 mg  1 mg IntraMUSCular PRN Shayla Goldstein MD        dextrose 5 % solution  100 mL/hr IntraVENous PRN Shayla Goldstein MD           PRN Medications  haloperidol lactate, sodium chloride flush, sodium chloride, ondansetron **OR** ondansetron, senna, acetaminophen **OR** acetaminophen, glucose, dextrose, glucagon (rDNA), dextrose    Objective  Most Recent Recorded Vitals  /63   Pulse 84   Temp 98.2 °F (36.8 °C) (Axillary)   Resp 20   Ht 5' 9\" (1.753 m)   Wt 158 lb 8 oz (71.9 kg)   SpO2 98%   BMI 23.41 kg/m²   I/O last 3 completed shifts:   In: 515.3 [I.V.:515.3]  Out: 750 [Urine:750]  No intake/output data recorded. Physical Exam:   General: able to state his name, NAD, no labored breathing  Eyes: conjunctivae/corneas clear, sclera non icteric  Skin: color/texture/turgor normal, no rashes or lesions  Lungs: CTAB, no retractions/use of accessory muscles, no vocal fremitus, no rhonchi, no crackle, no rales  Heart: regular rate, regular rhythm, no murmur  Abdomen: soft, NT, bowel sounds normal  Extremities: atraumatic, no edema  Neurologic: cranial nerves 2-12 grossly intact    Most Recent Labs  Lab Results   Component Value Date    WBC 4.8 12/06/2021    HGB 10.6 (L) 12/06/2021    HCT 30.8 (L) 12/06/2021    PLT 86 (L) 12/06/2021     12/06/2021    K 4.4 12/06/2021     12/06/2021    CREATININE 0.8 12/06/2021    BUN 21 12/06/2021    CO2 25 12/06/2021    GLUCOSE 300 (H) 12/06/2021    ALT 39 12/06/2021    AST 23 12/06/2021    INR 1.5 12/06/2021    TSH 2.27 10/21/2020    LABA1C 6.1 (H) 10/13/2021    LABMICR <12.0 07/30/2021       MRI BRAIN WO CONTRAST   Final Result   1. Limited and incomplete exam, grossly negative for acute process. 2.  Sequela of very minimal, chronic microvascular ischemic disease. 3.   Moderate/severe, generalized parenchymal volume loss, contributing to   prominent subarachnoid spaces overlying both fronto-temporal regions. .      RECOMMENDATIONS:   1. If the patient can be adequately sedated, repeat MRI of the brain,   without and with contrast, would be recommended for further evaluation. Meli Peña US DUP ABD PEL RETRO SCROT COMPLETE   Final Result   1. Cirrhotic liver with mild to moderate ascites. 2. Gallbladder wall thickening with tiny amount of pericholecystic fluid. The fluid may represent ascites, however. I cannot confirm or exclude acute   cholecystitis. 3. Unchanged gallbladder polyp. No cholelithiasis seen. 4. Small non-obstructing right intrarenal calculus. There is is is the   5. Doppler evaluation demonstrates patent paraumbilical vein. 6. Main portal venous flow is hepatopetal. Left portal vein is not well   visualized. 7. Hepatic artery patent and unremarkable. Hepatic veins are patent. US LIVER   Final Result   1. Cirrhotic liver with mild to moderate ascites. 2. Gallbladder wall thickening with tiny amount of pericholecystic fluid. The fluid may represent ascites, however. I cannot confirm or exclude acute   cholecystitis. 3. Unchanged gallbladder polyp. No cholelithiasis seen. 4. Small non-obstructing right intrarenal calculus. There is is is the   5. Doppler evaluation demonstrates patent paraumbilical vein. 6. Main portal venous flow is hepatopetal.  Left portal vein is not well   visualized. 7. Hepatic artery patent and unremarkable. Hepatic veins are patent. XR CHEST PORTABLE   Final Result   Unchanged elevation of left hemidiaphragm with left basilar atelectasis and   question of small left pleural effusion. CT Head WO Contrast   Final Result   No acute intracranial abnormality. Periventricular white matter changes consistent chronic microvascular   disease. Diffuse volume loss. Assessment   Active Hospital Problems    Diagnosis     Upper GI bleed [K92.2]      Priority: High    H/o streptococcal bacteremia [R78.81, B95.5]     Hepatic cirrhosis due to chronic hepatitis C infection ([facility] [address] [B18.2, K74.60]     Secondary esophageal varices with bleeding (HCC) [I85.11]     Dementia associated with other underlying disease without behavioral disturbance (HCC) [F02.80]     B12 deficiency [E53.8]     Type 1 diabetes mellitus without complication ([address] [T72.8]     Anemia [D64.9]          Plan  · Upper GI bleed (esophageal varices) with acute blood loss anemia:   · Transferred out of ICU on 12/5  · GI following-- sp EGD w/ banding x 6 on 12/5  · Octreotide infusion  · PPI.    · Follow H&H--pRBC if Hb < 7.   · Altered diet

## 2021-12-08 VITALS
DIASTOLIC BLOOD PRESSURE: 79 MMHG | BODY MASS INDEX: 23.47 KG/M2 | HEART RATE: 84 BPM | RESPIRATION RATE: 16 BRPM | HEIGHT: 69 IN | TEMPERATURE: 97.5 F | SYSTOLIC BLOOD PRESSURE: 127 MMHG | WEIGHT: 158.5 LBS | OXYGEN SATURATION: 98 %

## 2021-12-08 LAB
ALBUMIN SERPL-MCNC: 2.8 G/DL (ref 3.5–5.2)
ALP BLD-CCNC: 80 U/L (ref 40–129)
ALT SERPL-CCNC: 32 U/L (ref 0–40)
AMMONIA: 48.3 UMOL/L (ref 16–60)
ANION GAP SERPL CALCULATED.3IONS-SCNC: 8 MMOL/L (ref 7–16)
AST SERPL-CCNC: 34 U/L (ref 0–39)
BASOPHILS ABSOLUTE: 0.05 E9/L (ref 0–0.2)
BASOPHILS RELATIVE PERCENT: 1.1 % (ref 0–2)
BILIRUB SERPL-MCNC: 0.9 MG/DL (ref 0–1.2)
BUN BLDV-MCNC: 14 MG/DL (ref 6–23)
CALCIUM SERPL-MCNC: 8 MG/DL (ref 8.6–10.2)
CHLORIDE BLD-SCNC: 102 MMOL/L (ref 98–107)
CO2: 21 MMOL/L (ref 22–29)
CREAT SERPL-MCNC: 0.7 MG/DL (ref 0.7–1.2)
EOSINOPHILS ABSOLUTE: 0.38 E9/L (ref 0.05–0.5)
EOSINOPHILS RELATIVE PERCENT: 8.2 % (ref 0–6)
GFR AFRICAN AMERICAN: >60
GFR NON-AFRICAN AMERICAN: >60 ML/MIN/1.73
GLUCOSE BLD-MCNC: 198 MG/DL (ref 74–99)
HCT VFR BLD CALC: 26.2 % (ref 37–54)
HEMOGLOBIN: 9.1 G/DL (ref 12.5–16.5)
IMMATURE GRANULOCYTES #: 0.01 E9/L
IMMATURE GRANULOCYTES %: 0.2 % (ref 0–5)
INR BLD: 1.6
LYMPHOCYTES ABSOLUTE: 0.79 E9/L (ref 1.5–4)
LYMPHOCYTES RELATIVE PERCENT: 17 % (ref 20–42)
MCH RBC QN AUTO: 33.7 PG (ref 26–35)
MCHC RBC AUTO-ENTMCNC: 34.7 % (ref 32–34.5)
MCV RBC AUTO: 97 FL (ref 80–99.9)
METER GLUCOSE: 178 MG/DL (ref 74–99)
METER GLUCOSE: 242 MG/DL (ref 74–99)
MONOCYTES ABSOLUTE: 0.47 E9/L (ref 0.1–0.95)
MONOCYTES RELATIVE PERCENT: 10.1 % (ref 2–12)
NEUTROPHILS ABSOLUTE: 2.96 E9/L (ref 1.8–7.3)
NEUTROPHILS RELATIVE PERCENT: 63.4 % (ref 43–80)
PDW BLD-RTO: 14.1 FL (ref 11.5–15)
PLATELET # BLD: 94 E9/L (ref 130–450)
PLATELET CONFIRMATION: NORMAL
PMV BLD AUTO: 10.7 FL (ref 7–12)
POTASSIUM REFLEX MAGNESIUM: 4.4 MMOL/L (ref 3.5–5)
PROTHROMBIN TIME: 17.5 SEC (ref 9.3–12.4)
RBC # BLD: 2.7 E12/L (ref 3.8–5.8)
SODIUM BLD-SCNC: 131 MMOL/L (ref 132–146)
TOTAL PROTEIN: 5.2 G/DL (ref 6.4–8.3)
WBC # BLD: 4.7 E9/L (ref 4.5–11.5)

## 2021-12-08 PROCEDURE — 92526 ORAL FUNCTION THERAPY: CPT

## 2021-12-08 PROCEDURE — 82962 GLUCOSE BLOOD TEST: CPT

## 2021-12-08 PROCEDURE — 82140 ASSAY OF AMMONIA: CPT

## 2021-12-08 PROCEDURE — 80053 COMPREHEN METABOLIC PANEL: CPT

## 2021-12-08 PROCEDURE — 85025 COMPLETE CBC W/AUTO DIFF WBC: CPT

## 2021-12-08 PROCEDURE — 85610 PROTHROMBIN TIME: CPT

## 2021-12-08 PROCEDURE — 6370000000 HC RX 637 (ALT 250 FOR IP): Performed by: STUDENT IN AN ORGANIZED HEALTH CARE EDUCATION/TRAINING PROGRAM

## 2021-12-08 PROCEDURE — 6370000000 HC RX 637 (ALT 250 FOR IP): Performed by: INTERNAL MEDICINE

## 2021-12-08 PROCEDURE — 6360000002 HC RX W HCPCS: Performed by: STUDENT IN AN ORGANIZED HEALTH CARE EDUCATION/TRAINING PROGRAM

## 2021-12-08 PROCEDURE — 36415 COLL VENOUS BLD VENIPUNCTURE: CPT

## 2021-12-08 PROCEDURE — C9113 INJ PANTOPRAZOLE SODIUM, VIA: HCPCS | Performed by: STUDENT IN AN ORGANIZED HEALTH CARE EDUCATION/TRAINING PROGRAM

## 2021-12-08 PROCEDURE — 2580000003 HC RX 258: Performed by: STUDENT IN AN ORGANIZED HEALTH CARE EDUCATION/TRAINING PROGRAM

## 2021-12-08 PROCEDURE — 6370000000 HC RX 637 (ALT 250 FOR IP): Performed by: NURSE PRACTITIONER

## 2021-12-08 RX ORDER — SPIRONOLACTONE 25 MG/1
25 TABLET ORAL DAILY
Qty: 30 TABLET | Refills: 0 | Status: ON HOLD
Start: 2021-12-08 | End: 2022-05-17 | Stop reason: HOSPADM

## 2021-12-08 RX ORDER — LACTULOSE 10 G/15ML
30 SOLUTION ORAL EVERY 8 HOURS
Qty: 473 ML | Refills: 0 | Status: SHIPPED | OUTPATIENT
Start: 2021-12-08 | End: 2021-12-13 | Stop reason: SDUPTHER

## 2021-12-08 RX ORDER — BUMETANIDE 2 MG/1
1 TABLET ORAL EVERY OTHER DAY
Qty: 30 TABLET | Refills: 0 | Status: SHIPPED
Start: 2021-12-08 | End: 2021-12-13 | Stop reason: DRUGHIGH

## 2021-12-08 RX ADMIN — PANTOPRAZOLE SODIUM 40 MG: 40 INJECTION, POWDER, FOR SOLUTION INTRAVENOUS at 07:45

## 2021-12-08 RX ADMIN — LACTULOSE 30 G: 20 SOLUTION ORAL at 07:45

## 2021-12-08 RX ADMIN — INSULIN LISPRO 1 UNITS: 100 INJECTION, SOLUTION INTRAVENOUS; SUBCUTANEOUS at 06:23

## 2021-12-08 RX ADMIN — INSULIN LISPRO 2 UNITS: 100 INJECTION, SOLUTION INTRAVENOUS; SUBCUTANEOUS at 11:10

## 2021-12-08 RX ADMIN — LACTULOSE 30 G: 20 SOLUTION ORAL at 00:32

## 2021-12-08 RX ADMIN — Medication 10 ML: at 07:45

## 2021-12-08 RX ADMIN — RIFAXIMIN 550 MG: 550 TABLET ORAL at 07:45

## 2021-12-08 ASSESSMENT — PAIN SCALES - GENERAL: PAINLEVEL_OUTOF10: 0

## 2021-12-08 NOTE — PROGRESS NOTES
Message sent to Dr. Stevan Frazier to check discharge-awaiting response-Psych signed off 12/07.     Electronically signed by Haresh Nicole RN on 12/8/2021 at 11:29 AM

## 2021-12-08 NOTE — PROGRESS NOTES
Internal Medicine Progress Note    Patient's name: Sony Moeller  : 1950  Chief complaints (on day of admission): Emesis (per squad pt had 2 episodes of bloody vomitting Hx esophageal varices hx dementia)  Admission date: 12/3/2021  Date of service: 2021   Room: [facility]  Primary care physician: Barbara Velázquez MD  Reason for visit: Follow-up for GI bleed    Subjective  Cody Norfolk was seen and examined. Patient was ambulating in the room with no tremor or instability. Answering questions appropriately,  states he has been having dark diarrhea, but denies any other complaints at this time. No acute events or agitation overnight. Patient status has significantly improved, his ammonia was 48.3. No issues with blood draws this morning. Wife at bedside. Review of Systems  Patient denies labor breathing, chest pain, shortness of breath, abdominal pain, cough, vomiting or diarrhea.       Hospital Medications  Current Facility-Administered Medications   Medication Dose Route Frequency Provider Last Rate Last Admin    lactulose (CHRONULAC) 10 GM/15ML solution 30 g  30 g Oral Q8H Marco E Volino, DO   30 g at 21 0032    0.9 % sodium chloride infusion   IntraVENous Continuous Marco Zendejas, DO 75 mL/hr at 21 1144 New Bag at 21 1144    haloperidol lactate (HALDOL) injection 2 mg  2 mg IntraMUSCular Q6H PRN Marco SALCIDO Volino, DO   2 mg at 21 2336    cefTRIAXone (ROCEPHIN) 1,000 mg in sodium chloride flush 10 mL IV syringe  1,000 mg IntraVENous Q24H Kayla Cosme MD   1,000 mg at 21 2133    pantoprazole (PROTONIX) injection 40 mg  40 mg IntraVENous BID Deysi Mishra, DO   40 mg at 21    rifaximin (XIFAXAN) tablet 550 mg  550 mg Oral BID Celeste Plant Masters, APRN - CNP   550 mg at 21    sodium chloride flush 0.9 % injection 10 mL  10 mL IntraVENous 2 times per day Leidy Hemphill MD   10 mL at 21    sodium chloride flush 0.9 % injection 10 mL  10 mL IntraVENous PRN Donny Tavera MD        0.9 % sodium chloride infusion  25 mL IntraVENous PRN Donny Tavera MD        ondansetron (ZOFRAN-ODT) disintegrating tablet 4 mg  4 mg Oral Q8H PRN Donny Tavera MD        Or    ondansetron [facility]) injection 4 mg  4 mg IntraVENous Q6H PRN Donny Tavera MD        [facility] tablet 8.6 mg  1 tablet Oral Daily PRN Donny Tavera MD        acetaminophen (TYLENOL) tablet 650 mg  650 mg Oral Q6H PRN Donny Tavera MD        Or    acetaminophen (TYLENOL) suppository 650 mg  650 mg Rectal Q6H PRN Donny Tavera MD        insulin lispro (HUMALOG) injection vial 0-6 Units  0-6 Units SubCUTAneous TID WC Donny Tavera MD   1 Units at 12/08/21 1600    insulin lispro (HUMALOG) injection vial 0-3 Units  0-3 Units SubCUTAneous Nightly Donny Tavera MD   1 Units at 12/07/21 2201    glucose (GLUTOSE) 40 % oral gel 15 g  15 g Oral PRN Donny Tavera MD        dextrose 50 % IV solution  12.5 g IntraVENous PRN Donny Tavera MD        glucagon (rDNA) injection 1 mg  1 mg IntraMUSCular PRN Donny Tavera MD        dextrose 5 % solution  100 mL/hr IntraVENous PRN Donny Tavera MD           PRN Medications  haloperidol lactate, sodium chloride flush, sodium chloride, ondansetron **OR** ondansetron, senna, acetaminophen **OR** acetaminophen, glucose, dextrose, glucagon (rDNA), dextrose    Objective  Most Recent Recorded Vitals  /71   Pulse 80   Temp 98.1 °F (36.7 °C) (Oral)   Resp 14   Ht 5' 9\" (1.753 m)   Wt 158 lb 8 oz (71.9 kg)   SpO2 99%   BMI 23.41 kg/m²   I/O last 3 completed shifts: In: 535.7 [P.O.:220; I.V.:315.7]  Out: 175 [Urine:175]  No intake/output data recorded.     Physical Exam:   General: alert to self, time and pace, able to state his name and short answers, NAD, no labored breathing  Eyes: conjunctivae/corneas clear, sclera non icteric  Skin: color/texture/turgor normal, no rashes or lesions  Lungs: CTAB, no retractions/use of accessory muscles, no vocal fremitus, no rhonchi, no crackle, no rales  Heart: regular rate, regular rhythm, no murmur  Abdomen: soft, NT, bowel sounds normal  Extremities: atraumatic, no edema  Neurologic: cranial nerves 2-12 grossly intact    Most Recent Labs  Lab Results   Component Value Date    WBC 4.7 12/08/2021    HGB 9.1 (L) 12/08/2021    HCT 26.2 (L) 12/08/2021    PLT 94 (L) 12/08/2021     (L) 12/08/2021    K 4.4 12/08/2021     12/08/2021    CREATININE 0.7 12/08/2021    BUN 14 12/08/2021    CO2 21 (L) 12/08/2021    GLUCOSE 198 (H) 12/08/2021    ALT 32 12/08/2021    AST 34 12/08/2021    INR 1.6 12/08/2021    TSH 2.27 10/21/2020    LABA1C 6.1 (H) 10/13/2021    LABMICR <12.0 07/30/2021       MRI BRAIN WO CONTRAST   Final Result   1. Limited and incomplete exam, grossly negative for acute process. 2.  Sequela of very minimal, chronic microvascular ischemic disease. 3.   Moderate/severe, generalized parenchymal volume loss, contributing to   prominent subarachnoid spaces overlying both fronto-temporal regions. .      RECOMMENDATIONS:   1. If the patient can be adequately sedated, repeat MRI of the brain,   without and with contrast, would be recommended for further evaluation. Laura Disla US DUP ABD PEL RETRO SCROT COMPLETE   Final Result   1. Cirrhotic liver with mild to moderate ascites. 2. Gallbladder wall thickening with tiny amount of pericholecystic fluid. The fluid may represent ascites, however. I cannot confirm or exclude acute   cholecystitis. 3. Unchanged gallbladder polyp. No cholelithiasis seen. 4. Small non-obstructing right intrarenal calculus. There is is is the   5. Doppler evaluation demonstrates patent paraumbilical vein. 6. Main portal venous flow is hepatopetal. Left portal vein is not well   visualized. 7. Hepatic artery patent and unremarkable. Hepatic veins are patent. US LIVER   Final Result   1. Cirrhotic liver with mild to moderate ascites.    2. Gallbladder wall thickening with tiny amount of pericholecystic fluid. The fluid may represent ascites, however. I cannot confirm or exclude acute   cholecystitis. 3. Unchanged gallbladder polyp. No cholelithiasis seen. 4. Small non-obstructing right intrarenal calculus. There is is is the   5. Doppler evaluation demonstrates patent paraumbilical vein. 6. Main portal venous flow is hepatopetal.  Left portal vein is not well   visualized. 7. Hepatic artery patent and unremarkable. Hepatic veins are patent. XR CHEST PORTABLE   Final Result   Unchanged elevation of left hemidiaphragm with left basilar atelectasis and   question of small left pleural effusion. CT Head WO Contrast   Final Result   No acute intracranial abnormality. Periventricular white matter changes consistent chronic microvascular   disease. Diffuse volume loss. Assessment   Active Hospital Problems    Diagnosis     Upper GI bleed [K92.2]      Priority: High    H/o streptococcal bacteremia [R78.81, B95.5]     Hepatic cirrhosis due to chronic hepatitis C infection ([facility] [address] [B18.2, K74.60]     Secondary esophageal varices with bleeding (HCC) [I85.11]     Dementia associated with other underlying disease without behavioral disturbance (HCC) [F02.80]     B12 deficiency [E53.8]     Type 1 diabetes mellitus without complication ([address] [K23.6]     Anemia [D64.9]          Plan  · Upper GI bleed (esophageal varices) with acute blood loss anemia:   · Transferred out of ICU on 12/5  · GI following-- sp EGD w/ banding x 6 on 12/5  · Octreotide infusion stopped 12/7  · PPI.    · Follow H&H--pRBC if Hb < 7.   · Altered diet per SLP  · IVF stopped  · Advanced liver cirrhosis (hep C) w/ h/o hepatic encephalopathy:  · Lactulose/Xifaxin  · Follow ammonia level  · Trance-like state:  · MRI brain noted  · Lactulose increased 12/6  · IM Haldol prn agitation  · Psychiatry consultation-signed off- not a geriatric psych unit candidate  · PT AM-PAC-- 17/24  · Follow labs   · DVT prophylaxis-- SCD's  · Please see orders for further management and care. · Discharge plan: home with [facility] soon     The patient was seen and evaluated by myself and Germania Porras MD PGY-1  12/8/2021  10:35 AM             Addendum: I have personally participated in a face-to-face history and physical exam on the date of service with the patient. I have discussed the case with the resident. I also participated in medical decision making with the resident on the date of service and I agree with all of the pertinent clinical information unless indicated in my editing of the note. I have reviewed and edited the note above based on my findings during my history, exam, and decision making on the same day of service. My additional thoughts:    Mental status greatly improved   Hopefully discharge home today with home health care barring setbacks   Wife very pleased with current status of patient    Electronically signed by Bethany Laughlin DO on 12/8/2021 at 10:39 AM    I can be reached through "[email or web address]

## 2021-12-08 NOTE — CARE COORDINATION
Social work / Discharge Planning:       Discharge order noted. Social work updated liaison for [facility] that orders are on the chart.   Electronically signed by VETO Calix on 12/8/2021 at 11:01 AM

## 2021-12-08 NOTE — PROGRESS NOTES
SPEECH LANGUAGE PATHOLOGY  DAILY PROGRESS NOTE        PATIENT NAME:  Amadeo Bautista      :  1950          TODAY'S DATE:  2021 ROOM:  [address] [place] [address]-A        SWALLOWING   AM: Patient in bed with AM meal present. Family at bedside. 100% consumption of meal with no s/s of aspiration. Patient nonverbal.     PM: Patient in bed with noon meal present. Family at bedside. 100% consumption of meal with no s/s of aspiration. Good intake and toleration of current diet. DYSPHAGIA DIAGNOSIS:   Clinical indicators of oral phase dysphagia                             DIET RECOMMENDATIONS:  Pureed consistency solids (IDDSI level 4) with  thin liquids (IDDSI level 0)                 FEEDING RECOMMENDATIONS:                           Assistance level:  Full assistance is needed during all oral intake                             Compensatory strategies recommended: Small bites/sips and Alternate solids and liquids        Oral- adequate labial seal and A-P transfer, no oral residue      Pharyngeal- No overt s/s of aspiration, no multiple swallows      Education- Pt educated on results and POC. Pt trained on compensatory strategies for safe swallow with good outcome. Questions answered. Will continue SP intervention as per previously established POC. Anayeli JAVED  [id number] [address]  Speech Language Pathologist            CPT code(s) 29841  dysphagia tx  Total minutes :  15 minutes

## 2021-12-08 NOTE — DISCHARGE SUMMARY
Internal Medicine Discharge Summary    NAME: Tricia Hanson :  1950  MRN:  [id number] Teresa Syed MD    ADMITTED: 12/3/2021   DISCHARGED: 2021  1:15 PM    ADMITTING PHYSICIAN: Yelitza Anthony DO    PCP: Aaron Cardenas MD    CONSULTANT(S):   IP CONSULT TO INTERNAL MEDICINE  IP CONSULT TO CRITICAL CARE  IP CONSULT TO SOCIAL WORK  IP CONSULT TO IV TEAM  IP CONSULT TO PSYCHIATRY  IP CONSULT TO IV TEAM  IP CONSULT TO HOME CARE NEEDS     ADMITTING DIAGNOSIS:   Upper GI bleed [K92.2]  History of esophageal varices [Z87.19]     Please see H&P for further details    DISCHARGE DIAGNOSES:   Active Hospital Problems    Diagnosis     Upper GI bleed [K92.2]      Priority: High    H/o streptococcal bacteremia [R78.81, B95.5]     Hepatic cirrhosis due to chronic hepatitis C infection ([facility] [B18.2, K74.60]     Secondary esophageal varices with bleeding (HCC) [I85.11]     Dementia associated with other underlying disease without behavioral disturbance (HCC) [F02.80]     B12 deficiency [E53.8]     Type 1 diabetes mellitus without complication (HCC) [S18.0]     Anemia [D64.9]        BRIEF HISTORY OF PRESENT ILLNESS: Tricia Hanson is a 66 y.o. male patient of Aaron Cardenas MD who  has a past medical history of Buccal mass, Diabetes mellitus ([address] Esophageal varices ([address] Hepatitis C, Liver cirrhosis ([address] Mild dementia ([facility] and Positive colorectal cancer screening using Cologuard test. who originally had concerns including Emesis (per squad pt had 2 episodes of bloody vomitting Hx esophageal varices hx dementia). at presentation on 12/3/2021, and was found to have Upper GI bleed [K92.2]  History of esophageal varices [Z87.19] after workup. Please see H&P for further details. HOSPITAL COURSE:   The patient presented to the hospital with the chief complaint of Emesis (per squad pt had 2 episodes of bloody vomitting Hx esophageal varices hx dementia).  The patient was admitted to the hospital.     Upper GI bleed (esophageal varices) with acute blood loss anemia:   ? Transferred out of ICU on 12/5  ? GI following-- sp EGD w/ banding x 6 on 12/5  ? Octreotide infusion stopped 12/7  ? PPI. ? Follow H&H--pRBC if Hb < 7.   ? Altered diet per SLP  ? IVF stopped    Advanced liver cirrhosis (hep C) w/ h/o hepatic encephalopathy:  ? Lactulose/Xifaxin  ? Follow ammonia level    Trance-like state, improved:  ? MRI brain noted  ? Lactulose increased 12/6  ? IM Haldol prn agitation  ? Psychiatry consultation-signed off- not a geriatric psych unit candidate    PT AM-PAC-- 17/24  [facility] with [facility]     BRIEF PHYSICAL EXAMINATION AND LABORATORIES ON DAY OF DISCHARGE:  VITALS:  /79   Pulse 84   Temp 97.5 °F (36.4 °C) (Oral)   Resp 16   Ht 5' 9\" (1.753 m)   Wt 158 lb 8 oz (71.9 kg)   SpO2 98%   BMI 23.41 kg/m²     Please see note from the same day. LABS[de-identified]  Recent Labs     12/06/21  0945 12/07/21  1030 12/08/21  0230    133 131*   K 4.4 4.3 4.4    101 102   CO2 25 22 21*   BUN 21 18 14   CREATININE 0.8 0.7 0.7   GLUCOSE 300* 289* 198*   CALCIUM 8.4* 8.4* 8.0*     Recent Labs     12/06/21  0945 12/07/21  1030 12/08/21  0230   ALKPHOS 91 85 80   ALT 39 36 32   AST 23 24 34   PROT 5.7* 5.6* 5.2*   BILITOT 1.2 1.1 0.9   LABALBU 3.1* 2.9* 2.8*     Recent Labs     12/06/21  0945 12/06/21  1750 12/07/21  0706 12/07/21  1840 12/08/21  0230   WBC 4.8  --  4.6  --  4.7   RBC 3.08*  --  2.79*  --  2.70*   HGB 10.3*   < > 9.5* 9.8* 9.1*   HCT 30.3*   < > 27.8* 28.9* 26.2*   MCV 98.4  --  99.6  --  97.0   MCH 33.4  --  34.1  --  33.7   MCHC 34.0  --  34.2  --  34.7*   RDW 13.9  --  14.0  --  14.1   PLT 86*  --  82*  --  94*   MPV 10.3  --  10.6  --  10.7    < > = values in this interval not displayed.      Lab Results   Component Value Date    LABA1C 6.1 (H) 10/13/2021    LABA1C 6.7 (H) 07/30/2021    LABA1C 7.1 (H) 01/22/2021     Lab Results   Component Value Date    INR 1.6 12/08/2021    INR 1.6 12/07/2021    INR 1.5 12/06/2021    PROTIME 17.5 (H) 12/08/2021    PROTIME 16.8 (H) 12/07/2021    PROTIME 16.1 (H) 12/06/2021      Lab Results   Component Value Date    TSH 2.27 10/21/2020    TSH 1.590 02/02/2016     Lab Results   Component Value Date    TRIG 43 07/30/2021    HDL 57 07/30/2021    LDLCALC 81 07/30/2021     No results for input(s): MG in the last 72 hours. No results for input(s): CKTOTAL, CKMB, TROPONINI in the last 72 hours. No results for input(s): LACTA in the last 72 hours. IMAGING:  CT Head WO Contrast    Result Date: 12/3/2021  EXAMINATION: CT OF THE HEAD WITHOUT CONTRAST  12/3/2021 8:47 pm TECHNIQUE: CT of the head was performed without the administration of intravenous contrast. Dose modulation, iterative reconstruction, and/or weight based adjustment of the mA/kV was utilized to reduce the radiation dose to as low as reasonably achievable. COMPARISON: None. HISTORY: ORDERING SYSTEM PROVIDED HISTORY: altered mental state TECHNOLOGIST PROVIDED HISTORY: Reason for exam:->altered mental state Has a \"code stroke\" or \"stroke alert\" been called? ->Yes Decision Support Exception - unselect if not a suspected or confirmed emergency medical condition->Emergency Medical Condition (MA) FINDINGS: BRAIN/VENTRICLES: There is no acute intracranial hemorrhage, mass effect or midline shift. No abnormal extra-axial fluid collection. The gray-white differentiation is maintained without evidence of an acute infarct. There is no evidence of hydrocephalus. Periventricular white matter changes consistent chronic microvascular disease. Diffuse volume loss. ORBITS: The visualized portion of the orbits demonstrate no acute abnormality. SINUSES: The visualized paranasal sinuses and mastoid air cells demonstrate no acute abnormality. SOFT TISSUES/SKULL:  No acute abnormality of the visualized skull or soft tissues. No acute intracranial abnormality. Periventricular white matter changes consistent chronic microvascular disease. Diffuse volume loss. US LIVER    Result Date: 12/5/2021  EXAMINATION: RIGHT UPPER QUADRANT ULTRASOUND 12/4/2021 3:48 pm COMPARISON: 07/10/2020 HISTORY: ORDERING SYSTEM PROVIDED HISTORY: Cirrhosis - please include Dopplers TECHNOLOGIST PROVIDED HISTORY: Reason for exam:->Cirrhosis - please include Dopplers What reading provider will be dictating this exam?->CRC FINDINGS: LIVER: The liver appears cirrhotic. There is mild ascites liver. No focal liver lesion identified. BILIARY SYSTEM:  There is gallbladder wall thickening measuring 4 mm. There is a tiny amount of pericholecystic fluid although this may relate to ascites. There is a gallbladder polyp which is unchanged. I cannot confirm cholelithiasis. Common bile duct is within normal limits measuring 3 mm. RIGHT KIDNEY: There is a right intrarenal calculus measuring about 3 mm. There is no right hydronephrosis. PANCREAS:  Visualized portions of the pancreas are unremarkable. OTHER: Visualized IVC patent. There is a right pleural effusion. Mild-to-moderate ascites. I Doppler evaluation demonstrates patent IVC. Main portal vein is patent with hepatopetal flow. Flow is seen within the right portal vein. Left portal vein is poorly visualized. Hepatic artery demonstrates low resistance flow approximately 80 centimeters/second. . Left common middle and right hepatic veins are patent. Patent paraumbilical vein is demonstrated. 1. Cirrhotic liver with mild to moderate ascites. 2. Gallbladder wall thickening with tiny amount of pericholecystic fluid. The fluid may represent ascites, however. I cannot confirm or exclude acute cholecystitis. 3. Unchanged gallbladder polyp. No cholelithiasis seen. 4. Small non-obstructing right intrarenal calculus. There is is is the 5. Doppler evaluation demonstrates patent paraumbilical vein. 6. Main portal venous flow is hepatopetal.  Left portal vein is not well visualized. 7. Hepatic artery patent and unremarkable. What reading provider will be dictating this exam?->CRC FINDINGS: LIVER: The liver appears cirrhotic. There is mild ascites liver. No focal liver lesion identified. BILIARY SYSTEM: There is gallbladder wall thickening measuring 4 mm. There is a tiny amount of pericholecystic fluid although this may relate to ascites. There is a gallbladder polyp which is unchanged. I cannot confirm cholelithiasis. Common bile duct is within normal limits measuring 3 mm. RIGHT KIDNEY: There is a right intrarenal calculus measuring about 3 mm. There is no right hydronephrosis. PANCREAS: Visualized portions of the pancreas are unremarkable. OTHER: Visualized IVC patent. There is a right pleural effusion. Mild-to-moderate ascites. I Doppler evaluation demonstrates patent IVC. Main portal vein is patent with hepatopetal flow. Flow is seen within the right portal vein. Left portal vein is poorly visualized. Hepatic artery demonstrates low resistance flow approximately 80 centimeters/second. . Left common middle and right hepatic veins are patent. Patent paraumbilical vein is demonstrated. 1. Cirrhotic liver with mild to moderate ascites. 2. Gallbladder wall thickening with tiny amount of pericholecystic fluid. The fluid may represent ascites, however. I cannot confirm or exclude acute cholecystitis. 3. Unchanged gallbladder polyp. No cholelithiasis seen. 4. Small non-obstructing right intrarenal calculus. There is is is the 5. Doppler evaluation demonstrates patent paraumbilical vein. 6. Main portal venous flow is hepatopetal. Left portal vein is not well visualized. 7. Hepatic artery patent and unremarkable. Hepatic veins are patent. MRI BRAIN WO CONTRAST    Result Date: 12/6/2021  EXAMINATION: MRI OF THE BRAIN WITHOUT CONTRAST  12/6/2021 5:05 pm TECHNIQUE: Multiplanar multisequence MRI of the brain was performed without the administration of intravenous contrast. COMPARISON: None.  HISTORY: ORDERING SYSTEM PROVIDED HISTORY: AMS TECHNOLOGIST PROVIDED HISTORY: Reason for exam:->AMS FINDINGS: This exam is incomplete, including only axial T2, FLAIR, and diffusion-weighted acquisitions, further somewhat compromised by patient motion, in addition to the lack of intravenous contrast.  Given these factors: BRAIN/VENTRICLES: This exam is grossly negative for mass effect/midline shift, intra/extra-axial fluid collection, or abnormally-restricted diffusion. There is very minimal, confluent bilateral periventricular leukoaraiosis, compatible with sequela of chronic microvascular ischemic disease. There is moderate/severe, generalized parenchymal volume loss, contributing to prominent subarachnoid spaces overlying both fronto-temporal regions. The presence of multiple traversing vessels suggests against CSF hygroma or subdural hematoma. VASCULAR: No definite vascular abnormalities are identified, within the limits of this nonvascular study. ORBITS: Grossly negative for acute process. SINUSES/MASTOIDS: Grossly negative for acute process. MUSCULOSKELETAL: Grossly negative for acute process. .     1. Limited and incomplete exam, grossly negative for acute process. 2.  Sequela of very minimal, chronic microvascular ischemic disease. 3.   Moderate/severe, generalized parenchymal volume loss, contributing to prominent subarachnoid spaces overlying both fronto-temporal regions. . RECOMMENDATIONS: 1. If the patient can be adequately sedated, repeat MRI of the brain, without and with contrast, would be recommended for further evaluation. .       DISPOSITION:  The patient's condition is fair.    The patient is being discharged to home    DISCHARGE MEDICATIONS:      Medication List      START taking these medications    lactulose 10 GM/15ML solution  Commonly known as: CHRONULAC  Take 45 mLs by mouth every 8 hours     rifaximin 550 MG tablet  Commonly known as: XIFAXAN  Take 1 tablet by mouth 2 times daily        CHANGE how you take these medications    bumetanide 2 MG tablet  Commonly known as: BUMEX  Take 0.5 tablets by mouth every other day  What changed:   · how much to take  · when to take this     spironolactone 25 MG tablet  Commonly known as: ALDACTONE  Take 1 tablet by mouth daily  What changed: when to take this        CONTINUE taking these medications    b complex vitamins capsule     blood glucose test strips strip  Commonly known as: ASCENSIA AUTODISC VI;ONE TOUCH ULTRA TEST VI  1 each by In Vitro route 4 times daily One touch test strips     Calcium-Magnesium-Zinc 500-250-12.5 MG Tabs     CRANBERRY PO     donepezil 5 MG tablet  Commonly known as: ARICEPT  Take 1 tablet by mouth daily (with breakfast)     escitalopram 5 MG tablet  Commonly known as: LEXAPRO     fish oil 1000 MG Caps     MISC NATURAL PRODUCTS PO     mupirocin 2 % cream  Commonly known as: BACTROBAN  Apply topically 2 times daily Apply topically 2 times daily     NovoLOG FlexPen 100 UNIT/ML injection pen  Generic drug: insulin aspart  Inject 14 Units into the skin 3 times daily (before meals) If BS less than 100, no coverage  If greater than 150, 14 units     PREVAGEN PO     Probiotic Multi-Enzyme Tabs     Tresiba FlexTouch 200 UNIT/ML Sopn  Generic drug: Insulin Degludec  Inject 26 Units into the skin nightly     vitamin B-12 500 MCG tablet  Commonly known as: CYANOCOBALAMIN     vitamin C 500 MG tablet  Commonly known as: ASCORBIC ACID     vitamin D-3 125 MCG (5000 UT) Tabs  Commonly known as: cholecalciferol     vitamin E 400 UNIT capsule           Where to Get Your Medications      [facility] [address] [place] [address]    Phone: [phone]   · lactulose 10 GM/15ML solution  · rifaximin 550 MG tablet     Information about where to get these medications is not yet available    Ask your nurse or doctor about these medications  · bumetanide 2 MG tablet  · spironolactone 25 MG tablet          INTERNAL MEDICINE INSTRUCTIONS:  · Follow-up with primary care physician as directed in discharge paperwork. · Please review results of imaging studies with PCP. · Follow-up with consultants as directed by them. · If recurrence or worsening of symptoms go to the ED or call primary care physician. · Diet: ADULT DIET; Dysphagia - Pureed; 4 carb choices (60 gm/meal)    FOLLOW-IP  [facility] [phone]    Schedule an appointment as soon as possible for a visit in 1 week  for follow-up appointment from this hospital stay    [address]  [phone]    Call  for follow-up appointment from this hospital stay      Preparing for this patient's discharge, including paperwork, orders, instructions, and meeting with patient did required 34 minutes.     Electronically signed by Rocío Ponce DO on 12/10/2021 at 8:45 AM

## 2021-12-13 DIAGNOSIS — B18.2 HEPATIC CIRRHOSIS DUE TO CHRONIC HEPATITIS C INFECTION (HCC): ICD-10-CM

## 2021-12-13 DIAGNOSIS — K74.60 HEPATIC CIRRHOSIS DUE TO CHRONIC HEPATITIS C INFECTION (HCC): ICD-10-CM

## 2021-12-13 LAB
ALBUMIN SERPL-MCNC: 2.9 G/DL (ref 3.5–5.2)
ALP BLD-CCNC: 81 U/L (ref 40–129)
ALT SERPL-CCNC: 29 U/L (ref 0–40)
ANION GAP SERPL CALCULATED.3IONS-SCNC: 9 MMOL/L (ref 7–16)
AST SERPL-CCNC: 27 U/L (ref 0–39)
BASOPHILS ABSOLUTE: 0.05 E9/L (ref 0–0.2)
BASOPHILS RELATIVE PERCENT: 0.9 % (ref 0–2)
BILIRUB SERPL-MCNC: 1.4 MG/DL (ref 0–1.2)
BUN BLDV-MCNC: 13 MG/DL (ref 6–23)
CALCIUM SERPL-MCNC: 8.5 MG/DL (ref 8.6–10.2)
CHLORIDE BLD-SCNC: 100 MMOL/L (ref 98–107)
CO2: 24 MMOL/L (ref 22–29)
CREAT SERPL-MCNC: 0.7 MG/DL (ref 0.7–1.2)
EOSINOPHILS ABSOLUTE: 0.16 E9/L (ref 0.05–0.5)
EOSINOPHILS RELATIVE PERCENT: 3 % (ref 0–6)
GFR AFRICAN AMERICAN: >60
GFR NON-AFRICAN AMERICAN: >60 ML/MIN/1.73
GLUCOSE BLD-MCNC: 174 MG/DL (ref 74–99)
HCT VFR BLD CALC: 30.4 % (ref 37–54)
HEMOGLOBIN: 10.3 G/DL (ref 12.5–16.5)
IMMATURE GRANULOCYTES #: 0.02 E9/L
IMMATURE GRANULOCYTES %: 0.4 % (ref 0–5)
LYMPHOCYTES ABSOLUTE: 0.97 E9/L (ref 1.5–4)
LYMPHOCYTES RELATIVE PERCENT: 18.3 % (ref 20–42)
MCH RBC QN AUTO: 32.9 PG (ref 26–35)
MCHC RBC AUTO-ENTMCNC: 33.9 % (ref 32–34.5)
MCV RBC AUTO: 97.1 FL (ref 80–99.9)
MONOCYTES ABSOLUTE: 0.55 E9/L (ref 0.1–0.95)
MONOCYTES RELATIVE PERCENT: 10.4 % (ref 2–12)
NEUTROPHILS ABSOLUTE: 3.55 E9/L (ref 1.8–7.3)
NEUTROPHILS RELATIVE PERCENT: 67 % (ref 43–80)
PDW BLD-RTO: 14.6 FL (ref 11.5–15)
PLATELET # BLD: 120 E9/L (ref 130–450)
PMV BLD AUTO: 10.7 FL (ref 7–12)
POTASSIUM SERPL-SCNC: 4.3 MMOL/L (ref 3.5–5)
RBC # BLD: 3.13 E12/L (ref 3.8–5.8)
SODIUM BLD-SCNC: 133 MMOL/L (ref 132–146)
TOTAL PROTEIN: 5.8 G/DL (ref 6.4–8.3)
WBC # BLD: 5.3 E9/L (ref 4.5–11.5)

## 2021-12-15 ENCOUNTER — HOSPITAL ENCOUNTER (OUTPATIENT)
Age: 71
Discharge: HOME | End: 2021-12-15
Payer: MEDICARE

## 2021-12-15 DIAGNOSIS — K74.60 HEPATIC CIRRHOSIS DUE TO CHRONIC HEPATITIS C INFECTION (HCC): ICD-10-CM

## 2021-12-15 DIAGNOSIS — B18.2 HEPATIC CIRRHOSIS DUE TO CHRONIC HEPATITIS C INFECTION (HCC): ICD-10-CM

## 2021-12-15 LAB — AMMONIA: 39 UMOL/L (ref 16–60)

## 2021-12-15 PROCEDURE — 82140 ASSAY OF AMMONIA: CPT

## 2021-12-15 PROCEDURE — 36415 COLL VENOUS BLD VENIPUNCTURE: CPT

## 2022-02-14 ENCOUNTER — HOSPITAL ENCOUNTER (OUTPATIENT)
Age: 72
Discharge: HOME | End: 2022-02-16
Payer: MEDICARE

## 2022-02-14 ENCOUNTER — HOSPITAL ENCOUNTER (OUTPATIENT)
Dept: GENERAL RADIOLOGY | Age: 72
Discharge: HOME | End: 2022-02-16
Payer: MEDICARE

## 2022-02-14 DIAGNOSIS — R06.9 ABNORMAL BREATHING: ICD-10-CM

## 2022-02-14 PROCEDURE — 71046 X-RAY EXAM CHEST 2 VIEWS: CPT

## 2022-03-14 ENCOUNTER — OFFICE VISIT (OUTPATIENT)
Dept: GERIATRIC MEDICINE | Age: 72
End: 2022-03-14
Payer: MEDICARE

## 2022-03-14 VITALS
BODY MASS INDEX: 25.18 KG/M2 | HEIGHT: 69 IN | TEMPERATURE: 97.3 F | RESPIRATION RATE: 28 BRPM | DIASTOLIC BLOOD PRESSURE: 74 MMHG | HEART RATE: 96 BPM | SYSTOLIC BLOOD PRESSURE: 110 MMHG | WEIGHT: 170 LBS

## 2022-03-14 DIAGNOSIS — G30.9 ALZHEIMER DISEASE (HCC): Primary | ICD-10-CM

## 2022-03-14 DIAGNOSIS — F02.80 ALZHEIMER DISEASE (HCC): Primary | ICD-10-CM

## 2022-03-14 PROCEDURE — 99212 OFFICE O/P EST SF 10 MIN: CPT | Performed by: INTERNAL MEDICINE

## 2022-03-14 RX ORDER — COFFEE XT/PHOSPHATIDYL SERINE 50 MG-50MG
1 TABLET,CHEWABLE ORAL DAILY
COMMUNITY

## 2022-03-14 RX ORDER — RIVASTIGMINE 4.6 MG/24H
1 PATCH, EXTENDED RELEASE TRANSDERMAL DAILY
Qty: 30 PATCH | Refills: 3 | Status: SHIPPED | OUTPATIENT
Start: 2022-03-14

## 2022-03-14 RX ORDER — THIAMINE HCL 100 MG
1 TABLET ORAL DAILY
COMMUNITY

## 2022-03-14 RX ORDER — CALCIUM CARB/VITAMIN D3/VIT K1 500-500-40
1 TABLET,CHEWABLE ORAL DAILY
COMMUNITY

## 2022-03-14 RX ORDER — ZINC GLUCONATE 50 MG
50 TABLET ORAL DAILY
COMMUNITY

## 2022-03-14 RX ORDER — LACTULOSE 10 G/10G
10 SOLUTION ORAL DAILY
COMMUNITY

## 2022-03-14 RX ORDER — ACETAMINOPHEN 160 MG
1 TABLET,DISINTEGRATING ORAL DAILY
COMMUNITY

## 2022-03-14 SDOH — ECONOMIC STABILITY: FOOD INSECURITY: WITHIN THE PAST 12 MONTHS, THE FOOD YOU BOUGHT JUST DIDN'T LAST AND YOU DIDN'T HAVE MONEY TO GET MORE.: NEVER TRUE

## 2022-03-14 SDOH — ECONOMIC STABILITY: FOOD INSECURITY: WITHIN THE PAST 12 MONTHS, YOU WORRIED THAT YOUR FOOD WOULD RUN OUT BEFORE YOU GOT MONEY TO BUY MORE.: NEVER TRUE

## 2022-03-14 ASSESSMENT — PATIENT HEALTH QUESTIONNAIRE - PHQ9
SUM OF ALL RESPONSES TO PHQ QUESTIONS 1-9: 0
1. LITTLE INTEREST OR PLEASURE IN DOING THINGS: 0
SUM OF ALL RESPONSES TO PHQ QUESTIONS 1-9: 0
SUM OF ALL RESPONSES TO PHQ9 QUESTIONS 1 & 2: 0
2. FEELING DOWN, DEPRESSED OR HOPELESS: 0

## 2022-03-14 ASSESSMENT — SOCIAL DETERMINANTS OF HEALTH (SDOH): HOW HARD IS IT FOR YOU TO PAY FOR THE VERY BASICS LIKE FOOD, HOUSING, MEDICAL CARE, AND HEATING?: NOT HARD AT ALL

## 2022-03-14 NOTE — PROGRESS NOTES
Chief Complaint   Patient presents with    3 Month Follow-Up     Last seen in December re: SDAT & FTT. Weight is now 170 #, up 13 # since December visit.  Other     Wife states pt was in [place] last month & was in the hospital for 5 days because of fluid in the lungs.

## 2022-03-14 NOTE — PATIENT INSTRUCTIONS
Patient Education        Preventing Falls: Care Instructions  Your Care Instructions     Getting around your home safely can be a challenge if you have injuries or health problems that make it easy for you to fall. Loose rugs and furniture in walkways are among the dangers for many older people who have problems walking or who have poor eyesight. People who have conditions such as arthritis, osteoporosis, or dementia also have to be careful not to fall. You can make your home safer with a few simple measures. Follow-up care is a key part of your treatment and safety. Be sure to make and go to all appointments, and call your doctor if you are having problems. It's also a good idea to know your test results and keep a list of the medicines you take. How can you care for yourself at home? Taking care of yourself  · You may get dizzy if you do not drink enough water. To prevent dehydration, drink plenty of fluids. Choose water and other clear liquids. If you have kidney, heart, or liver disease and have to limit fluids, talk with your doctor before you increase the amount of fluids you drink. · Exercise regularly to improve your strength, muscle tone, and balance. Walk if you can. Swimming may be a good choice if you cannot walk easily. · Have your vision and hearing checked each year or any time you notice a change. If you have trouble seeing and hearing, you might not be able to avoid objects and could lose your balance. · Know the side effects of the medicines you take. Ask your doctor or pharmacist whether the medicines you take can affect your balance. Sleeping pills or sedatives can affect your balance. · Limit the amount of alcohol you drink. Alcohol can impair your balance and other senses. · Ask your doctor whether calluses or corns on your feet need to be removed. If you wear loose-fitting shoes because of calluses or corns, you can lose your balance and fall.   · Talk to your doctor if you have numbness in your feet. Preventing falls at home  · Remove raised doorway thresholds, throw rugs, and clutter. Repair loose carpet or raised areas in the floor. · Move furniture and electrical cords to keep them out of walking paths. · Use nonskid floor wax, and wipe up spills right away, especially on ceramic tile floors. · If you use a walker or cane, put rubber tips on it. If you use crutches, clean the bottoms of them regularly with an abrasive pad, such as steel wool. · Keep your house well lit, especially [place], and outside walkways. Use night-lights in areas such as hallways and bathrooms. Add extra light switches or use remote switches (such as switches that go on or off when you clap your hands) to make it easier to turn lights on if you have to get up during the night. · Install sturdy handrails on stairways. · Move items in your cabinets so that the things you use a lot are on the lower shelves (about waist level). · Keep a cordless phone and a flashlight with new batteries by your bed. If possible, put a phone in each of the main rooms of your house, or carry a cell phone in case you fall and cannot reach a phone. Or, you can wear a device around your neck or wrist. You push a button that sends a signal for help. · Wear low-heeled shoes that fit well and give your feet good support. Use footwear with nonskid soles. Check the heels and soles of your shoes for wear. Repair or replace worn heels or soles. · Do not wear socks without shoes on wood floors. · Walk on the grass when the sidewalks are slippery. If you live in an area that gets snow and ice in the winter, sprinkle salt on slippery steps and sidewalks. Preventing falls in the bath  · Install grab bars and nonskid mats inside and outside your shower or tub and near the toilet and sinks. · Use shower chairs and bath benches. · Use a hand-held shower head that will allow you to sit while showering.   · Get into a tub or shower by putting the weaker leg in first. Get out of a tub or shower with your strong side first.  · Repair loose toilet seats and consider installing a raised toilet seat to make getting on and off the toilet easier. · Keep your bathroom door unlocked while you are in the shower. Where can you learn more? Go to https://[email or web address].Yum[employer]. org and sign in to your [facility] account. Enter [phone] in the [employer] box to learn more about \"Preventing Falls: Care Instructions. \"     If you do not have an account, please click on the \"Sign Up Now\" link. Current as of: September 8, 2021               Content Version: 13.1  © 5385-8454 [employer]wise, [employer]orporated. Care instructions adapted under license by [facility] ([facility]). If you have questions about a medical condition or this instruction, always ask your healthcare professional. [place] [address] any warranty or liability for your use of this information.

## 2022-03-14 NOTE — PROGRESS NOTES
Here with wife and he knows her by name   And hospitallized in [place] 2 weeks  Now he is eating food  And good appetite for everything  Knows wife and by name   ADL's coached including toileting  Sleeps >12 hours   Bladder issues   Impression: Severe liver disease                      Alzheimer's disease severe                       Namenda sedation                      And Aricept with muscle cramps and anoresia  Plan: Rifaximin bid           Trial with Exelon patch 4.6 mg a day

## 2022-03-24 ENCOUNTER — HOSPITAL ENCOUNTER (OUTPATIENT)
Dept: GENERAL RADIOLOGY | Age: 72
Discharge: HOME | End: 2022-03-26
Payer: MEDICARE

## 2022-03-24 ENCOUNTER — HOSPITAL ENCOUNTER (OUTPATIENT)
Age: 72
Discharge: HOME | End: 2022-03-26
Payer: MEDICARE

## 2022-03-24 ENCOUNTER — HOSPITAL ENCOUNTER (OUTPATIENT)
Age: 72
Discharge: HOME | End: 2022-03-24
Payer: MEDICARE

## 2022-03-24 DIAGNOSIS — J90 PLEURAL EFFUSION: ICD-10-CM

## 2022-03-24 LAB
ALBUMIN SERPL-MCNC: 3.3 G/DL (ref 3.5–5.2)
ALP BLD-CCNC: 105 U/L (ref 40–129)
ALT SERPL-CCNC: 33 U/L (ref 0–40)
ANION GAP SERPL CALCULATED.3IONS-SCNC: 11 MMOL/L (ref 7–16)
APTT: 26.9 SEC (ref 24.5–35.1)
AST SERPL-CCNC: 37 U/L (ref 0–39)
BASOPHILS ABSOLUTE: 0.07 E9/L (ref 0–0.2)
BASOPHILS RELATIVE PERCENT: 1.6 % (ref 0–2)
BILIRUB SERPL-MCNC: 1.5 MG/DL (ref 0–1.2)
BUN BLDV-MCNC: 13 MG/DL (ref 6–23)
CALCIUM SERPL-MCNC: 8.9 MG/DL (ref 8.6–10.2)
CHLORIDE BLD-SCNC: 102 MMOL/L (ref 98–107)
CO2: 24 MMOL/L (ref 22–29)
CREAT SERPL-MCNC: 0.8 MG/DL (ref 0.7–1.2)
EOSINOPHILS ABSOLUTE: 0.16 E9/L (ref 0.05–0.5)
EOSINOPHILS RELATIVE PERCENT: 3.7 % (ref 0–6)
GFR AFRICAN AMERICAN: >60
GFR NON-AFRICAN AMERICAN: >60 ML/MIN/1.73
GLUCOSE BLD-MCNC: 189 MG/DL (ref 74–99)
HCT VFR BLD CALC: 35.4 % (ref 37–54)
HEMOGLOBIN: 11.5 G/DL (ref 12.5–16.5)
IMMATURE GRANULOCYTES #: 0.01 E9/L
IMMATURE GRANULOCYTES %: 0.2 % (ref 0–5)
INR BLD: 1.3
LYMPHOCYTES ABSOLUTE: 0.92 E9/L (ref 1.5–4)
LYMPHOCYTES RELATIVE PERCENT: 21.1 % (ref 20–42)
MCH RBC QN AUTO: 30.7 PG (ref 26–35)
MCHC RBC AUTO-ENTMCNC: 32.5 % (ref 32–34.5)
MCV RBC AUTO: 94.4 FL (ref 80–99.9)
MONOCYTES ABSOLUTE: 0.54 E9/L (ref 0.1–0.95)
MONOCYTES RELATIVE PERCENT: 12.4 % (ref 2–12)
NEUTROPHILS ABSOLUTE: 2.66 E9/L (ref 1.8–7.3)
NEUTROPHILS RELATIVE PERCENT: 61 % (ref 43–80)
PDW BLD-RTO: 16.6 FL (ref 11.5–15)
PLATELET # BLD: 130 E9/L (ref 130–450)
PMV BLD AUTO: 10.4 FL (ref 7–12)
POTASSIUM SERPL-SCNC: 4.2 MMOL/L (ref 3.5–5)
PROTHROMBIN TIME: 14.5 SEC (ref 9.3–12.4)
RBC # BLD: 3.75 E12/L (ref 3.8–5.8)
SODIUM BLD-SCNC: 137 MMOL/L (ref 132–146)
TOTAL PROTEIN: 6.7 G/DL (ref 6.4–8.3)
WBC # BLD: 4.4 E9/L (ref 4.5–11.5)

## 2022-03-24 PROCEDURE — 80053 COMPREHEN METABOLIC PANEL: CPT

## 2022-03-24 PROCEDURE — 82105 ALPHA-FETOPROTEIN SERUM: CPT

## 2022-03-24 PROCEDURE — 85610 PROTHROMBIN TIME: CPT

## 2022-03-24 PROCEDURE — 85025 COMPLETE CBC W/AUTO DIFF WBC: CPT

## 2022-03-24 PROCEDURE — 85730 THROMBOPLASTIN TIME PARTIAL: CPT

## 2022-03-24 PROCEDURE — 36415 COLL VENOUS BLD VENIPUNCTURE: CPT

## 2022-03-24 PROCEDURE — 71046 X-RAY EXAM CHEST 2 VIEWS: CPT

## 2022-03-26 LAB — AFP-TUMOR MARKER: 1 NG/ML (ref 0–9)

## 2022-05-15 ENCOUNTER — APPOINTMENT (OUTPATIENT)
Dept: ULTRASOUND IMAGING | Age: 72
DRG: 432 | End: 2022-05-15
Payer: MEDICARE

## 2022-05-15 ENCOUNTER — APPOINTMENT (OUTPATIENT)
Dept: CT IMAGING | Age: 72
DRG: 432 | End: 2022-05-15
Payer: MEDICARE

## 2022-05-15 ENCOUNTER — HOSPITAL ENCOUNTER (INPATIENT)
Age: 72
LOS: 4 days | Discharge: HOME | DRG: 432 | End: 2022-05-19
Attending: STUDENT IN AN ORGANIZED HEALTH CARE EDUCATION/TRAINING PROGRAM | Admitting: INTERNAL MEDICINE
Payer: MEDICARE

## 2022-05-15 ENCOUNTER — APPOINTMENT (OUTPATIENT)
Dept: GENERAL RADIOLOGY | Age: 72
DRG: 432 | End: 2022-05-15
Payer: MEDICARE

## 2022-05-15 DIAGNOSIS — J90 PLEURAL EFFUSION: ICD-10-CM

## 2022-05-15 DIAGNOSIS — R18.8 OTHER ASCITES: ICD-10-CM

## 2022-05-15 DIAGNOSIS — R06.03 ACUTE RESPIRATORY DISTRESS: Primary | ICD-10-CM

## 2022-05-15 PROBLEM — J96.00 ACUTE RESPIRATORY FAILURE (HCC): Status: ACTIVE | Noted: 2022-05-15

## 2022-05-15 LAB
ALBUMIN SERPL-MCNC: 3.6 G/DL (ref 3.5–5.2)
ALP BLD-CCNC: 103 U/L (ref 40–129)
ALT SERPL-CCNC: 36 U/L (ref 0–40)
AMMONIA: 45.7 UMOL/L (ref 16–60)
ANION GAP SERPL CALCULATED.3IONS-SCNC: 10 MMOL/L (ref 7–16)
APTT: 30.6 SEC (ref 24.5–35.1)
AST SERPL-CCNC: 37 U/L (ref 0–39)
ATYPICAL LYMPHOCYTE RELATIVE PERCENT: 1 % (ref 0–4)
B.E.: -2.4 MMOL/L (ref -3–3)
BASOPHILS ABSOLUTE: 0 E9/L (ref 0–0.2)
BASOPHILS RELATIVE PERCENT: 0 % (ref 0–2)
BILIRUB SERPL-MCNC: 0.9 MG/DL (ref 0–1.2)
BILIRUBIN DIRECT: 0.3 MG/DL (ref 0–0.3)
BILIRUBIN, INDIRECT: 0.6 MG/DL (ref 0–1)
BUN BLDV-MCNC: 15 MG/DL (ref 6–23)
CALCIUM SERPL-MCNC: 8.6 MG/DL (ref 8.6–10.2)
CHLORIDE BLD-SCNC: 99 MMOL/L (ref 98–107)
CO2: 21 MMOL/L (ref 22–29)
COHB: 0.4 % (ref 0–1.5)
CREAT SERPL-MCNC: 0.8 MG/DL (ref 0.7–1.2)
CRITICAL: ABNORMAL
DATE ANALYZED: ABNORMAL
DATE OF COLLECTION: ABNORMAL
EOSINOPHILS ABSOLUTE: 0.04 E9/L (ref 0.05–0.5)
EOSINOPHILS RELATIVE PERCENT: 1 % (ref 0–6)
GFR AFRICAN AMERICAN: >60
GFR NON-AFRICAN AMERICAN: >60 ML/MIN/1.73
GLUCOSE BLD-MCNC: 288 MG/DL (ref 74–99)
HCO3: 20.6 MMOL/L (ref 22–26)
HCT VFR BLD CALC: 34.9 % (ref 37–54)
HEMOGLOBIN: 11.5 G/DL (ref 12.5–16.5)
HHB: 1.9 % (ref 0–5)
INFLUENZA A BY PCR: NOT DETECTED
INFLUENZA B BY PCR: NOT DETECTED
INR BLD: 1.5
LAB: ABNORMAL
LACTIC ACID, SEPSIS: 1.8 MMOL/L (ref 0.5–1.9)
LACTIC ACID, SEPSIS: 3 MMOL/L (ref 0.5–1.9)
LIPASE: 29 U/L (ref 13–60)
LYMPHOCYTES ABSOLUTE: 0.47 E9/L (ref 1.5–4)
LYMPHOCYTES RELATIVE PERCENT: 10 % (ref 20–42)
Lab: ABNORMAL
MCH RBC QN AUTO: 31.9 PG (ref 26–35)
MCHC RBC AUTO-ENTMCNC: 33 % (ref 32–34.5)
MCV RBC AUTO: 96.7 FL (ref 80–99.9)
METHB: 0.3 % (ref 0–1.5)
MODE: ABNORMAL
MONOCYTES ABSOLUTE: 0.17 E9/L (ref 0.1–0.95)
MONOCYTES RELATIVE PERCENT: 4 % (ref 2–12)
NEUTROPHILS ABSOLUTE: 3.61 E9/L (ref 1.8–7.3)
NEUTROPHILS RELATIVE PERCENT: 84 % (ref 43–80)
O2 CONTENT: 15.9 ML/DL
O2 SATURATION: 98.1 % (ref 92–98.5)
O2HB: 97.4 % (ref 94–97)
OPERATOR ID: 420
OVALOCYTES: ABNORMAL
PATIENT TEMP: 37 C
PCO2: 29.9 MMHG (ref 35–45)
PDW BLD-RTO: 14.6 FL (ref 11.5–15)
PEEP/CPAP: 5 CMH2O
PH BLOOD GAS: 7.46 (ref 7.35–7.45)
PLATELET # BLD: 88 E9/L (ref 130–450)
PLATELET CONFIRMATION: NORMAL
PMV BLD AUTO: 10.5 FL (ref 7–12)
PO2: 113.4 MMHG (ref 75–100)
POIKILOCYTES: ABNORMAL
POTASSIUM REFLEX MAGNESIUM: 4.9 MMOL/L (ref 3.5–5)
PRO-BNP: 63 PG/ML (ref 0–125)
PROTHROMBIN TIME: 16.2 SEC (ref 9.3–12.4)
PS: 18 CMH20
RBC # BLD: 3.61 E12/L (ref 3.8–5.8)
SARS-COV-2, NAAT: NOT DETECTED
SODIUM BLD-SCNC: 130 MMOL/L (ref 132–146)
SOURCE, BLOOD GAS: ABNORMAL
TARGET CELLS: ABNORMAL
THB: 11.5 G/DL (ref 11.5–16.5)
TIME ANALYZED: 1848
TOTAL PROTEIN: 7.3 G/DL (ref 6.4–8.3)
TROPONIN, HIGH SENSITIVITY: 38 NG/L (ref 0–11)
TROPONIN, HIGH SENSITIVITY: 39 NG/L (ref 0–11)
WBC # BLD: 4.3 E9/L (ref 4.5–11.5)

## 2022-05-15 PROCEDURE — 71275 CT ANGIOGRAPHY CHEST: CPT

## 2022-05-15 PROCEDURE — 6370000000 HC RX 637 (ALT 250 FOR IP): Performed by: STUDENT IN AN ORGANIZED HEALTH CARE EDUCATION/TRAINING PROGRAM

## 2022-05-15 PROCEDURE — 83605 ASSAY OF LACTIC ACID: CPT

## 2022-05-15 PROCEDURE — 87635 SARS-COV-2 COVID-19 AMP PRB: CPT

## 2022-05-15 PROCEDURE — 71045 X-RAY EXAM CHEST 1 VIEW: CPT

## 2022-05-15 PROCEDURE — 74177 CT ABD & PELVIS W/CONTRAST: CPT

## 2022-05-15 PROCEDURE — 83880 ASSAY OF NATRIURETIC PEPTIDE: CPT

## 2022-05-15 PROCEDURE — 93971 EXTREMITY STUDY: CPT

## 2022-05-15 PROCEDURE — 6360000004 HC RX CONTRAST MEDICATION: Performed by: RADIOLOGY

## 2022-05-15 PROCEDURE — 93005 ELECTROCARDIOGRAM TRACING: CPT | Performed by: STUDENT IN AN ORGANIZED HEALTH CARE EDUCATION/TRAINING PROGRAM

## 2022-05-15 PROCEDURE — 85025 COMPLETE CBC W/AUTO DIFF WBC: CPT

## 2022-05-15 PROCEDURE — 84484 ASSAY OF TROPONIN QUANT: CPT

## 2022-05-15 PROCEDURE — 80076 HEPATIC FUNCTION PANEL: CPT

## 2022-05-15 PROCEDURE — 51702 INSERT TEMP BLADDER CATH: CPT

## 2022-05-15 PROCEDURE — 94660 CPAP INITIATION&MGMT: CPT

## 2022-05-15 PROCEDURE — 2060000000 HC ICU INTERMEDIATE R&B

## 2022-05-15 PROCEDURE — 87502 INFLUENZA DNA AMP PROBE: CPT

## 2022-05-15 PROCEDURE — 6360000002 HC RX W HCPCS: Performed by: STUDENT IN AN ORGANIZED HEALTH CARE EDUCATION/TRAINING PROGRAM

## 2022-05-15 PROCEDURE — 83690 ASSAY OF LIPASE: CPT

## 2022-05-15 PROCEDURE — 80048 BASIC METABOLIC PNL TOTAL CA: CPT

## 2022-05-15 PROCEDURE — 99285 EMERGENCY DEPT VISIT HI MDM: CPT

## 2022-05-15 PROCEDURE — 85610 PROTHROMBIN TIME: CPT

## 2022-05-15 PROCEDURE — 94640 AIRWAY INHALATION TREATMENT: CPT

## 2022-05-15 PROCEDURE — 82805 BLOOD GASES W/O2 SATURATION: CPT

## 2022-05-15 PROCEDURE — 96372 THER/PROPH/DIAG INJ SC/IM: CPT

## 2022-05-15 PROCEDURE — 82140 ASSAY OF AMMONIA: CPT

## 2022-05-15 PROCEDURE — 85730 THROMBOPLASTIN TIME PARTIAL: CPT

## 2022-05-15 RX ORDER — ACETAMINOPHEN 500 MG
1000 TABLET ORAL ONCE
Status: DISCONTINUED | OUTPATIENT
Start: 2022-05-15 | End: 2022-05-16

## 2022-05-15 RX ORDER — IPRATROPIUM BROMIDE AND ALBUTEROL SULFATE 2.5; .5 MG/3ML; MG/3ML
3 SOLUTION RESPIRATORY (INHALATION) ONCE
Status: COMPLETED | OUTPATIENT
Start: 2022-05-15 | End: 2022-05-15

## 2022-05-15 RX ORDER — HALOPERIDOL 5 MG/ML
2.5 INJECTION INTRAMUSCULAR ONCE
Status: COMPLETED | OUTPATIENT
Start: 2022-05-15 | End: 2022-05-15

## 2022-05-15 RX ADMIN — HALOPERIDOL LACTATE 2.5 MG: 5 INJECTION, SOLUTION INTRAMUSCULAR at 17:58

## 2022-05-15 RX ADMIN — IOPAMIDOL 75 ML: 755 INJECTION, SOLUTION INTRAVENOUS at 19:55

## 2022-05-15 RX ADMIN — HALOPERIDOL LACTATE 2.5 MG: 5 INJECTION, SOLUTION INTRAMUSCULAR at 23:28

## 2022-05-15 RX ADMIN — IPRATROPIUM BROMIDE AND ALBUTEROL SULFATE 3 AMPULE: .5; 3 SOLUTION RESPIRATORY (INHALATION) at 18:48

## 2022-05-15 NOTE — ED PROVIDER NOTES
The history is provided by medical records, a parent and the spouse. 65-year-old male presents emergency department complaint shortness of breath. Does have a history of dementia history provided by the patient's wife. Been going on for couple hours. Does have a history of liver cirrhosis and ascites by states and occasionally gets fluid on his lungs. Last time he was with admitted was back in February in [place] she states. She states shortness of breath started few hours ago. Seems to be constant. She states he has not complained of any pain. Is not have any black stools however he does have a history of varices. Otherwise no other acute complaints this time. The patient presents with sob that has been going on for 2-3 hrs. These symptoms are moderate in severity. Symptoms are made better by nothing. Symptoms are made worse by nothing. Associated symptoms include none. Review of Systems   Unable to perform ROS: Dementia        Physical Exam  Vitals and nursing note reviewed. Constitutional:       Appearance: He is well-developed. HENT:      Head: Normocephalic and atraumatic. Eyes:      Conjunctiva/sclera: Conjunctivae normal.   Cardiovascular:      Rate and Rhythm: Normal rate and regular rhythm. Heart sounds: Normal heart sounds. No murmur heard. Pulmonary:      Effort: Pulmonary effort is normal. Tachypnea present. No respiratory distress. Breath sounds: Decreased breath sounds present. No wheezing or rales. Abdominal:      General: Bowel sounds are normal.      Palpations: Abdomen is soft. Tenderness: There is no abdominal tenderness. There is no guarding or rebound. Musculoskeletal:         General: No tenderness or deformity. Cervical back: Normal range of motion and neck supple. Skin:     General: Skin is warm and dry. Neurological:      Mental Status: He is alert and oriented to person, place, and time.       Cranial Nerves: No cranial nerve deficit. Coordination: Coordination normal.          Procedures     MDM       69-year male present emerged part complaint shortness of breath, was having significant work of breathing was placed on BiPAP. Does have a history of dementia was given Haldol due to his agitation with mild improvement. Work-up on patient showing moderate ascites as well as pleural effusion, EKG was stable troponin slightly elevation however delta troponin within acceptable range. There was concern for possible thrombosis of the common femoral vein on CT scan of the abdomen pelvis, ultrasound was negative for any acute DVT, CT of the chest was also unremarkable for any acute pathology. Due to the patient's respiratory stress on BiPAP he will be admitted to the hospital.  Family agreeable this plan. Did speak with hospitalist who is also agreeable. He remained hemodynamically stable. ED Course as of 05/16/22 0033   Sun May 15, 2022   2334 Dr. Israel Hernandes agree with admission   [CB]      ED Course User Index  [CB] Blayne Padgett MD        EKG: This EKG is signed by emergency department physician. Rate: 117  Rhythm: Sinus  Interpretation: With significant baseline artifact no acute ST ovation depression left axis stable intervals  Comparison: stable as compared to patient's most recent EKG       ED Course as of 05/16/22 0033   Sun May 15, 2022   2334 Dr. Israel Hernandes agree with admission   [CB]      ED Course User Index  [CB] Blayne Padgett MD       --------------------------------------------- PAST HISTORY ---------------------------------------------  Past Medical History:  has a past medical history of Buccal mass, Diabetes mellitus ([facility] Esophageal varices ([facility] Hepatitis C, [facility] Mild dementia ([facility] and Positive colorectal cancer screening using Cologuard test.    Past Surgical History:  has a past surgical history that includes Nasal septum surgery; Tonsillectomy;  Upper gastrointestinal endoscopy (N/A, 06/19/2018); hernia repair (Right, 2018); Upper gastrointestinal endoscopy (09/01/2020); Biopsy mouth lesion (Left, 06/04/2020); Mouth surgery (Right, 09/24/2020); Cholecystectomy; Colonoscopy (2017); Endoscopy, colon, diagnostic (2019 and 2020); Insert Picc Cath, 5/> Yrs (10/14/2021); and Upper gastrointestinal endoscopy (N/A, 12/5/2021). Social History:  reports that he has never smoked. He has never used smokeless tobacco. He reports that he does not drink alcohol and does not use drugs. Family History: family history includes Heart Failure in his brother; Hypertension in his brother; Seizures in his brother; Stroke in his brother. The patients home medications have been reviewed.     Allergies: Ketorolac tromethamine, Memantine hcl, and Tamsulosin hcl    -------------------------------------------------- RESULTS -------------------------------------------------    Lab  Results for orders placed or performed during the hospital encounter of 05/15/22   COVID-19, Rapid    Specimen: Nasopharyngeal Swab   Result Value Ref Range    SARS-CoV-2, NAAT Not Detected Not Detected   RAPID INFLUENZA A/B ANTIGENS    Specimen: Nasopharyngeal   Result Value Ref Range    Influenza A by PCR Not Detected Not Detected    Influenza B by PCR Not Detected Not Detected   CBC with Auto Differential   Result Value Ref Range    WBC 4.3 (L) 4.5 - 11.5 E9/L    RBC 3.61 (L) 3.80 - 5.80 E12/L    Hemoglobin 11.5 (L) 12.5 - 16.5 g/dL    Hematocrit 34.9 (L) 37.0 - 54.0 %    MCV 96.7 80.0 - 99.9 fL    MCH 31.9 26.0 - 35.0 pg    MCHC 33.0 32.0 - 34.5 %    RDW 14.6 11.5 - 15.0 fL    Platelets 88 (L) 183 - 450 E9/L    MPV 10.5 7.0 - 12.0 fL    Neutrophils % 84.0 (H) 43.0 - 80.0 %    Lymphocytes % 10.0 (L) 20.0 - 42.0 %    Monocytes % 4.0 2.0 - 12.0 %    Eosinophils % 1.0 0.0 - 6.0 %    Basophils % 0.0 0.0 - 2.0 %    Neutrophils Absolute 3.61 1.80 - 7.30 E9/L    Lymphocytes Absolute 0.47 (L) 1.50 - 4.00 E9/L    Monocytes Absolute 0.17 0.10 - 0.95 E9/L    Eosinophils Absolute 0.04 (L) 0.05 - 0.50 E9/L    Basophils Absolute 0.00 0.00 - 0.20 E9/L    Atypical Lymphocytes Relative 1.0 0.0 - 4.0 %    Poikilocytes 1+     Ovalocytes 2+     Target Cells 1+    Basic Metabolic Panel w/ Reflex to MG   Result Value Ref Range    Sodium 130 (L) 132 - 146 mmol/L    Potassium reflex Magnesium 4.9 3.5 - 5.0 mmol/L    Chloride 99 98 - 107 mmol/L    CO2 21 (L) 22 - 29 mmol/L    Anion Gap 10 7 - 16 mmol/L    Glucose 288 (H) 74 - 99 mg/dL    BUN 15 6 - 23 mg/dL    CREATININE 0.8 0.7 - 1.2 mg/dL    GFR Non-African American >60 >=60 mL/min/1.73    GFR African American >60     Calcium 8.6 8.6 - 10.2 mg/dL   Hepatic Function Panel   Result Value Ref Range    Total Protein 7.3 6.4 - 8.3 g/dL    Albumin 3.6 3.5 - 5.2 g/dL    Alkaline Phosphatase 103 40 - 129 U/L    ALT 36 0 - 40 U/L    AST 37 0 - 39 U/L    Total Bilirubin 0.9 0.0 - 1.2 mg/dL    Bilirubin, Direct 0.3 0.0 - 0.3 mg/dL    Bilirubin, Indirect 0.6 0.0 - 1.0 mg/dL   Lipase   Result Value Ref Range    Lipase 29 13 - 60 U/L   Troponin   Result Value Ref Range    Troponin, High Sensitivity 39 (H) 0 - 11 ng/L   Brain Natriuretic Peptide   Result Value Ref Range    Pro-BNP 63 0 - 125 pg/mL   Ammonia   Result Value Ref Range    Ammonia 45.7 16.0 - 60.0 umol/L   Lactate, Sepsis   Result Value Ref Range    Lactic Acid, Sepsis 3.0 (H) 0.5 - 1.9 mmol/L   Lactate, Sepsis   Result Value Ref Range    Lactic Acid, Sepsis 1.8 0.5 - 1.9 mmol/L   Platelet Confirmation   Result Value Ref Range    Platelet Confirmation CONFIRMED    Troponin   Result Value Ref Range    Troponin, High Sensitivity 38 (H) 0 - 11 ng/L   Blood Gas, Arterial   Result Value Ref Range    Date Analyzed [id number]     Time Analyzed 0068     Source: Blood Arterial     pH, Blood Gas 7.456 (H) 7.350 - 7.450    PCO2 29.9 (L) 35.0 - 45.0 mmHg    PO2 113.4 (H) 75.0 - 100.0 mmHg    HCO3 20.6 (L) 22.0 - 26.0 mmol/L    B.E. -2.4 -3.0 - 3.0 mmol/L    O2 Sat 98.1 92.0 - 98.5 % O2Hb 97.4 (H) 94.0 - 97.0 %    COHb 0.4 0.0 - 1.5 %    MetHb 0.3 0.0 - 1.5 %    O2 Content 15.9 mL/dL    HHb 1.9 0.0 - 5.0 %    tHb (est) 11.5 11.5 - 16.5 g/dL    Mode NIV PAP     Peep/Cpap 5.0 cmH2O    PS 18 cmH20    Date Of Collection      Time Collected      Pt Temp 37.0 C     ID [id number]     Lab [id number]     Critical(s) Notified . No Critical Values    APTT   Result Value Ref Range    aPTT 30.6 24.5 - 35.1 sec   Protime-INR   Result Value Ref Range    Protime 16.2 (H) 9.3 - 12.4 sec    INR 1.5    EKG 12 Lead   Result Value Ref Range    Ventricular Rate 117 BPM    Atrial Rate 117 BPM    QRS Duration 68 ms    Q-T Interval 286 ms    QTc Calculation (Bazett) 398 ms    R Axis -15 degrees    T Axis 16 degrees       Radiology  CTA PULMONARY W CONTRAST   Final Result   Limited exam due to patient motion. Large right pleural effusion with compressive atelectasis most notable in the   right lower lobe. Lingular and left lower lobe atelectasis similar to previous. No large or central pulmonary embolism. Due to limitations, small or   peripheral embolism would be difficult to exclude. RECOMMENDATIONS:   Unavailable         US DUP LOWER EXTREMITY RIGHT CANDIDA   Final Result   No evidence of DVT in the right lower extremity. CT ABDOMEN PELVIS W IV CONTRAST Additional Contrast? None   Final Result   1. Findings consistent with hepatic cirrhosis, 3rd spacing includes overall   moderate ascites, and pleural effusions. 2. Additional portal hypertension seen as splenomegaly, varices and   recanalized paraumbilical vein which is prominent sized. 3. Suspicious for focal thrombosis of right common femoral vein.          XR CHEST PORTABLE   Final Result   There is low lung volumes with poor inspiratory F fracture with the continued   bibasilar subsegmental atelectasis and small pleural effusions.                   ------------------------- NURSING NOTES AND VITALS REVIEWED ---------------------------  Date / Time Roomed:  5/15/2022  2:56 PM  ED Bed Assignment:  10/10    The nursing notes within the ED encounter and vital signs as below have been reviewed. Patient Vitals for the past 24 hrs:   BP Temp Temp src Pulse Resp SpO2 Height Weight   05/15/22 2258 (!) 151/76   91 16 98 %     05/15/22 2240     20      05/15/22 2018 132/81 100 °F (37.8 °C) Axillary 103 22 98 %     05/15/22 2000  CROSSCANONBY  22      05/15/22 1850      100 %     05/15/22 1652     28      05/15/22 1605 (!) 153/80 100.6 °F (38.1 °C) Oral 111 21 100 %     05/15/22 1454 (!) 155/79 98.6 °F (37 °C) Oral 100 24 95 % 5' 10\" (1.778 m) 165 lb (74.8 kg)       Oxygen Saturation Interpretation: Normal      ------------------------------------------ PROGRESS NOTES ------------------------------------------  Re-evaluation(s):  Time: 2330. Patients symptoms show no change  Repeat physical examination is not changed        I have spoken with the patient and discussed todays results, in addition to providing specific details for the plan of care and counseling regarding the diagnosis and prognosis. Their questions are answered at this time and they are agreeable with the plan.      --------------------------------- ADDITIONAL PROVIDER NOTES ---------------------------------  Consultations:  Spoke with Dr. Rodriguez Rather,  They will admit this patient. This patient's ED course included: a personal history and physicial examination, re-evaluation prior to disposition, multiple bedside re-evaluations, IV medications, cardiac monitoring and continuous pulse oximetry    This patient has remained hemodynamically stable during their ED course. Clinical Impression  1. Acute respiratory distress    2. Pleural effusion    3. Other ascites          Disposition  Patient's disposition: Admit to telemetry  Patient's condition is stable.        Laura Vizcarra MD  Resident  05/16/22 9825

## 2022-05-16 ENCOUNTER — APPOINTMENT (OUTPATIENT)
Dept: ULTRASOUND IMAGING | Age: 72
DRG: 432 | End: 2022-05-16
Payer: MEDICARE

## 2022-05-16 ENCOUNTER — APPOINTMENT (OUTPATIENT)
Dept: GENERAL RADIOLOGY | Age: 72
DRG: 432 | End: 2022-05-16
Payer: MEDICARE

## 2022-05-16 LAB
ALBUMIN FLUID: 0.7 G/DL
AMYLASE FLUID: 31 U/L
APPEARANCE FLUID: NORMAL
APPEARANCE FLUID: NORMAL
BILIRUBIN FLUID: <0.2 MG/DL
CELL COUNT FLUID TYPE: NORMAL
CHOLESTEROL FLUID: 14 MG/DL
COLOR FLUID: YELLOW
CREATININE FLUID: 0.6 MG/DL
EKG ATRIAL RATE: 117 BPM
EKG Q-T INTERVAL: 286 MS
EKG QRS DURATION: 68 MS
EKG QTC CALCULATION (BAZETT): 398 MS
EKG R AXIS: -15 DEGREES
EKG T AXIS: 16 DEGREES
EKG VENTRICULAR RATE: 117 BPM
FLUID TYPE: NORMAL
GLUCOSE, FLUID: 201 MG/DL
HEMATOCRIT FLUID: <2 %
LD, FLUID: 62 U/L
LIPASE BODY FLUID: 28 U/L
METER GLUCOSE: 112 MG/DL (ref 74–99)
METER GLUCOSE: 191 MG/DL (ref 74–99)
METER GLUCOSE: 224 MG/DL (ref 74–99)
METER GLUCOSE: 232 MG/DL (ref 74–99)
METER GLUCOSE: 85 MG/DL (ref 74–99)
MONOCYTE, FLUID: 95 %
NEUTROPHIL, FLUID: 6 %
NUCLEATED CELLS FLUID: 91 /UL
PROTEIN FLUID: 1.2 G/DL
RBC FLUID: 2000 /UL
TRIGLYCERIDES FLUID: 32 MG/DL

## 2022-05-16 PROCEDURE — 85013 SPUN MICROHEMATOCRIT: CPT

## 2022-05-16 PROCEDURE — 82570 ASSAY OF URINE CREATININE: CPT

## 2022-05-16 PROCEDURE — 83690 ASSAY OF LIPASE: CPT

## 2022-05-16 PROCEDURE — 82947 ASSAY GLUCOSE BLOOD QUANT: CPT

## 2022-05-16 PROCEDURE — 99233 SBSQ HOSP IP/OBS HIGH 50: CPT | Performed by: INTERNAL MEDICINE

## 2022-05-16 PROCEDURE — 87102 FUNGUS ISOLATION CULTURE: CPT

## 2022-05-16 PROCEDURE — 99222 1ST HOSP IP/OBS MODERATE 55: CPT | Performed by: NURSE PRACTITIONER

## 2022-05-16 PROCEDURE — 6370000000 HC RX 637 (ALT 250 FOR IP): Performed by: INTERNAL MEDICINE

## 2022-05-16 PROCEDURE — APPSS30 APP SPLIT SHARED TIME 16-30 MINUTES: Performed by: NURSE PRACTITIONER

## 2022-05-16 PROCEDURE — 87205 SMEAR GRAM STAIN: CPT

## 2022-05-16 PROCEDURE — 93010 ELECTROCARDIOGRAM REPORT: CPT | Performed by: INTERNAL MEDICINE

## 2022-05-16 PROCEDURE — 84478 ASSAY OF TRIGLYCERIDES: CPT

## 2022-05-16 PROCEDURE — 6370000000 HC RX 637 (ALT 250 FOR IP): Performed by: HOSPITALIST

## 2022-05-16 PROCEDURE — 0W993ZZ DRAINAGE OF RIGHT PLEURAL CAVITY, PERCUTANEOUS APPROACH: ICD-10-PCS | Performed by: INTERNAL MEDICINE

## 2022-05-16 PROCEDURE — 87206 SMEAR FLUORESCENT/ACID STAI: CPT

## 2022-05-16 PROCEDURE — 6360000002 HC RX W HCPCS: Performed by: INTERNAL MEDICINE

## 2022-05-16 PROCEDURE — 82150 ASSAY OF AMYLASE: CPT

## 2022-05-16 PROCEDURE — 32555 ASPIRATE PLEURA W/ IMAGING: CPT

## 2022-05-16 PROCEDURE — 83986 ASSAY PH BODY FLUID NOS: CPT

## 2022-05-16 PROCEDURE — 83615 LACTATE (LD) (LDH) ENZYME: CPT

## 2022-05-16 PROCEDURE — 82042 OTHER SOURCE ALBUMIN QUAN EA: CPT

## 2022-05-16 PROCEDURE — 76705 ECHO EXAM OF ABDOMEN: CPT

## 2022-05-16 PROCEDURE — 2580000003 HC RX 258: Performed by: INTERNAL MEDICINE

## 2022-05-16 PROCEDURE — 82247 BILIRUBIN TOTAL: CPT

## 2022-05-16 PROCEDURE — 2500000003 HC RX 250 WO HCPCS: Performed by: INTERNAL MEDICINE

## 2022-05-16 PROCEDURE — 88112 CYTOPATH CELL ENHANCE TECH: CPT

## 2022-05-16 PROCEDURE — 94660 CPAP INITIATION&MGMT: CPT

## 2022-05-16 PROCEDURE — 84999 UNLISTED CHEMISTRY PROCEDURE: CPT

## 2022-05-16 PROCEDURE — 87040 BLOOD CULTURE FOR BACTERIA: CPT

## 2022-05-16 PROCEDURE — 87070 CULTURE OTHR SPECIMN AEROBIC: CPT

## 2022-05-16 PROCEDURE — 36415 COLL VENOUS BLD VENIPUNCTURE: CPT

## 2022-05-16 PROCEDURE — 89051 BODY FLUID CELL COUNT: CPT

## 2022-05-16 PROCEDURE — 71045 X-RAY EXAM CHEST 1 VIEW: CPT

## 2022-05-16 PROCEDURE — 87015 SPECIMEN INFECT AGNT CONCNTJ: CPT

## 2022-05-16 PROCEDURE — 88305 TISSUE EXAM BY PATHOLOGIST: CPT

## 2022-05-16 PROCEDURE — 87116 MYCOBACTERIA CULTURE: CPT

## 2022-05-16 PROCEDURE — 82962 GLUCOSE BLOOD TEST: CPT

## 2022-05-16 PROCEDURE — 2060000000 HC ICU INTERMEDIATE R&B

## 2022-05-16 PROCEDURE — 84157 ASSAY OF PROTEIN OTHER: CPT

## 2022-05-16 PROCEDURE — 99222 1ST HOSP IP/OBS MODERATE 55: CPT | Performed by: INTERNAL MEDICINE

## 2022-05-16 RX ORDER — ONDANSETRON 2 MG/ML
4 INJECTION INTRAMUSCULAR; INTRAVENOUS EVERY 6 HOURS PRN
Status: DISCONTINUED | OUTPATIENT
Start: 2022-05-16 | End: 2022-05-19 | Stop reason: HOSPADM

## 2022-05-16 RX ORDER — LACTULOSE 10 G/15ML
10 SOLUTION ORAL DAILY
Status: DISCONTINUED | OUTPATIENT
Start: 2022-05-16 | End: 2022-05-19 | Stop reason: HOSPADM

## 2022-05-16 RX ORDER — SPIRONOLACTONE 25 MG/1
25 TABLET ORAL DAILY
Status: DISCONTINUED | OUTPATIENT
Start: 2022-05-16 | End: 2022-05-16

## 2022-05-16 RX ORDER — ACETAMINOPHEN 325 MG/1
650 TABLET ORAL EVERY 6 HOURS PRN
Status: DISCONTINUED | OUTPATIENT
Start: 2022-05-16 | End: 2022-05-19 | Stop reason: HOSPADM

## 2022-05-16 RX ORDER — NADOLOL 20 MG/1
20 TABLET ORAL DAILY
Status: DISCONTINUED | OUTPATIENT
Start: 2022-05-16 | End: 2022-05-19 | Stop reason: HOSPADM

## 2022-05-16 RX ORDER — LORAZEPAM 2 MG/ML
1 INJECTION INTRAMUSCULAR EVERY 4 HOURS PRN
Status: DISCONTINUED | OUTPATIENT
Start: 2022-05-16 | End: 2022-05-19 | Stop reason: HOSPADM

## 2022-05-16 RX ORDER — DEXTROSE MONOHYDRATE 50 MG/ML
100 INJECTION, SOLUTION INTRAVENOUS PRN
Status: DISCONTINUED | OUTPATIENT
Start: 2022-05-16 | End: 2022-05-19 | Stop reason: HOSPADM

## 2022-05-16 RX ORDER — ACETAMINOPHEN 650 MG/1
650 SUPPOSITORY RECTAL EVERY 6 HOURS PRN
Status: DISCONTINUED | OUTPATIENT
Start: 2022-05-16 | End: 2022-05-19 | Stop reason: HOSPADM

## 2022-05-16 RX ORDER — INSULIN GLARGINE 100 [IU]/ML
21 INJECTION, SOLUTION SUBCUTANEOUS NIGHTLY
Status: DISCONTINUED | OUTPATIENT
Start: 2022-05-16 | End: 2022-05-19 | Stop reason: HOSPADM

## 2022-05-16 RX ORDER — LIDOCAINE HYDROCHLORIDE 10 MG/ML
10 INJECTION, SOLUTION EPIDURAL; INFILTRATION; INTRACAUDAL; PERINEURAL ONCE
Status: COMPLETED | OUTPATIENT
Start: 2022-05-16 | End: 2022-05-16

## 2022-05-16 RX ORDER — INSULIN LISPRO 100 [IU]/ML
0-3 INJECTION, SOLUTION INTRAVENOUS; SUBCUTANEOUS NIGHTLY
Status: DISCONTINUED | OUTPATIENT
Start: 2022-05-16 | End: 2022-05-19 | Stop reason: HOSPADM

## 2022-05-16 RX ORDER — SODIUM CHLORIDE 0.9 % (FLUSH) 0.9 %
5-40 SYRINGE (ML) INJECTION PRN
Status: DISCONTINUED | OUTPATIENT
Start: 2022-05-16 | End: 2022-05-19 | Stop reason: HOSPADM

## 2022-05-16 RX ORDER — SODIUM CHLORIDE 0.9 % (FLUSH) 0.9 %
5-40 SYRINGE (ML) INJECTION EVERY 12 HOURS SCHEDULED
Status: DISCONTINUED | OUTPATIENT
Start: 2022-05-16 | End: 2022-05-19 | Stop reason: HOSPADM

## 2022-05-16 RX ORDER — INSULIN LISPRO 100 [IU]/ML
12 INJECTION, SOLUTION INTRAVENOUS; SUBCUTANEOUS
Status: DISCONTINUED | OUTPATIENT
Start: 2022-05-16 | End: 2022-05-19 | Stop reason: HOSPADM

## 2022-05-16 RX ORDER — ONDANSETRON 4 MG/1
4 TABLET, ORALLY DISINTEGRATING ORAL EVERY 8 HOURS PRN
Status: DISCONTINUED | OUTPATIENT
Start: 2022-05-16 | End: 2022-05-19 | Stop reason: HOSPADM

## 2022-05-16 RX ORDER — SODIUM CHLORIDE 9 MG/ML
25 INJECTION, SOLUTION INTRAVENOUS PRN
Status: DISCONTINUED | OUTPATIENT
Start: 2022-05-16 | End: 2022-05-19 | Stop reason: HOSPADM

## 2022-05-16 RX ORDER — POLYETHYLENE GLYCOL 3350 17 G/17G
17 POWDER, FOR SOLUTION ORAL DAILY PRN
Status: DISCONTINUED | OUTPATIENT
Start: 2022-05-16 | End: 2022-05-19 | Stop reason: HOSPADM

## 2022-05-16 RX ORDER — SPIRONOLACTONE 25 MG/1
25 TABLET ORAL 2 TIMES DAILY
Status: DISCONTINUED | OUTPATIENT
Start: 2022-05-16 | End: 2022-05-19 | Stop reason: HOSPADM

## 2022-05-16 RX ORDER — BUMETANIDE 1 MG/1
2 TABLET ORAL DAILY
Status: DISCONTINUED | OUTPATIENT
Start: 2022-05-16 | End: 2022-05-19 | Stop reason: HOSPADM

## 2022-05-16 RX ORDER — INSULIN LISPRO 100 [IU]/ML
0-6 INJECTION, SOLUTION INTRAVENOUS; SUBCUTANEOUS
Status: DISCONTINUED | OUTPATIENT
Start: 2022-05-16 | End: 2022-05-19 | Stop reason: HOSPADM

## 2022-05-16 RX ADMIN — INSULIN LISPRO 12 UNITS: 100 INJECTION, SOLUTION INTRAVENOUS; SUBCUTANEOUS at 16:28

## 2022-05-16 RX ADMIN — SODIUM CHLORIDE 25 ML: 9 INJECTION, SOLUTION INTRAVENOUS at 11:52

## 2022-05-16 RX ADMIN — AMPICILLIN SODIUM AND SULBACTAM SODIUM 3000 MG: 2; 1 INJECTION, POWDER, FOR SOLUTION INTRAMUSCULAR; INTRAVENOUS at 18:00

## 2022-05-16 RX ADMIN — INSULIN GLARGINE 21 UNITS: 100 INJECTION, SOLUTION SUBCUTANEOUS at 20:57

## 2022-05-16 RX ADMIN — LACTULOSE 10 G: 20 SOLUTION ORAL at 08:48

## 2022-05-16 RX ADMIN — NADOLOL 20 MG: 20 TABLET ORAL at 08:49

## 2022-05-16 RX ADMIN — INSULIN GLARGINE 21 UNITS: 100 INJECTION, SOLUTION SUBCUTANEOUS at 02:12

## 2022-05-16 RX ADMIN — LIDOCAINE HYDROCHLORIDE 10 ML: 10 INJECTION, SOLUTION EPIDURAL; INFILTRATION; INTRACAUDAL; PERINEURAL at 17:19

## 2022-05-16 RX ADMIN — INSULIN LISPRO 2 UNITS: 100 INJECTION, SOLUTION INTRAVENOUS; SUBCUTANEOUS at 16:28

## 2022-05-16 RX ADMIN — AMPICILLIN SODIUM AND SULBACTAM SODIUM 3000 MG: 2; 1 INJECTION, POWDER, FOR SOLUTION INTRAMUSCULAR; INTRAVENOUS at 06:22

## 2022-05-16 RX ADMIN — SODIUM CHLORIDE 25 ML: 9 INJECTION, SOLUTION INTRAVENOUS at 18:00

## 2022-05-16 RX ADMIN — SODIUM CHLORIDE, PRESERVATIVE FREE 10 ML: 5 INJECTION INTRAVENOUS at 20:56

## 2022-05-16 RX ADMIN — INSULIN LISPRO 12 UNITS: 100 INJECTION, SOLUTION INTRAVENOUS; SUBCUTANEOUS at 07:46

## 2022-05-16 RX ADMIN — SODIUM CHLORIDE 25 ML: 9 INJECTION, SOLUTION INTRAVENOUS at 06:20

## 2022-05-16 RX ADMIN — INSULIN LISPRO 1 UNITS: 100 INJECTION, SOLUTION INTRAVENOUS; SUBCUTANEOUS at 07:46

## 2022-05-16 RX ADMIN — RIFAXIMIN 550 MG: 550 TABLET ORAL at 20:56

## 2022-05-16 RX ADMIN — INSULIN LISPRO 1 UNITS: 100 INJECTION, SOLUTION INTRAVENOUS; SUBCUTANEOUS at 02:12

## 2022-05-16 RX ADMIN — SPIRONOLACTONE 25 MG: 25 TABLET ORAL at 18:00

## 2022-05-16 RX ADMIN — AMPICILLIN SODIUM AND SULBACTAM SODIUM 3000 MG: 2; 1 INJECTION, POWDER, FOR SOLUTION INTRAMUSCULAR; INTRAVENOUS at 11:52

## 2022-05-16 RX ADMIN — RIFAXIMIN 550 MG: 550 TABLET ORAL at 08:49

## 2022-05-16 ASSESSMENT — PAIN SCALES - PAIN ASSESSMENT IN ADVANCED DEMENTIA (PAINAD)
BODYLANGUAGE: 0
BREATHING: 0
CONSOLABILITY: 0
TOTALSCORE: 0
NEGVOCALIZATION: 0
FACIALEXPRESSION: 0

## 2022-05-16 NOTE — PROGRESS NOTES
Event Note    · Patient known to [facility]. Will change the consult. Thanks. Colton Rai. Soila Rm M.D., F.C.C.P.     [facility], [address] [place] [address] [facility], [address], [facility] [place]

## 2022-05-16 NOTE — CONSULTS
Prudencio Jauregui M.D. [facility]  Dr. José Luis Cruz M.D.,  Dr. Miranda Sandoval M.D.,  Dr. Jorden Vargas D.O.,  Dr. Marti Rivers D.O. ,  Dr. Mike Carranza M.D.,  CARLI SilvermanO. Mary Anne Perez  66 y.o.  male      Re: Concern for progressive worsening of underlying liver cirrhosis leading to new development of portal hypertension  Requesting physician: Dr Devika Stuart  Date:3:36 PM 5/16/2022          HPI: 71-year-old male patient seen in the hospital for above-described issue. Patient is known to me from previous hospital admission he has history of cirrhosis with varices. Patient has also pretty significant dementia/cognitive deficit and currently does not provide any useful information regards to his condition or past medical history. His wife however is present at bedside and provides most of the information. She reports that patient was brought to the hospital because of shortness of breath and mostly dyspnea on exertion. She denies nausea vomiting for the patient and denies patient having hematemesis emesis of coffee-ground material.  She reports bowel movements to be without blood or melena. Patient has previous history of upper endoscopy with variceal banding in December of last year as well as approximately a month to month and a half ago per patient's wife. According to her patient was told that he will have to be followed at 6-month interval for above issue. Upon presentation he was noted to have some ascites but mostly in the pelvis which according to radiology is not amenable to paracentesis. Patient also had CT scan with CTA of the chest revealing large pleural effusion with compressive atelectasis. Reveals preserved renal function with BUN of 15 and creatinine of 0.8. Liver profile shows nonelevated transaminases, nonelevated bilirubin and nonelevated alkaline phosphatase.   Ammonia level is 45.7    Information sources:   -Patient  -family  -medical record  -health MD        polyethylene glycol (GLYCOLAX) packet 17 g  17 g Oral Daily PRN Gracie Meneses MD        acetaminophen (TYLENOL) tablet 650 mg  650 mg Oral Q6H PRN Gracie Meneses MD        Or    acetaminophen (TYLENOL) suppository 650 mg  650 mg Rectal Q6H PRN Gracie Meneses MD        insulin lispro (HUMALOG) injection vial 0-6 Units  0-6 Units SubCUTAneous TID  Gracie Meneses MD   1 Units at 05/16/22 0746    insulin lispro (HUMALOG) injection vial 0-3 Units  0-3 Units SubCUTAneous Nightly Gracie Meneses MD   1 Units at 05/16/22 0212    lactulose ([facility]) 10 GM/15ML solution 10 g  10 g Oral Daily Gracie Meneses MD   10 g at 05/16/22 0848    rifAXIMin (XIFAXAN) tablet 550 mg  550 mg Oral BID Gracie Meneses MD   550 mg at 05/16/22 0849    bumetanide (BUMEX) tablet 2 mg  2 mg Oral Daily Gracie Meneses MD        spironolactone (ALDACTONE) tablet 25 mg  25 mg Oral Daily Gracie Meneses MD        LORazepam (ATIVAN) injection 1 mg  1 mg IntraVENous Q4H PRN Gracie Meneses MD        insulin glargine (LANTUS) injection vial 21 Units  21 Units SubCUTAneous Nightly Gracie Meneses MD   21 Units at 05/16/22 5790    insulin lispro (HUMALOG) injection vial 12 Units  12 Units SubCUTAneous TID  Gracie Meneses MD   12 Units at 05/16/22 0746    nadolol (CORGARD) tablet 20 mg  20 mg Oral Daily Gracie Meneses MD   20 mg at 05/16/22 0849    ampicillin-sulbactam (UNASYN) 3000 mg ivpb minibag  3,000 mg IntraVENous Q6H Gracie Meneses MD   Stopped at 05/16/22 1225    glucose chewable tablet 16 g  4 tablet Oral PRN Gracie Meneses MD        dextrose bolus 10% 125 mL  125 mL IntraVENous PRN Gracie Meneses MD        Or    dextrose bolus 10% 250 mL  250 mL IntraVENous PRN Gracie Meneses MD        glucagon (rDNA) injection 1 mg  1 mg IntraMUSCular PRN Gracie Meneses MD        dextrose 5 % solution  100 mL/hr IntraVENous PRN Gracie Meneses MD           SocHx:  Social History     Socioeconomic History    Marital status:      Spouse name: Not on file    Number of children: 3    Years of education: Not on file    Highest education level: Not on file   Occupational History    Occupation: retired     Comment: customer service   Tobacco Use    Smoking status: Never Smoker    Smokeless tobacco: Never Used   Vaping Use    Vaping Use: Never used   Substance and Sexual Activity    Alcohol use: Never     Comment: rare socially    Drug use: Never    Sexual activity: Not Currently   Other Topics Concern    Not on file   Social History Narrative    Not on file     Social Determinants of Health     Financial Resource Strain: Low Risk     Difficulty of Paying Living Expenses: Not hard at all   Food Insecurity: No Food Insecurity    Worried About Running Out of Food in the Last Year: Never true    [address] in the Last Year: Never true   Transportation Needs:     Lack of Transportation (Medical): Not on file    Lack of Transportation (Non-Medical):  Not on file   Physical Activity:     Days of Exercise per Week: Not on file    Minutes of Exercise per Session: Not on file   Stress:     Feeling of Stress : Not on file   Social Connections:     Frequency of Communication with Friends and Family: Not on file    Frequency of Social Gatherings with Friends and Family: Not on file    Attends Shinto Services: Not on file    Active Member of [address] [place] [address] [employer] or Organizations: Not on file    Attends Club or Organization Meetings: Not on file    Marital Status: Not on file   Intimate Partner Violence:     Fear of Current or Ex-Partner: Not on file    Emotionally Abused: Not on file    Physically Abused: Not on file    Sexually Abused: Not on file   Housing Stability:     Unable to Pay for Housing in the Last Year: Not on file    Number of [place] in the Last Year: Not on file    Unstable Housing in the Last Year: Not on file       FamHx:  Family History   Problem Relation Age of Onset    Stroke Brother     Seizures Brother     Hypertension Brother     Heart Failure Brother        Allergy:  Allergies   Allergen Reactions    Ketorolac Tromethamine Shortness Of Breath and Other (See Comments)     Depression, \"walking like a zombie\", no desire to do anything.  Memantine Hcl Other (See Comments)     AMS, \"walking like a zombie\"    Tamsulosin Hcl Shortness Of Breath         ROS: As described in the HPI and in addition is negative upon detailed review of systems or unobtainable unless otherwise stated in this dictation. PE:  /66   Pulse 70   Temp 98.3 °F (36.8 °C) (Oral)   Resp 18   Ht 5' 10\" (1.778 m)   Wt 160 lb 9.6 oz (72.8 kg)   SpO2 98%   BMI 23.04 kg/m²     Gen. :  male. Confused. NAD  Head: Atraumatic/normocephalic  Eyes: Anicteric sclera/no conjunctival erythema  ENT: Moist oral mucosa/no discharge from nose or ears  Neck: Supple/trachea midline  Chest: Symmetric excursion/no wheezing  Cor: Regular/S1-S2  Abd.: Soft and obese/distended.   Appears nontender  Extr.:  No peripheral edema  Muscles: Decreased tone and bulk, consistent with age and condition  Skin: Warm and dry/anicteric        DATA:     Lab Results   Component Value Date    WBC 4.3 05/15/2022    RBC 3.61 05/15/2022    HGB 11.5 05/15/2022    HCT 34.9 05/15/2022    MCV 96.7 05/15/2022    MCH 31.9 05/15/2022    MCHC 33.0 05/15/2022    RDW 14.6 05/15/2022    PLT 88 05/15/2022    MPV 10.5 05/15/2022     Lab Results   Component Value Date     05/15/2022    K 4.9 05/15/2022    CL 99 05/15/2022    CO2 21 05/15/2022    BUN 15 05/15/2022    CREATININE 0.8 05/15/2022    CALCIUM 8.6 05/15/2022    PROT 7.3 05/15/2022    LABALBU 3.6 05/15/2022    LABALBU 4.3 03/29/2012    BILITOT 0.9 05/15/2022    ALKPHOS 103 05/15/2022    AST 37 05/15/2022    ALT 36 05/15/2022     Lab Results   Component Value Date    LIPASE 29 05/15/2022     Lab Results   Component Value Date    AMYLASE 39 10/10/2021         ASSESSMENT/PLAN:  Patient Active Problem List   Diagnosis    Type 1 diabetes mellitus without complication (HCC)    Anemia    Hepatic cirrhosis due to chronic hepatitis C infection ([facility]    Secondary esophageal varices with bleeding (HCC)    Dementia associated with other underlying disease without behavioral disturbance (HCC)    B12 deficiency    H/o streptococcal bacteremia    Upper GI bleed    Acute respiratory failure (HCC)    Acute respiratory distress       1.  Cirrhosis  -MELD-Na = 11 (12 on previous admission)   -History of HCV - treated - RNA negative (11/8/2021)  -Autoimmune liver serology negative  -Previously with elevated ferritin (2016) - repeat  -Nonelevated AFP = 1 (10/13/2021) -   -Ultrasound of the liver consistent with cirrhosis -obtain Doppler of portal/hepatic veins  -History of esophageal varices - see below     2.  Esophageal varices/anemia/GI bleed  -History of varices with banding  -Stable H&H/no evidence of overt bleed  -No immediate plans for inpatient endoscopy     3.  Hepatic encephalopathy / AMS  -Nonelevated ammonia  -Continue rifaximin/lactulose     4.  Ascites/right pleural effusion  -Small ascites  -not amendable to paracentesis per radiology  -Doppler ultrasound as above  -Await pulmonology evaluation     6.  Comorbidities  -DM, dementia  -Per admitting / consultants    Above has been discussed in detail with patient's wife at bedside and all questions answered to her satisfaction. She is agreeable with the plan as delineated. Thank you for the opportunity to see this patient in consultation    Anneliese Sherman MD  5/16/2022  3:36 PM    NOTE:  This report was transcribed using voice recognition software. Every effort was made to ensure accuracy; however, inadvertent computerized transcription errors may be present. Patient seen and examined independently. Discussed with Dr. Guanaco Jaimes and agree with the plan of care stated above.     Marti Rivers DO  5/16/2022  4:12 PM

## 2022-05-16 NOTE — PROGRESS NOTES
[facility] Follow Up Progress Note- Pt admitted after modnight    Admitting Date and Time: 5/15/2022  2:56 PM  Admit Dx: Acute respiratory failure (HCC) [J96.00]  Acute respiratory distress [R06.03]  Pleural effusion [J90]  Other ascites [R18.8]    Subjective:  Patient is being followed for Acute respiratory failure (HCC) [J96.00]  Acute respiratory distress [R06.03]  Pleural effusion [J90]  Other ascites [R18.8]       Pt resting with eyes closed in bed in no acute distress  Sitter at bedside  Wife sitting on on couch  Wife reporting pt overall doing better  Pt's breathing better  Pt did make several attempts to get out of bed while I was in the room. Per wife this is not unusual for pt. He is usually up moving around- very active. Has dementia- confusion at baseline. Reporting he is at baseline  Wife reporting they have plans to travel to [place] on Friday and are hoping to discharge soon  Per nursing there was not enough fluid for IR paracentesis    ROS: denies fever, chills, cp, sob, n/v, HA unless stated above. Assessment:    Principal Problem:    Acute respiratory failure ([address]  Resolved Problems:    * No resolved hospital problems. *      Plan: 1. Dyspnea/ respiratory distress- related to fluid overload/ pleural effusion: Pt presented to the ER with sob for 2-3 hours. Per ER note by was having significant work of breathing on arrival and needed to be placed on BiPAP. Does not appear that he was hypoxic on admission. Currently on RA. Breathing easily and non labored. 2. Large right pleural effusion: pt does have a history of liver cirrhosis with ascities He has never had a paracentesis in the past. CT chest showing a large right pleural effusion with compressive atelectasis. Echo from 20/2021 showing EF 60%- fibrotic aortic valve. Started on IV diuresis.  Consult pulmonology to assist.     3. Decompensated liver cirrhosis with ascites- h/o varices: secondary to Hep C and portal HTN: pt appears to be thrid spacing with moderate ascites and right sided pleural effusion: GI consulted. IR consulted for paracentesis. Continue aldactone. Lactulose, rifaximin and diuretic. Nadolol started due to h/o varices. Palliative medicine consulted due to progressive cirrhosis. Pt went to IR this am for paracentesis and not enough fluid. 4. H/o dementia:pt very active/ confused at baseline per wife. Sitter at bedside     5. H/o Hep C    6. Hyponatremia: monitor    7. Pancytopenia: monitor    8. DM: check hga1c: on tresiba nightly and novolog insulin with meals at home. Dispo: wife reporting plans to go to [place] on Friday and hoping pt will not be hospitalized long. NOTE: This report was transcribed using voice recognition software. Every effort was made to ensure accuracy; however, inadvertent computerized transcription errors may be present.   Electronically signed by AVILA Sequeira on 5/16/2022 at 10:06 AM

## 2022-05-16 NOTE — ED NOTES
PT climbing out of bed, attempting to remove bipap mask, unable to be redirected, MD aware, orders given     Marvin Pryor, ANGELITO  05/15/22 2020

## 2022-05-16 NOTE — PROCEDURES
[facility]    Pulmonary/critical care procedure note    THORACENTESIS PROCEDURE NOTE    Patient: Diana Allison  MRN: [id number]  : 1950  Date: 2022  Time: 5:11 PM    Laboratory Work:  Lab Results   Component Value Date    INR 1.5 05/15/2022    INR 1.3 2022    INR 1.6 2021    PROTIME 16.2 (H) 05/15/2022    PROTIME 14.5 (H) 2022    PROTIME 17.5 (H) 2021     Lab Results   Component Value Date    WBC 4.3 (L) 05/15/2022    HGB 11.5 (L) 05/15/2022    HCT 34.9 (L) 05/15/2022    MCV 96.7 05/15/2022    PLT 88 (L) 05/15/2022    LYMPHOPCT 10.0 (L) 05/15/2022    RBC 3.61 (L) 05/15/2022    MCH 31.9 05/15/2022    MCHC 33.0 05/15/2022    RDW 14.6 05/15/2022    NEUTOPHILPCT 84.0 (H) 05/15/2022    MONOPCT 4.0 05/15/2022    EOSPCT 2.0 10/21/2020    BASOPCT 0.0 05/15/2022    NEUTROABS 3.61 05/15/2022    LYMPHSABS 0.47 (L) 05/15/2022    MONOSABS 0.17 05/15/2022    EOSABS 0.04 (L) 05/15/2022    BASOSABS 0.00 05/15/2022     Lab Results   Component Value Date     05/15/2022    K 4.9 05/15/2022    CL 99 05/15/2022    CO2 21 05/15/2022    BUN 15 05/15/2022    CREATININE 0.8 05/15/2022    GLUCOSE 288 05/15/2022    GLUCOSE 114 2012    CALCIUM 8.6 05/15/2022      Attending Physician: Dr. Mandi Crareno    Assistant(s): Ultrasound, Nursing Staff    Pre-Procedure Diagnosis: Right Pleural Effusion    Operation/Procedure: Right Ultrasound Guided Thoracentesis    Post-Procedure Diagnosis: Right Pleural Effusion    Indications: Pleural Effusion    Purpose: Diagnostic, Therapeutic    Consent: Consent was obtained prior to procedure. Indications, risks, benefits were explained at length. Procedure Summary:  A time out was performed to confirm patient and procedure. A hat, mask as well as sterile gown and gloves were worn during the procedure.   The patient was prepped and draped in a sterile manner using chlorhexidine scrub after the appropriate level of the right thoracic wall was percussed and effusion pocket was confirmed by ultrasound. U/S images were permanently documented. Approximately 2 cc of 1% lidocaine was used to numb the region. A finder needle was then used to attempt to locate the pleural fluid and once located the needle was retracted while injecting anesthetic along the track. The fluid that was obtained was straw-colored and free flowing. A # 10 surgical blade was used to make small incision at the entrance site of track mentioned above. Thoracentesis catheter was then threaded along the track that was mentioned above and into the pleural space. Evacuation of the fluid was achieved via vacuum evacuation into a single 1000 cc heparin-containing vacuum container. The patient had 1450 cc of straw-colored free-flowing fluid removed. No immediate complications were noted during the procedure or after the procedure completion. A post-procedure chest x-ray is pending at the time of this note. The fluid will be sent for several studies with analysis per [employer]. Pleural Fluid 5/16/2022:      Literature Links:   (1) REFERENCE  (2) REFERENCE    Estimated Blood Loss: NONE    Complications: NONE    Tolerance: Excellent      Department of Internal Medicine  Division of Pulmonary, Critical Care and Sleep Medicine  [facility]  Procedural Note Addendum Statement    I personally supervised/performed the performance of the documented procedure. I was present for the critical elements of this procedure and was immediately available for the entire procedure.     Heike Simpson MD on 5/16/2022 at 5:11 PM

## 2022-05-16 NOTE — PROGRESS NOTES
Message left on Dr. Lawrence Foot answering service re: new consult. @[id number] Message sent to Dr. Claude Pro via Wipebook re: new consult.

## 2022-05-16 NOTE — ED NOTES
Attempted to complete CT scan as ordered, pt uncooperative and climbing off CT table, MD aware     Emma Garza, RN  05/15/22 2021

## 2022-05-16 NOTE — CONSULTS
Comprehensive Nutrition Assessment    Type and Reason for Visit:  Initial,Consult    Nutrition Recommendations/Plan:   1. Continue current diet  2. Start Magic Cup BID      Malnutrition Assessment:  Malnutrition Status:  Insufficient data (05/16/22 1413)    Context:  Chronic Illness     Findings of the 6 clinical characteristics of malnutrition:  Energy Intake:  No significant decrease in energy intake  Weight Loss:  Unable to assess (d/t cirrhosis w/ ascites)     Body Fat Loss:  Unable to assess (d/t pt AG)     Muscle Mass Loss:  Unable to assess (d/t pt AG)    Fluid Accumulation:  No significant fluid accumulation     Strength:  Not Performed    Nutrition Assessment:    Pt w/ R pleural effusion. Hx dementia/DM/hepatic cirrhosis w/ varices/ascites d/t hep C. Pt consuming >75% of meals, will add ONS BID for nutritional support & monitor. Nutrition Related Findings:    confused, I&Os WDL, no edema, abd distended/gross ascites, +BS, Na 130 Wound Type: None       Current Nutrition Intake & Therapies:    Average Meal Intake: %  Average Supplements Intake: None Ordered  ADULT DIET; Dysphagia - Soft and Bite Sized; 3 carb choices (45 gm/meal); Low Sodium (2 gm)    Anthropometric Measures:  Height: 5' 10\" (177.8 cm)  Ideal Body Weight (IBW): 166 lbs (75 kg)       Current Body Weight: 160 lb (72.6 kg) (5/16 acual, possibly elevated r/t gross ascites), 96.4 % IBW.     Current BMI (kg/m2): 23  Usual Body Weight: 179 lb (81.2 kg) (7/2021 actual per EMR)  % Weight Change (Calculated): -10.6  Weight Adjustment For: No Adjustment                 BMI Categories: Normal Weight (BMI 22.0 to 24.9) age over 72    Estimated Daily Nutrient Needs:  Energy Requirements Based On: Formula  Weight Used for Energy Requirements: Current  Energy (kcal/day):   Weight Used for Protein Requirements: Current  Protein (g/day):  (1.2-1.4)  Method Used for Fluid Requirements: 1 ml/kcal  Fluid (ml/day):     Nutrition Diagnosis:   No nutrition diagnosis at this time    Nutrition Interventions:   Food and/or Nutrient Delivery: Continue Current Diet,Start Oral Nutrition Supplement  Nutrition Education/Counseling: Education not appropriate  Coordination of Nutrition Care: Continue to monitor while inpatient       Goals:     Goals: PO intake 75% or greater       Nutrition Monitoring and Evaluation:      Food/Nutrient Intake Outcomes: Food and Nutrient Intake,Supplement Intake  Physical Signs/Symptoms Outcomes: Biochemical Data,GI Status,Fluid Status or Edema,Nutrition Focused Physical Findings,Skin,Weight    Discharge Planning:     Too soon to determine     Luis Manuel Paige RD, LD  Contact: [phone]

## 2022-05-16 NOTE — CONSULTS
Palliative Care Department  [phone]  Palliative Care Initial Consult  Provider DAVID Weathers CNP      PATIENT: Cheri Bruno  : 1950  MRN: [id number]  ADMISSION DATE: 5/15/2022  2:56 PM  Referring Provider: Anant East MD    Palliative Medicine was consulted on hospital day 1 for assistance with Goals of care, Code Status Discussion, Family support  HPI:     Keven Zhang is a 66 y.o. y/o male with a history of Liver cirrhosis with esophageal varices, dementia with behavorial disturbances who presented to [facility]) on 5/15/2022 with labored breathing. CT of the chest found a large right pleural effusion with compressive atelectasis. He was placed on a BiPAP and admitted to telemetry. ASSESSMENT/PLAN:     Pertinent Hospital Diagnoses      Large right pleural effusion   Progressive liver cirrhosis with esophageal varices and ascites   Dementia with behavioral disturbance    Palliative Care Encounter / Counseling Regarding Goals of Care  Please see detailed goals of care discussion as below   At this time, Cheri Bruno, Does Not have capacity for medical decision-making. Capacity is time limited and situation/question specific   During encounter Nasreengrisel Gutierrez was surrogate medical decision-maker   Outcome of goals of care meeting: Continue current medical management. Wife would like him to return home and she would like to keep him at home as long as she is able. Plan is to move to [place] this week to move in with her daughter. As his diseases progress she would consider hospice care. She expresses that he would not want aggressive measures and for CODE STATUS to be a DNR CCA/DNI.    Code status Limited DNRCCA/ DNI   Advanced Directives: no POA or living will in [facility]   Surrogate/Legal NOK:  o Андрей Salinas (spouse) [phone]    Spiritual assessment: no spiritual distress identified  Bereavement and grief: to be determined  Referrals to: none today    Thank you for the chart    Time/Communication  Greater than 50% of time spent, total 50 minutes in counseling and coordination of care at the bedside regarding goals of care. Thank you for allowing Palliative Medicine to participate in the care of Birdie Finnegan. Note: This report was completed using computerTvinci voiced recognition software. Every effort has been made to ensure accuracy; however, inadvertent computerized transcription errors may be present.

## 2022-05-16 NOTE — CONSULTS
[address] NOTE    Patient: Maico Lindsay  MRN: [id number]  : 1950    Encounter Date: 2022  Encounter Time: 4:13 PM     Date of Admission: .5/15/2022  2:56 PM    Consulting Physician:  Primary Care Physician:      Veronica Milligan MD     [phone]    PROBLEM LIST:  Patient Active Problem List   Diagnosis    Type 1 diabetes mellitus without complication ([facility]    Anemia    Hepatic cirrhosis due to chronic hepatitis C infection ([facility]    Secondary esophageal varices with bleeding ([facility]    Dementia associated with other underlying disease without behavioral disturbance ([facility]    B12 deficiency    H/o streptococcal bacteremia    Upper GI bleed    Acute respiratory failure ([facility]    Acute respiratory distress     Reason for Consultation: Pleural Effusion     HPI:   Shawn Other a pleasant 76 y. o. male lifelong non-smoker, covid vaccinated with [employer], with PMH of DM, Hep C treated with Vosevi, liver cirrhosis due to VEGA, dementia, esophageal varices who presented to [facility] for dyspnea.     Wife answers most questions due to dementia.      S/P EGD 18 -Three large varices, grade 3/4, banded with three bands, otherwise, unremarkable.  Stomach showed mild gastritis. Duodenum unremarkable.     S/P EGD 21 with 5 medium varices banded with 6 bands. Duodenum unremarkable.      This admission patient had CTA that was negative for pulmonary embolism but noted large right pleural effusion.     Patient had no fevers, chills, sick contacts, chest pains. He remains on supplemental oxygen at this time. Ammonia level upon this presentation was 45.7. Back in 2021 patient family declined thoracentesis. On May 20, 2022 patient and family will be moving to [place], [place] and will establish with new pulmonary/primary care physician.      PAST MEDICAL HISTORY:   Past Medical History:   Diagnosis Date    Buccal mass 2020    Diabetes mellitus ([facility]     Esophageal varices (HCC)     Hepatitis C     treated and resolved    Liver cirrhosis (HCC)     Mild dementia (HCC)     no meds    Positive colorectal cancer screening using Cologuard test 04/18/2021    Referred to Dr Kindra Gramajo Colonoscopy       PAST SURGICAL HISTORY:   Past Surgical History:   Procedure Laterality Date    BIOPSY MOUTH LESION Left 06/04/2020    EXCISIONAL BIOPSY LEFT BUCCAL MUCOSAL LESION performed by Gila Castro MD at [facility]  2017    ENDOSCOPY, COLON, DIAGNOSTIC  2019 and 2020    HC INSERT PICC CATH, 5/> YRS  10/14/2021         HERNIA REPAIR Right 2018    inguinal     MOUTH SURGERY Right 09/24/2020    EXCISIONAL BIOPSY OF RIGHT BUCCOL LESION performed by Gila Castro MD at [address]      UPPER GASTROINTESTINAL ENDOSCOPY N/A 06/19/2018    EGD BAND LIGATION performed by Hermila Adler MD at [address] [facility] [facility]  09/01/2020    Dr Tiffany Steward medium sized esophageal varices--placed 6 rubber bands on varices--normal duodenum    UPPER GASTROINTESTINAL ENDOSCOPY N/A 12/5/2021    EGD BAND LIGATION performed by Reyna Lujan MD at [facility] ENDOSCOPY       FAMILY HISTORY:   Family History   Problem Relation Age of Onset    Stroke Brother     Seizures Brother     Hypertension Brother     Heart Failure Brother        SOCIAL HISTORY:   Social History     Socioeconomic History    Marital status:      Spouse name: Not on file    Number of children: 3    Years of education: Not on file    Highest education level: Not on file   Occupational History    Occupation: retired     Comment: customer service   Tobacco Use    Smoking status: Never Smoker    Smokeless tobacco: Never Used   Vaping Use    Vaping Use: Never used   Substance and Sexual Activity    Alcohol use: Never     Comment: rare socially    Drug use: Never    Sexual activity: Not Currently   Other Topics Concern    Not on file   Social History Narrative    Not on file     Social Determinants of Health     Financial Resource Strain: Low Risk     Difficulty of Paying Living Expenses: Not hard at all   Food Insecurity: No Food Insecurity    Worried About Running Out of Food in the Last Year: Never true    [address] in the Last Year: Never true   Transportation Needs:     Lack of Transportation (Medical): Not on file    Lack of Transportation (Non-Medical): Not on file   Physical Activity:     Days of Exercise per Week: Not on file    Minutes of Exercise per Session: Not on file   Stress:     Feeling of Stress : Not on file   Social Connections:     Frequency of Communication with Friends and Family: Not on file    Frequency of Social Gatherings with Friends and Family: Not on file    Attends Mandaen Services: Not on file    Active Member of [address] [place] [address] or Organizations: Not on file    Attends Club or Organization Meetings: Not on file    Marital Status: Not on file   Intimate Partner Violence:     Fear of Current or Ex-Partner: Not on file    Emotionally Abused: Not on file    Physically Abused: Not on file    Sexually Abused: Not on file   Housing Stability:     Unable to Pay for Housing in the Last Year: Not on file    Number of [place] in the Last Year: Not on file    Unstable Housing in the Last Year: Not on file     Social History     Tobacco Use   Smoking Status Never Smoker   Smokeless Tobacco Never Used     Social History     Substance and Sexual Activity   Alcohol Use Never    Comment: rare socially     Social History     Substance and Sexual Activity   Drug Use Never   HOME MEDICATIONS:  Prior to Admission medications    Medication Sig Start Date End Date Taking?  Authorizing Provider   rifaximin (XIFAXAN) 550 MG tablet Take 1 tablet by mouth 2 times daily Indications: APPROVED FROM 09/14/2021 TO 12/14/2022 PER kimberly 1/3/22   Jose Andres Brian Bolivar MD   lactulose (CEPHULAC) 10 g packet Take 10 g by mouth daily    Historical Provider, MD   Magnesium 500 MG TABS Take 1 tablet by mouth daily    Historical Provider, MD   Coenzyme Q10 (CO Q 10 PO) Take 200 mg by mouth daily    Historical Provider, MD   Cholecalciferol (VITAMIN D3) 50 MCG (2000 UT) CAPS Take 1 capsule by mouth daily    Historical Provider, MD   Calcium Carbonate-Vit D-Min (CALCIUM 1200 PO) Take 1 tablet by mouth daily    Historical Provider, MD   Flaxseed, Linseed, (FLAXSEED OIL) 1200 MG CAPS Take 1 capsule by mouth daily    Historical Provider, MD   Multiple Vitamin (MULTIVITAMIN PO) Take 1 tablet by mouth daily    Historical Provider, MD   Turmeric Curcumin 500 MG CAPS Take 1 capsule by mouth daily    Historical Provider, MD   zinc 50 MG TABS tablet Take 50 mg by mouth daily    Historical Provider, MD   MILK THISTLE PO Take 1,000 mg by mouth daily    Historical Provider, MD   Chromium Picolinate 1000 MCG TABS Take 1 tablet by mouth daily    Historical Provider, MD   CINNAMON PO Take 1,000 mg by mouth daily    Historical Provider, MD   Misc Natural Products (NEURIVA) CHEW Take 1 tablet by mouth daily    Historical Provider, MD   rivastigmine (EXELON) 4.6 MG/24HR Place 1 patch onto the skin daily 3/14/22   Dawna Groves MD   bumetanide (BUMEX) 2 MG tablet Take 1 tablet by mouth daily  Patient taking differently: Take 2 mg by mouth every other day This is taken every other day, not daily 12/20/21   Sonja Henao MD   escitalopram (LEXAPRO) 5 MG tablet Take 1 tablet by mouth daily 12/20/21   Sonja Henao MD   spironolactone (ALDACTONE) 25 MG tablet Take 1 tablet by mouth daily  Patient taking differently: Take 50 mg by mouth daily  12/8/21   Marco Zendejas,    blood glucose test strips (ASCENSIA AUTODISC VI;ONE TOUCH ULTRA TEST VI) strip 1 each by In Vitro route 4 times daily One touch test strips 10/29/21   Sonja Henao MD   [facility] NATURAL PRODUCTS PO Take 1 tablet by mouth every other day -[address] [place]-     Historical Provider, MD   CRANBERRY PO Take 1 tablet by mouth as needed (URINARY)    Historical Provider, MD   insulin aspart (NOVOLOG FLEXPEN) 100 UNIT/ML injection pen Inject 14 Units into the skin 3 times daily (before meals) If BS less than 100, no coverage  If greater than 150, 14 units 7/13/21 12/13/21  Thompson Foster MD   Insulin Degludec (TRESIBA FLEXTOUCH) 200 UNIT/ML SOPN Inject 26 Units into the skin nightly 3/9/21   Thompson Foster MD   Probiotic Product (PROBIOTIC MULTI-ENZYME) TABS Take 1 tablet by mouth daily     Historical Provider, MD   Omega-3 Fatty Acids (FISH OIL) 1000 MG CAPS Take 1,000 mg by mouth daily     Historical Provider, MD   vitamin C (ASCORBIC ACID) 500 MG tablet Take 1,000 mg by mouth daily     Historical Provider, MD   vitamin B-12 (CYANOCOBALAMIN) 500 MCG tablet Take 500 mcg by mouth daily     Historical Provider, MD   vitamin E 400 UNIT capsule Take 400 Units by mouth daily     Historical Provider, MD   b complex vitamins capsule Take 1 capsule by mouth daily     Historical Provider, MD       CURRENT MEDICATIONS:  Current Facility-Administered Medications: sodium chloride flush 0.9 % injection 5-40 mL, 5-40 mL, IntraVENous, 2 times per day  sodium chloride flush 0.9 % injection 5-40 mL, 5-40 mL, IntraVENous, PRN  0.9 % sodium chloride infusion, 25 mL, IntraVENous, PRN  ondansetron (ZOFRAN-ODT) disintegrating tablet 4 mg, 4 mg, Oral, Q8H PRN **OR** ondansetron (ZOFRAN) injection 4 mg, 4 mg, IntraVENous, Q6H PRN  polyethylene glycol (GLYCOLAX) packet 17 g, 17 g, Oral, Daily PRN  acetaminophen (TYLENOL) tablet 650 mg, 650 mg, Oral, Q6H PRN **OR** acetaminophen (TYLENOL) suppository 650 mg, 650 mg, Rectal, Q6H PRN  insulin lispro (HUMALOG) injection vial 0-6 Units, 0-6 Units, SubCUTAneous, TID WC  insulin lispro (HUMALOG) injection vial 0-3 Units, 0-3 Units, SubCUTAneous, Nightly  lactulose (CHRONULAC) 10 GM/15ML solution 10 g, 10 g, Oral, Daily  rifAXIMin (XIFAXAN) tablet 550 mg, 550 mg, Oral, BID  bumetanide (BUMEX) tablet 2 mg, 2 mg, Oral, Daily  LORazepam (ATIVAN) injection 1 mg, 1 mg, IntraVENous, Q4H PRN  insulin glargine (LANTUS) injection vial 21 Units, 21 Units, SubCUTAneous, Nightly  insulin lispro (HUMALOG) injection vial 12 Units, 12 Units, SubCUTAneous, TID WC  nadolol (CORGARD) tablet 20 mg, 20 mg, Oral, Daily  ampicillin-sulbactam (UNASYN) 3000 mg ivpb minibag, 3,000 mg, IntraVENous, Q6H  glucose chewable tablet 16 g, 4 tablet, Oral, PRN  dextrose bolus 10% 125 mL, 125 mL, IntraVENous, PRN **OR** dextrose bolus 10% 250 mL, 250 mL, IntraVENous, PRN  glucagon (rDNA) injection 1 mg, 1 mg, IntraMUSCular, PRN  dextrose 5 % solution, 100 mL/hr, IntraVENous, PRN  spironolactone (ALDACTONE) tablet 25 mg, 25 mg, Oral, BID    IV MEDICATIONS:   sodium chloride 25 mL (05/16/22 1152)    dextrose         ALLERGIES:  Allergies   Allergen Reactions    Ketorolac Tromethamine Shortness Of Breath and Other (See Comments)     Depression, \"walking like a zombie\", no desire to do anything.     Memantine Hcl Other (See Comments)     AMS, \"walking like a zombie\"    Tamsulosin Hcl Shortness Of Breath       REVIEW OF SYSTEMS:  General ROS:     - Positive For:     - Negative For: chills, fatigue, fever, malaise, night sweats or sleep disturbance   ENT ROS:      - Positive For:     - Negative For: epistaxis, headaches, sinus pain, sneezing or sore throat   Allergy and Immunology ROS:     - Negative For: nasal congestion, postnasal drip or seasonal allergies   Hematological and Lymphatic ROS:      - Negative For: bleeding problems, bruising, fatigue, night sweats or pallor   Respiratory ROS:      - Positive For:       - Negative For: hemoptysis, pleuritic type chest pains  Cardiovascular ROS:      - Positive For:      - Negative For: chest pain, dyspnea on exertion, irregular heartbeat, loss of consciousness, murmur, orthopnea or palpitations   Gastrointestinal ROS:      - Positive For:     - Negative For: abdominal pain, appetite loss, blood in stools, change in bowel habits, change in stools, constipation, diarrhea, hematemesis, melena, nausea/vomiting or stool incontinence   Genito-Urinary ROS:      - Negative For: change in urinary stream, dysuria, hematuria or incontinence   Musculoskeletal ROS:      - Negative For: joint pain, joint stiffness, joint swelling or muscle pain   Neurological ROS:     - Negative For: behavioral changes, confusion, dizziness, headaches, impaired coordination/balance or memory loss   Dermatological ROS:      - Negative For: hair changes, lumps, mole changes, nail changes or pruritus    PHYSICAL EXAMINATION:     VITAL SIGNS:  /66   Pulse 70   Temp 98.3 °F (36.8 °C) (Oral)   Resp 18   Ht 5' 10\" (1.778 m)   Wt 160 lb 9.6 oz (72.8 kg)   SpO2 98%   BMI 23.04 kg/m²   Wt Readings from Last 3 Encounters:   22 160 lb 9.6 oz (72.8 kg)   22 170 lb (77.1 kg)   21 165 lb 12.8 oz (75.2 kg)     Temp Readings from Last 3 Encounters:   22 98.3 °F (36.8 °C) (Oral)   22 97.3 °F (36.3 °C) (Temporal)   21 97.6 °F (36.4 °C) (Tympanic)     TMAX:  BP Readings from Last 3 Encounters:   22 121/66   22 110/74   21 110/60     Pulse Readings from Last 3 Encounters:   22 70   22 96   21 88       CURRENT PULSE OXIMETRY: SpO2: 98 %  24HR PULSE OXIMETRY RANGE: SpO2  Av.4 %  Min: 93 %  Max: 100 %  CVP:      ________________________________________________________________________    VENTILATOR SETTINGS (if applicable):             Additional Respiratory Assessments  Pulse: 70  Resp: 18  SpO2: 98 %  Swallow: Normal - able to swallow solids  ETCO2:  Peak Inspiratory Pressure:  End-Inspiratory Plateau Pressure:    ABG:  Recent Labs     05/15/22  5408   PH 7.456*   PO2 113.4*   PCO2 29.9*   HCO3 20.6*   BE -2.4   O2SAT 98.1   METHB 0.3   O2HB 97.4*   COHB 0.4   O2CON 15.9   HHB 1.9   THB 11.5     FiO2 : 30 %     ________________________________________________________________________    IV ACCESS:    NUTRITION: ADULT DIET; Dysphagia - Soft and Bite Sized; 3 carb choices (45 gm/meal);  Low Sodium (2 gm)  ADULT ORAL NUTRITION SUPPLEMENT; Lunch, Dinner; Fortified Pudding Oral Supplement  Diet NPO    INTAKE/OUTPUTS:  I/O last 3 completed shifts:  In: -   Out: 800 [Urine:800]    Intake/Output Summary (Last 24 hours) at 5/16/2022 1613  Last data filed at 5/16/2022 1423  Gross per 24 hour   Intake 420 ml   Output 2150 ml   Net -1730 ml     General Appearance: alert and oriented to person, place and time, well-developed and   well-nourished, in no acute distress   Eyes: pupils equal, round, and reactive to light, extraocular eye movements intact, conjunctivae normal and sclera anicteric   Neck: neck supple and non tender without mass, no thyromegaly, no thyroid nodules and no cervical adenopathy   Pulmonary/Chest: decreased breath sounds right side, no accessory muscles of inspiration, no focal wheezes  Cardiovascular: normal rate, regular rhythm, normal S1 and S2, no murmurs, rubs, clicks or gallops, distal pulses intact, no carotid bruits, no murmurs, no gallops, no carotid bruits and no JVD   Abdomen: soft, non-tender, non-distended, normal bowel sounds, no masses or organomegaly   Extremities: no cyanosis, no clubbing, no edema  Musculoskeletal: normal range of motion, no joint swelling, deformity or tenderness   Neurologic: reflexes normal and symmetric, no cranial nerve deficit noted    LABS/IMAGING:    CBC:  Lab Results   Component Value Date    WBC 4.3 (L) 05/15/2022    HGB 11.5 (L) 05/15/2022    HCT 34.9 (L) 05/15/2022    MCV 96.7 05/15/2022    PLT 88 (L) 05/15/2022    LYMPHOPCT 10.0 (L) 05/15/2022    RBC 3.61 (L) 05/15/2022    MCH 31.9 05/15/2022    MCHC 33.0 05/15/2022    RDW 14.6 05/15/2022    NEUTOPHILPCT 84.0 (H) 05/15/2022    MONOPCT 4.0 05/15/2022    EOSPCT 2.0 10/21/2020    BASOPCT 0.0 05/15/2022 NEUTROABS 3.61 05/15/2022    LYMPHSABS 0.47 (L) 05/15/2022    MONOSABS 0.17 05/15/2022    EOSABS 0.04 (L) 05/15/2022    BASOSABS 0.00 05/15/2022       Recent Labs     05/15/22  1505   WBC 4.3*   HGB 11.5*   HCT 34.9*   MCV 96.7   PLT 88*       BMP:   Recent Labs     05/15/22  1505   *   K 4.9   CL 99   CO2 21*   BUN 15   CREATININE 0.8       MG:   Lab Results   Component Value Date    MG 2.2 12/05/2021     Ca/Phos:   Lab Results   Component Value Date    CALCIUM 8.6 05/15/2022    PHOS 4.0 12/05/2021     Amylase:   Lab Results   Component Value Date    AMYLASE 39 10/10/2021     Lipase:   Lab Results   Component Value Date    LIPASE 29 05/15/2022     LIVER PROFILE:   Recent Labs     05/15/22  1505   AST 37   ALT 36   LIPASE 29   BILIDIR 0.3   BILITOT 0.9   ALKPHOS 103       PT/INR:   Recent Labs     05/15/22  2030   PROTIME 16.2*   INR 1.5     APTT:   Recent Labs     05/15/22  2030   APTT 30.6       Cardiac Enzymes:  Lab Results   Component Value Date    TROPONINI <0.01 06/18/2018       Hgb A1C:   Lab Results   Component Value Date    LABA1C 6.1 (H) 10/13/2021     No results found for: EAG  CORAL:   Lab Results   Component Value Date    CORAL NEGATIVE 02/02/2016     ESR:   Lab Results   Component Value Date    SEDRATE 0 11/01/2021     CRP:   Lab Results   Component Value Date    CRP 0.7 (H) 11/01/2021     D Dimer: No results found for: DDIMER  Folate and B12:   Lab Results   Component Value Date    YZKQNKJT85 1062 (H) 02/26/2021   ,   Lab Results   Component Value Date    FOLATE >20.0 06/18/2018       Lactic Acid:   Lab Results   Component Value Date    LACTA 1.9 10/12/2021     Ammonia:   Cortisol:  Thyroid Studies:  Lab Results   Component Value Date    TSH 2.27 10/21/2020    C2XLTPB 9.3 02/02/2016     US ABDOMEN LIMITED   Final Result   Insufficient ascitic fluid in the abdomen to safely perform paracentesis. CTA PULMONARY W CONTRAST   Final Result   Limited exam due to patient motion.       Large right pleural effusion with compressive atelectasis most notable in the   right lower lobe. Lingular and left lower lobe atelectasis similar to previous. No large or central pulmonary embolism. Due to limitations, small or   peripheral embolism would be difficult to exclude. RECOMMENDATIONS:   Unavailable         US DUP LOWER EXTREMITY RIGHT CANDIDA   Final Result   No evidence of DVT in the right lower extremity. CT ABDOMEN PELVIS W IV CONTRAST Additional Contrast? None   Final Result   1. Findings consistent with hepatic cirrhosis, 3rd spacing includes overall   moderate ascites, and pleural effusions. 2. Additional portal hypertension seen as splenomegaly, varices and   recanalized paraumbilical vein which is prominent sized. 3. Suspicious for focal thrombosis of right common femoral vein. XR CHEST PORTABLE   Final Result   There is low lung volumes with poor inspiratory F fracture with the continued   bibasilar subsegmental atelectasis and small pleural effusions. US DUP ABD PEL RETRO SCROT LIMITED    (Results Pending)       ASSESSMENT:  1.) Large Right Pleural Effusion   2.) Cirrhosis due to VEGA  3.) Esophageal Varices  4.) Hepatic Encephalopathy   5.) AScites  6.) Dementia   7.) H/O Hep C Virus treated with Vosevi    PLAN:  1.) right US thoracentesis completed at bedside with about 1450 cc straw-colored free flowing fluid removed  2.) CXR and full fluid analysis pending for patient at this time  3.) continue unasyn, rifaximin, lactulose  4.) family is moving to [facility] this Friday May 20, 2022, will forward all fluid analysis and cytology to new pulmonary physician in [facility] area  5.) DVT Prophylaxis     - check procalcitonin   - COVID negative     Thank you Zulema Cruz, DO very much for allowing me to see this patient in consultation and follow up.     Care reviewed with nursing staff, medical and surgical specialty care, primary care and the patient's family as available. Restraints are ordered when the patient can do harm to him/herself by pulling out devices.     Dahiana Mazno MD, M.D.

## 2022-05-16 NOTE — OR NURSING
Patient brought into room, discussed procedure. Dr. Shivani Dumont answered all questions and concerns related to the procedure. Treatment consent signed by wife. Dr. Shivani Dumont checked for abdominal fluid and it was determined that patient did not have enough fluid to drain.

## 2022-05-16 NOTE — PROGRESS NOTES
P Quality Flow/Interdisciplinary Rounds Progress Note        Quality Flow Rounds held on May 16, 2022    Disciplines Attending:  Bedside Nurse, ,  and Nursing Unit Leadership    Lit Ross was admitted on 5/15/2022  2:56 PM    Anticipated Discharge Date:       Disposition:    Nilton Score:  Nilton Scale Score: 18    Readmission Risk              Risk of Unplanned Readmission:  23           Discussed patient goal for the day, patient clinical progression, and barriers to discharge. The following Goal(s) of the Day/Commitment(s) have been identified:  paracentesis today.       Peg Mosqueda RN  May 16, 2022

## 2022-05-16 NOTE — PROGRESS NOTES
Bedside right US thoracentesis completed with about 1450 cc removed. Fluid is straw-colored. Full fluid analysis and CXR pending for patient. Please see procedure note section for further details.     Dawit Liu MD  5/16/2022  5:10 PM

## 2022-05-16 NOTE — ED NOTES
Pt climbing out of bed, placed back in bed, RN at bedside until sitter available to be at bedside     Mark Burgos [facility]  05/15/22 2019

## 2022-05-16 NOTE — PROGRESS NOTES
Patients wife Giovanna Thao given update on patient, will bring in home meds to reconcile during day shift.

## 2022-05-16 NOTE — H&P
@[facility]    [facility] [facility]    HISTORY AND PHYSICAL EXAMINATION            Date:   5/16/2022  Patient name:  Diana Allison  Date of admission:  5/15/2022  2:56 PM  MRN:   [id number]  Account:  [de-identified]  YOB: 1950  PCP:    Jimi Sheikh MD  Room:   10/10  Code Status:    Full Code    Chief Complaint:     Acute onset SOB and abdominal distension in setting of liver cirrhosis     History Obtained From:    Wife and chart. History of Present Illness:     69-year-old male with underlying dementia, liver cirrhosis with varicies who was brought in by his wife with reports of labored breathing started earlier in the day. Information was provided by the wife and upon chart review as well as discussion with the ED staff. As per report, the wife states that it is not uncommon for the fluid in his abdomen to seep into his lungs. However they report that he is never required an abdominal paracentesis. They deny any history of SBP. They denied any hematemesis, melena or abnormal stools. No abdominal pain, fever chills, lower extremity swelling or pain. There is questionable intermittent complaints with home medications. Upon arrival to the hospital patient required bipap for work of breathing, CT chest Large right pleural effusion with compressive atelectasis .      Past Medical History:     Past Medical History:   Diagnosis Date    Buccal mass 09/2020    Diabetes mellitus ([address]     Esophageal varices (HCC)     Hepatitis C     treated and resolved    Liver cirrhosis (HCC)     Mild dementia (HCC)     no meds    Positive colorectal cancer screening using Cologuard test 04/18/2021    Referred to Dr Jhon Newell Colonoscopy        Past Surgical History:     Past Surgical History:   Procedure Laterality Date    BIOPSY MOUTH LESION Left 06/04/2020    EXCISIONAL BIOPSY LEFT BUCCAL MUCOSAL LESION performed by Liberty Alberts MD at [facility] OR    CHOLECYSTECTOMY      COLONOSCOPY  2017    ENDOSCOPY, COLON, DIAGNOSTIC  2019 and 2020    HC INSERT PICC CATH, 5/> YRS  10/14/2021         HERNIA REPAIR Right 2018    inguinal     MOUTH SURGERY Right 09/24/2020    EXCISIONAL BIOPSY OF RIGHT BUCCOL LESION performed by William Dye MD at [address]      UPPER GASTROINTESTINAL ENDOSCOPY N/A 06/19/2018    EGD BAND LIGATION performed by Derrick Plummer MD at [place] [address]  09/01/2020    Dr Quan Sunshine medium sized esophageal varices--placed 6 rubber bands on varices--normal duodenum    UPPER GASTROINTESTINAL ENDOSCOPY N/A 12/5/2021    EGD BAND LIGATION performed by Kaylee Fischer MD at [facility] ENDOSCOPY        Medications Prior to Admission:     Prior to Admission medications    Medication Sig Start Date End Date Taking?  Authorizing Provider   rifaximin (XIFAXAN) 550 MG tablet Take 1 tablet by mouth 2 times daily Indications: APPROVED FROM 09/14/2021 TO 12/14/2022 PER anthem 1/3/22   Tanisha Yao MD   lactulose (CEPHULAC) 10 g packet Take 10 g by mouth daily    Historical Provider, MD   Magnesium 500 MG TABS Take 1 tablet by mouth daily    Historical Provider, MD   Coenzyme Q10 (CO Q 10 PO) Take 200 mg by mouth daily    Historical Provider, MD   Cholecalciferol (VITAMIN D3) 50 MCG (2000 UT) CAPS Take 1 capsule by mouth daily    Historical Provider, MD   Calcium Carbonate-Vit D-Min (CALCIUM 1200 PO) Take 1 tablet by mouth daily    Historical Provider, MD   Flaxseed, Linseed, (FLAXSEED OIL) 1200 MG CAPS Take 1 capsule by mouth daily    Historical Provider, MD   Multiple Vitamin (MULTIVITAMIN PO) Take 1 tablet by mouth daily    Historical Provider, MD   Turmeric Curcumin 500 MG CAPS Take 1 capsule by mouth daily    Historical Provider, MD   zinc 50 MG TABS tablet Take 50 mg by mouth daily    Historical Provider, MD   MILK THISTLE PO Take 1,000 mg by mouth daily Historical Provider, MD   Chromium Picolinate 1000 MCG TABS Take 1 tablet by mouth daily    Historical Provider, MD   CINNAMON PO Take 1,000 mg by mouth daily    Historical Provider, MD   Misc Natural Products (NEURIVA) CHEW Take 1 tablet by mouth daily    Historical Provider, MD   rivastigmine (EXELON) 4.6 MG/24HR Place 1 patch onto the skin daily 3/14/22   Mortimer Maus, MD   bumetanide (BUMEX) 2 MG tablet Take 1 tablet by mouth daily 12/20/21   Anna Collier MD   escitalopram (LEXAPRO) 5 MG tablet Take 1 tablet by mouth daily 12/20/21   nAna Collier MD   spironolactone (ALDACTONE) 25 MG tablet Take 1 tablet by mouth daily 12/8/21   Marco Zendejas DO   blood glucose test strips (ASCENSIA AUTODISC VI;ONE TOUCH ULTRA TEST VI) strip 1 each by In Vitro route 4 times daily One touch test strips 10/29/21   MD MADISYN Hargrove [employer] PO Take 1 tablet by mouth every other day -[address]-     Historical Provider, MD   CRANBERRY PO Take 1 tablet by mouth as needed (URINARY)    Historical Provider, MD   insulin aspart (NOVOLOG FLEXPEN) 100 UNIT/ML injection pen Inject 14 Units into the skin 3 times daily (before meals) If BS less than 100, no coverage  If greater than 150, 14 units 7/13/21 12/13/21  Anna Collier MD   Insulin Degludec (TRESIBA FLEXTOUCH) 200 UNIT/ML SOPN Inject 26 Units into the skin nightly 3/9/21   Anna Collier MD   Probiotic Product (PROBIOTIC MULTI-ENZYME) TABS Take 1 tablet by mouth daily     Historical Provider, MD   Omega-3 Fatty Acids (FISH OIL) 1000 MG CAPS Take 1,000 mg by mouth daily     Historical Provider, MD   vitamin C (ASCORBIC ACID) 500 MG tablet Take 1,000 mg by mouth daily     Historical Provider, MD   vitamin B-12 (CYANOCOBALAMIN) 500 MCG tablet Take 500 mcg by mouth daily     Historical Provider, MD   vitamin E 400 UNIT capsule Take 400 Units by mouth daily     Historical Provider, MD   b complex vitamins capsule Take 1 capsule by mouth daily     Historical Provider, MD        Allergies:     Ketorolac tromethamine, Memantine hcl, and Tamsulosin hcl    Social History:     Tobacco:    reports that he has never smoked. He has never used smokeless tobacco.  Alcohol:      reports no history of alcohol use. Drug Use:  reports no history of drug use. Family History:     Family History   Problem Relation Age of Onset    Stroke Brother     Seizures Brother     Hypertension Brother     Heart Failure Brother        Review of Systems:     Positive and Negative as described in HPI. Unable to assess due to altered mental status and patient unwilling to cooperate    Physical Exam:   BP (!) 151/76   Pulse 91   Temp 100 °F (37.8 °C) (Axillary)   Resp 16   Ht 5' 10\" (1.778 m)   Wt 165 lb (74.8 kg)   SpO2 98%   BMI 23.68 kg/m²   Temp (24hrs), Av.7 °F (37.6 °C), Min:98.6 °F (37 °C), Max:100.6 °F (38.1 °C)    No results for input(s): POCGLU in the last 72 hours. No intake or output data in the 24 hours ending 22 0046    Physical Exam  Vitals and nursing note reviewed. Constitutional:       Appearance: Frail, chronically ill appearing    HENT:      Head: Normocephalic and atraumatic. Eyes:      Conjunctiva/sclera: Conjunctivae normal.   Cardiovascular:      Rate and Rhythm: Normal rate and regular rhythm. Heart sounds: Normal heart sounds. No murmur heard. Pulmonary:      Effort: Pulmonary effort is normal. Tachypnea present. No respiratory distress. Tolerating bipap well. Breath sounds: Decreased breath sounds present. No wheezing or rales. Abdominal:   -Abdomen is soft but distended. No abdominal tenderness. Positive fluid shifts. Hypoactive bowel sounds. No acute abdomen. rebound. Musculoskeletal:         General: No tenderness or deformity. Cervical back: Normal range of motion and neck supple. Skin:     General: Skin is warm and dry. Neurological:      Mental Status: Alert  To self. Non cooperative.  Wants to be left alone.      Cranial Nerves: No cranial nerve deficit.       Coordination: Coordination normal.   Investigations:      Laboratory Testing:  Recent Results (from the past 24 hour(s))   CBC with Auto Differential    Collection Time: 05/15/22  3:05 PM   Result Value Ref Range    WBC 4.3 (L) 4.5 - 11.5 E9/L    RBC 3.61 (L) 3.80 - 5.80 E12/L    Hemoglobin 11.5 (L) 12.5 - 16.5 g/dL    Hematocrit 34.9 (L) 37.0 - 54.0 %    MCV 96.7 80.0 - 99.9 fL    MCH 31.9 26.0 - 35.0 pg    MCHC 33.0 32.0 - 34.5 %    RDW 14.6 11.5 - 15.0 fL    Platelets 88 (L) 987 - 450 E9/L    MPV 10.5 7.0 - 12.0 fL    Neutrophils % 84.0 (H) 43.0 - 80.0 %    Lymphocytes % 10.0 (L) 20.0 - 42.0 %    Monocytes % 4.0 2.0 - 12.0 %    Eosinophils % 1.0 0.0 - 6.0 %    Basophils % 0.0 0.0 - 2.0 %    Neutrophils Absolute 3.61 1.80 - 7.30 E9/L    Lymphocytes Absolute 0.47 (L) 1.50 - 4.00 E9/L    Monocytes Absolute 0.17 0.10 - 0.95 E9/L    Eosinophils Absolute 0.04 (L) 0.05 - 0.50 E9/L    Basophils Absolute 0.00 0.00 - 0.20 E9/L    Atypical Lymphocytes Relative 1.0 0.0 - 4.0 %    Poikilocytes 1+     Ovalocytes 2+     Target Cells 1+    Basic Metabolic Panel w/ Reflex to MG    Collection Time: 05/15/22  3:05 PM   Result Value Ref Range    Sodium 130 (L) 132 - 146 mmol/L    Potassium reflex Magnesium 4.9 3.5 - 5.0 mmol/L    Chloride 99 98 - 107 mmol/L    CO2 21 (L) 22 - 29 mmol/L    Anion Gap 10 7 - 16 mmol/L    Glucose 288 (H) 74 - 99 mg/dL    BUN 15 6 - 23 mg/dL    CREATININE 0.8 0.7 - 1.2 mg/dL    GFR Non-African American >60 >=60 mL/min/1.73    GFR African American >60     Calcium 8.6 8.6 - 10.2 mg/dL   Hepatic Function Panel    Collection Time: 05/15/22  3:05 PM   Result Value Ref Range    Total Protein 7.3 6.4 - 8.3 g/dL    Albumin 3.6 3.5 - 5.2 g/dL    Alkaline Phosphatase 103 40 - 129 U/L    ALT 36 0 - 40 U/L    AST 37 0 - 39 U/L    Total Bilirubin 0.9 0.0 - 1.2 mg/dL    Bilirubin, Direct 0.3 0.0 - 0.3 mg/dL    Bilirubin, Indirect 0.6 0.0 - 1.0 mg/dL   Lipase Collection Time: 05/15/22  3:05 PM   Result Value Ref Range    Lipase 29 13 - 60 U/L   Troponin    Collection Time: 05/15/22  3:05 PM   Result Value Ref Range    Troponin, High Sensitivity 39 (H) 0 - 11 ng/L   Brain Natriuretic Peptide    Collection Time: 05/15/22  3:05 PM   Result Value Ref Range    Pro-BNP 63 0 - 125 pg/mL   Lactate, Sepsis    Collection Time: 05/15/22  3:05 PM   Result Value Ref Range    Lactic Acid, Sepsis 3.0 (H) 0.5 - 1.9 mmol/L   Platelet Confirmation    Collection Time: 05/15/22  3:05 PM   Result Value Ref Range    Platelet Confirmation CONFIRMED    EKG 12 Lead    Collection Time: 05/15/22  3:35 PM   Result Value Ref Range    Ventricular Rate 117 BPM    Atrial Rate 117 BPM    QRS Duration 68 ms    Q-T Interval 286 ms    QTc Calculation (Bazett) 398 ms    R Axis -15 degrees    T Axis 16 degrees   Ammonia    Collection Time: 05/15/22  3:57 PM   Result Value Ref Range    Ammonia 45.7 16.0 - 60.0 umol/L   COVID-19, Rapid    Collection Time: 05/15/22  4:53 PM    Specimen: Nasopharyngeal Swab   Result Value Ref Range    SARS-CoV-2, NAAT Not Detected Not Detected   RAPID INFLUENZA A/B ANTIGENS    Collection Time: 05/15/22  4:53 PM    Specimen: Nasopharyngeal   Result Value Ref Range    Influenza A by PCR Not Detected Not Detected    Influenza B by PCR Not Detected Not Detected   Lactate, Sepsis    Collection Time: 05/15/22  5:12 PM   Result Value Ref Range    Lactic Acid, Sepsis 1.8 0.5 - 1.9 mmol/L   Troponin    Collection Time: 05/15/22  5:12 PM   Result Value Ref Range    Troponin, High Sensitivity 38 (H) 0 - 11 ng/L   Blood Gas, Arterial    Collection Time: 05/15/22  6:48 PM   Result Value Ref Range    Date Analyzed [id number]     Time Analyzed 0904     Source: Blood Arterial     pH, Blood Gas 7.456 (H) 7.350 - 7.450    PCO2 29.9 (L) 35.0 - 45.0 mmHg    PO2 113.4 (H) 75.0 - 100.0 mmHg    HCO3 20.6 (L) 22.0 - 26.0 mmol/L    B.E. -2.4 -3.0 - 3.0 mmol/L    O2 Sat 98.1 92.0 - 98.5 %    O2Hb 97.4 (H) 94.0 - 97.0 %    COHb 0.4 0.0 - 1.5 %    MetHb 0.3 0.0 - 1.5 %    O2 Content 15.9 mL/dL    HHb 1.9 0.0 - 5.0 %    tHb (est) 11.5 11.5 - 16.5 g/dL    Mode NIV PAP     Peep/Cpap 5.0 cmH2O    PS 18 cmH20    Date Of Collection      Time Collected      Pt Temp 37.0 C     ID [id number]     Lab [id number]     Critical(s) Notified . No Critical Values    APTT    Collection Time: 05/15/22  8:30 PM   Result Value Ref Range    aPTT 30.6 24.5 - 35.1 sec   Protime-INR    Collection Time: 05/15/22  8:30 PM   Result Value Ref Range    Protime 16.2 (H) 9.3 - 12.4 sec    INR 1.5        Imaging/Diagnostics:  CT ABDOMEN PELVIS W IV CONTRAST Additional Contrast? None    Result Date: 5/15/2022  1. Findings consistent with hepatic cirrhosis, 3rd spacing includes overall moderate ascites, and pleural effusions. 2. Additional portal hypertension seen as splenomegaly, varices and recanalized paraumbilical vein which is prominent sized. 3. Suspicious for focal thrombosis of right common femoral vein. XR CHEST PORTABLE    Result Date: 5/15/2022  There is low lung volumes with poor inspiratory F fracture with the continued bibasilar subsegmental atelectasis and small pleural effusions. CTA PULMONARY W CONTRAST    Result Date: 5/15/2022  Limited exam due to patient motion. Large right pleural effusion with compressive atelectasis most notable in the right lower lobe. Lingular and left lower lobe atelectasis similar to previous. No large or central pulmonary embolism. Due to limitations, small or peripheral embolism would be difficult to exclude. RECOMMENDATIONS: Unavailable     US DUP LOWER EXTREMITY RIGHT CANDIDA    Result Date: 5/15/2022  No evidence of DVT in the right lower extremity.        Assessment :      Hospital Problems           Last Modified POA    * (Principal) Acute respiratory failure ([address] 5/15/2022 Yes          #Large Right Pleural Effusion  -Secondary to large right pleural effusion with compressive atelectasis. -Etiology of the large pleural effusion peers to be secondary to portal hypertension development from progressive liver cirrhosis. -Sodium 130, bicarb 21, normal renal function, proBNP 63, troponin 38. Ammonia level 45, lactate 3.0 followed by 1.8, leukocytosis but had a left shift. Hemoglobin at baseline 11.5, platelets 88, INR 1.5  -Negative for influenza and COVID on admission  -CT chest: Large right pleural effusion with compressive atelectasis most notable in the right lower lobe. Lingular and left lower lobe atelectasis similar to previous.  -Echo in : EF 60%, fibrotic aortic valve at trileaflet which opens well. RV-RA gradient is estimated at 31 mmHg. PLAN  -Will need to give lasix and remove abdominal ascites fluid. Will provide BiPAP. Patient may require thoracocentesis if there is no resolution with Lasix and paracentesis. -Will empirically cover for aspiration given his baseline dementia with low threshold to dc after initial work up         [employer] Liver cirrhosis with esophageal varices/ Ascities  -Secondary to hep C and portal hypertension   -Patient appears to be third spacing with moderate ascites and a right-sided pleural effusion. Portal hypertension seen as splenomegaly, varices and recanalized periumbilical vein prominent in size. -AFP tumor marker on March 2022 unrevealing  PLAN  -Gi consult for development of portal gastropathy, IR consult for therapeutic paracentesis with fluid analsyis  -resume spirolactone, lactulose, rifaximin and diuretic  -Start patient on nadolol for known esophageal varices   -Palliative care consult considering his baseline current status quo with progressive liver disease. CODE STATUS and goals of care to be addressed. #DM2  -Glucose on admission 232  -Glucose goal while inpatient between 140 and 180  -Resume home medications, hypoglycemia protocol, SSI scale. #Dementia with behavioral disturbances  -Baseline unknown.   At the time seen patient received multiple doses of Haldol.  -No focal deficits appreciated. Moving limbs spontaneously.   -Delirium precautions, fall and aspiration precaution, daily bladder scan

## 2022-05-16 NOTE — PLAN OF CARE
Problem: Skin/Tissue Integrity  Goal: Absence of new skin breakdown  Description: 1. Monitor for areas of redness and/or skin breakdown  2. Assess vascular access sites hourly  3. Every 4-6 hours minimum:  Change oxygen saturation probe site  4. Every 4-6 hours:  If on nasal continuous positive airway pressure, respiratory therapy assess nares and determine need for appliance change or resting period.   Outcome: Progressing     Problem: Safety - Adult  Goal: Free from fall injury  Outcome: Progressing     Problem: Pain  Goal: Verbalizes/displays adequate comfort level or baseline comfort level  Outcome: Progressing     Problem: Chronic Conditions and Co-morbidities  Goal: Patient's chronic conditions and co-morbidity symptoms are monitored and maintained or improved  Outcome: Progressing

## 2022-05-16 NOTE — CARE COORDINATION
Attempted to meet with patient and family to discuss plan of care and discharge planning- pt off unit for testing. Call placed to pt's wife- No answer- VM left- await return call. 1128: Spoke with patient's wife via phone- she confirmed that discharge plan is for patient to return home with her. Pt's wife noted that they are moving to [place] on Friday. She requested a wheeled walker with a seat upon discharge, does not have DME provider preference. She denies any other discharge needs. Referral made to [place] at [facility]    The Plan for Transition of Care is related to the following treatment goals: home    The Patient and/or patient representative, Veronica Sahni, was provided with a choice of provider and agrees   with the discharge plan. [x] Yes [] No    Freedom of choice list was provided with basic dialogue that supports the patient's individualized plan of care/goals, treatment preferences and shares the quality data associated with the providers.  [x] Yes [] No

## 2022-05-17 ENCOUNTER — APPOINTMENT (OUTPATIENT)
Dept: MRI IMAGING | Age: 72
DRG: 432 | End: 2022-05-17
Payer: MEDICARE

## 2022-05-17 ENCOUNTER — APPOINTMENT (OUTPATIENT)
Dept: ULTRASOUND IMAGING | Age: 72
DRG: 432 | End: 2022-05-17
Payer: MEDICARE

## 2022-05-17 LAB
ANION GAP SERPL CALCULATED.3IONS-SCNC: 11 MMOL/L (ref 7–16)
BASOPHILS ABSOLUTE: 0.04 E9/L (ref 0–0.2)
BASOPHILS RELATIVE PERCENT: 0.7 % (ref 0–2)
BUN BLDV-MCNC: 12 MG/DL (ref 6–23)
CALCIUM SERPL-MCNC: 9 MG/DL (ref 8.6–10.2)
CHLORIDE BLD-SCNC: 101 MMOL/L (ref 98–107)
CO2: 21 MMOL/L (ref 22–29)
CREAT SERPL-MCNC: 0.7 MG/DL (ref 0.7–1.2)
EOSINOPHILS ABSOLUTE: 0.21 E9/L (ref 0.05–0.5)
EOSINOPHILS RELATIVE PERCENT: 3.7 % (ref 0–6)
GFR AFRICAN AMERICAN: >60
GFR NON-AFRICAN AMERICAN: >60 ML/MIN/1.73
GLUCOSE BLD-MCNC: 114 MG/DL (ref 74–99)
GRAM STAIN ORDERABLE: NORMAL
HCT VFR BLD CALC: 37.6 % (ref 37–54)
HEMOGLOBIN: 12.7 G/DL (ref 12.5–16.5)
IMMATURE GRANULOCYTES #: 0.01 E9/L
IMMATURE GRANULOCYTES %: 0.2 % (ref 0–5)
LYMPHOCYTES ABSOLUTE: 0.78 E9/L (ref 1.5–4)
LYMPHOCYTES RELATIVE PERCENT: 13.6 % (ref 20–42)
MCH RBC QN AUTO: 31.8 PG (ref 26–35)
MCHC RBC AUTO-ENTMCNC: 33.8 % (ref 32–34.5)
MCV RBC AUTO: 94.2 FL (ref 80–99.9)
METER GLUCOSE: 113 MG/DL (ref 74–99)
METER GLUCOSE: 127 MG/DL (ref 74–99)
METER GLUCOSE: 133 MG/DL (ref 74–99)
METER GLUCOSE: 164 MG/DL (ref 74–99)
MONOCYTES ABSOLUTE: 0.66 E9/L (ref 0.1–0.95)
MONOCYTES RELATIVE PERCENT: 11.5 % (ref 2–12)
NEUTROPHILS ABSOLUTE: 4.02 E9/L (ref 1.8–7.3)
NEUTROPHILS RELATIVE PERCENT: 70.3 % (ref 43–80)
PDW BLD-RTO: 14.6 FL (ref 11.5–15)
PLATELET # BLD: 103 E9/L (ref 130–450)
PMV BLD AUTO: 10.7 FL (ref 7–12)
POTASSIUM REFLEX MAGNESIUM: 4.6 MMOL/L (ref 3.5–5)
PROCALCITONIN: 0.13 NG/ML (ref 0–0.08)
RBC # BLD: 3.99 E12/L (ref 3.8–5.8)
SODIUM BLD-SCNC: 133 MMOL/L (ref 132–146)
WBC # BLD: 5.7 E9/L (ref 4.5–11.5)

## 2022-05-17 PROCEDURE — 85025 COMPLETE CBC W/AUTO DIFF WBC: CPT

## 2022-05-17 PROCEDURE — 6370000000 HC RX 637 (ALT 250 FOR IP): Performed by: INTERNAL MEDICINE

## 2022-05-17 PROCEDURE — 76705 ECHO EXAM OF ABDOMEN: CPT

## 2022-05-17 PROCEDURE — 2580000003 HC RX 258: Performed by: INTERNAL MEDICINE

## 2022-05-17 PROCEDURE — 94660 CPAP INITIATION&MGMT: CPT

## 2022-05-17 PROCEDURE — 6370000000 HC RX 637 (ALT 250 FOR IP): Performed by: HOSPITALIST

## 2022-05-17 PROCEDURE — 2060000000 HC ICU INTERMEDIATE R&B

## 2022-05-17 PROCEDURE — 84145 PROCALCITONIN (PCT): CPT

## 2022-05-17 PROCEDURE — 80048 BASIC METABOLIC PNL TOTAL CA: CPT

## 2022-05-17 PROCEDURE — 99233 SBSQ HOSP IP/OBS HIGH 50: CPT | Performed by: INTERNAL MEDICINE

## 2022-05-17 PROCEDURE — 93976 VASCULAR STUDY: CPT

## 2022-05-17 PROCEDURE — 36415 COLL VENOUS BLD VENIPUNCTURE: CPT

## 2022-05-17 PROCEDURE — 6360000002 HC RX W HCPCS: Performed by: INTERNAL MEDICINE

## 2022-05-17 PROCEDURE — 82962 GLUCOSE BLOOD TEST: CPT

## 2022-05-17 RX ORDER — SPIRONOLACTONE 25 MG/1
25 TABLET ORAL 2 TIMES DAILY
Qty: 60 TABLET | Refills: 0 | Status: SHIPPED | OUTPATIENT
Start: 2022-05-17 | End: 2022-06-16

## 2022-05-17 RX ORDER — BUMETANIDE 2 MG/1
2 TABLET ORAL DAILY
Qty: 30 TABLET | Refills: 0 | Status: SHIPPED | OUTPATIENT
Start: 2022-05-17

## 2022-05-17 RX ORDER — NADOLOL 20 MG/1
20 TABLET ORAL DAILY
Qty: 30 TABLET | Refills: 3 | Status: SHIPPED | OUTPATIENT
Start: 2022-05-18

## 2022-05-17 RX ADMIN — SPIRONOLACTONE 25 MG: 25 TABLET ORAL at 19:05

## 2022-05-17 RX ADMIN — INSULIN LISPRO 12 UNITS: 100 INJECTION, SOLUTION INTRAVENOUS; SUBCUTANEOUS at 11:58

## 2022-05-17 RX ADMIN — INSULIN LISPRO 1 UNITS: 100 INJECTION, SOLUTION INTRAVENOUS; SUBCUTANEOUS at 11:56

## 2022-05-17 RX ADMIN — RIFAXIMIN 550 MG: 550 TABLET ORAL at 10:37

## 2022-05-17 RX ADMIN — NADOLOL 20 MG: 20 TABLET ORAL at 10:36

## 2022-05-17 RX ADMIN — SODIUM CHLORIDE, PRESERVATIVE FREE 10 ML: 5 INJECTION INTRAVENOUS at 11:56

## 2022-05-17 RX ADMIN — AMPICILLIN SODIUM AND SULBACTAM SODIUM 3000 MG: 2; 1 INJECTION, POWDER, FOR SOLUTION INTRAMUSCULAR; INTRAVENOUS at 11:54

## 2022-05-17 RX ADMIN — LORAZEPAM 1 MG: 2 INJECTION INTRAMUSCULAR; INTRAVENOUS at 20:31

## 2022-05-17 RX ADMIN — AMPICILLIN SODIUM AND SULBACTAM SODIUM 3000 MG: 2; 1 INJECTION, POWDER, FOR SOLUTION INTRAMUSCULAR; INTRAVENOUS at 00:24

## 2022-05-17 RX ADMIN — BUMETANIDE 2 MG: 1 TABLET ORAL at 10:36

## 2022-05-17 RX ADMIN — SODIUM CHLORIDE, PRESERVATIVE FREE 10 ML: 5 INJECTION INTRAVENOUS at 09:09

## 2022-05-17 RX ADMIN — SODIUM CHLORIDE, PRESERVATIVE FREE 10 ML: 5 INJECTION INTRAVENOUS at 20:31

## 2022-05-17 RX ADMIN — AMPICILLIN SODIUM AND SULBACTAM SODIUM 3000 MG: 2; 1 INJECTION, POWDER, FOR SOLUTION INTRAMUSCULAR; INTRAVENOUS at 06:13

## 2022-05-17 RX ADMIN — LACTULOSE 10 G: 20 SOLUTION ORAL at 10:36

## 2022-05-17 RX ADMIN — SPIRONOLACTONE 25 MG: 25 TABLET ORAL at 10:37

## 2022-05-17 ASSESSMENT — PAIN SCALES - GENERAL: PAINLEVEL_OUTOF10: 0

## 2022-05-17 NOTE — PROGRESS NOTES
[facility] Progress Note    Admitting Date and Time: 5/15/2022  2:56 PM  Admit Dx: Acute respiratory failure ([facility] [J96.00]  Acute respiratory distress [R06.03]  Pleural effusion [J90]  Other ascites [R18.8]    Subjective:  Patient is being followed for Acute respiratory failure ([facility] [J96.00]  Acute respiratory distress [R06.03]  Pleural effusion [J90]  Other ascites [R18.8]     Pt resting in bed comfortably in no acute distress  Breathing easily  On RA  Mentation at baseline          ROS: denies fever, chills, cp, sob, n/v, HA unless stated above.       sodium chloride flush  5-40 mL IntraVENous 2 times per day    insulin lispro  0-6 Units SubCUTAneous TID WC    insulin lispro  0-3 Units SubCUTAneous Nightly    lactulose  10 g Oral Daily    rifAXIMin  550 mg Oral BID    bumetanide  2 mg Oral Daily    insulin glargine  21 Units SubCUTAneous Nightly    insulin lispro  12 Units SubCUTAneous TID WC    nadolol  20 mg Oral Daily    ampicillin-sulbactam  3,000 mg IntraVENous Q6H    spironolactone  25 mg Oral BID     sodium chloride flush, 5-40 mL, PRN  sodium chloride, 25 mL, PRN  ondansetron, 4 mg, Q8H PRN   Or  ondansetron, 4 mg, Q6H PRN  polyethylene glycol, 17 g, Daily PRN  acetaminophen, 650 mg, Q6H PRN   Or  acetaminophen, 650 mg, Q6H PRN  LORazepam, 1 mg, Q4H PRN  glucose, 4 tablet, PRN  dextrose bolus, 125 mL, PRN   Or  dextrose bolus, 250 mL, PRN  glucagon (rDNA), 1 mg, PRN  dextrose, 100 mL/hr, PRN         Objective:    BP (!) 107/58   Pulse 88   Temp 98.6 °F (37 °C) (Oral)   Resp 18   Ht 5' 10\" (1.778 m)   Wt 165 lb 9.6 oz (75.1 kg)   SpO2 97%   BMI 23.76 kg/m²   General Appearance: awake and alert- pleasantly confused   Skin: warm and dry  Head: normocephalic and atraumatic  Neck: neck supple and non tender without mass   Pulmonary/Chest: CTA  Cardiovascular: normal rate, normal S1 and S2 and no carotid bruits  Abdomen: soft, non-tender, non-distended, normal bowel sounds, no masses or organomegaly  Extremities: no cyanosis, no clubbing and no edema  Neurologic: speech clear         Recent Labs     05/15/22  1505 05/17/22  0400   * 133   K 4.9 4.6   CL 99 101   CO2 21* 21*   BUN 15 12   CREATININE 0.8 0.7   GLUCOSE 288* 114*   CALCIUM 8.6 9.0       Recent Labs     05/15/22  1505 05/17/22  0400   WBC 4.3* 5.7   RBC 3.61* 3.99   HGB 11.5* 12.7   HCT 34.9* 37.6   MCV 96.7 94.2   MCH 31.9 31.8   MCHC 33.0 33.8   RDW 14.6 14.6   PLT 88* 103*   MPV 10.5 10.7         Assessment:    Principal Problem:    Acute respiratory failure (HCC)  Active Problems:    Acute respiratory distress  Resolved Problems:    * No resolved hospital problems. *      Plan: 1. Dyspnea/ respiratory distress- related to fluid overload/ pleural effusion: Pt presented to the ER with sob for 2-3 hours. Per ER note by was having significant work of breathing on arrival and needed to be placed on BiPAP. Does not appear that he was hypoxic on admission. Currently on RA. Breathing easily and non labored. Pt breathing comfortably. S/p thoracentesis yesterday.      2. Large right pleural effusion s/p thoracentesis with 1450 cc off on 5/16 : pt does have a history of liver cirrhosis with ascities He has never had a paracentesis in the past. CT chest showing a large right pleural effusion with compressive atelectasis. Echo from 20/2021 showing EF 60%- fibrotic aortic valve. On PO bumex. Appreciate pulmonology input- ok to DC per pulmonology standpoint.      3. Decompensated liver cirrhosis with ascites- h/o varices: secondary to Hep C and portal HTN: pt appears to be thrid spacing with moderate ascites and right sided pleural effusion: GI consulted. IR consulted for paracentesis. Continue aldactone. Lactulose, rifaximin and diuretic. Nadolol started due to h/o varices. Palliative medicine consulted due to progressive cirrhosis. Not enough fluid for IR guided paracentesis. US ABD/ Pelvis- ? Portal vein not seen  ? Thrombosed. Call placed to GI. MRI Abdomen ordered.      4. H/o dementia:pt very active/ confused at baseline per wife. Appears more calm today     5. H/o Hep C     6. Hyponatremia: monitor     7. Pancytopenia: monitor     8. DM: check hga1c: on tresiba nightly and novolog insulin with meals at home.            Dispo: wife reporting plans to go to [place] on Friday and hoping pt will not be hospitalized long. Pulmonology ok to DC  GI ok to DC if Us negative          NOTE: This report was transcribed using voice recognition software. Every effort was made to ensure accuracy; however, inadvertent computerized transcription errors may be present.   Electronically signed by AVILA Prater on 5/17/2022 at 8:58 AM

## 2022-05-17 NOTE — PROGRESS NOTES
P Quality Flow/Interdisciplinary Rounds Progress Note        Quality Flow Rounds held on May 17, 2022    Disciplines Attending:  Bedside Nurse, ,  and Nursing Unit Leadership    Janet Jackson was admitted on 5/15/2022  2:56 PM    Anticipated Discharge Date:       Disposition:    Nilton Score:  Nilton Scale Score: 18    Readmission Risk              Risk of Unplanned Readmission:  22           Discussed patient goal for the day, patient clinical progression, and barriers to discharge. The following Goal(s) of the Day/Commitment(s) have been identified:  IV Torin.       Manisha Hooper RN  May 17, 2022

## 2022-05-17 NOTE — PROGRESS NOTES
[id number] [address] PROGRESS NOTE    Patient: Aminta Mehta  MRN: [id number]  : 1950    Encounter Date: 2022  Encounter Time: 11:26 AM     Date of Admission: .5/15/2022  2:56 PM    Consulting Physician:  Primary Care Physician:      Eleazar Saini MD     [phone]    PROBLEM LIST:  Patient Active Problem List   Diagnosis    Type 1 diabetes mellitus without complication ([facility]    Anemia    [facility] to [facility]    Secondary esophageal varices with bleeding [facility]    Dementia associated with other underlying disease without behavioral disturbance ([facility]    B12 deficiency    H/o streptococcal bacteremia    Upper GI bleed    Acute respiratory failure [facility]    Acute respiratory distress     SUBJECTIVE:  Sleepy, was up all night  On room air  Wife at bedside  Denies dyspnea, occ dry cough  Moving to [place] Friday and wants to go home    HOME MEDICATIONS:  Prior to Admission medications    Medication Sig Start Date End Date Taking?  Authorizing Provider   rifaximin (XIFAXAN) 550 MG tablet Take 1 tablet by mouth 2 times daily Indications: APPROVED FROM 2021 TO 2022 PER anthem 1/3/22   Logan Gu MD   lactulose (CEPHULAC) 10 g packet Take 10 g by mouth daily    Historical Provider, MD   Magnesium 500 MG TABS Take 1 tablet by mouth daily    Historical Provider, MD   Coenzyme Q10 (CO Q 10 PO) Take 200 mg by mouth daily    Historical Provider, MD   Cholecalciferol (VITAMIN D3) 50 MCG ( UT) CAPS Take 1 capsule by mouth daily    Historical Provider, MD   Calcium Carbonate-Vit D-Min (CALCIUM 1200 PO) Take 1 tablet by mouth daily    Historical Provider, MD   Flaxseed, Linseed, (FLAXSEED OIL) 1200 MG CAPS Take 1 capsule by mouth daily    Historical Provider, MD   Multiple Vitamin (MULTIVITAMIN PO) Take 1 tablet by mouth daily    Historical Provider, MD   Turmeric Curcumin 500 MG CAPS Take 1 capsule by mouth daily    Historical Provider, MD   zinc 50 MG TABS tablet Take 50 mg by mouth daily    Historical Provider, MD   MILK THISTLE PO Take 1,000 mg by mouth daily    Historical Provider, MD   Chromium Picolinate 1000 MCG TABS Take 1 tablet by mouth daily    Historical Provider, MD   CINNAMON PO Take 1,000 mg by mouth daily    Historical Provider, MD   Misc Natural Products (NEURIVA) CHEW Take 1 tablet by mouth daily    Historical Provider, MD   rivastigmine (EXELON) 4.6 MG/24HR Place 1 patch onto the skin daily 3/14/22   Gracy Hutchinson MD   bumetanide (BUMEX) 2 MG tablet Take 1 tablet by mouth daily  Patient taking differently: Take 2 mg by mouth every other day This is taken every other day, not daily 12/20/21   Rad Carrasco MD   escitalopram (LEXAPRO) 5 MG tablet Take 1 tablet by mouth daily 12/20/21   Rad Carrasco MD   spironolactone (ALDACTONE) 25 MG tablet Take 1 tablet by mouth daily  Patient taking differently: Take 50 mg by mouth daily  12/8/21   Marco Zendejas, DO   blood glucose test strips (ASCENSIA AUTODISC VI;ONE TOUCH ULTRA TEST VI) strip 1 each by In Vitro route 4 times daily One touch test strips 10/29/21   MD MADISYN Miranda [employer] PO Take 1 tablet by mouth every other day -[facility] Provider, MD   CRANBERRY PO Take 1 tablet by mouth as needed (URINARY)    Historical Provider, MD   insulin aspart (NOVOLOG FLEXPEN) 100 UNIT/ML injection pen Inject 14 Units into the skin 3 times daily (before meals) If BS less than 100, no coverage  If greater than 150, 14 units 7/13/21 12/13/21  Rad Carrasco MD   Insulin Degludec (TRESIBA FLEXTOUCH) 200 UNIT/ML SOPN Inject 26 Units into the skin nightly 3/9/21   Rad Carrasco MD   Probiotic Product (PROBIOTIC MULTI-ENZYME) TABS Take 1 tablet by mouth daily     Historical Provider, MD   Omega-3 Fatty Acids (FISH OIL) 1000 MG CAPS Take 1,000 mg by mouth daily     Historical Provider, MD   vitamin C (ASCORBIC ACID) 500 MG tablet Take 1,000 mg by mouth daily     Historical Provider, MD   vitamin B-12 (CYANOCOBALAMIN) 500 MCG tablet Take 500 mcg by mouth daily     Historical Provider, MD   vitamin E 400 UNIT capsule Take 400 Units by mouth daily     Historical Provider, MD   b complex vitamins capsule Take 1 capsule by mouth daily     Historical Provider, MD       CURRENT MEDICATIONS:  Current Facility-Administered Medications: sodium chloride flush 0.9 % injection 5-40 mL, 5-40 mL, IntraVENous, 2 times per day  sodium chloride flush 0.9 % injection 5-40 mL, 5-40 mL, IntraVENous, PRN  0.9 % sodium chloride infusion, 25 mL, IntraVENous, PRN  ondansetron (ZOFRAN-ODT) disintegrating tablet 4 mg, 4 mg, Oral, Q8H PRN **OR** ondansetron (ZOFRAN) injection 4 mg, 4 mg, IntraVENous, Q6H PRN  polyethylene glycol (GLYCOLAX) packet 17 g, 17 g, Oral, Daily PRN  acetaminophen (TYLENOL) tablet 650 mg, 650 mg, Oral, Q6H PRN **OR** acetaminophen (TYLENOL) suppository 650 mg, 650 mg, Rectal, Q6H PRN  insulin lispro (HUMALOG) injection vial 0-6 Units, 0-6 Units, SubCUTAneous, TID WC  insulin lispro (HUMALOG) injection vial 0-3 Units, 0-3 Units, SubCUTAneous, Nightly  lactulose (CHRONULAC) 10 GM/15ML solution 10 g, 10 g, Oral, Daily  rifAXIMin (XIFAXAN) tablet 550 mg, 550 mg, Oral, BID  bumetanide (BUMEX) tablet 2 mg, 2 mg, Oral, Daily  LORazepam (ATIVAN) injection 1 mg, 1 mg, IntraVENous, Q4H PRN  insulin glargine (LANTUS) injection vial 21 Units, 21 Units, SubCUTAneous, Nightly  insulin lispro (HUMALOG) injection vial 12 Units, 12 Units, SubCUTAneous, TID WC  nadolol (CORGARD) tablet 20 mg, 20 mg, Oral, Daily  ampicillin-sulbactam (UNASYN) 3000 mg ivpb minibag, 3,000 mg, IntraVENous, Q6H  glucose chewable tablet 16 g, 4 tablet, Oral, PRN  dextrose bolus 10% 125 mL, 125 mL, IntraVENous, PRN **OR** dextrose bolus 10% 250 mL, 250 mL, IntraVENous, PRN  glucagon (rDNA) injection 1 mg, 1 mg, IntraMUSCular, PRN  dextrose 5 % solution, 100 mL/hr, IntraVENous, PRN  spironolactone (ALDACTONE) tablet 25 mg, 25 mg, Oral, BID    IV MEDICATIONS:   sodium chloride 25 mL (22 1800)    dextrose         ALLERGIES:  Allergies   Allergen Reactions    Ketorolac Tromethamine Shortness Of Breath and Other (See Comments)     Depression, \"walking like a zombie\", no desire to do anything.  Memantine Hcl Other (See Comments)     AMS, \"walking like a zombie\"    Tamsulosin Hcl Shortness Of Breath     PHYSICAL EXAMINATION:     VITAL SIGNS:  BP (!) 107/58   Pulse 88   Temp 98.6 °F (37 °C) (Oral)   Resp 18   Ht 5' 10\" (1.778 m)   Wt 165 lb 9.6 oz (75.1 kg)   SpO2 97%   BMI 23.76 kg/m²   Wt Readings from Last 3 Encounters:   22 165 lb 9.6 oz (75.1 kg)   22 170 lb (77.1 kg)   21 165 lb 12.8 oz (75.2 kg)     Temp Readings from Last 3 Encounters:   22 98.6 °F (37 °C) (Oral)   22 97.3 °F (36.3 °C) (Temporal)   21 97.6 °F (36.4 °C) (Tympanic)     TMAX:  BP Readings from Last 3 Encounters:   22 (!) 107/58   22 110/74   21 110/60     Pulse Readings from Last 3 Encounters:   22 88   22 96   21 88       CURRENT PULSE OXIMETRY: SpO2: 97 %  24HR PULSE OXIMETRY RANGE: SpO2  Av %  Min: 94 %  Max: 97 %  CVP:      ________________________________________________________________________    VENTILATOR SETTINGS (if applicable): Additional Respiratory Assessments  Pulse: 88  Resp: 18  SpO2: 97 %  Swallow: Normal - able to swallow solids  ETCO2:  Peak Inspiratory Pressure:  End-Inspiratory Plateau Pressure:    ABG:    Recent Labs     05/15/22  0328   PH 7.456*   PO2 113.4*   PCO2 29.9*   HCO3 20.6*   BE -2.4   O2SAT 98.1   METHB 0.3   O2HB 97.4*   COHB 0.4   O2CON 15.9   HHB 1.9   THB 11.5     FiO2 : 30 %     ________________________________________________________________________    IV ACCESS:    NUTRITION: ADULT DIET; Dysphagia - Soft and Bite Sized; 3 carb choices (45 gm/meal);  Low Sodium (2 gm)  ADULT ORAL NUTRITION SUPPLEMENT; Lunch, Dinner; Fortified Pudding Oral Supplement    INTAKE/OUTPUTS:  I/O last 3 completed shifts:   In: 420 [P.O.:420]  Out: 2875 [Urine:2875]    Intake/Output Summary (Last 24 hours) at 5/17/2022 1126  Last data filed at 5/17/2022 0432  Gross per 24 hour   Intake 240 ml   Output 2075 ml   Net -1835 ml     General Appearance: NAD  Eyes: PERRLA   Neck: neck supple and non tender without mass, no thyromegaly  Pulmonary/Chest: decreased breath sounds right side, no accessory muscles of inspiration, no focal wheezes  Cardiovascular: RRR, normal S1 and S2, no murmurs, rubs, clicks or gallops  Abdomen: soft, non-tender, non-distended, normal bowel sounds, no masses or organomegaly   Extremities: no cyanosis, no clubbing, no edema  Musculoskeletal: normal range of motion, no joint swelling, deformity or tenderness   Neurologic: sleepy but follows commands      LABS/IMAGING:    CBC:  Lab Results   Component Value Date    WBC 5.7 05/17/2022    HGB 12.7 05/17/2022    HCT 37.6 05/17/2022    MCV 94.2 05/17/2022     (L) 05/17/2022    LYMPHOPCT 13.6 (L) 05/17/2022    RBC 3.99 05/17/2022    MCH 31.8 05/17/2022    MCHC 33.8 05/17/2022    RDW 14.6 05/17/2022    NEUTOPHILPCT 70.3 05/17/2022    MONOPCT 11.5 05/17/2022    EOSPCT 2.0 10/21/2020    BASOPCT 0.7 05/17/2022    NEUTROABS 4.02 05/17/2022    LYMPHSABS 0.78 (L) 05/17/2022    MONOSABS 0.66 05/17/2022    EOSABS 0.21 05/17/2022    BASOSABS 0.04 05/17/2022       Recent Labs     05/17/22  0400 05/15/22  1505   WBC 5.7 4.3*   HGB 12.7 11.5*   HCT 37.6 34.9*   MCV 94.2 96.7   * 88*       BMP:   Recent Labs     05/15/22  1505 05/17/22  0400   * 133   K 4.9 4.6   CL 99 101   CO2 21* 21*   BUN 15 12   CREATININE 0.8 0.7       MG:   Lab Results   Component Value Date    MG 2.2 12/05/2021     Ca/Phos:   Lab Results   Component Value Date    CALCIUM 9.0 05/17/2022    PHOS 4.0 12/05/2021     Amylase:   Lab Results   Component Value Date    AMYLASE 39 10/10/2021     Lipase:   Lab Results   Component Value Date    LIPASE 29 05/15/2022     LIVER PROFILE:   Recent Labs     05/15/22  1505   AST 37   ALT 36   LIPASE 29   BILIDIR 0.3   BILITOT 0.9   ALKPHOS 103       PT/INR:   Recent Labs     05/15/22  2030   PROTIME 16.2*   INR 1.5     APTT:   Recent Labs     05/15/22  2030   APTT 30.6       Cardiac Enzymes:  Lab Results   Component Value Date    TROPONINI <0.01 06/18/2018       Hgb A1C:   Lab Results   Component Value Date    LABA1C 6.1 (H) 10/13/2021     No results found for: EAG  CORAL:   Lab Results   Component Value Date    OCRAL NEGATIVE 02/02/2016     ESR:   Lab Results   Component Value Date    SEDRATE 0 11/01/2021     CRP:   Lab Results   Component Value Date    CRP 0.7 (H) 11/01/2021     D Dimer: No results found for: DDIMER  Folate and B12:   Lab Results   Component Value Date    KWCLYNRW08 9869 (H) 02/26/2021   ,   Lab Results   Component Value Date    FOLATE >20.0 06/18/2018       Lactic Acid:   Lab Results   Component Value Date    LACTA 1.9 10/12/2021     Ammonia:   Cortisol:  Thyroid Studies:  Lab Results   Component Value Date    TSH 2.27 10/21/2020    U2STSGI 9.3 02/02/2016     XR CHEST PORTABLE   Final Result   1. No evidence of pneumothorax. 2. Probable bilateral pleural effusions with possible decreased size of right   pleural effusion compared to prior examination. 3. Bibasilar airspace disease. US THORACENTESIS Which side should the procedure be performed? Right   Final Result   Ultrasound-guided right chest marking for thoracentesis         US ABDOMEN LIMITED   Final Result   Insufficient ascitic fluid in the abdomen to safely perform paracentesis. CTA PULMONARY W CONTRAST   Final Result   Limited exam due to patient motion. Large right pleural effusion with compressive atelectasis most notable in the   right lower lobe. Lingular and left lower lobe atelectasis similar to previous.       No large or central pulmonary embolism. Due to limitations, small or   peripheral embolism would be difficult to exclude. RECOMMENDATIONS:   Unavailable         US DUP LOWER EXTREMITY RIGHT CANDIDA   Final Result   No evidence of DVT in the right lower extremity. CT ABDOMEN PELVIS W IV CONTRAST Additional Contrast? None   Final Result   1. Findings consistent with hepatic cirrhosis, 3rd spacing includes overall   moderate ascites, and pleural effusions. 2. Additional portal hypertension seen as splenomegaly, varices and   recanalized paraumbilical vein which is prominent sized. 3. Suspicious for focal thrombosis of right common femoral vein. XR CHEST PORTABLE   Final Result   There is low lung volumes with poor inspiratory F fracture with the continued   bibasilar subsegmental atelectasis and small pleural effusions. 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 LD 62. CXR 5/16 post thoracentesis with no ptx, B pleural effusions decreased on the R, B airspace disease. 2.) Procalcitonin pending  3.) continue unasyn, rifaximin, lactulose  4.) family is moving to [facility] this Friday May 20, 2022, will forward all fluid analysis and cytology to new pulmonary physician in [facility] area  5.) DVT Prophylaxis   6.) GI at bedside - ordered US of liver    Ok for discharge per pulmonary perspective. Moving to [place] and has pulmonary scheduled already.      - right pleural fluid transudate, LDH 62, Protein 1.2  - CYTOLOGY NEGATIVE   - patient moving to [facility], [place] Friday, will defer any further pulmonary workup to pulmonary physician in that location   - AFB was negative in fluid, this patient is NOT a concern for tuberculosis as AFB is a standard assessment in not only pleural fluid but also respiratory culture (please note that there are a variety of infections that stain AFB positive that are not Mycobacterium tuberculosis) LINK  - no cavitary lesions noted on CT chest   - await CTA Abdomen with Contrast     Brain MD Manish  5/17/2022  3:47 PM

## 2022-05-17 NOTE — PROGRESS NOTES
Message left at Dr. Brice Pap office re: [address] results.     Call returned from Dr. Anastasiya Cisneros- see new order

## 2022-05-17 NOTE — PROGRESS NOTES
PROGRESS NOTE  By Luis Fernando Mahmood M.D. [facility]  Dr. Leyda Rivera M.D.,  Dr. Barber Rose M.D.,   Dr. Tigist Cruz D.O.,  Dr. Osmani Lerner M.D.,  Dr. Makayla Barron D.O.,  Cameron Banuelos D.O. Lupis Eunice  66 y.o.  male    SUBJECTIVE:  No new complaints. Wife at bedside    OBJECTIVE:    BP (!) 107/58   Pulse 88   Temp 98.6 °F (37 °C) (Oral)   Resp 18   Ht 5' 10\" (1.778 m)   Wt 165 lb 9.6 oz (75.1 kg)   SpO2 97%   BMI 23.76 kg/m²     General: NAD/ male  HEENT: No discharge from nose ears  Neck: Supple/trachea midline  Chest: Symmetric excursion/nonlabored respirations  Cor: Regular  Abd.: Soft and not significantly distended  Extr.:  No significant peripheral edema  Skin: Warm and dry        DATA:    Monitor data reviewed -sinus rhythm noted.        Lab Results   Component Value Date    WBC 5.7 05/17/2022    RBC 3.99 05/17/2022    HGB 12.7 05/17/2022    HCT 37.6 05/17/2022    MCV 94.2 05/17/2022    MCH 31.8 05/17/2022    MCHC 33.8 05/17/2022    RDW 14.6 05/17/2022     05/17/2022    MPV 10.7 05/17/2022     Lab Results   Component Value Date     05/17/2022    K 4.6 05/17/2022     05/17/2022    CO2 21 05/17/2022    BUN 12 05/17/2022    CREATININE 0.7 05/17/2022    CALCIUM 9.0 05/17/2022    PROT 7.3 05/15/2022    LABALBU 3.6 05/15/2022    LABALBU 4.3 03/29/2012    BILITOT 0.9 05/15/2022    ALKPHOS 103 05/15/2022    AST 37 05/15/2022    ALT 36 05/15/2022     Lab Results   Component Value Date    LIPASE 29 05/15/2022     Lab Results   Component Value Date    AMYLASE 39 10/10/2021         ASSESSMENT/PLAN:  Patient Active Problem List   Diagnosis    Type 1 diabetes mellitus without complication (HCC)    Anemia    [facility] to [facility]    Secondary esophageal varices with bleeding ([facility]    Dementia associated with other underlying disease without behavioral disturbance ([facility]    B12 deficiency    H/o streptococcal bacteremia    Upper GI bleed    Acute respiratory failure (HCC)    Acute respiratory distress     1.  Cirrhosis  -MELD-Na = 11 (12 on previous admission)   -History of HCV - treated - RNA negative (11/8/2021)  -Autoimmune liver serology negative  -Previously with elevated ferritin (2016) - repeat  -Nonelevated AFP = 1 (10/13/2021) -   -Ultrasound of the liver consistent with cirrhosis -pending report on Doppler of portal/hepatic veins  -History of esophageal varices - see below     2.  Esophageal varices/anemia/GI bleed  -History of varices with banding  -Stable H&H/no evidence of overt bleed  -No immediate plans for inpatient endoscopy     3.  Hepatic encephalopathy / AMS  -Nonelevated ammonia  -Continue rifaximin/lactulose      4.  Ascites/right pleural effusion  -Small ascites  -not amendable to paracentesis per radiology  -Doppler ultrasound as above  -Await pulmonology evaluation     6.  Comorbidities  -DM, dementia  -Per admitting / consultants    Okay to be discharged from GI POV if Doppler ultrasound of portal/hepatic veins negative. Follow-up in the office in 1 to 2 weeks after discharge -patient/wife to call for appointment and with questions/concerns at [phone]. Thank you for the opportunity to participate in the care of . Lilly Plasencia MD  5/17/2022  11:26 AM    NOTE:  This report was transcribed using voice recognition software. Every effort was made to ensure accuracy; however, inadvertent computerized transcription errors may be present.

## 2022-05-18 ENCOUNTER — APPOINTMENT (OUTPATIENT)
Dept: CT IMAGING | Age: 72
DRG: 432 | End: 2022-05-18
Payer: MEDICARE

## 2022-05-18 LAB
ANION GAP SERPL CALCULATED.3IONS-SCNC: 8 MMOL/L (ref 7–16)
BASOPHILS ABSOLUTE: 0.06 E9/L (ref 0–0.2)
BASOPHILS RELATIVE PERCENT: 1.3 % (ref 0–2)
BUN BLDV-MCNC: 17 MG/DL (ref 6–23)
CALCIUM SERPL-MCNC: 8.3 MG/DL (ref 8.6–10.2)
CHLORIDE BLD-SCNC: 104 MMOL/L (ref 98–107)
CO2: 23 MMOL/L (ref 22–29)
CREAT SERPL-MCNC: 0.8 MG/DL (ref 0.7–1.2)
EOSINOPHILS ABSOLUTE: 0.18 E9/L (ref 0.05–0.5)
EOSINOPHILS RELATIVE PERCENT: 3.8 % (ref 0–6)
GFR AFRICAN AMERICAN: >60
GFR NON-AFRICAN AMERICAN: >60 ML/MIN/1.73
GLUCOSE BLD-MCNC: 136 MG/DL (ref 74–99)
HCT VFR BLD CALC: 32.7 % (ref 37–54)
HEMOGLOBIN: 11.2 G/DL (ref 12.5–16.5)
IMMATURE GRANULOCYTES #: 0.02 E9/L
IMMATURE GRANULOCYTES %: 0.4 % (ref 0–5)
LYMPHOCYTES ABSOLUTE: 0.9 E9/L (ref 1.5–4)
LYMPHOCYTES RELATIVE PERCENT: 19 % (ref 20–42)
MCH RBC QN AUTO: 31.5 PG (ref 26–35)
MCHC RBC AUTO-ENTMCNC: 34.3 % (ref 32–34.5)
MCV RBC AUTO: 91.9 FL (ref 80–99.9)
METER GLUCOSE: 166 MG/DL (ref 74–99)
METER GLUCOSE: 181 MG/DL (ref 74–99)
METER GLUCOSE: 304 MG/DL (ref 74–99)
METER GLUCOSE: 97 MG/DL (ref 74–99)
MONOCYTES ABSOLUTE: 0.59 E9/L (ref 0.1–0.95)
MONOCYTES RELATIVE PERCENT: 12.4 % (ref 2–12)
NEUTROPHILS ABSOLUTE: 2.99 E9/L (ref 1.8–7.3)
NEUTROPHILS RELATIVE PERCENT: 63.1 % (ref 43–80)
PDW BLD-RTO: 14.6 FL (ref 11.5–15)
PLATELET # BLD: 104 E9/L (ref 130–450)
PMV BLD AUTO: 10.6 FL (ref 7–12)
POTASSIUM REFLEX MAGNESIUM: 4 MMOL/L (ref 3.5–5)
RBC # BLD: 3.56 E12/L (ref 3.8–5.8)
SODIUM BLD-SCNC: 135 MMOL/L (ref 132–146)
WBC # BLD: 4.7 E9/L (ref 4.5–11.5)

## 2022-05-18 PROCEDURE — 2580000003 HC RX 258: Performed by: RADIOLOGY

## 2022-05-18 PROCEDURE — 82962 GLUCOSE BLOOD TEST: CPT

## 2022-05-18 PROCEDURE — 2060000000 HC ICU INTERMEDIATE R&B

## 2022-05-18 PROCEDURE — 36415 COLL VENOUS BLD VENIPUNCTURE: CPT

## 2022-05-18 PROCEDURE — 6360000002 HC RX W HCPCS: Performed by: INTERNAL MEDICINE

## 2022-05-18 PROCEDURE — 6370000000 HC RX 637 (ALT 250 FOR IP): Performed by: INTERNAL MEDICINE

## 2022-05-18 PROCEDURE — 6370000000 HC RX 637 (ALT 250 FOR IP): Performed by: HOSPITALIST

## 2022-05-18 PROCEDURE — 85025 COMPLETE CBC W/AUTO DIFF WBC: CPT

## 2022-05-18 PROCEDURE — 2580000003 HC RX 258: Performed by: INTERNAL MEDICINE

## 2022-05-18 PROCEDURE — 99233 SBSQ HOSP IP/OBS HIGH 50: CPT | Performed by: INTERNAL MEDICINE

## 2022-05-18 PROCEDURE — 80048 BASIC METABOLIC PNL TOTAL CA: CPT

## 2022-05-18 PROCEDURE — 97161 PT EVAL LOW COMPLEX 20 MIN: CPT

## 2022-05-18 PROCEDURE — 97165 OT EVAL LOW COMPLEX 30 MIN: CPT

## 2022-05-18 PROCEDURE — 74175 CTA ABDOMEN W/CONTRAST: CPT

## 2022-05-18 PROCEDURE — 6360000004 HC RX CONTRAST MEDICATION: Performed by: RADIOLOGY

## 2022-05-18 PROCEDURE — 94660 CPAP INITIATION&MGMT: CPT

## 2022-05-18 RX ORDER — SODIUM CHLORIDE 0.9 % (FLUSH) 0.9 %
10 SYRINGE (ML) INJECTION PRN
Status: COMPLETED | OUTPATIENT
Start: 2022-05-18 | End: 2022-05-18

## 2022-05-18 RX ADMIN — LORAZEPAM 1 MG: 2 INJECTION INTRAMUSCULAR; INTRAVENOUS at 17:19

## 2022-05-18 RX ADMIN — NADOLOL 20 MG: 20 TABLET ORAL at 10:53

## 2022-05-18 RX ADMIN — INSULIN LISPRO 12 UNITS: 100 INJECTION, SOLUTION INTRAVENOUS; SUBCUTANEOUS at 11:45

## 2022-05-18 RX ADMIN — SPIRONOLACTONE 25 MG: 25 TABLET ORAL at 10:52

## 2022-05-18 RX ADMIN — INSULIN GLARGINE 21 UNITS: 100 INJECTION, SOLUTION SUBCUTANEOUS at 20:35

## 2022-05-18 RX ADMIN — IOPAMIDOL 100 ML: 755 INJECTION, SOLUTION INTRAVENOUS at 18:23

## 2022-05-18 RX ADMIN — INSULIN LISPRO 4 UNITS: 100 INJECTION, SOLUTION INTRAVENOUS; SUBCUTANEOUS at 11:42

## 2022-05-18 RX ADMIN — SODIUM CHLORIDE, PRESERVATIVE FREE 10 ML: 5 INJECTION INTRAVENOUS at 20:39

## 2022-05-18 RX ADMIN — SPIRONOLACTONE 25 MG: 25 TABLET ORAL at 19:23

## 2022-05-18 RX ADMIN — LACTULOSE 10 G: 20 SOLUTION ORAL at 10:52

## 2022-05-18 RX ADMIN — SODIUM CHLORIDE, PRESERVATIVE FREE 10 ML: 5 INJECTION INTRAVENOUS at 10:59

## 2022-05-18 RX ADMIN — RIFAXIMIN 550 MG: 550 TABLET ORAL at 20:30

## 2022-05-18 RX ADMIN — INSULIN LISPRO 1 UNITS: 100 INJECTION, SOLUTION INTRAVENOUS; SUBCUTANEOUS at 20:32

## 2022-05-18 RX ADMIN — SODIUM CHLORIDE, PRESERVATIVE FREE 10 ML: 5 INJECTION INTRAVENOUS at 18:22

## 2022-05-18 RX ADMIN — RIFAXIMIN 550 MG: 550 TABLET ORAL at 10:53

## 2022-05-18 RX ADMIN — INSULIN LISPRO 1 UNITS: 100 INJECTION, SOLUTION INTRAVENOUS; SUBCUTANEOUS at 11:01

## 2022-05-18 RX ADMIN — BUMETANIDE 2 MG: 1 TABLET ORAL at 10:52

## 2022-05-18 ASSESSMENT — PAIN SCALES - GENERAL
PAINLEVEL_OUTOF10: 0

## 2022-05-18 ASSESSMENT — PAIN SCALES - PAIN ASSESSMENT IN ADVANCED DEMENTIA (PAINAD)
FACIALEXPRESSION: 0
TOTALSCORE: 0
BODYLANGUAGE: 0
BREATHING: 0
NEGVOCALIZATION: 0
CONSOLABILITY: 0

## 2022-05-18 NOTE — PROGRESS NOTES
[facility] Quality Flow/Interdisciplinary Rounds Progress Note        Quality Flow Rounds held on May 18, 2022    Disciplines Attending:  Bedside Nurse, ,  and Nursing Unit Leadership    Lu Horowitz was admitted on 5/15/2022  2:56 PM    Anticipated Discharge Date:       Disposition:    Nilton Score:  Nilton Scale Score: 17    Readmission Risk              Risk of Unplanned Readmission:  27           Discussed patient goal for the day, patient clinical progression, and barriers to discharge. The following Goal(s) of the Day/Commitment(s) have been identified:  check GI consult's plan.       Sreedhar Rain RN  May 18, 2022

## 2022-05-18 NOTE — PROGRESS NOTES
MRI attempted. Pt altered and unable to hold still. Pt reperatedly tried to remove coil and roll onto side. Unable to do MRI. Per Dr Shasha Fair, a dedicated CTA abdomen pelvis for portal vein thrombosis will have different contrast timing than previous CTA chest and routine CT abdomen pelvis and may be considered to show the portal vein.

## 2022-05-18 NOTE — PROGRESS NOTES
Sofia Velazquez NP notified that pt's wife does not want pt to re-attempt MRI or CT. Pt's wife anxious to have diet order for pt and to get pt discharged so they can proceed with move to [place] on Friday. Ok for diet order per Ed.

## 2022-05-18 NOTE — PROGRESS NOTES
Spoke to Dr Laurel Yost no need to place patient in isolation at this time. Infection control RN asking, he states to call him with any further questions.

## 2022-05-18 NOTE — PROGRESS NOTES
Physical Therapy  Facility/Department: [address] INTERMEDIATE  Physical Therapy Initial Assessment    Name: Lawanda Huitron  : 1950  MRN: [id number]  Date of Service: 2022        Patient Diagnosis(es): The primary encounter diagnosis was Acute respiratory distress. Diagnoses of Pleural effusion and Other ascites were also pertinent to this visit. Past Medical History:  has a past medical history of Buccal mass, Diabetes mellitus ([facility] Esophageal varices ([facility] [facility] Hepatitis C, Liver cirrhosis ([facility] [address] [facility] Mild dementia ([facility] [address] and Positive colorectal cancer screening using Cologuard test.  Past Surgical History:  has a past surgical history that includes Nasal septum surgery; Tonsillectomy; Upper gastrointestinal endoscopy (N/A, 2018); hernia repair (Right, ); Upper gastrointestinal endoscopy (2020); Biopsy mouth lesion (Left, 2020); Mouth surgery (Right, 2020); Cholecystectomy; Colonoscopy (); Endoscopy, colon, diagnostic ( and ); Insert Picc Cath, 5/> Yrs (10/14/2021); and Upper gastrointestinal endoscopy (N/A, 2021). Requires PT Follow-Up: Yes     Evaluating Therapist: Carola Peña PT     Referring Provider:  AVILA Ward    PT order : PT eval and treat     Room #: 040   DIAGNOSIS:  Acute hypoxic resp failure   PRECAUTIONS: falls    Social:  Pt lives with  Wife  in a  2  floor plan with first floor set up    Prior to admission pt walked with  No AD. Wife assist with mobility and ADLS as needed. Plan is to move to [place] Friday     Initial Evaluation  Date:  2022  Treatment      Short Term/ Long Term   Goals   Was pt agreeable to Eval/treatment?   yes      Does pt have pain?  none reported      Bed Mobility  Rolling:  S/I   Supine to sit:  SBA/CGA  Sit to supine:  SBA   Scooting:  SBA    S/I    Transfers Sit to stand:  CGA   Stand to sit:  CGA   Stand pivot:  NT    S/I    Ambulation     300  feet with  No AD  with  SBA/CGA    400  feet with  No AD/AAD with  Supervision        Stair negotiation: ascended and descended  12  steps with  1  rail with  CGA    12  steps with  1  rail with  SBA    LE ROM  WFL     LE strength  4- to 4/ 5      AM- PAC RAW score   18/ 24            Pt is alert , follows commands, limited verbalization     Balance:  SBA/CGA . Fall risk due to  Decreased balance and safety awareness     Endurance: WFL   Bed/Chair alarm:  No , has sitter      ASSESSMENT  Pt displays functional ability as noted in the objective portion of this evaluation. Conditions Requiring Skilled Therapeutic Intervention:    [x]Decreased strength     []Decreased ROM  [x]Decreased functional mobility  [x]Decreased balance   []Decreased endurance   []Decreased posture  []Decreased sensation  []Decreased coordination   []Decreased vision  [x]Decreased safety awareness   []Increased pain         Treatment/Education:    Pt in bed  upon arrival ; agreeable to PT. Pt;s wife present for session. Mobility as above. Wife assisted pt with supine to sit. Pt needs CGA at times for balance and tactile cuing to perform tasks. Instructed on increased safety with steps, placing more of foot on step. Pt educated on fall risk, safety with mobility        Patient response to education:   Pt verbalized understanding Pt demonstrated skill Pt requires further education in this area   Wife   x       Comments:  Pt left  In bed after session, with call light in reach. Rehab potential is Good for reaching above PT goals. Pts/ family goals   1. Home ASAP per wife     Patient and or family understand(s) diagnosis, prognosis, and plan of care. -  Yes , wife     PLAN  PT care will be provided in accordance with the objectives noted above. Whenever appropriate, clear delegation orders will be provided for nursing staff. Exercises and functional mobility practice will be used as well as appropriate assistive devices or modalities to obtain goals.  Patient and family education will also be administered as needed. PLAN OF CARE:    Current Treatment Recommendations     [x] Strengthening to improve independence with functional mobility   [] ROM to improve independence with functional mobility   [x] Balance Training to improve static/dynamic balance and to reduce fall risk  [x] Endurance Training to improve activity tolerance during functional mobility   [x] Transfer Training to improve safety and independence with all functional transfers   [x] Gait Training to improve gait mechanics, endurance and assess need for appropriate assistive device  [x] Stair Training in preparation for safe discharge home and/or into the community   [x] Positioning to prevent skin breakdown and contractures  [x] Safety and Education Training   [x] Patient/Caregiver Education   [] HEP  [] Other     Frequency of treatments will be 2-5x/week x 3-7 days. Time in: 0937         Evaluation Time includes thorough review of current medical information, gathering information on past medical history/social history and prior level of function, completion of standardized testing/informal observation of tasks, assessment of data and education on plan of care and goals.     CPT codes:  [x] Low Complexity PT evaluation 19156  [] Moderate Complexity PT evaluation 04649  [] High Complexity PT evaluation 25586  [] PT Re-evaluation 05504  [] Gait training 27155  minutes  [] Therapeutic activities 10530  minutes  [] Therapeutic exercises 54993  minutes  [] Neuromuscular reeducation 21756  minutes       [address] [address] number:  PT 5898

## 2022-05-18 NOTE — PLAN OF CARE
Problem: Skin/Tissue Integrity  Goal: Absence of new skin breakdown  Description: 1. Monitor for areas of redness and/or skin breakdown  2. Assess vascular access sites hourly  3. Every 4-6 hours minimum:  Change oxygen saturation probe site  4. Every 4-6 hours:  If on nasal continuous positive airway pressure, respiratory therapy assess nares and determine need for appliance change or resting period.   5/18/2022 0107 by Catracho Coombs RN  Outcome: Progressing  5/17/2022 1849 by Valentino Leyden, RN  Outcome: Progressing     Problem: Safety - Adult  Goal: Free from fall injury  5/18/2022 0107 by Catracho Coombs RN  Outcome: Progressing  Flowsheets (Taken 5/17/2022 2015)  Free From Fall Injury: Instruct family/caregiver on patient safety  5/17/2022 1849 by Valentino Leyden, RN  Outcome: Progressing     Problem: Pain  Goal: Verbalizes/displays adequate comfort level or baseline comfort level  5/18/2022 0107 by Catracho Coombs RN  Outcome: Progressing  Flowsheets (Taken 5/17/2022 2015)  Verbalizes/displays adequate comfort level or baseline comfort level: Encourage patient to monitor pain and request assistance  5/17/2022 1849 by Valentino Leyden, RN  Outcome: Progressing     Problem: Chronic Conditions and Co-morbidities  Goal: Patient's chronic conditions and co-morbidity symptoms are monitored and maintained or improved  5/18/2022 0107 by Catracho Coombs RN  Outcome: Progressing  Flowsheets (Taken 5/17/2022 2015)  Care Plan - Patient's Chronic Conditions and Co-Morbidity Symptoms are Monitored and Maintained or Improved: Monitor and assess patient's chronic conditions and comorbid symptoms for stability, deterioration, or improvement  5/17/2022 1849 by Valentino Leyden, RN  Outcome: Progressing  Flowsheets (Taken 5/17/2022 0815)  Care Plan - Patient's Chronic Conditions and Co-Morbidity Symptoms are Monitored and Maintained or Improved: Monitor and assess patient's chronic conditions and comorbid symptoms for stability, deterioration, or improvement

## 2022-05-18 NOTE — PROGRESS NOTES
Outreached  to make him aware patient was not able to tolerate CT. Will give dosage ativan as PRN for aggitation. Patient was sleeping and very calm when sent to CT for scan.

## 2022-05-18 NOTE — PROGRESS NOTES
[facility] Progress Note    Admitting Date and Time: 5/15/2022  2:56 PM  Admit Dx: Acute respiratory failure ([address] [J96.00]  Acute respiratory distress [R06.03]  Pleural effusion [J90]  Other ascites [R18.8]    Subjective:  Patient is being followed for Acute respiratory failure ([address] [J96.00]  Acute respiratory distress [R06.03]  Pleural effusion [J90]  Other ascites [R18.8]     PT was unable to tolerate MRI last night  Pt currently resting / sleeping in no acute distress  Wife very upset at bedside stating that she does not want to put pt thru any more testing and \" just wants to take him home\"   Discussed that we may be able to consider CTA abdomen- wife reporting she does not want to discuss further testing   Wife adamant that pt will be going home today      ROS: denies fever, chills, cp, sob, n/v, HA unless stated above.       sodium chloride flush  5-40 mL IntraVENous 2 times per day    insulin lispro  0-6 Units SubCUTAneous TID WC    insulin lispro  0-3 Units SubCUTAneous Nightly    lactulose  10 g Oral Daily    rifAXIMin  550 mg Oral BID    bumetanide  2 mg Oral Daily    insulin glargine  21 Units SubCUTAneous Nightly    insulin lispro  12 Units SubCUTAneous TID WC    nadolol  20 mg Oral Daily    spironolactone  25 mg Oral BID     sodium chloride flush, 5-40 mL, PRN  sodium chloride, 25 mL, PRN  ondansetron, 4 mg, Q8H PRN   Or  ondansetron, 4 mg, Q6H PRN  polyethylene glycol, 17 g, Daily PRN  acetaminophen, 650 mg, Q6H PRN   Or  acetaminophen, 650 mg, Q6H PRN  LORazepam, 1 mg, Q4H PRN  glucose, 4 tablet, PRN  dextrose bolus, 125 mL, PRN   Or  dextrose bolus, 250 mL, PRN  glucagon (rDNA), 1 mg, PRN  dextrose, 100 mL/hr, PRN         Objective:    BP (!) 97/52   Pulse 82   Temp 97.9 °F (36.6 °C) (Axillary)   Resp 16   Ht 5' 10\" (1.778 m)   Wt 161 lb (73 kg)   SpO2 94%   BMI 23.10 kg/m²   General Appearance: resting/ sleeping in no acute distress  Skin: warm and dry  Head: normocephalic and atraumatic  Neck: neck supple and non tender without mass   Pulmonary/Chest: CTA  Cardiovascular: normal rate, normal S1 and S2 and no carotid bruits  Abdomen: soft, non-tender, non-distended, normal bowel sounds, no masses or organomegaly  Extremities: no cyanosis, no clubbing and no edema  Neurologic: speech clear         Recent Labs     05/15/22  1505 05/17/22  0400 05/18/22  0318   * 133 135   K 4.9 4.6 4.0   CL 99 101 104   CO2 21* 21* 23   BUN 15 12 17   CREATININE 0.8 0.7 0.8   GLUCOSE 288* 114* 136*   CALCIUM 8.6 9.0 8.3*       Recent Labs     05/15/22  1505 05/17/22  0400 05/18/22  0318   WBC 4.3* 5.7 4.7   RBC 3.61* 3.99 3.56*   HGB 11.5* 12.7 11.2*   HCT 34.9* 37.6 32.7*   MCV 96.7 94.2 91.9   MCH 31.9 31.8 31.5   MCHC 33.0 33.8 34.3   RDW 14.6 14.6 14.6   PLT 88* 103* 104*   MPV 10.5 10.7 10.6         Assessment:    Principal Problem:    Acute respiratory failure (HCC)  Active Problems:    Acute respiratory distress  Resolved Problems:    * No resolved hospital problems. *      Plan: 1. Dyspnea/ respiratory distress- related to fluid overload/ pleural effusion: Pt presented to the ER with sob for 2-3 hours. Per ER note by was having significant work of breathing on arrival and needed to be placed on BiPAP. Does not appear that he was hypoxic on admission. Currently on RA. Breathing easily and non labored. Pt breathing comfortably. S/p thoracentesis    2. Large right pleural effusion s/p thoracentesis with 1450 cc off on 5/16 : pt does have a history of liver cirrhosis with ascities He has never had a paracentesis in the past. CT chest showing a large right pleural effusion with compressive atelectasis. Echo from 20/2021 showing EF 60%- fibrotic aortic valve. On PO bumex.  Appreciate pulmonology input- ok to DC per pulmonology standpoint.      3. Decompensated liver cirrhosis with ascites- h/o varices: secondary to Hep C and portal HTN: pt appears to be thrid spacing with moderate

## 2022-05-18 NOTE — PROGRESS NOTES
Discussed CT procedure with wife and let her know that we will need to get another larger gauge needle for CTA with dye and be NPO for 4 hours. Members wife is ok with having procedure.

## 2022-05-18 NOTE — CARE COORDINATION
Discharge plan remains HOME- pt and wife relocating to [place] on Friday. Lizett Hobbs at [facility] notified of anticipate discharge with in next 24 hours. She will deliver ordered walker.

## 2022-05-18 NOTE — PROGRESS NOTES
PROGRESS NOTE  By Alina Barrera M.D. [facility]  Dr. Chari Mchugh M.D.,  Dr. Jameel Guzman M.D.,   Dr. Rangel Cheema D.O.,  Dr. Trisha Valencia M.D.,  Dr. Rose Quintanilla D.O.,  Joseph Haddad D.O. Aletha Lozada  66 y.o.  male    SUBJECTIVE:  No new complaints.   Wife at bedside    OBJECTIVE:    /70   Pulse 88   Temp 98.5 °F (36.9 °C) (Oral)   Resp 18   Ht 5' 10\" (1.778 m)   Wt 161 lb (73 kg)   SpO2 94%   BMI 23.10 kg/m²     General: Resting comfortably/ male  HEENT: No discharge from nose ears  Neck: Trachea midline  Chest: Nonlabored respirations  Abd.: Soft and nondistended  Extr.:  Decreased muscle tone and bulk throughout  Skin: Anicteric        DATA:    M   Lab Results   Component Value Date    WBC 4.7 05/18/2022    RBC 3.56 05/18/2022    HGB 11.2 05/18/2022    HCT 32.7 05/18/2022    MCV 91.9 05/18/2022    MCH 31.5 05/18/2022    MCHC 34.3 05/18/2022    RDW 14.6 05/18/2022     05/18/2022    MPV 10.6 05/18/2022     Lab Results   Component Value Date     05/18/2022    K 4.0 05/18/2022     05/18/2022    CO2 23 05/18/2022    BUN 17 05/18/2022    CREATININE 0.8 05/18/2022    CALCIUM 8.3 05/18/2022    PROT 7.3 05/15/2022    LABALBU 3.6 05/15/2022    LABALBU 4.3 03/29/2012    BILITOT 0.9 05/15/2022    ALKPHOS 103 05/15/2022    AST 37 05/15/2022    ALT 36 05/15/2022     Lab Results   Component Value Date    LIPASE 29 05/15/2022     Lab Results   Component Value Date    AMYLASE 39 10/10/2021         ASSESSMENT/PLAN:  Patient Active Problem List   Diagnosis    Type 1 diabetes mellitus without complication (HCC)    Anemia    Hepatic cirrhosis due to chronic hepatitis C infection [facility]    Secondary esophageal varices with bleeding (HCC)    Dementia associated with other underlying disease without behavioral disturbance (HCC)    B12 deficiency    H/o streptococcal bacteremia    Upper GI bleed    Acute respiratory failure (HCC)    Acute respiratory distress     1.  Cirrhosis  -MELD-Na = 11 (12 on previous admission)   -History of HCV - treated - RNA negative (11/8/2021)  -Autoimmune liver serology negative  -Previously with elevated ferritin (2016) - repeat  -Nonelevated AFP = 1 (10/13/2021) -   -Ultrasound of the liver consistent with cirrhosis -see below  -History of esophageal varices - see below     2.  Esophageal varices/anemia/GI bleed  -History of varices with banding  -Stable H&H/no evidence of overt bleed  -No immediate plans for inpatient endoscopy     3.  Hepatic encephalopathy / AMS  -Nonelevated ammonia  -Continue rifaximin/lactulose      4.  Ascites/right pleural effusion  -Small ascites  -not amendable to paracentesis per radiology  -Doppler ultrasound as above  -Await pulmonology evaluation     6.  Comorbidities  -DM, dementia  -Per admitting / consultants     Ultrasound/Doppler raising concern for possible portal vein thrombosis. Patient apparently was not able to participate/tolerate MRI -we will cancel MRI test.  CTA scan has been ordered per admitting. I have discussed above with patient's wife at bedside and all questions answered to her satisfaction -she verbalized understanding and wishes to proceed with CT scan as above. She is also agreeable to oral anticoagulation. She however states that she does not want patient to suffer -I have explained that it is within her power to request certain de-escalation or withholding of treatment/diagnostic modalities if she believes this is not benefiting the patient all putting him through undue stress/discomfort. She was again verbalized understanding. If patient CTA reveals portal vein thrombosis, would recommend placing patient on oral anticoagulation with Eliquis or Xarelto and discharging patient as requested per wife.   If no anticoagulant indicated, patient can be discharged as previously stated to follow in the office in 1 to 2 weeks -wife to call for appointment/with questions/concerns at [phone]. Mattie Mcintyre MD  5/18/2022  1:45 PM    NOTE:  This report was transcribed using voice recognition software. Every effort was made to ensure accuracy; however, inadvertent computerized transcription errors may be present.

## 2022-05-18 NOTE — PROGRESS NOTES
OCCUPATIONAL THERAPY INITIAL EVALUATION     [address] [facility] [place], [address] [facility]                                                  Patient Name: Diana Allison    MRN: [id number]    : 1950    Room: [address] [place] [address]      Evaluating OT: Alycia Vicente OTR/L   [id number]      Referring AVILA Alvarado    Specific Provider Orders/Date:OT eval and treat 2022      Diagnosis:  Acute respiratory failure ([address] [J96.00]  Acute respiratory distress [R06.03]  Pleural effusion [J90]  Other ascites [R18.8]     Pertinent Medical History: demetia   Precautions:  Fall Risk, alarm ,       Assessment of current deficits    [x] Functional mobility  [x]ADLs  [x] Strength               [x]Cognition    [x] Functional transfers   [x] IADLs         [x] Safety Awareness   [x]Endurance    [] Fine Coordination              [x] Balance      [] Vision/perception   []Sensation     []Gross Motor Coordination  [] ROM  [] Delirium                   [] Motor Control     OT PLAN OF CARE   OT POC based on physician orders, patient diagnosis and results of clinical assessment    Frequency/Duration  2-3 days/wk for 2 weeks PRN   Specific OT Treatment Interventions to include:   ADL retraining/adapted techniques and AE recommendations to increase functional independence within precautions                    Energy conservation techniques to improve tolerance for selfcare routine   Functional transfer/mobility training/DME recommendations for increased independence, safety and fall prevention         Patient/family education to increase safety and functional independence             Environmental modifications for safe mobility and completion of ADLs                             Therapeutic activity to improve functional performance during ADLs.                                          Therapeutic exercise to improve tolerance and functional strength for ADLs    Balance retraining/tolerance tasks for facilitation of postural control with dynamic challenges during ADLs . Positioning to improve functional independence  []    Recommended Adaptive Equipment: TBD     SOCIAL: obtained from wife:  Home Living: Pt lives with wife, 2 story. - able to stay on 1st floor. Per wife - they are planning on moving to [facility] to be closer to their daughter. Wife assists patient at home with ADLs and mobility       Pain Level: no pain ;   Cognition: A&O to person . Patient following simple commands with cueing. Minimal verbalization    Memory:  impaired   Sequencing:  Fair    Problem solving:  Fair-   Judgement/safety:  Fair-     Functional Assessment:  AM-PAC Daily Activity Raw Score: 16/24   Initial Eval Status  Date: 5/18/22 Treatment Status  Date: STGs = LTGs  Time frame: 10-14 days   Feeding SBA  Supervision    Grooming Mod A   Min A   UB Dressing Mod A     PTA: wife assisting with ADLs  Min A    LB Dressing ModA   Min A    Bathing Mod A   Min A   Toileting Mod A   Min A   Bed Mobility  SBA  Supine <> sit   supervisison    Functional Transfers CGA  Sit-stand from bed, commode   Supervision    Functional Mobility CGA,, no device   Ambulated to/from bathroom   SBA with good tolerance    Balance Sitting:     Static:  CGA/SBA - EOB     Dynamic:Sylvester   Standing: CGA  SBA/supervision    Activity Tolerance Fair   No SOB noted   Fair+ with ADL activity    Visual/  Perceptual Glasses: yes                 Hand Dominance right   AROM (PROM) Strength Additional Info:    RUE  WFL WFL good  and wfl FMC/dexterity noted during ADL tasks       LUE WFL WFL good  and wfl FMC/dexterity noted during ADL tasks       Hearing: WFL   Sensation:  No c/o numbness or tingling   Tone: WFL  Edema: none observed     Comments: Upon arrival patient lying in bed . At end of session, patient returned to bed  with call light and phone within reach, all lines and tubes intact.   *ALARM ON ,wife present    Overall patient demonstrated  decreased independence and safety during completion of ADL/functional transfer/mobility tasks. Pt would benefit from continued skilled OT to increase safety and independence with completion of ADL/IADL tasks for functional independence and quality of life. Rehab Potential: good for established goals     Patient / Family Goal: to be discharged       Patient and/or family were instructed on functional diagnosis, prognosis/goals and OT plan of care. Demonstrated good  Understanding by wife      Eval Complexity: Low    Time In: 6793  Time Out: 0957      Min Units   OT Eval Low 97165 x  1   OT Eval Medium 84632      OT Eval High [id number]      OT Re-Eval [id number]       Therapeutic Ex [id number]       Therapeutic Activities 71337       ADL/Self Care [id number]       Orthotic Management 54178       Manual 31507     Neuro Re-Ed 50034       Non-Billable Time         Evaluation Time additionally includes thorough review of current medical information, gathering information on past medical history/social history and prior level of function, interpretation of standardized testing/informal observation of tasks, assessment of data and development of plan of care and goals.             Jerad Shine  OTR/L  OT [id number]

## 2022-05-19 VITALS
TEMPERATURE: 98.1 F | SYSTOLIC BLOOD PRESSURE: 134 MMHG | HEIGHT: 70 IN | HEART RATE: 81 BPM | RESPIRATION RATE: 18 BRPM | WEIGHT: 161 LBS | BODY MASS INDEX: 23.05 KG/M2 | DIASTOLIC BLOOD PRESSURE: 69 MMHG | OXYGEN SATURATION: 95 %

## 2022-05-19 LAB
ANION GAP SERPL CALCULATED.3IONS-SCNC: 8 MMOL/L (ref 7–16)
BASOPHILS ABSOLUTE: 0.03 E9/L (ref 0–0.2)
BASOPHILS RELATIVE PERCENT: 0.7 % (ref 0–2)
BUN BLDV-MCNC: 16 MG/DL (ref 6–23)
CALCIUM SERPL-MCNC: 8.1 MG/DL (ref 8.6–10.2)
CHLORIDE BLD-SCNC: 100 MMOL/L (ref 98–107)
CO2: 24 MMOL/L (ref 22–29)
CREAT SERPL-MCNC: 0.8 MG/DL (ref 0.7–1.2)
EOSINOPHILS ABSOLUTE: 0.15 E9/L (ref 0.05–0.5)
EOSINOPHILS RELATIVE PERCENT: 3.7 % (ref 0–6)
GFR AFRICAN AMERICAN: >60
GFR NON-AFRICAN AMERICAN: >60 ML/MIN/1.73
GLUCOSE BLD-MCNC: 170 MG/DL (ref 74–99)
HCT VFR BLD CALC: 31.3 % (ref 37–54)
HEMOGLOBIN: 10.8 G/DL (ref 12.5–16.5)
IMMATURE GRANULOCYTES #: 0.01 E9/L
IMMATURE GRANULOCYTES %: 0.2 % (ref 0–5)
LYMPHOCYTES ABSOLUTE: 0.93 E9/L (ref 1.5–4)
LYMPHOCYTES RELATIVE PERCENT: 22.7 % (ref 20–42)
MCH RBC QN AUTO: 31.3 PG (ref 26–35)
MCHC RBC AUTO-ENTMCNC: 34.5 % (ref 32–34.5)
MCV RBC AUTO: 90.7 FL (ref 80–99.9)
METER GLUCOSE: 177 MG/DL (ref 74–99)
METER GLUCOSE: 193 MG/DL (ref 74–99)
MONOCYTES ABSOLUTE: 0.51 E9/L (ref 0.1–0.95)
MONOCYTES RELATIVE PERCENT: 12.5 % (ref 2–12)
NEUTROPHILS ABSOLUTE: 2.46 E9/L (ref 1.8–7.3)
NEUTROPHILS RELATIVE PERCENT: 60.2 % (ref 43–80)
PDW BLD-RTO: 14.5 FL (ref 11.5–15)
PLATELET # BLD: 112 E9/L (ref 130–450)
PMV BLD AUTO: 10.2 FL (ref 7–12)
POTASSIUM REFLEX MAGNESIUM: 3.7 MMOL/L (ref 3.5–5)
RBC # BLD: 3.45 E12/L (ref 3.8–5.8)
SODIUM BLD-SCNC: 132 MMOL/L (ref 132–146)
WBC # BLD: 4.1 E9/L (ref 4.5–11.5)

## 2022-05-19 PROCEDURE — 2580000003 HC RX 258: Performed by: INTERNAL MEDICINE

## 2022-05-19 PROCEDURE — 94660 CPAP INITIATION&MGMT: CPT

## 2022-05-19 PROCEDURE — 80048 BASIC METABOLIC PNL TOTAL CA: CPT

## 2022-05-19 PROCEDURE — 6370000000 HC RX 637 (ALT 250 FOR IP): Performed by: HOSPITALIST

## 2022-05-19 PROCEDURE — 36415 COLL VENOUS BLD VENIPUNCTURE: CPT

## 2022-05-19 PROCEDURE — 6370000000 HC RX 637 (ALT 250 FOR IP): Performed by: INTERNAL MEDICINE

## 2022-05-19 PROCEDURE — 85025 COMPLETE CBC W/AUTO DIFF WBC: CPT

## 2022-05-19 PROCEDURE — 82962 GLUCOSE BLOOD TEST: CPT

## 2022-05-19 PROCEDURE — 99233 SBSQ HOSP IP/OBS HIGH 50: CPT | Performed by: INTERNAL MEDICINE

## 2022-05-19 RX ADMIN — SODIUM CHLORIDE, PRESERVATIVE FREE 10 ML: 5 INJECTION INTRAVENOUS at 09:04

## 2022-05-19 RX ADMIN — SPIRONOLACTONE 25 MG: 25 TABLET ORAL at 09:03

## 2022-05-19 RX ADMIN — INSULIN LISPRO 12 UNITS: 100 INJECTION, SOLUTION INTRAVENOUS; SUBCUTANEOUS at 11:54

## 2022-05-19 RX ADMIN — INSULIN LISPRO 1 UNITS: 100 INJECTION, SOLUTION INTRAVENOUS; SUBCUTANEOUS at 11:53

## 2022-05-19 RX ADMIN — LACTULOSE 10 G: 20 SOLUTION ORAL at 09:03

## 2022-05-19 RX ADMIN — RIFAXIMIN 550 MG: 550 TABLET ORAL at 09:03

## 2022-05-19 RX ADMIN — INSULIN LISPRO 12 UNITS: 100 INJECTION, SOLUTION INTRAVENOUS; SUBCUTANEOUS at 08:06

## 2022-05-19 RX ADMIN — INSULIN LISPRO 1 UNITS: 100 INJECTION, SOLUTION INTRAVENOUS; SUBCUTANEOUS at 08:06

## 2022-05-19 RX ADMIN — NADOLOL 20 MG: 20 TABLET ORAL at 09:03

## 2022-05-19 RX ADMIN — BUMETANIDE 2 MG: 1 TABLET ORAL at 09:03

## 2022-05-19 NOTE — PLAN OF CARE
Problem: Skin/Tissue Integrity  Goal: Absence of new skin breakdown  Description: 1. Monitor for areas of redness and/or skin breakdown  2. Assess vascular access sites hourly  3. Every 4-6 hours minimum:  Change oxygen saturation probe site  4. Every 4-6 hours:  If on nasal continuous positive airway pressure, respiratory therapy assess nares and determine need for appliance change or resting period.   5/19/2022 1035 by Bruce Bull RN  Outcome: Progressing     Problem: Safety - Adult  Goal: Free from fall injury  5/19/2022 1035 by Bruce Bull RN  Outcome: Progressing     Problem: Pain  Goal: Verbalizes/displays adequate comfort level or baseline comfort level  5/19/2022 1035 by Bruce Bull RN  Outcome: Progressing

## 2022-05-19 NOTE — PROGRESS NOTES
P Quality Flow/Interdisciplinary Rounds Progress Note        Quality Flow Rounds held on May 19, 2022    Disciplines Attending:  Bedside Nurse, ,  and Nursing Unit Leadership    Lu Horowitz was admitted on 5/15/2022  2:56 PM    Anticipated Discharge Date:       Disposition:    Nilton Score:  Nilton Scale Score: 17    Readmission Risk              Risk of Unplanned Readmission:  27           Discussed patient goal for the day, patient clinical progression, and barriers to discharge. The following Goal(s) of the Day/Commitment(s) have been identified:  discharge home?        Karel Mahmood RN  May 19, 2022

## 2022-05-19 NOTE — PROGRESS NOTES
Hospitalist NP aware of pt CT result    GI aware of CT results - Issue will be addressed in the AM per the team

## 2022-05-19 NOTE — DISCHARGE SUMMARY
[facility] Physician Discharge Summary       No follow-up provider specified. Activity level: As tolerated     Dispo: Home      Condition on discharge: Stable     Patient ID:  Lupis Dawson  [id number]  27 y.o.  1950    Admit date: 5/15/2022    Discharge date and time:  5/19/2022  12:32 PM    Admission Diagnoses: Principal Problem:    Acute respiratory failure ([address]  Active Problems:    Acute respiratory distress  Resolved Problems:    * No resolved hospital problems. *      Discharge Diagnoses: Principal Problem:    Acute respiratory failure ([address]  Active Problems:    Acute respiratory distress  Resolved Problems:    * No resolved hospital problems. *      Consults:  IP CONSULT TO GI  IP CONSULT TO DIETITIAN  IP CONSULT TO PALLIATIVE CARE  IP CONSULT TO PULMONOLOGY  IP CONSULT TO PULMONOLOGY  IP CONSULT TO IV TEAM  IP CONSULT TO IV TEAM  IP CONSULT TO IV TEAM    Hospital Course:   Patient Lupis Dawson is a 66 y.o. 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 Work-up during this hospitalization including CTA of the chest was negative for pulmonary embolism but was noted with large right pleural effusion. Pulmonology as well as GI service were consulted. Thoracentesis was done with drainage of 1450 cc of straw-colored fluid, studies was compatible with transudative etiology. CTA of the abdomen was also done during this hospitalization and came back positive for portal vein thrombosis. GI service recommended starting on Eliquis.   Patient as well as family are very eager to be discharged as they are moving out of the state tomorrow. Daughter at bedside noted that all follow-ups have been set up at his new destination. Patient will be discharged in stable condition after being cleared by consultants. Discharge Exam:  General: Resting comfortably/ male  HEENT: No discharge from nose ears  Neck: Trachea midline  Chest: Nonlabored respirations  Abd.: Soft and nondistended  Extr.:  Decreased muscle tone and bulk throughout  Skin: Anicteric    I/O last 3 completed shifts: In: 240 [P.O.:240]  Out: 1 [Urine:1]  No intake/output data recorded. LABS:  Recent Labs     05/17/22 0400 05/18/22 0318 05/19/22  0456    135 132   K 4.6 4.0 3.7    104 100   CO2 21* 23 24   BUN 12 17 16   CREATININE 0.7 0.8 0.8   GLUCOSE 114* 136* 170*   CALCIUM 9.0 8.3* 8.1*       Recent Labs     05/17/22 0400 05/18/22 0318 05/19/22 0456   WBC 5.7 4.7 4.1*   RBC 3.99 3.56* 3.45*   HGB 12.7 11.2* 10.8*   HCT 37.6 32.7* 31.3*   MCV 94.2 91.9 90.7   MCH 31.8 31.5 31.3   MCHC 33.8 34.3 34.5   RDW 14.6 14.6 14.5   * 104* 112*   MPV 10.7 10.6 10.2       No results for input(s): POCGLU in the last 72 hours. Imaging:  CT ABDOMEN PELVIS W IV CONTRAST Additional Contrast? None    Result Date: 5/15/2022  EXAMINATION: CT OF THE ABDOMEN AND PELVIS WITH CONTRAST 5/15/2022 5:30 pm TECHNIQUE: CT of the abdomen and pelvis was performed with the administration of intravenous contrast. Multiplanar reformatted images are provided for review. Automated exposure control, iterative reconstruction, and/or weight based adjustment of the mA/kV was utilized to reduce the radiation dose to as low as reasonably achievable.  COMPARISON: 10-21 HISTORY: ORDERING SYSTEM PROVIDED HISTORY: hx liver cirrhoisis abd dist TECHNOLOGIST PROVIDED HISTORY: Additional Contrast?->None Reason for exam:->hx liver cirrhoisis abd dist Decision Support Exception - unselect if not a suspected or confirmed emergency medical condition->Emergency Medical Condition (MA) FINDINGS: Lower Chest: Prominent appearing pleural effusions but incompletely covered for characterization. Adjacent lung density is most likely atelectasis. There is partially seen breast density likely gynecomastia. Hiatal hernia appears to be moderate sized with esophageal varices. Organs: Hepatic morphology consistent with cirrhosis. Splenomegaly and varices present. A prominent recanalized paraumbilical vein is noted exiting through the umbilicus and returning into the abdominal wall consistent with hernia. GI/Bowel: Diverticulosis coli present. No obstruction. Suspicious for wall thickening at the cecum but limited in characterization on the exam, most commonly would be due to congestive changes of cirrhosis. Pelvis: Right inguinal hernia also apparently fluid herniation into the abductor space, and focal low-attenuation within the right common femoral vein is present. Peritoneum/Retroperitoneum: Ascites is overall moderate most evident in the pelvis and right abdomen. Bones/Soft Tissues: Spinal degenerative changes with associated L4-L5 anterolisthesis. Motion artifact limits the exam.     1. Findings consistent with hepatic cirrhosis, 3rd spacing includes overall moderate ascites, and pleural effusions. 2. Additional portal hypertension seen as splenomegaly, varices and recanalized paraumbilical vein which is prominent sized. 3. Suspicious for focal thrombosis of right common femoral vein. XR CHEST PORTABLE    Result Date: 5/16/2022  EXAMINATION: ONE XRAY VIEW OF THE CHEST 5/16/2022 5:43 pm COMPARISON: Chest, single view 05/15/2022 HISTORY: ORDERING SYSTEM PROVIDED HISTORY: Thoracentesis TECHNOLOGIST PROVIDED HISTORY: Reason for exam:->Thoracentesis FINDINGS: A single portable AP view of the chest was obtained. Lung volumes are extremely low. There is marked elevation of left hemidiaphragm which extends nearly to the level of the graham. There are probable bilateral pleural effusions.   This appears slightly decreased in size on the right compared to prior examination. There are alveolar opacities within the bilateral lower lung zones. There is no evidence of pneumothorax. 1. No evidence of pneumothorax. 2. Probable bilateral pleural effusions with possible decreased size of right pleural effusion compared to prior examination. 3. Bibasilar airspace disease. XR CHEST PORTABLE    Result Date: 5/15/2022  EXAMINATION: ONE XRAY VIEW OF THE CHEST 5/15/2022 4:08 pm COMPARISON: 24 March 2022 HISTORY: ORDERING SYSTEM PROVIDED HISTORY: sob TECHNOLOGIST PROVIDED HISTORY: Reason for exam:->sob FINDINGS: There are extremely low lung volumes as previous study. There are bibasilar compressive subsegmental atelectatic changes. There is no evidence of a pneumothorax. I suspect small bilateral pleural effusions. There are atherosclerotic changes of the aorta without cardiomegaly. There is no evidence of a pneumothorax. There is low lung volumes with poor inspiratory F fracture with the continued bibasilar subsegmental atelectasis and small pleural effusions. CTA PULMONARY W CONTRAST    Result Date: 5/15/2022  EXAMINATION: CTA OF THE CHEST 5/15/2022 10:47 pm TECHNIQUE: CTA of the chest was performed after the administration of intravenous contrast.  Multiplanar reformatted images are provided for review. MIP images are provided for review. Automated exposure control, iterative reconstruction, and/or weight based adjustment of the mA/kV was utilized to reduce the radiation dose to as low as reasonably achievable.  COMPARISON: Portable chest performed earlier today at 1615 hours and CT dated 11/06/2021 HISTORY: ORDERING SYSTEM PROVIDED HISTORY: sob concern for pe TECHNOLOGIST PROVIDED HISTORY: Reason for exam:->sob concern for pe Decision Support Exception - unselect if not a suspected or confirmed emergency medical condition->Emergency Medical Condition (MA) FINDINGS: Pulmonary Arteries: Evaluation is significantly limited by patient motion related artifact. A small or peripheral pulmonary embolism cannot be entirely excluded. No large or central embolism identified. Mediastinum: No evidence of mediastinal lymphadenopathy. The heart and pericardium demonstrate no acute abnormality. There is no acute abnormality of the thoracic aorta. Lungs/pleura: Large right pleural effusion. No significant left pleural effusion. No pneumothorax. Assessment of lungs is limited by patient motion artifact. Moderate compressive atelectasis in the right lower lobe. Mild right upper lobe compressive atelectasis. Linear atelectasis in the left lower lobe is similar to the previous CT. Minimal atelectasis or scarring in the lingula. Upper Abdomen: Please see separate report for CT of the abdomen and pelvis performed earlier today. Soft Tissues/Bones: Degenerative changes are present in the spine and shoulders. Limited exam due to patient motion. Large right pleural effusion with compressive atelectasis most notable in the right lower lobe. Lingular and left lower lobe atelectasis similar to previous. No large or central pulmonary embolism. Due to limitations, small or peripheral embolism would be difficult to exclude. RECOMMENDATIONS: Unavailable     US ABDOMEN LIMITED    Result Date: 5/16/2022  EXAMINATION: LIMITED ABDOMINAL ULTRASOUND 5/16/2022 10:53 am COMPARISON: None. HISTORY: ORDERING SYSTEM PROVIDED HISTORY: Other ascites TECHNOLOGIST PROVIDED HISTORY: Reason for exam:->Ultrasound-guided abdominal paracentesis for underlying ascites in the setting of portal hypertension liver cirrhosis progression What reading provider will be dictating this exam?->CRC FINDINGS: The patient presented for paracentesis. In spite of ascites noted in the abdomen and pelvis on recent CT, only minimal ascites was appreciated in the upper abdomen.   The majority of the ascitic fluid appears to be positioned in the pelvis, not easily accessible. Insufficient ascitic fluid in the abdomen to safely perform paracentesis. US THORACENTESIS Which side should the procedure be performed? Right    Result Date: 5/17/2022  PROCEDURE: ULTRASOUNDGUIDED RIGHT THORACENTESIS 5/16/2022 HISTORY: ORDERING SYSTEM PROVIDED HISTORY: Right US Thoracentesis TECHNOLOGIST PROVIDED HISTORY: Reason for exam:->Right US Thoracentesis Which side should the procedure be performed?->Right What reading provider will be dictating this exam?->CRC TECHNIQUE: Sonographic examination of the right chest was performed. FINDINGS: Sonographic examination of the right chest demonstrates pleural effusion. The thoracentesis site was marked on the skin. Ultrasound-guided thoracentesis was performed by Dr. Miguel Angel Yost. Ultrasound-guided right chest marking for thoracentesis     US DUP LOWER EXTREMITY RIGHT CANDIDA    Result Date: 5/15/2022  EXAMINATION: DUPLEX VENOUS ULTRASOUND OF THE RIGHT LOWER EXTREMITY 5/15/2022 9:02 pm TECHNIQUE: Duplex ultrasound using B-mode/gray scaled imaging and Doppler spectral analysis and color flow was obtained of the deep venous structures of the right lower extremity. COMPARISON: None. HISTORY: ORDERING SYSTEM PROVIDED HISTORY: Concern for thrombus in right common femoral vein TECHNOLOGIST PROVIDED HISTORY: Reason for exam:->Concern for thrombus in right common femoral vein What reading provider will be dictating this exam?->CRC FINDINGS: The visualized veins of the right lower extremity are patent and free of echogenic thrombus. The veins demonstrate good compressibility with normal color flow study and spectral analysis. Arterial plaque noted. No evidence of DVT in the right lower extremity.        Patient Instructions:      Medication List      START taking these medications    apixaban 5 MG Tabs tablet  Commonly known as: ELIQUIS  Please take Eliquis 10mg twice daily for 7 days followed by 5mg twice daily for 6 months     nadolol 20 MG tablet  Commonly known as: CORGARD  Take 1 tablet by mouth daily        CHANGE how you take these medications    bumetanide 2 MG tablet  Commonly known as: Bumex  Take 1 tablet by mouth daily  What changed:   · when to take this  · additional instructions     spironolactone 25 MG tablet  Commonly known as: ALDACTONE  Take 1 tablet by mouth 2 times daily  What changed: when to take this        CONTINUE taking these medications    b complex vitamins capsule     blood glucose test strips strip  Commonly known as: [employer] AUTODISC VI;ONE TOUCH ULTRA TEST VI  1 each by In Vitro route 4 times daily One touch test strips     CALCIUM 1200 PO     Chromium Picolinate 1000 MCG Tabs     CINNAMON PO     CO Q 10 PO     CRANBERRY PO     escitalopram 5 MG tablet  Commonly known as: LEXAPRO  Take 1 tablet by mouth daily     fish oil 1000 MG Caps     Flaxseed Oil 1200 MG Caps     lactulose 10 g packet  Commonly known as: CEPHULAC     Magnesium 500 MG Tabs     MILK THISTLE PO     * MISC NATURAL PRODUCTS PO     * Neuriva Chew     MULTIVITAMIN PO     NovoLOG FlexPen 100 UNIT/ML injection pen  Generic drug: insulin aspart  Inject 14 Units into the skin 3 times daily (before meals) If BS less than 100, no coverage  If greater than 150, 14 units     Probiotic Multi-Enzyme Tabs     rifAXIMin 550 MG tablet  Commonly known as: XIFAXAN  Take 1 tablet by mouth 2 times daily Indications: APPROVED FROM 09/14/2021 TO 12/14/2022 PER anthem     rivastigmine 4.6 MG/24HR  Commonly known as: Exelon  Place 1 patch onto the skin daily     Tresiba FlexTouch 200 UNIT/ML Sopn  Generic drug: Insulin Degludec  Inject 26 Units into the skin nightly     Turmeric Curcumin 500 MG Caps     vitamin B-12 500 MCG tablet  Commonly known as: CYANOCOBALAMIN     vitamin C 500 MG tablet  Commonly known as: ASCORBIC ACID     Vitamin D3 50 MCG (2000 UT) Caps     vitamin E 400 UNIT capsule     zinc 50 MG Tabs tablet         * This list has 2 medication(s) that are the same as other medications prescribed for you. Read the directions carefully, and ask your doctor or other care provider to review them with you.                Where to Get Your Medications      These medications were sent to [facility] [address] [place], [address]    Phone: [phone]   · apixaban 5 MG Tabs tablet     These medications were sent to [facility] [phone]  [facility] [place] [address]    Phone: [phone]   · bumetanide 2 MG tablet  · nadolol 20 MG tablet  · spironolactone 25 MG tablet           Note that more than 30 minutes was spent in preparing discharge papers, discussing discharge with patient, medication review, etc.    Signed:  Electronically signed by Joey Coyle MD on 5/19/2022 at 12:32 PM

## 2022-05-19 NOTE — PROGRESS NOTES
CLINICAL PHARMACY NOTE: MEDS TO BEDS    Total # of Prescriptions Filled: 3   The following medications were delivered to the patient:  · Nadolol 20 mg  · Bumetanide 2 mg  · Spironolactone 25 mg    Additional Documentation:

## 2022-05-19 NOTE — PLAN OF CARE
Problem: Skin/Tissue Integrity  Goal: Absence of new skin breakdown  Description: 1. Monitor for areas of redness and/or skin breakdown  2. Assess vascular access sites hourly  3. Every 4-6 hours minimum:  Change oxygen saturation probe site  4. Every 4-6 hours:  If on nasal continuous positive airway pressure, respiratory therapy assess nares and determine need for appliance change or resting period.   5/19/2022 0154 by Sheila Diaz RN  Outcome: Progressing  5/18/2022 1815 by Lori Lewis RN  Outcome: Progressing     Problem: Safety - Adult  Goal: Free from fall injury  5/19/2022 0154 by Sheila Diaz RN  Outcome: Progressing  5/18/2022 1815 by Lori Lewis RN  Outcome: Progressing  Flowsheets (Taken 5/18/2022 1814)  Free From Fall Injury: Instruct family/caregiver on patient safety     Problem: Pain  Goal: Verbalizes/displays adequate comfort level or baseline comfort level  5/19/2022 0154 by Sheila Diaz RN  Outcome: Progressing  Flowsheets (Taken 5/18/2022 1955)  Verbalizes/displays adequate comfort level or baseline comfort level: Encourage patient to monitor pain and request assistance  5/18/2022 1815 by oLri Lewis RN  Outcome: Progressing     Problem: Chronic Conditions and Co-morbidities  Goal: Patient's chronic conditions and co-morbidity symptoms are monitored and maintained or improved  5/19/2022 0154 by Sheial Diaz RN  Outcome: Progressing  Flowsheets (Taken 5/18/2022 1955)  Care Plan - Patient's Chronic Conditions and Co-Morbidity Symptoms are Monitored and Maintained or Improved: Monitor and assess patient's chronic conditions and comorbid symptoms for stability, deterioration, or improvement  5/18/2022 1815 by Lori Lewis RN  Outcome: Progressing

## 2022-05-19 NOTE — PROGRESS NOTES
[id number] [address] PROGRESS NOTE    Patient: Cheri Bruno  MRN: [id number]  : 1950    Encounter Date: 2022  Encounter Time: 10:16 AM     Date of Admission: .5/15/2022  2:56 PM    Consulting Physician:  Primary Care Physician:      Oh Ji MD     [phone]    PROBLEM LIST:  Patient Active Problem List   Diagnosis    Type 1 diabetes mellitus without complication ([facility]    Anemia    Hepatic cirrhosis due to chronic hepatitis C infection ([facility]    Secondary esophageal varices with bleeding ([facility]    Dementia associated with other underlying disease without behavioral disturbance ([facility]    B12 deficiency    H/o streptococcal bacteremia    Upper GI bleed    Acute respiratory failure ([facility]    Acute respiratory distress     SUBJECTIVE:  Sleeping, but wakes easily to name  Wife at bedside and demanding they be discharged now as they are moving to [place] tomorrow  Denies dyspnea, cough    HOME MEDICATIONS:  Prior to Admission medications    Medication Sig Start Date End Date Taking?  Authorizing Provider   nadolol (CORGARD) 20 MG tablet Take 1 tablet by mouth daily 22  Yes Steve Nakul, APN   spironolactone (ALDACTONE) 25 MG tablet Take 1 tablet by mouth 2 times daily 22 Yes Steve Nakul, APN   bumetanide (BUMEX) 2 MG tablet Take 1 tablet by mouth daily 22  Yes Steve Nakul, APN   rifaximin (XIFAXAN) 550 MG tablet Take 1 tablet by mouth 2 times daily Indications: APPROVED FROM 2021 TO 2022 PER [place] 1/3/22   Patience Damon MD   lactulose (CEPHULAC) 10 g packet Take 10 g by mouth daily    Historical Provider, MD   Magnesium 500 MG TABS Take 1 tablet by mouth daily    Historical Provider, MD   Coenzyme Q10 (CO Q 10 PO) Take 200 mg by mouth daily    Historical Provider, MD   Cholecalciferol (VITAMIN D3) 50 MCG (2000) CAPS Take 1 capsule by mouth daily    Historical Provider, MD   Calcium Carbonate-Vit D-Min (CALCIUM 1200 PO) Take 1 tablet by mouth daily    Historical Provider, MD   Flaxseed, Linseed, (FLAXSEED OIL) 1200 MG CAPS Take 1 capsule by mouth daily    Historical Provider, MD   Multiple Vitamin (MULTIVITAMIN PO) Take 1 tablet by mouth daily    Historical Provider, MD   Turmeric Curcumin 500 MG CAPS Take 1 capsule by mouth daily    Historical Provider, MD   zinc 50 MG TABS tablet Take 50 mg by mouth daily    Historical Provider, MD   MILK THISTLE PO Take 1,000 mg by mouth daily    Historical Provider, MD   Chromium Picolinate 1000 MCG TABS Take 1 tablet by mouth daily    Historical Provider, MD   CINNAMON PO Take 1,000 mg by mouth daily    Historical Provider, MD   [facility] (NEURIVA) CHEW Take 1 tablet by mouth daily    Historical Provider, MD   rivastigmine (EXELON) 4.6 MG/24HR Place 1 patch onto the skin daily 3/14/22   Malgorzata Harris MD   escitalopram (LEXAPRO) 5 MG tablet Take 1 tablet by mouth daily 12/20/21   Yaron Jeronimo MD   blood glucose test strips (ASCENSIA AUTODISC VI;ONE TOUCH ULTRA TEST VI) strip 1 each by In Vitro route 4 times daily One touch test strips 10/29/21   Yaron Jeronimo MD   [facility] [place] [facility] PO Take 1 tablet by mouth every other day -[address] [place]-     Historical Provider, MD   CRANBERRY PO Take 1 tablet by mouth as needed (URINARY)    Historical Provider, MD   insulin aspart (NOVOLOG FLEXPEN) 100 UNIT/ML injection pen Inject 14 Units into the skin 3 times daily (before meals) If BS less than 100, no coverage  If greater than 150, 14 units 7/13/21 12/13/21  Yaron Jeronimo MD   Insulin Degludec (TRESIBA FLEXTOUCH) 200 UNIT/ML SOPN Inject 26 Units into the skin nightly 3/9/21   Yaron Jeronimo MD   Probiotic Product (PROBIOTIC MULTI-ENZYME) TABS Take 1 tablet by mouth daily     Historical Provider, MD   Omega-3 Fatty Acids (FISH OIL) 1000 MG CAPS Take 1,000 mg by mouth daily     Historical Provider, MD   vitamin C (ASCORBIC ACID) 500 MG tablet Take 1,000 mg by mouth daily     Historical Provider, MD   vitamin B-12 (CYANOCOBALAMIN) 500 MCG tablet Take 500 mcg by mouth daily     Historical Provider, MD   vitamin E 400 UNIT capsule Take 400 Units by mouth daily     Historical Provider, MD   b complex vitamins capsule Take 1 capsule by mouth daily     Historical Provider, MD       CURRENT MEDICATIONS:  Current Facility-Administered Medications: sodium chloride flush 0.9 % injection 5-40 mL, 5-40 mL, IntraVENous, 2 times per day  sodium chloride flush 0.9 % injection 5-40 mL, 5-40 mL, IntraVENous, PRN  0.9 % sodium chloride infusion, 25 mL, IntraVENous, PRN  ondansetron (ZOFRAN-ODT) disintegrating tablet 4 mg, 4 mg, Oral, Q8H PRN **OR** ondansetron (ZOFRAN) injection 4 mg, 4 mg, IntraVENous, Q6H PRN  polyethylene glycol (GLYCOLAX) packet 17 g, 17 g, Oral, Daily PRN  acetaminophen (TYLENOL) tablet 650 mg, 650 mg, Oral, Q6H PRN **OR** acetaminophen (TYLENOL) suppository 650 mg, 650 mg, Rectal, Q6H PRN  insulin lispro (HUMALOG) injection vial 0-6 Units, 0-6 Units, SubCUTAneous, TID WC  insulin lispro (HUMALOG) injection vial 0-3 Units, 0-3 Units, SubCUTAneous, Nightly  lactulose (CHRONULAC) 10 GM/15ML solution 10 g, 10 g, Oral, Daily  rifAXIMin (XIFAXAN) tablet 550 mg, 550 mg, Oral, BID  bumetanide (BUMEX) tablet 2 mg, 2 mg, Oral, Daily  LORazepam (ATIVAN) injection 1 mg, 1 mg, IntraVENous, Q4H PRN  insulin glargine (LANTUS) injection vial 21 Units, 21 Units, SubCUTAneous, Nightly  insulin lispro (HUMALOG) injection vial 12 Units, 12 Units, SubCUTAneous, TID WC  nadolol (CORGARD) tablet 20 mg, 20 mg, Oral, Daily  glucose chewable tablet 16 g, 4 tablet, Oral, PRN  dextrose bolus 10% 125 mL, 125 mL, IntraVENous, PRN **OR** dextrose bolus 10% 250 mL, 250 mL, IntraVENous, PRN  glucagon (rDNA) injection 1 mg, 1 mg, IntraMUSCular, PRN  dextrose 5 % solution, 100 mL/hr, IntraVENous, PRN  spironolactone (ALDACTONE) tablet 25 mg, 25 mg, Oral, BID    IV MEDICATIONS:   sodium chloride 25 mL (22 1800)    dextrose         ALLERGIES:  Allergies   Allergen Reactions    Ketorolac Tromethamine Shortness Of Breath and Other (See Comments)     Depression, \"walking like a zombie\", no desire to do anything.  Memantine Hcl Other (See Comments)     AMS, \"walking like a zombie\"    Tamsulosin Hcl Shortness Of Breath     PHYSICAL EXAMINATION:     VITAL SIGNS:  /69   Pulse 81   Temp 98.1 °F (36.7 °C) (Oral)   Resp 18   Ht 5' 10\" (1.778 m)   Wt 161 lb (73 kg)   SpO2 95%   BMI 23.10 kg/m²   Wt Readings from Last 3 Encounters:   22 161 lb (73 kg)   22 165 lb 9.6 oz (75.1 kg)   22 170 lb (77.1 kg)     Temp Readings from Last 3 Encounters:   22 98.1 °F (36.7 °C) (Oral)   22 97.3 °F (36.3 °C) (Temporal)   21 97.6 °F (36.4 °C) (Tympanic)     TMAX:  BP Readings from Last 3 Encounters:   22 134/69   22 110/74   21 110/60     Pulse Readings from Last 3 Encounters:   22 81   22 96   21 88       CURRENT PULSE OXIMETRY: SpO2: 95 %  24HR PULSE OXIMETRY RANGE: SpO2  Av %  Min: 94 %  Max: 96 %  CVP:      ________________________________________________________________________    VENTILATOR SETTINGS (if applicable): Additional Respiratory Assessments  Pulse: 81  Resp: 18  SpO2: 95 %  Swallow: Normal - able to swallow solids  ETCO2:  Peak Inspiratory Pressure:  End-Inspiratory Plateau Pressure:    ABG:    No results for input(s): PH, PO2, PCO2, HCO3, BE, O2SAT, METHB, O2HB, COHB, O2CON, HHB, THB in the last 72 hours. FiO2 : 30 %     ________________________________________________________________________    IV ACCESS:    NUTRITION: ADULT DIET; Dysphagia - Soft and Bite Sized; 3 carb choices (45 gm/meal); Low Sodium (2 gm)  ADULT ORAL NUTRITION SUPPLEMENT; Breakfast, Lunch, Dinner; Fortified Pudding Oral Supplement    INTAKE/OUTPUTS:  I/O last 3 completed shifts:   In: 240 [P.O.:240]  Out: 1 [Urine:1]  No intake or output data in the 24 hours ending 05/19/22 1016     General Appearance: NAD, sleeping  Eyes: PERRLA   Neck: neck supple and non tender without mass, no thyromegaly  Pulmonary/Chest: decreased breath sounds right side, no accessory muscles of inspiration, no focal wheezes  Cardiovascular: RRR, normal S1 and S2, no murmurs, rubs, clicks or gallops  Abdomen: soft, non-tender, non-distended, normal bowel sounds, no masses or organomegaly   Extremities: no cyanosis, no clubbing, no edema  Musculoskeletal: normal range of motion, no joint swelling, deformity or tenderness   Neurologic: sleepy but follows commands      LABS/IMAGING:    CBC:  Lab Results   Component Value Date    WBC 4.1 (L) 05/19/2022    HGB 10.8 (L) 05/19/2022    HCT 31.3 (L) 05/19/2022    MCV 90.7 05/19/2022     (L) 05/19/2022    LYMPHOPCT 22.7 05/19/2022    RBC 3.45 (L) 05/19/2022    MCH 31.3 05/19/2022    MCHC 34.5 05/19/2022    RDW 14.5 05/19/2022    NEUTOPHILPCT 60.2 05/19/2022    MONOPCT 12.5 (H) 05/19/2022    EOSPCT 2.0 10/21/2020    BASOPCT 0.7 05/19/2022    NEUTROABS 2.46 05/19/2022    LYMPHSABS 0.93 (L) 05/19/2022    MONOSABS 0.51 05/19/2022    EOSABS 0.15 05/19/2022    BASOSABS 0.03 05/19/2022       Recent Labs     05/19/22  0456 05/18/22  0318 05/17/22  0400   WBC 4.1* 4.7 5.7   HGB 10.8* 11.2* 12.7   HCT 31.3* 32.7* 37.6   MCV 90.7 91.9 94.2   * 104* 103*       BMP:   Recent Labs     05/17/22  0400 05/18/22  0318 05/19/22  0456    135 132   K 4.6 4.0 3.7    104 100   CO2 21* 23 24   BUN 12 17 16   CREATININE 0.7 0.8 0.8       MG:   Lab Results   Component Value Date    MG 2.2 12/05/2021     Ca/Phos:   Lab Results   Component Value Date    CALCIUM 8.1 (L) 05/19/2022    PHOS 4.0 12/05/2021     Amylase:   Lab Results   Component Value Date    AMYLASE 39 10/10/2021     Lipase:   Lab Results   Component Value Date    LIPASE 29 05/15/2022     LIVER PROFILE:   No results for input(s): AST, ALT, LIPASE, BILIDIR, BILITOT, ALKPHOS in the last 72 hours. Invalid input(s): AMYLASE,  ALB    PT/INR:   No results for input(s): PROTIME, INR in the last 72 hours. APTT:   No results for input(s): APTT in the last 72 hours. Cardiac Enzymes:  Lab Results   Component Value Date    TROPONINI <0.01 06/18/2018       Hgb A1C:   Lab Results   Component Value Date    LABA1C 6.1 (H) 10/13/2021     No results found for: EAG  CORAL:   Lab Results   Component Value Date    CORAL NEGATIVE 02/02/2016     ESR:   Lab Results   Component Value Date    SEDRATE 0 11/01/2021     CRP:   Lab Results   Component Value Date    CRP 0.7 (H) 11/01/2021     D Dimer: No results found for: DDIMER  Folate and B12:   Lab Results   Component Value Date    GYZDAMHC49 7596 (H) 02/26/2021   ,   Lab Results   Component Value Date    FOLATE >20.0 06/18/2018       Lactic Acid:   Lab Results   Component Value Date    LACTA 1.9 10/12/2021     Ammonia:   Cortisol:  Thyroid Studies:  Lab Results   Component Value Date    TSH 2.27 10/21/2020    [id number] 9.3 02/02/2016     CTA ABDOMEN W CONTRAST   Final Result   Motion limits evaluation. Allowing for this limitation, findings are as   follows: This examination better demonstrates findings of thrombosis of the   intrahepatic portal veins. The comparison ultrasound did not demonstrate   these findings as well. Cirrhosis. Stable sequela of portal hypertension in the abdomen. Stable partially imaged bilateral pleural effusions. US DUP ABD PEL RETRO SCROT LIMITED   Final Result   The right portal vein was not seen on this exam, and given findings on the   comparison CT may be thrombosed. Patent main and left portal veins. Patent   hepatic veins. Patent hepatic artery. Cirrhosis with sequela portal hypertension. Ascites. Right pleural effusion. Gallbladder wall thickening is likely secondary to portal hypertension.          US ABDOMEN LIMITED   Final Result   The right portal vein was not seen on this exam, and given findings on the   comparison CT may be thrombosed. Patent main and left portal veins. Patent   hepatic veins. Patent hepatic artery. Cirrhosis with sequela portal hypertension. Ascites. Right pleural effusion. Gallbladder wall thickening is likely secondary to portal hypertension. XR CHEST PORTABLE   Final Result   1. No evidence of pneumothorax. 2. Probable bilateral pleural effusions with possible decreased size of right   pleural effusion compared to prior examination. 3. Bibasilar airspace disease. US THORACENTESIS Which side should the procedure be performed? Right   Final Result   Ultrasound-guided right chest marking for thoracentesis         US ABDOMEN LIMITED   Final Result   Insufficient ascitic fluid in the abdomen to safely perform paracentesis. CTA PULMONARY W CONTRAST   Final Result   Limited exam due to patient motion. Large right pleural effusion with compressive atelectasis most notable in the   right lower lobe. Lingular and left lower lobe atelectasis similar to previous. No large or central pulmonary embolism. Due to limitations, small or   peripheral embolism would be difficult to exclude. RECOMMENDATIONS:   Unavailable         US DUP LOWER EXTREMITY RIGHT CANDIDA   Final Result   No evidence of DVT in the right lower extremity. CT ABDOMEN PELVIS W IV CONTRAST Additional Contrast? None   Final Result   1. Findings consistent with hepatic cirrhosis, 3rd spacing includes overall   moderate ascites, and pleural effusions. 2. Additional portal hypertension seen as splenomegaly, varices and   recanalized paraumbilical vein which is prominent sized. 3. Suspicious for focal thrombosis of right common femoral vein.          XR CHEST PORTABLE   Final Result   There is low lung volumes with poor inspiratory F fracture with the continued   bibasilar subsegmental atelectasis and small pleural effusions. MRI ABDOMEN W WO CONTRAST MRCP    (Results Pending)       ASSESSMENT:  1.) Large Right Pleural Effusion   - transudate  - cultures negative  - cytology negative   2.) Cirrhosis due to VEGA  3.) Esophageal Varices  4.) Hepatic Encephalopathy   5.) AScites  6.) Dementia   7.) H/O Hep C Virus treated with Vosevi    PLAN:  1.) R US thoracentesis completed at bedside 5/16 with about 1450 cc straw-colored free flowing fluid removed. LD 62, cytology negative. CXR 5/16 post thoracentesis with no ptx, B pleural effusions decreased on the R, B airspace disease. 2.) 5/18 CT abdomen: exam better demonstrates findings of thrombosis of the intrahepatic portal veins. Cirrhosis, stable sequela or portal hypertension in abd, stable partially imaged bilateral pleural effusion. Not on [address] - GI to address this am per chart. 3.) Continue rifaximin, lactulose. Stopped Unasyn, procal 0.13  4.) family is moving to [facility] 5/20, will forward all fluid analysis and cytology to new pulmonary physician in [facility]  5.) DVT Prophylaxis     Ok for discharge per pulmonary perspective. Moving to [place] and has pulmonary scheduled already.        Yared Fermin, APRN - CNP  5/19/2022  10:16 AM

## 2022-05-20 LAB
BODY FLUID CULTURE, STERILE: NORMAL
GRAM STAIN RESULT: NORMAL

## 2022-05-21 LAB — BLOOD CULTURE, ROUTINE: NORMAL

## 2022-06-20 LAB
FUNGUS (MYCOLOGY) CULTURE: NORMAL
FUNGUS STAIN: NORMAL

## 2022-07-05 LAB
AFB CULTURE (MYCOBACTERIA): NORMAL
AFB SMEAR: NORMAL

## (undated) DEVICE — GOWN,SIRUS,FABRNF,L,20/CS: Brand: MEDLINE

## (undated) DEVICE — SYRINGE MED 10ML TRNSLUC BRL PLUNG BLK MRK POLYPR CTRL

## (undated) DEVICE — DUAL LUMEN STOMACH TUBE: Brand: SALEM SUMP

## (undated) DEVICE — BASIC SINGLE BASIN 1-LF: Brand: MEDLINE INDUSTRIES, INC.

## (undated) DEVICE — PAD,NON-ADHERENT,2X3,STERILE,LF,1/PK: Brand: MEDLINE

## (undated) DEVICE — INTENDED FOR TISSUE SEPARATION, AND OTHER PROCEDURES THAT REQUIRE A SHARP SURGICAL BLADE TO PUNCTURE OR CUT.: Brand: BARD-PARKER ® STAINLESS STEEL BLADES

## (undated) DEVICE — SET T AND A POTESTA

## (undated) DEVICE — PUNCH TONSIL / ADENOID

## (undated) DEVICE — ELECTRODE ELECSURG NDL 2.8 INX7.2 CM COAT INSUL EDGE

## (undated) DEVICE — 4-PORT MANIFOLD: Brand: NEPTUNE 2

## (undated) DEVICE — SYRINGE, LUER LOCK, 5ML: Brand: MEDLINE

## (undated) DEVICE — BLADE ES ELASTOMERIC COAT INSUL DURABLE BEND UPTO 90DEG

## (undated) DEVICE — PENCIL ES CRD L10FT HND SWCHING ROCK SWCH W/ EDGE COAT BLDE

## (undated) DEVICE — TOWEL,OR,DSP,ST,BLUE,STD,6/PK,12PK/CS: Brand: MEDLINE

## (undated) DEVICE — SYRINGE,EAR/ULCER, 2 OZ, STERILE: Brand: MEDLINE

## (undated) DEVICE — CLEANER,CAUTERY TIP,2X2",STERILE: Brand: MEDLINE

## (undated) DEVICE — COUNTER NDL 30 COUNT DBL MAG

## (undated) DEVICE — SURGICAL PROCEDURE PACK EENT CUST

## (undated) DEVICE — MULTIPLE BAND LIGATOR: Brand: SPEEDBAND SUPERVIEW SUPER 7

## (undated) DEVICE — SPONGE,TONSIL,DBL STRNG,XRAY,MED,1",STRL: Brand: MEDLINE INDUSTRIES, INC.

## (undated) DEVICE — BLOCK BITE 60FR RUBBER ADLT DENTAL

## (undated) DEVICE — SOLUTION IV IRRIG POUR BRL 0.9% SODIUM CHL 2F7124

## (undated) DEVICE — LIGHT SOURCE WHT

## (undated) DEVICE — COAGULATOR SUCT 10FR LAIN FTSWCH ACTIVATION DISP VALLEYLAB

## (undated) DEVICE — GLOVE ORANGE PI 7 1/2   MSG9075

## (undated) DEVICE — TUBING SUCT 12FR MAL ALUM SHFT FN CAP VENT UNIV CONN W/ OBT

## (undated) DEVICE — GRADUATE TRIANG MEASURE 1000ML BLK PRNT

## (undated) DEVICE — ELECTRODE PT RET AD L9FT HI MOIST COND ADH HYDRGEL CORDED

## (undated) DEVICE — BIPOLAR ELECTROHEMOSTASIS CATHETER: Brand: GOLD PROBE

## (undated) DEVICE — COVER HNDL LT DISP

## (undated) DEVICE — NEEDLE FLTR 18GA L1.5IN MEM THK5UM BLNT DISP

## (undated) DEVICE — GOWN,SIRUS,FABRNF,XL,20/CS: Brand: MEDLINE

## (undated) DEVICE — KIT,ANTI FOG,W/SPONGE & FLUID,SOFT PACK: Brand: MEDLINE

## (undated) DEVICE — SPONGE GZ W4XL4IN RAYON POLY FILL CVR W/ NONWOVEN FAB

## (undated) DEVICE — COVER,LIGHT HANDLE,FLX,2/PK: Brand: MEDLINE INDUSTRIES, INC.

## (undated) DEVICE — SIX SHOOTER SAEED MULTI-BAND LIGATOR: Brand: SAEED

## (undated) DEVICE — NEEDLE HYPO 25GA L1.5IN BLU POLYPR HUB S STL REG BVL STR